# Patient Record
Sex: MALE | Race: BLACK OR AFRICAN AMERICAN | Employment: UNEMPLOYED | URBAN - METROPOLITAN AREA
[De-identification: names, ages, dates, MRNs, and addresses within clinical notes are randomized per-mention and may not be internally consistent; named-entity substitution may affect disease eponyms.]

---

## 2017-01-11 ENCOUNTER — OFFICE VISIT (OUTPATIENT)
Dept: FAMILY MEDICINE CLINIC | Age: 52
End: 2017-01-11

## 2017-01-11 VITALS
BODY MASS INDEX: 25.92 KG/M2 | WEIGHT: 175 LBS | OXYGEN SATURATION: 99 % | SYSTOLIC BLOOD PRESSURE: 132 MMHG | HEIGHT: 69 IN | DIASTOLIC BLOOD PRESSURE: 85 MMHG | TEMPERATURE: 98.3 F | RESPIRATION RATE: 18 BRPM | HEART RATE: 91 BPM

## 2017-01-11 DIAGNOSIS — E11.9 TYPE 2 DIABETES MELLITUS WITHOUT COMPLICATION, WITHOUT LONG-TERM CURRENT USE OF INSULIN (HCC): Primary | ICD-10-CM

## 2017-01-11 DIAGNOSIS — Z86.73 HISTORY OF CVA (CEREBROVASCULAR ACCIDENT): ICD-10-CM

## 2017-01-11 DIAGNOSIS — E78.2 MIXED HYPERLIPIDEMIA: ICD-10-CM

## 2017-01-11 DIAGNOSIS — J40 BRONCHITIS: ICD-10-CM

## 2017-01-11 DIAGNOSIS — E11.9 TYPE 2 DIABETES MELLITUS WITHOUT COMPLICATION, WITHOUT LONG-TERM CURRENT USE OF INSULIN (HCC): ICD-10-CM

## 2017-01-11 RX ORDER — INSULIN PUMP SYRINGE, 3 ML
EACH MISCELLANEOUS
Qty: 1 KIT | Refills: 0 | Status: ON HOLD | OUTPATIENT
Start: 2017-01-11 | End: 2020-11-05

## 2017-01-11 RX ORDER — LISINOPRIL 5 MG/1
5 TABLET ORAL DAILY
Qty: 30 TAB | Refills: 5 | Status: SHIPPED | OUTPATIENT
Start: 2017-01-11 | End: 2018-09-20

## 2017-01-11 RX ORDER — LANCETS 28 GAUGE
EACH MISCELLANEOUS
Qty: 50 LANCET | Refills: 12 | Status: ON HOLD | OUTPATIENT
Start: 2017-01-11 | End: 2020-11-05

## 2017-01-11 RX ORDER — ATORVASTATIN CALCIUM 20 MG/1
20 TABLET, FILM COATED ORAL DAILY
Qty: 30 TAB | Refills: 5 | Status: SHIPPED | OUTPATIENT
Start: 2017-01-11 | End: 2017-06-22 | Stop reason: SDUPTHER

## 2017-01-11 NOTE — PROGRESS NOTES
Dora Garcia 46 y.o. male   Chief Complaint   Patient presents with    Follow-up     1 month f/u    Hypertension    Diabetes     Type 2    Labs     completed on 12-29-16         1. Have you been to the ER, urgent care clinic since your last visit? Hospitalized since your last visit? No    2. Have you seen or consulted any other health care providers outside of the [address] [place] [address] since your last visit? Include any pap smears or colon screening.  No

## 2017-01-11 NOTE — PROGRESS NOTES
HISTORY OF PRESENT ILLNESS  Mendy Al is a 46 y.o. male. Chief Complaint   Patient presents with    Follow-up     1 month f/u    Hypertension    Diabetes     Type 2    Labs     completed on 12-29-16       HPI  Pt is here for follow up of Hypertension, Diabetes Type 2, and review labs. Pt has not been able to check blood sugars as he does not have a machine or testing supplies. Pt has not yet noted any improvement in his vision or his foot pain. Pt had labs on 12-29-16. Labs reviewed in detail with pt. Pt does need medication refills today. Pt requests electronic refills. New concerns today: none      Pt reports that his cough has improved. He is still coughing at times. He is using the albuterol inhaler. Review of Systems   Constitutional: Negative. HENT: Negative. Eyes: Positive for blurred vision. Respiratory: Negative. Cardiovascular: Negative. Neurological: Positive for sensory change. All other systems reviewed and are negative. Physical Exam  Nursing note and vitals reviewed. Constitutional: He is oriented to person, place, and time. He appears well-developed and well-nourished. HENT:   Head: Normocephalic and atraumatic. Right Ear: External ear normal.   Left Ear: External ear normal.   Nose: Nose normal.   Eyes: Conjunctivae and EOM are normal.   Neck: Normal range of motion. Neck supple. No JVD present. Carotid bruit is not present. No thyromegaly present. Cardiovascular: Normal rate, regular rhythm, normal heart sounds and intact distal pulses. Exam reveals no gallop and no friction rub. No murmur heard. Pulmonary/Chest: Effort normal and breath sounds normal. He has no wheezes. He has no rhonchi. He has no rales. Abdominal: Soft. Musculoskeletal: Normal range of motion. Neurological: He is alert and oriented to person, place, and time. Coordination normal.   Skin: Skin is warm and dry. Psychiatric: He has a normal mood and affect. His behavior is normal. Judgment and thought content normal.       ASSESSMENT and PLAN  Kiki Lefort was seen today for follow-up, hypertension, diabetes and labs. Diagnoses and all orders for this visit:    Type 2 diabetes mellitus without complication, without long-term current use of insulin ([facility])  -     Blood-Glucose Meter monitoring kit; TEST GLUCOSE TWICE DAILY. DX:E11.9  -     glucose blood VI test strips (FREESTYLE LITE STRIPS) strip; TEST BLOOD GLUCOSE TWICE DAILY. DX:E11.9  -     lancets (FREESTYLE LANCETS) 28 gauge misc; TEST BLOOD GLUCOSE TWICE DAILY. DX E11.9  -     lisinopril (PRINIVIL, ZESTRIL) 5 mg tablet; Take 1 Tab by mouth daily.  -     METABOLIC PANEL, COMPREHENSIVE; Future  -     LIPID PANEL; Future  -     HEMOGLOBIN A1C WITH EAG; Future    Mixed hyperlipidemia  -     atorvastatin (LIPITOR) 20 mg tablet; Take 1 Tab by mouth daily.  -     METABOLIC PANEL, COMPREHENSIVE; Future  -     LIPID PANEL; Future    History of CVA (cerebrovascular accident)  -     METABOLIC PANEL, COMPREHENSIVE; Future  -     LIPID PANEL; Future    Bronchitis  Improving.       Follow-up Disposition: 1 month for dm; sooner prn

## 2017-01-13 ENCOUNTER — TELEPHONE (OUTPATIENT)
Dept: FAMILY MEDICINE CLINIC | Age: 52
End: 2017-01-13

## 2017-01-13 NOTE — TELEPHONE ENCOUNTER
Contacted patients insurance for approved meter. Insurance instructed me to have the patient call [employer] to set up for his supplies. Pt was notified and given the contact info to [employer].

## 2017-02-13 ENCOUNTER — OFFICE VISIT (OUTPATIENT)
Dept: FAMILY MEDICINE CLINIC | Age: 52
End: 2017-02-13

## 2017-02-13 VITALS
OXYGEN SATURATION: 97 % | HEIGHT: 69 IN | DIASTOLIC BLOOD PRESSURE: 87 MMHG | HEART RATE: 86 BPM | WEIGHT: 179 LBS | BODY MASS INDEX: 26.51 KG/M2 | SYSTOLIC BLOOD PRESSURE: 138 MMHG | TEMPERATURE: 98.4 F

## 2017-02-13 DIAGNOSIS — E11.9 TYPE 2 DIABETES MELLITUS WITHOUT COMPLICATION, WITHOUT LONG-TERM CURRENT USE OF INSULIN (HCC): Primary | ICD-10-CM

## 2017-02-13 RX ORDER — GLIMEPIRIDE 2 MG/1
TABLET ORAL
Qty: 45 TAB | Refills: 0 | Status: SHIPPED | OUTPATIENT
Start: 2017-02-13 | End: 2017-03-02 | Stop reason: SDUPTHER

## 2017-02-13 RX ORDER — GABAPENTIN 100 MG/1
CAPSULE ORAL
Refills: 0 | COMMUNITY
Start: 2017-01-28 | End: 2017-12-01 | Stop reason: SDUPTHER

## 2017-02-13 NOTE — PROGRESS NOTES
1. Have you been to the ER, urgent care clinic since your last visit? Hospitalized since your last visit? Pt states he was seen at [facility] for DM foot pain. Pt was referred to 1 foot 2 foot. Pt was given gabapentin for the DM foot pain. 2. Have you seen or consulted any other health care providers outside of the [address] [place] [address] since your last visit? Include any pap smears or colon screening. Yes 1 foot 2 foot    Is someone accompanying this pt? no    Is the patient using any DME equipment during OV? no      Chief Complaint   Patient presents with    Diabetes     Pt following up today onchronic condition. Pt has recieved DM supplies and has been checking blood sugars daily. Pt states that his blood sugars on average are 280.  Pt states he always checks blood sugars in the AM before breakfast.

## 2017-02-13 NOTE — PROGRESS NOTES
Chief Complaint   Patient presents with    Diabetes     Pt following up today onchronic condition. Pt has recieved DM supplies and has been checking blood sugars daily. Pt states that his blood sugars on average are 280. Pt states he always checks blood sugars in the AM before breakfast.     HISTORY OF PRESENT ILLNESS  Yudi Cabrera is a 46 y.o. male. HPI  Pt is here for follow up of diabetes. He has been checking bs readings and they are usually around 280 prior to breakfast.      Pt does not need medication refills today. New concerns today: pt will f/u with podiatry later this month for testing. He will f/u for the results of those tests. ROS  Review of Systems   Constitutional: Negative. HENT: Negative. Respiratory: Negative. Cardiovascular: Negative. All other systems reviewed and are negative. Physical Exam  Nursing note and vitals reviewed. Constitutional: He is oriented to person, place, and time. He appears well-developed and well-nourished. HENT:   Head: Normocephalic and atraumatic. Right Ear: External ear normal.   Left Ear: External ear normal.   Nose: Nose normal.   Eyes: Conjunctivae and EOM are normal.   Neck: Normal range of motion. Neck supple. No JVD present. Carotid bruit is not present. No thyromegaly present. Cardiovascular: Normal rate, regular rhythm, normal heart sounds and intact distal pulses. Exam reveals no gallop and no friction rub. No murmur heard. Pulmonary/Chest: Effort normal and breath sounds normal. He has no wheezes. He has no rhonchi. He has no rales. Abdominal: Soft. Musculoskeletal: Normal range of motion. Neurological: He is alert and oriented to person, place, and time. Coordination normal.   Skin: Skin is warm and dry. Psychiatric: He has a normal mood and affect. His behavior is normal. Judgment and thought content normal.       ASSESSMENT and PLAN  Koki Fletcher was seen today for diabetes.     Diagnoses and all orders for this visit:    Type 2 diabetes mellitus without complication, without long-term current use of insulin (HCC)  -     glimepiride (AMARYL) 2 mg tablet; Take one pill by mouth each morning x 2 wks, then increase to 2 pills by mouth each morning. Anticipate increasing dosage further if tolerated. Pt agrees with plan.       Follow-up Disposition: 1 month; sooner prn

## 2017-03-02 DIAGNOSIS — E11.9 TYPE 2 DIABETES MELLITUS WITHOUT COMPLICATION, WITHOUT LONG-TERM CURRENT USE OF INSULIN (HCC): ICD-10-CM

## 2017-03-03 RX ORDER — GLIMEPIRIDE 2 MG/1
TABLET ORAL
Qty: 45 TAB | Refills: 0 | Status: SHIPPED | OUTPATIENT
Start: 2017-03-03 | End: 2017-07-25 | Stop reason: SDUPTHER

## 2017-05-31 ENCOUNTER — TELEPHONE (OUTPATIENT)
Dept: FAMILY MEDICINE CLINIC | Age: 52
End: 2017-05-31

## 2017-05-31 NOTE — TELEPHONE ENCOUNTER
Arcelia with [employer] is calling regarding paperwork that was filled out by Dr. Sue Ron. Not all the areas have been filled out. She needs ICD 10 codes pertaining to diabetes so that [employer] will pay.

## 2017-06-02 ENCOUNTER — TELEPHONE (OUTPATIENT)
Dept: FAMILY MEDICINE CLINIC | Age: 52
End: 2017-06-02

## 2017-06-07 ENCOUNTER — OFFICE VISIT (OUTPATIENT)
Dept: FAMILY MEDICINE CLINIC | Age: 52
End: 2017-06-07

## 2017-06-07 ENCOUNTER — PATIENT OUTREACH (OUTPATIENT)
Dept: FAMILY MEDICINE CLINIC | Age: 52
End: 2017-06-07

## 2017-06-07 VITALS
HEIGHT: 69 IN | HEART RATE: 96 BPM | OXYGEN SATURATION: 97 % | BODY MASS INDEX: 26.36 KG/M2 | TEMPERATURE: 98.1 F | DIASTOLIC BLOOD PRESSURE: 89 MMHG | SYSTOLIC BLOOD PRESSURE: 150 MMHG | RESPIRATION RATE: 18 BRPM | WEIGHT: 178 LBS

## 2017-06-07 DIAGNOSIS — Z12.11 COLON CANCER SCREENING: ICD-10-CM

## 2017-06-07 DIAGNOSIS — E11.9 TYPE 2 DIABETES MELLITUS WITHOUT COMPLICATION, WITHOUT LONG-TERM CURRENT USE OF INSULIN (HCC): ICD-10-CM

## 2017-06-07 DIAGNOSIS — Z00.00 WELL ADULT EXAM: Primary | ICD-10-CM

## 2017-06-07 DIAGNOSIS — Z23 ENCOUNTER FOR IMMUNIZATION: ICD-10-CM

## 2017-06-07 DIAGNOSIS — Z11.59 ENCOUNTER FOR HEPATITIS C SCREENING TEST FOR LOW RISK PATIENT: ICD-10-CM

## 2017-06-07 LAB — HBA1C MFR BLD HPLC: 10.4 %

## 2017-06-07 RX ORDER — INSULIN GLARGINE 100 [IU]/ML
10 INJECTION, SOLUTION SUBCUTANEOUS
Qty: 1 PEN | Refills: 5 | Status: SHIPPED | OUTPATIENT
Start: 2017-06-07 | End: 2017-07-06 | Stop reason: SDUPTHER

## 2017-06-07 NOTE — TELEPHONE ENCOUNTER
Returned call to [facility] at [facility], and she was given the patients diagnosis of Diabetes Type 2.

## 2017-06-07 NOTE — MR AVS SNAPSHOT
Visit Information Date & Time Provider Department Dept. Phone Encounter #  
 6/7/2017  9:45 AM Lucretia Adam MD [address] [id number] Follow-up Instructions Return in about 2 weeks (around 6/21/2017) for diabetes. Upcoming Health Maintenance Date Due Hepatitis C Screening 1965 FOOT EXAM Q1 7/26/1975 MICROALBUMIN Q1 7/26/1975 EYE EXAM RETINAL OR DILATED Q1 7/26/1975 Pneumococcal 19-64 Medium Risk (1 of 1 - PPSV23) 7/26/1984 DTaP/Tdap/Td series (1 - Tdap) 7/26/1986 FOBT Q 1 YEAR AGE 50-75 7/26/2015 HEMOGLOBIN A1C Q6M 6/28/2017 INFLUENZA AGE 9 TO ADULT 8/1/2017 LIPID PANEL Q1 12/29/2017 Allergies as of 6/7/2017  Review Complete On: 6/7/2017 By: Lucretia Adam MD  
 No Known Allergies Current Immunizations  Never Reviewed No immunizations on file. Not reviewed this visit You Were Diagnosed With   
  
 Codes Comments Well adult exam    -  Primary ICD-10-CM: Z00.00 ICD-9-CM: V70.0 Encounter for hepatitis C screening test for low risk patient     ICD-10-CM: Z11.59 
ICD-9-CM: V73.89 Encounter for immunization     ICD-10-CM: T85 ICD-9-CM: V03.89 Colon cancer screening     ICD-10-CM: Z12.11 ICD-9-CM: V76.51 Type 2 diabetes mellitus without complication, without long-term current use of insulin (HCC)     ICD-10-CM: E11.9 ICD-9-CM: 250.00 Vitals BP Pulse Temp Resp Height(growth percentile) Weight(growth percentile) 150/89 96 98.1 °F (36.7 °C) (Oral) 18 5' 8.9\" (1.75 m) 178 lb (80.7 kg) SpO2 BMI Smoking Status 97% 26.36 kg/m2 Never Smoker BMI and BSA Data Body Mass Index Body Surface Area  
 26.36 kg/m 2 1.98 m 2 Preferred Pharmacy Pharmacy Name Phone [facility] [address] [place] [address] [phone] Your Updated Medication List  
  
   
 This list is accurate as of: 6/7/17 10:27 AM.  Always use your most recent med list.  
  
  
  
  
 albuterol 90 mcg/actuation inhaler Commonly known as:  PROVENTIL HFA, VENTOLIN HFA, PROAIR HFA Take 2 Puffs by inhalation every four (4) hours as needed for Wheezing. aspirin 81 mg chewable tablet Take 1 Tab by mouth daily. atorvastatin 20 mg tablet Commonly known as:  LIPITOR Take 1 Tab by mouth daily. Blood-Glucose Meter monitoring kit TEST GLUCOSE TWICE DAILY. DX:E11.9 FISH OIL 1,000 mg Cap Generic drug:  omega-3 fatty acids-vitamin e Take 1 Cap by mouth daily. gabapentin 100 mg capsule Commonly known as:  NEURONTIN  
TK ONE C PO  TID  
  
 glimepiride 2 mg tablet Commonly known as:  AMARYL  
TAKE 1 TABLET BY MOUTH EACH MORNING FOR 2 WEEKS, THEN INCREASE TO 2 TABLETS BY MOUTH EACH MORNING  
  
 * glucose blood VI test strips strip Commonly known as:  blood glucose test  
Use with [employer] Prime Meter. Test Blood sugar daily. DX:250.02  
  
 * glucose blood VI test strips strip Commonly known as:  FREESTYLE LITE STRIPS  
TEST BLOOD GLUCOSE TWICE DAILY. DX:E11.9  
  
 insulin glargine 100 unit/mL (3 mL) Inpn Commonly known as:  LANTUS,BASAGLAR  
10 Units by SubCUTAneous route nightly. * Lancets Misc Use with [employer] Prime Meter. Test Blood sugar Daily. DX: 250.02  
  
 * lancets 28 gauge Misc Commonly known as:  FREESTYLE LANCETS  
TEST BLOOD GLUCOSE TWICE DAILY. DX E11.9  
  
 lisinopril 5 mg tablet Commonly known as:  Dulcie Mcburney Take 1 Tab by mouth daily. SITagliptin-metFORMIN 100-1,000 mg Tm24 Commonly known as:  JANUMET XR Take 1 Tab by mouth daily. * Notice: This list has 4 medication(s) that are the same as other medications prescribed for you. Read the directions carefully, and ask your doctor or other care provider to review them with you. Prescriptions Sent to Pharmacy Refills insulin glargine (LANTUS,BASAGLAR) 100 unit/mL (3 mL) inpn 5 Sig: 10 Units by SubCUTAneous route nightly. Class: Normal  
 Pharmacy: [id number], [address] [place] [address] #: [phone] Route: SubCUTAneous We Performed the Following AMB POC HEMOGLOBIN A1C [03299 CPT(R)] REFERRAL TO OPHTHALMOLOGY [REF57 Custom] Follow-up Instructions Return in about 2 weeks (around 6/21/2017) for diabetes. To-Do List   
 06/07/2017 Lab:  HEMOGLOBIN A1C WITH EAG   
  
 06/07/2017 Lab:  HEPATITIS C AB   
  
 06/07/2017 Lab:  LIPID PANEL   
  
 06/07/2017 Lab:  METABOLIC PANEL, COMPREHENSIVE   
  
 06/07/2017 Lab:  MICROALBUMIN, UR, RAND W/ MICROALBUMIN/CREA RATIO   
  
 06/07/2017 Lab:  OCCULT BLOOD, IMMUNOASSAY (FIT) Referral Information Referral ID Referred By [facility]  
  
 [id number] [address] [facility], [place] [address] Phone: [phone] Fax: [phone] Visits Status Start Date End Date 1 New Request 6/7/17 6/7/18 If your referral has a status of pending review or denied, additional information will be sent to support the outcome of this decision. Patient Instructions Please contact our office if you have any questions about your visit today. Introducing [facility]! King Shankar introduces [employer] patient portal. Now you can access parts of your medical record, email your doctor's office, and request medication refills online. 1. In your internet browser, go to [email or web address] 2. Click on the First Time User? Click Here link in the Sign In box. You will see the New Member Sign Up page. 3. Enter your [employer] Access Code exactly as it appears below. You will not need to use this code after youve completed the sign-up process. If you do not sign up before the expiration date, you must request a new code. · [employer]b Access Code: [email or web address] Expires: 9/5/2017  9:29 AM 
 
4. Enter the last four digits of your Social Security Number (xxxx) and Date of Birth (mm/dd/yyyy) as indicated and click Submit. You will be taken to the next sign-up page. 5. Create a Coub ID. This will be your [employer]b login ID and cannot be changed, so think of one that is secure and easy to remember. 6. Create a [employer]b password. You can change your password at any time. 7. Enter your Password Reset Question and Answer. This can be used at a later time if you forget your password. 8. Enter your e-mail address. You will receive e-mail notification when new information is available in [address] 9. Click Sign Up. You can now view and download portions of your medical record. 10. Click the Download Summary menu link to download a portable copy of your medical information. If you have questions, please visit the Frequently Asked Questions section of the [employer] website. Remember, [employer] is NOT to be used for urgent needs. For medical emergencies, dial 911. Now available from your iPhone and Android! Please provide this summary of care documentation to your next provider. Your primary care clinician is listed as [place]. If you have any questions after today's visit, please call [phone].

## 2017-06-07 NOTE — PROGRESS NOTES
Subjective:     Callie Turner is a 46 y.o. male presenting for annual exam and complete physical.    Pt reports that home bs readings are now more consistently around 210. Patient Active Problem List   Diagnosis Code    Unspecified essential hypertension I10    Type 2 diabetes mellitus without complication, without long-term current use of insulin (HCC) E11.9    History of CVA (cerebrovascular accident) Z80.78    History of MI (myocardial infarction) I25.2     Current Outpatient Prescriptions   Medication Sig Dispense Refill    insulin glargine (LANTUS,BASAGLAR) 100 unit/mL (3 mL) inpn 10 Units by SubCUTAneous route nightly. 1 Pen 5    glimepiride (AMARYL) 2 mg tablet TAKE 1 TABLET BY MOUTH EACH MORNING FOR 2 WEEKS, THEN INCREASE TO 2 TABLETS BY MOUTH EACH MORNING 45 Tab 0    gabapentin (NEURONTIN) 100 mg capsule TK ONE C PO  TID  0    Blood-Glucose Meter monitoring kit TEST GLUCOSE TWICE DAILY. DX:E11.9 1 Kit 0    glucose blood VI test strips (FREESTYLE LITE STRIPS) strip TEST BLOOD GLUCOSE TWICE DAILY. DX:E11.9 48 Strip 12    lancets (FREESTYLE LANCETS) 28 gauge misc TEST BLOOD GLUCOSE TWICE DAILY. DX E11.9 50 Lancet 12    atorvastatin (LIPITOR) 20 mg tablet Take 1 Tab by mouth daily. 30 Tab 5    lisinopril (PRINIVIL, ZESTRIL) 5 mg tablet Take 1 Tab by mouth daily. 30 Tab 5    SITagliptin-metFORMIN (JANUMET XR) 100-1,000 mg TM24 Take 1 Tab by mouth daily. 30 Tab 5    aspirin 81 mg chewable tablet Take 1 Tab by mouth daily. 30 Tab 5    albuterol (PROVENTIL HFA, VENTOLIN HFA, PROAIR HFA) 90 mcg/actuation inhaler Take 2 Puffs by inhalation every four (4) hours as needed for Wheezing. 1 Inhaler 0    Lancets Misc Use with Relion Prime Meter. Test Blood sugar Daily. DX: 250.02 50 Each 1    glucose blood VI test strips (BLOOD GLUCOSE TEST) strip Use with [employer]ion Prime Meter. Test Blood sugar daily.  DX:250.02 50 Strip 1    omega-3 fatty acids-vitamin e (FISH OIL) 1,000 mg cap Take 1 Cap by mouth daily. No Known Allergies  Past Medical History:   Diagnosis Date    Arthritis     Chest pain     CVA [facility]cerebral vascular accident) ([facility] 2011    r side stroke    Depression     Diabetes ([facility]     Heart attack ([facility] 2010    chest pains    Hypercholesterolemia     Hypertension     Loss of hearing     right ear, patient states it comes and goes    Polyuria     Shortness of breath 2011    Type 2 diabetes mellitus without complication, without long-term current use of insulin ([facility] 7/23/2013     Past Surgical History:   Procedure Laterality Date    HX HEART CATHETERIZATION       Family History   Problem Relation Age of Onset    Arthritis-osteo Mother     Gout Mother     Diabetes Mother     Hypertension Mother     Diabetes Father     Diabetes Brother     Diabetes Maternal Grandmother      Social History   Substance Use Topics    Smoking status: Never Smoker    Smokeless tobacco: Never Used    Alcohol use Yes      Comment: occassionally        Lab Results   Component Value Date/Time    Glucose 337 12/29/2016 08:13 AM    LDL, calculated 131 12/29/2016 08:13 AM    Creatinine 0.6 12/29/2016 08:13 AM      Lab Results   Component Value Date/Time    Cholesterol, total 193 12/29/2016 08:13 AM    HDL Cholesterol 31 12/29/2016 08:13 AM    LDL, calculated 131 12/29/2016 08:13 AM    Triglyceride 154 12/29/2016 08:13 AM       Lab Results   Component Value Date/Time    Sodium 141 12/29/2016 08:13 AM    Potassium 5.2 12/29/2016 08:13 AM    Chloride 98 12/29/2016 08:13 AM    CO2 24 12/29/2016 08:13 AM    Anion gap 19.0 12/29/2016 08:13 AM    Glucose 337 12/29/2016 08:13 AM    BUN 10 12/29/2016 08:13 AM    Creatinine 0.6 12/29/2016 08:13 AM    Calcium 9.8 12/29/2016 08:13 AM    Bilirubin, total 0.4 12/29/2016 08:13 AM    ALT (SGPT) 11 12/29/2016 08:13 AM    AST (SGOT) 8 12/29/2016 08:13 AM    Alk.  phosphatase 81 12/29/2016 08:13 AM    Protein, total 7.1 12/29/2016 08:13 AM    Albumin 4.0 12/29/2016 08:13 AM Globulin 3.1 12/29/2016 08:13 AM    A-G Ratio 1.3 12/29/2016 08:13 AM      Lab Results   Component Value Date/Time    Hemoglobin A1c (POC) 10.4 06/07/2017 09:59 AM         Review of Systems  A comprehensive review of systems was negative. Objective:     Visit Vitals    /89  Comment: pt did take his BP medication today    Pulse 96    Temp 98.1 °F (36.7 °C) (Oral)    Resp 18    Ht 5' 8.9\" (1.75 m)    Wt 178 lb (80.7 kg)    SpO2 97%    BMI 26.36 kg/m2     Physical exam:   General:  Alert, cooperative, no distress, appears stated age. Head:  Normocephalic, without obvious abnormality, atraumatic. Eyes:  Conjunctivae/corneas clear. PERRL, EOMs intact. Fundi benign. Ears:  Normal TMs and external ear canals both ears. Nose: Nares normal. Septum midline. Mucosa normal. No drainage or sinus tenderness. Throat: Lips, mucosa, and tongue normal. Teeth and gums normal.   Neck: Supple, symmetrical, trachea midline, no adenopathy, thyroid: no enlargement/tenderness/nodules, no carotid bruit and no JVD. Back:   Symmetric, no curvature. ROM normal. No CVA tenderness. Lungs:   Clear to auscultation bilaterally. Chest wall:  No tenderness or deformity. Heart:  Regular rate and rhythm, S1, S2 normal, no murmur, click, rub or gallop. Abdomen:   Soft, non-tender. Bowel sounds normal. No masses,  No organomegaly. Extremities: Extremities normal, atraumatic, no cyanosis or edema. Pulses: 2+ and symmetric all extremities. Skin: Skin color, texture, turgor normal. No rashes or lesions. Lymph nodes: Cervical and supraclavicular nodes normal.   Neurologic: CNII-XII grossly intact. Normal reflexes throughout. Assessment/Plan:     Well adult  routine labs ordered. Katie France was seen today for complete physical.    Diagnoses and all orders for this visit:    Well adult exam    Encounter for hepatitis C screening test for low risk patient  -     HEPATITIS C AB;  Future  -     HEPATITIS C AB    Encounter for immunization  -     Pneumococcal polysaccharide vaccine, 23-valent, adult or immunosuppressed pt dose  -     Tetanus, diphtheria toxoids and acellular pertussis (TDAP) vaccine, in individuals >=7 years, IM    Colon cancer screening  -     OCCULT BLOOD, IMMUNOASSAY (FIT); Future    Type 2 diabetes mellitus without complication, without long-term current use of insulin (HCC)  -     AMB POC HEMOGLOBIN A1C  -     REFERRAL TO OPHTHALMOLOGY  -     LIPID PANEL; Future  -     METABOLIC PANEL, COMPREHENSIVE; Future  -     HEMOGLOBIN A1C WITH EAG; Future  -     MICROALBUMIN, UR, RAND W/ MICROALBUMIN/CREA RATIO; Future  -    Start: insulin glargine (LANTUS,BASAGLAR) 100 unit/mL (3 mL) inpn; 10 Units by SubCUTAneous route nightly. Pt referred to RN Freddy for insulin teaching. F/u in 2 wks, sooner prn.   -     CREATININE, UR, RANDOM  -     LIPID PANEL  -     METABOLIC PANEL, COMPREHENSIVE  -     HEMOGLOBIN A1C WITH EAG  -     MICROALBUMIN, UR, RAND W/ MICROALBUMIN/CREA RATIO    .

## 2017-06-07 NOTE — PROGRESS NOTES
Ania Hammer 46 y.o. male   Chief Complaint   Patient presents with    Complete Physical         1. Have you been to the ER, urgent care clinic since your last visit? Hospitalized since your last visit? No    2. Have you seen or consulted any other health care providers outside of the [address] [place] [address] since your last visit? Include any pap smears or colon screening. No  Ania Hammer 1965 male who presents for routine immunizations. Patient denies any symptoms , reactions or allergies that would exclude them from being immunized today. Risks and adverse reactions were discussed and the VIS was given to them. All questions were addressed. Order placed for TDAP and PPSV23,  per Verbal Order from DR. TAYLOR with read back. Patient was observed for 15 min post injection. There were no reactions observed.     Mariya Reza LPN

## 2017-06-08 DIAGNOSIS — E11.9 TYPE 2 DIABETES MELLITUS WITHOUT COMPLICATION, WITHOUT LONG-TERM CURRENT USE OF INSULIN (HCC): Primary | ICD-10-CM

## 2017-06-08 LAB
A-G RATIO,AGRAT: 1.6 RATIO (ref 1.1–2.6)
ALBUMIN SERPL-MCNC: 4.1 G/DL (ref 3.5–5)
ALP SERPL-CCNC: 82 U/L (ref 25–115)
ALT SERPL-CCNC: 10 U/L (ref 5–40)
ANION GAP SERPL CALC-SCNC: 15 MMOL/L
AST SERPL W P-5'-P-CCNC: 9 U/L (ref 10–37)
AVG GLU, 10930: 256 MG/DL (ref 91–123)
BILIRUB SERPL-MCNC: 0.2 MG/DL (ref 0.2–1.2)
BUN SERPL-MCNC: 10 MG/DL (ref 6–22)
CALCIUM SERPL-MCNC: 10.2 MG/DL (ref 8.4–10.4)
CHLORIDE SERPL-SCNC: 95 MMOL/L (ref 98–110)
CHOLEST SERPL-MCNC: 196 MG/DL (ref 110–200)
CO2 SERPL-SCNC: 27 MMOL/L (ref 20–32)
CREAT SERPL-MCNC: 0.7 MG/DL (ref 0.5–1.2)
CREATININE, URINE: 48 MG/DL
GFRAA, 66117: >60
GFRNA, 66118: >60
GLOBULIN,GLOB: 2.6 G/DL (ref 2–4)
GLUCOSE SERPL-MCNC: 298 MG/DL (ref 65–99)
HBA1C MFR BLD HPLC: 10.5 % (ref 4.8–5.9)
HCV AB SER IA-ACNC: NORMAL
HDLC SERPL-MCNC: 41 MG/DL (ref 40–59)
LDLC SERPL CALC-MCNC: 126 MG/DL (ref 50–99)
MICROALB/CREAT RATIO, 140286: NORMAL MCG/MG OF CREATININE (ref 0–30)
MICROALBUMIN,URINE RANDOM 140054: <12 UG/ML (ref 0.1–17)
POTASSIUM SERPL-SCNC: 5.2 MMOL/L (ref 3.5–5.5)
PROT SERPL-MCNC: 6.7 G/DL (ref 6.4–8.3)
SODIUM SERPL-SCNC: 137 MMOL/L (ref 133–145)
TRIGL SERPL-MCNC: 148 MG/DL (ref 40–149)
VLDLC SERPL CALC-MCNC: 30 MG/DL (ref 8–30)

## 2017-06-08 RX ORDER — PEN NEEDLE, DIABETIC 30 GX3/16"
NEEDLE, DISPOSABLE MISCELLANEOUS
COMMUNITY
End: 2017-06-08 | Stop reason: SDUPTHER

## 2017-06-09 RX ORDER — PEN NEEDLE, DIABETIC 30 GX3/16"
NEEDLE, DISPOSABLE MISCELLANEOUS DAILY
Qty: 1 PACKAGE | Refills: 12 | Status: SHIPPED | OUTPATIENT
Start: 2017-06-09 | End: 2018-09-27 | Stop reason: SDUPTHER

## 2017-06-14 DIAGNOSIS — Z86.73 HISTORY OF CVA (CEREBROVASCULAR ACCIDENT): ICD-10-CM

## 2017-06-14 DIAGNOSIS — E78.2 MIXED HYPERLIPIDEMIA: ICD-10-CM

## 2017-06-14 DIAGNOSIS — I10 UNSPECIFIED ESSENTIAL HYPERTENSION: ICD-10-CM

## 2017-06-14 DIAGNOSIS — I25.2 HISTORY OF MI (MYOCARDIAL INFARCTION): ICD-10-CM

## 2017-06-15 RX ORDER — GUAIFENESIN 100 MG/5ML
81 LIQUID (ML) ORAL DAILY
Qty: 30 TAB | Refills: 5 | Status: SHIPPED | OUTPATIENT
Start: 2017-06-15 | End: 2017-07-25 | Stop reason: SDUPTHER

## 2017-06-22 ENCOUNTER — OFFICE VISIT (OUTPATIENT)
Dept: FAMILY MEDICINE CLINIC | Age: 52
End: 2017-06-22

## 2017-06-22 VITALS
TEMPERATURE: 98.4 F | DIASTOLIC BLOOD PRESSURE: 82 MMHG | HEART RATE: 94 BPM | RESPIRATION RATE: 18 BRPM | SYSTOLIC BLOOD PRESSURE: 140 MMHG | OXYGEN SATURATION: 98 % | WEIGHT: 178 LBS | HEIGHT: 69 IN | BODY MASS INDEX: 26.36 KG/M2

## 2017-06-22 DIAGNOSIS — E78.2 MIXED HYPERLIPIDEMIA: Primary | ICD-10-CM

## 2017-06-22 DIAGNOSIS — Z79.4 TYPE 2 DIABETES MELLITUS WITHOUT COMPLICATION, WITH LONG-TERM CURRENT USE OF INSULIN (HCC): ICD-10-CM

## 2017-06-22 DIAGNOSIS — E11.9 TYPE 2 DIABETES MELLITUS WITHOUT COMPLICATION, WITH LONG-TERM CURRENT USE OF INSULIN (HCC): ICD-10-CM

## 2017-06-22 RX ORDER — ATORVASTATIN CALCIUM 20 MG/1
20 TABLET, FILM COATED ORAL DAILY
Qty: 30 TAB | Refills: 5 | Status: SHIPPED | OUTPATIENT
Start: 2017-06-22 | End: 2018-05-30 | Stop reason: SDUPTHER

## 2017-06-22 NOTE — PROGRESS NOTES
Adele Desir 46 y.o. male   Chief Complaint   Patient presents with    Follow-up     2 week f/u    Diabetes     Type 2, pt reports Glucose readings range from 140-180    Labs     completed on 6-7-17         1. Have you been to the ER, urgent care clinic since your last visit? Hospitalized since your last visit? No    2. Have you seen or consulted any other health care providers outside of the [address] [place] [address] since your last visit? Include any pap smears or colon screening.  No

## 2017-06-22 NOTE — PROGRESS NOTES
HISTORY OF PRESENT ILLNESS  Ania Hammer is a 46 y.o. male. Chief Complaint   Patient presents with    Follow-up     2 week f/u    Diabetes     Type 2, pt reports Glucose readings range from 140-180    Labs     completed on 6-7-17       HPI  Pt is here for a 2 week follow up of Diabetes Type 2. At his last visit, pt reported that his blood sugars were around 210. Pt's girlfriend reports that they were actually above 300 frequently. Patient reports that his glucose readings range from 140 to 180. He has been getting his shot each morning as they were not aware that it should be given in the evening. Pt had labs on 6-7-17. Labs reviewed in detail with pt. Pt does not need medication refills today. New concerns today: none      ROS  Review of Systems   Constitutional: Negative. HENT: Negative. Respiratory: Negative. Cardiovascular: Negative. All other systems reviewed and are negative. Physical Exam  Nursing note and vitals reviewed. Constitutional: He is oriented to person, place, and time. He appears well-developed and well-nourished. HENT:   Head: Normocephalic and atraumatic. Right Ear: External ear normal.   Left Ear: External ear normal.   Nose: Nose normal.   Eyes: Conjunctivae and EOM are normal.   Neck: Normal range of motion. Neck supple. No JVD present. Carotid bruit is not present. No thyromegaly present. Cardiovascular: Normal rate, regular rhythm, normal heart sounds and intact distal pulses. Exam reveals no gallop and no friction rub. No murmur heard. Pulmonary/Chest: Effort normal and breath sounds normal. He has no wheezes. He has no rhonchi. He has no rales. Abdominal: Soft. Musculoskeletal: Normal range of motion. Neurological: He is alert and oriented to person, place, and time. Coordination normal.   Skin: Skin is warm and dry. Psychiatric: He has a normal mood and affect.  His behavior is normal. Judgment and thought content normal. ASSESSMENT and PLAN  Talha Tang was seen today for follow-up, diabetes and labs. Diagnoses and all orders for this visit:    Mixed hyperlipidemia  -     atorvastatin (LIPITOR) 20 mg tablet; Take 1 Tab by mouth daily. Type 2 diabetes mellitus without complication, with long-term current use of insulin ([facility])  Increase lantus to 12Uqhs x 1 wk, then increase to 15U qhs if fasting blood sugar not between .      Follow-up Disposition: 2 wks; sooner prn

## 2017-06-26 ENCOUNTER — PATIENT OUTREACH (OUTPATIENT)
Dept: FAMILY MEDICINE CLINIC | Age: 52
End: 2017-06-26

## 2017-06-26 NOTE — PROGRESS NOTES
Completed chart review, patient kept DM follow-up appointment with PCP on 6/22/17 as previously scheduled. Next DM follow-up appointment is scheduled with PCP for 7/6/17 at 0930.

## 2017-07-06 ENCOUNTER — OFFICE VISIT (OUTPATIENT)
Dept: FAMILY MEDICINE CLINIC | Age: 52
End: 2017-07-06

## 2017-07-06 VITALS
TEMPERATURE: 98.3 F | RESPIRATION RATE: 18 BRPM | HEART RATE: 82 BPM | OXYGEN SATURATION: 97 % | SYSTOLIC BLOOD PRESSURE: 138 MMHG | BODY MASS INDEX: 26.07 KG/M2 | DIASTOLIC BLOOD PRESSURE: 83 MMHG | HEIGHT: 69 IN | WEIGHT: 176 LBS

## 2017-07-06 DIAGNOSIS — E11.9 TYPE 2 DIABETES MELLITUS WITHOUT COMPLICATION, WITH LONG-TERM CURRENT USE OF INSULIN (HCC): Primary | ICD-10-CM

## 2017-07-06 DIAGNOSIS — Z79.4 TYPE 2 DIABETES MELLITUS WITHOUT COMPLICATION, WITH LONG-TERM CURRENT USE OF INSULIN (HCC): Primary | ICD-10-CM

## 2017-07-06 RX ORDER — INSULIN GLARGINE 100 [IU]/ML
12 INJECTION, SOLUTION SUBCUTANEOUS
Qty: 2 ADJUSTABLE DOSE PRE-FILLED PEN SYRINGE | Refills: 5 | Status: SHIPPED | OUTPATIENT
Start: 2017-07-06 | End: 2018-02-12 | Stop reason: SDUPTHER

## 2017-07-06 NOTE — PROGRESS NOTES
Digna Parrish 46 y.o. male   Chief Complaint   Patient presents with    Follow-up     2 wk f/u    Diabetes     Type 2, pt reports that his glucose was 137 this morning         1. Have you been to the ER, urgent care clinic since your last visit? Hospitalized since your last visit? No    2. Have you seen or consulted any other health care providers outside of the [address] [place] [address] since your last visit? Include any pap smears or colon screening.  No

## 2017-07-06 NOTE — PROGRESS NOTES
HISTORY OF PRESENT ILLNESS  Sharmaine Bob is a 46 y.o. male. Chief Complaint   Patient presents with    Follow-up     2 wk f/u    Diabetes     Type 2, pt reports that his glucose was 137 this morning       HPI  Pt here for f/u of dm. Pt is tolerating the change from lantus in the morning to qhs;  He is also seeing better readings with the increase to 12U of lantus. His readings are usually 130-140s. He has decreased sugary beverages, including soda. His highest reading was 210. ROS  Review of Systems   Constitutional: Negative. HENT: Negative. Respiratory: Negative. Cardiovascular: Negative. All other systems reviewed and are negative. Physical Exam  Nursing note and vitals reviewed. Constitutional: He is oriented to person, place, and time. He appears well-developed and well-nourished. HENT:   Head: Normocephalic and atraumatic. Right Ear: External ear normal.   Left Ear: External ear normal.   Nose: Nose normal.   Eyes: Conjunctivae and EOM are normal.   Neck: Normal range of motion. Neck supple. No JVD present. Carotid bruit is not present. No thyromegaly present. Cardiovascular: Normal rate, regular rhythm, normal heart sounds and intact distal pulses. Exam reveals no gallop and no friction rub. No murmur heard. Pulmonary/Chest: Effort normal and breath sounds normal. He has no wheezes. He has no rhonchi. He has no rales. Abdominal: Soft. Musculoskeletal: Normal range of motion. Neurological: He is alert and oriented to person, place, and time. Coordination normal.   Skin: Skin is warm and dry. Psychiatric: He has a normal mood and affect. His behavior is normal. Judgment and thought content normal.       ASSESSMENT and PLAN  Marcia Pompa was seen today for follow-up and diabetes.     Diagnoses and all orders for this visit:    Type 2 diabetes mellitus without complication, with long-term current use of insulin (HCC)  -     insulin glargine (LANTUS,BASAGLAR) 100 unit/mL (3 mL) inpn; 12 Units by SubCUTAneous route nightly. Doing well. Cont pres tx plan.      Follow-up Disposition: 3 months; sooner prn

## 2017-07-24 DIAGNOSIS — I10 UNSPECIFIED ESSENTIAL HYPERTENSION: ICD-10-CM

## 2017-07-24 DIAGNOSIS — Z86.73 HISTORY OF CVA (CEREBROVASCULAR ACCIDENT): ICD-10-CM

## 2017-07-24 DIAGNOSIS — E78.2 MIXED HYPERLIPIDEMIA: ICD-10-CM

## 2017-07-24 DIAGNOSIS — E11.9 TYPE 2 DIABETES MELLITUS WITHOUT COMPLICATION, WITHOUT LONG-TERM CURRENT USE OF INSULIN (HCC): ICD-10-CM

## 2017-07-24 DIAGNOSIS — E11.9 DIABETES MELLITUS WITHOUT COMPLICATION (HCC): ICD-10-CM

## 2017-07-24 DIAGNOSIS — I25.2 HISTORY OF MI (MYOCARDIAL INFARCTION): ICD-10-CM

## 2017-07-26 RX ORDER — GUAIFENESIN 100 MG/5ML
81 LIQUID (ML) ORAL DAILY
Qty: 30 TAB | Refills: 5 | Status: SHIPPED | OUTPATIENT
Start: 2017-07-26 | End: 2018-07-17 | Stop reason: SDUPTHER

## 2017-07-26 RX ORDER — GLIMEPIRIDE 2 MG/1
2 TABLET ORAL DAILY
Qty: 30 TAB | Refills: 5 | Status: SHIPPED | OUTPATIENT
Start: 2017-07-26 | End: 2018-06-20 | Stop reason: SDUPTHER

## 2017-10-06 ENCOUNTER — OFFICE VISIT (OUTPATIENT)
Dept: FAMILY MEDICINE CLINIC | Age: 52
End: 2017-10-06

## 2017-10-06 VITALS
TEMPERATURE: 98.3 F | WEIGHT: 178 LBS | SYSTOLIC BLOOD PRESSURE: 135 MMHG | BODY MASS INDEX: 26.36 KG/M2 | HEART RATE: 88 BPM | DIASTOLIC BLOOD PRESSURE: 80 MMHG | RESPIRATION RATE: 16 BRPM | OXYGEN SATURATION: 98 % | HEIGHT: 69 IN

## 2017-10-06 DIAGNOSIS — E78.5 HYPERLIPIDEMIA, UNSPECIFIED HYPERLIPIDEMIA TYPE: ICD-10-CM

## 2017-10-06 DIAGNOSIS — Z79.4 TYPE 2 DIABETES MELLITUS WITHOUT COMPLICATION, WITH LONG-TERM CURRENT USE OF INSULIN (HCC): Primary | ICD-10-CM

## 2017-10-06 DIAGNOSIS — I10 ESSENTIAL HYPERTENSION: ICD-10-CM

## 2017-10-06 DIAGNOSIS — E11.9 TYPE 2 DIABETES MELLITUS WITHOUT COMPLICATION, WITH LONG-TERM CURRENT USE OF INSULIN (HCC): Primary | ICD-10-CM

## 2017-10-06 DIAGNOSIS — E11.9 TYPE 2 DIABETES MELLITUS WITHOUT COMPLICATION, WITH LONG-TERM CURRENT USE OF INSULIN (HCC): ICD-10-CM

## 2017-10-06 DIAGNOSIS — Z79.4 TYPE 2 DIABETES MELLITUS WITHOUT COMPLICATION, WITH LONG-TERM CURRENT USE OF INSULIN (HCC): ICD-10-CM

## 2017-10-06 DIAGNOSIS — Z28.21 INFLUENZA VACCINATION DECLINED: ICD-10-CM

## 2017-10-06 NOTE — PROGRESS NOTES
Vito Harris 46 y.o. male   Chief Complaint   Patient presents with    Follow Up Chronic Condition     3 month    Cholesterol Problem    Diabetes     Type 2, glucose reading was 168 this morning.  Hypertension         1. Have you been to the ER, urgent care clinic since your last visit? Hospitalized since your last visit? No    2. Have you seen or consulted any other health care providers outside of the [address] [place] [address] since your last visit? Include any pap smears or colon screening.  No

## 2017-10-06 NOTE — PROGRESS NOTES
HISTORY OF PRESENT ILLNESS  Agueda Coley is a 46 y.o. male. Chief Complaint   Patient presents with    Follow Up Chronic Condition     3 month    Cholesterol Problem    Diabetes     Type 2, glucose reading was 168 this morning.  Hypertension       HPI  Pt is here for follow up of Hypertension, Diabetes Type 2, and Cholesterol. Pt states his glucose was 168 this morning. Pt reports that his am blood sugars are ranging from 160-200. Pt does not need medication refills today. New concerns today: none      ROS  Review of Systems   Constitutional: Negative. HENT: Negative. Respiratory: Negative. Cardiovascular: Negative. All other systems reviewed and are negative. Physical Exam  Nursing note and vitals reviewed. Constitutional: He is oriented to person, place, and time. He appears well-developed and well-nourished. HENT:   Head: Normocephalic and atraumatic. Right Ear: External ear normal.   Left Ear: External ear normal.   Nose: Nose normal.   Eyes: Conjunctivae and EOM are normal.   Neck: Normal range of motion. Neck supple. No JVD present. Carotid bruit is not present. No thyromegaly present. Cardiovascular: Normal rate, regular rhythm, normal heart sounds and intact distal pulses. Exam reveals no gallop and no friction rub. No murmur heard. Pulmonary/Chest: Effort normal and breath sounds normal. He has no wheezes. He has no rhonchi. He has no rales. Abdominal: Soft. Musculoskeletal: Normal range of motion. Neurological: He is alert and oriented to person, place, and time. Coordination normal.   Skin: Skin is warm and dry. Psychiatric: He has a normal mood and affect. His behavior is normal. Judgment and thought content normal.       ASSESSMENT and PLAN  Diagnoses and all orders for this visit:    1. Type 2 diabetes mellitus without complication, with long-term current use of insulin (HCC)  -     HEMOGLOBIN A1C WITH EAG;  Future  -     METABOLIC PANEL, COMPREHENSIVE; Future  -     LIPID PANEL; Future  -     HEMOGLOBIN A1C WITH EAG; Future    2. Essential hypertension  -     METABOLIC PANEL, COMPREHENSIVE; Future  -     LIPID PANEL; Future  Stable, cont pres tx plan. 3. Hyperlipidemia, unspecified hyperlipidemia type  -     METABOLIC PANEL, COMPREHENSIVE; Future  -     LIPID PANEL; Future    4.  Influenza vaccination declined      Follow-up Disposition: 3 months; sooner prn

## 2017-10-07 ENCOUNTER — HOSPITAL ENCOUNTER (OUTPATIENT)
Dept: LAB | Age: 52
Discharge: HOME | End: 2017-10-07

## 2017-10-07 DIAGNOSIS — Z79.4 TYPE 2 DIABETES MELLITUS WITHOUT COMPLICATION, WITH LONG-TERM CURRENT USE OF INSULIN (HCC): Primary | ICD-10-CM

## 2017-10-07 DIAGNOSIS — E11.9 TYPE 2 DIABETES MELLITUS WITHOUT COMPLICATION, WITH LONG-TERM CURRENT USE OF INSULIN (HCC): Primary | ICD-10-CM

## 2017-10-07 LAB
A-G RATIO,AGRAT: 1.3 RATIO (ref 1.1–2.6)
ALBUMIN SERPL-MCNC: 3.9 G/DL (ref 3.5–5)
ALP SERPL-CCNC: 66 U/L (ref 25–115)
ALT SERPL-CCNC: 6 U/L (ref 5–40)
ANION GAP SERPL CALC-SCNC: 15 MMOL/L
AST SERPL W P-5'-P-CCNC: 6 U/L (ref 10–37)
AVG GLU, 10930: 247 MG/DL (ref 91–123)
BILIRUB SERPL-MCNC: 0.4 MG/DL (ref 0.2–1.2)
BUN SERPL-MCNC: 11 MG/DL (ref 6–22)
CALCIUM SERPL-MCNC: 9.7 MG/DL (ref 8.4–10.4)
CHLORIDE SERPL-SCNC: 98 MMOL/L (ref 98–110)
CHOLEST SERPL-MCNC: 192 MG/DL (ref 110–200)
CO2 SERPL-SCNC: 25 MMOL/L (ref 20–32)
CREAT SERPL-MCNC: 0.6 MG/DL (ref 0.5–1.2)
GFRAA, 66117: >60
GFRNA, 66118: >60
GLOBULIN,GLOB: 2.9 G/DL (ref 2–4)
GLUCOSE SERPL-MCNC: 266 MG/DL (ref 70–99)
HBA1C MFR BLD HPLC: 10.2 % (ref 4.8–5.9)
HDLC SERPL-MCNC: 36 MG/DL (ref 40–59)
LDLC SERPL CALC-MCNC: 134 MG/DL (ref 50–99)
POTASSIUM SERPL-SCNC: 4.6 MMOL/L (ref 3.5–5.5)
PROT SERPL-MCNC: 6.8 G/DL (ref 6.4–8.3)
SENTARA SPECIMEN COL,SENBCF: NORMAL
SODIUM SERPL-SCNC: 138 MMOL/L (ref 133–145)
TRIGL SERPL-MCNC: 111 MG/DL (ref 40–149)
VLDLC SERPL CALC-MCNC: 22 MG/DL (ref 8–30)

## 2017-10-07 PROCEDURE — 99001 SPECIMEN HANDLING PT-LAB: CPT | Performed by: FAMILY MEDICINE

## 2017-10-16 NOTE — PROGRESS NOTES
Please notify pt:  A1c was elevated. Need to increase insulin. Would like for pt to increase to 20 units nightly. Please order A1c for 3 months from now and ensure pt has f/u appt in 1 month.

## 2017-10-20 DIAGNOSIS — J40 BRONCHITIS: ICD-10-CM

## 2017-10-20 NOTE — TELEPHONE ENCOUNTER
Pt called requesting refill for medication . Requested Prescriptions     Pending Prescriptions Disp Refills    albuterol (PROVENTIL HFA, VENTOLIN HFA, PROAIR HFA) 90 mcg/actuation inhaler 1 Inhaler 0     Sig: Take 2 Puffs by inhalation every four (4) hours as needed for Wheezing.

## 2017-10-23 RX ORDER — ALBUTEROL SULFATE 90 UG/1
2 AEROSOL, METERED RESPIRATORY (INHALATION)
Qty: 1 INHALER | Refills: 5 | Status: SHIPPED | OUTPATIENT
Start: 2017-10-23 | End: 2019-04-23

## 2017-11-03 ENCOUNTER — OFFICE VISIT (OUTPATIENT)
Dept: FAMILY MEDICINE CLINIC | Age: 52
End: 2017-11-03

## 2017-11-03 VITALS
HEIGHT: 69 IN | TEMPERATURE: 98.1 F | BODY MASS INDEX: 25.77 KG/M2 | DIASTOLIC BLOOD PRESSURE: 84 MMHG | HEART RATE: 88 BPM | RESPIRATION RATE: 18 BRPM | WEIGHT: 174 LBS | OXYGEN SATURATION: 98 % | SYSTOLIC BLOOD PRESSURE: 135 MMHG

## 2017-11-03 DIAGNOSIS — E11.9 TYPE 2 DIABETES MELLITUS WITHOUT COMPLICATION, WITH LONG-TERM CURRENT USE OF INSULIN (HCC): ICD-10-CM

## 2017-11-03 DIAGNOSIS — Z79.4 TYPE 2 DIABETES MELLITUS WITHOUT COMPLICATION, WITH LONG-TERM CURRENT USE OF INSULIN (HCC): ICD-10-CM

## 2017-11-03 DIAGNOSIS — J01.40 ACUTE NON-RECURRENT PANSINUSITIS: Primary | ICD-10-CM

## 2017-11-03 DIAGNOSIS — Z28.21 INFLUENZA VACCINATION DECLINED: ICD-10-CM

## 2017-11-03 DIAGNOSIS — Z71.85 VACCINE COUNSELING: ICD-10-CM

## 2017-11-03 RX ORDER — AMOXICILLIN AND CLAVULANATE POTASSIUM 875; 125 MG/1; MG/1
1 TABLET, FILM COATED ORAL 2 TIMES DAILY
Qty: 20 TAB | Refills: 0 | Status: SHIPPED | OUTPATIENT
Start: 2017-11-03 | End: 2017-11-13

## 2017-11-03 NOTE — PROGRESS NOTES
Devin Lake 46 y.o. male   Chief Complaint   Patient presents with    Follow-up    Labs     completed on 10-7-17    Head Pain     and face on the right side x 4 days with blurred vision. Pain level 6/10. Pt denies any blurred vision currently. Pt also denies slurred speech    Fatigue         1. Have you been to the ER, urgent care clinic since your last visit? Hospitalized since your last visit? No    2. Have you seen or consulted any other health care providers outside of the [address] [place] [address] since your last visit? Include any pap smears or colon screening.  No

## 2017-11-03 NOTE — PROGRESS NOTES
HISTORY OF PRESENT ILLNESS  Daniela Fu is a 46 y.o. male. Chief Complaint   Patient presents with   [address]     completed on 10-7-17    Head Pain     and face on the right side x 4 days with blurred vision. Pain level 6/10. Pt denies any blurred vision currently. Pt also denies slurred speech    Fatigue       HPI  Pt is here for follow up of to discuss labs. Home bs readings are still in the 200s. Pt still drinks soda and eats whatever he wants to eat. Pt had labs on 10-7-17. Labs reviewed in detail with pt. Pt does not need medication refills today. New concerns today: Pt reports having right side head and facial pain x 4 days. Pt also states that he had blurred vision, but not currently. Pt denies any slurred speech or CP. He has a cough as well. The cough makes the pain worse. Pt's girlfriend suggested that he go to the ER but pt refused. He has been in the bed. Pt has had decreased appetite. Pt has prior hx of cva and his girlfriend has been concerned this could be another stroke. Discussed flu shot. Pt declines. Review of Systems   Constitutional: Negative. HENT: Negative. Respiratory: Positive for cough and sputum production. Cardiovascular: Negative. Neurological: Positive for headaches. All other systems reviewed and are negative. Physical Exam   Constitutional: He is oriented to person, place, and time. He appears well-developed and well-nourished. No distress. HENT:   Head: Normocephalic and atraumatic. Right Ear: Hearing, tympanic membrane, external ear and ear canal normal.   Left Ear: Hearing, tympanic membrane, external ear and ear canal normal.   Nose: Mucosal edema present. Right sinus exhibits maxillary sinus tenderness and frontal sinus tenderness. Left sinus exhibits maxillary sinus tenderness and frontal sinus tenderness.    Mouth/Throat: Uvula is midline, oropharynx is clear and moist and mucous membranes are normal. Eyes: Conjunctivae and EOM are normal.   Neck: Normal range of motion. Neck supple. No JVD present. No thyromegaly present. Cardiovascular: Normal rate, regular rhythm and normal heart sounds. Exam reveals no gallop and no friction rub. No murmur heard. Pulmonary/Chest: Effort normal and breath sounds normal. He has no wheezes. He has no rhonchi. He has no rales. Musculoskeletal: Normal range of motion. Lymphadenopathy:     He has no cervical adenopathy. Neurological: He is alert and oriented to person, place, and time. Coordination normal.   Skin: Skin is warm and dry. Psychiatric: He has a normal mood and affect. His behavior is normal. Judgment and thought content normal.   Nursing note and vitals reviewed. ASSESSMENT and PLAN  Diagnoses and all orders for this visit:    1. Acute non-recurrent pansinusitis  -     amoxicillin-clavulanate (AUGMENTIN) 875-125 mg per tablet; Take 1 Tab by mouth two (2) times a day for 10 days. 2. Type 2 diabetes mellitus without complication, with long-term current use of insulin ([facility]  Long discussion about improving diet. Pt will work on this. 3. Vaccine counseling  Lengthy discussion about flu vaccination.       4. Influenza vaccination declined      Follow-up Disposition: 1 month; sooner prn

## 2017-12-01 ENCOUNTER — OFFICE VISIT (OUTPATIENT)
Dept: FAMILY MEDICINE CLINIC | Age: 52
End: 2017-12-01

## 2017-12-01 VITALS
SYSTOLIC BLOOD PRESSURE: 134 MMHG | OXYGEN SATURATION: 98 % | HEIGHT: 69 IN | WEIGHT: 178 LBS | HEART RATE: 96 BPM | DIASTOLIC BLOOD PRESSURE: 76 MMHG | TEMPERATURE: 98 F | BODY MASS INDEX: 26.36 KG/M2 | RESPIRATION RATE: 16 BRPM

## 2017-12-01 DIAGNOSIS — G63 POLYNEUROPATHY ASSOCIATED WITH UNDERLYING DISEASE (HCC): ICD-10-CM

## 2017-12-01 DIAGNOSIS — J01.90 ACUTE NON-RECURRENT SINUSITIS, UNSPECIFIED LOCATION: ICD-10-CM

## 2017-12-01 DIAGNOSIS — E11.42 TYPE 2 DIABETES MELLITUS WITH DIABETIC POLYNEUROPATHY, WITH LONG-TERM CURRENT USE OF INSULIN (HCC): Primary | ICD-10-CM

## 2017-12-01 DIAGNOSIS — Z79.4 TYPE 2 DIABETES MELLITUS WITH DIABETIC POLYNEUROPATHY, WITH LONG-TERM CURRENT USE OF INSULIN (HCC): Primary | ICD-10-CM

## 2017-12-01 RX ORDER — GABAPENTIN 300 MG/1
300 CAPSULE ORAL
Qty: 30 CAP | Refills: 5 | Status: SHIPPED | OUTPATIENT
Start: 2017-12-01 | End: 2018-02-13 | Stop reason: SDUPTHER

## 2017-12-01 NOTE — PROGRESS NOTES
HISTORY OF PRESENT ILLNESS  Maira Diaz is a 46 y.o. male. Chief Complaint   Patient presents with    Follow-up     1 month     Diabetes       HPI  Patient is here for a 1 month follow up of DM. Patient states that his glucose ranges from 130-240. He is aware that his diet affects his blood sugars. He is usually 120-130s. Pt reports that the sinus infection completely resolved with the abx. He no longer has the ha or face pain. He is back to his usual activities. Patient does not need medication refills today. New concerns today: Patient reports still having tingling in the feet at night. This tends to happen more at night but can happen in the daytime as well. The discomfort is always a tingling feeling. Review of Systems   Constitutional: Negative. HENT: Negative. Respiratory: Negative. Cardiovascular: Negative. Neurological: Positive for tingling and sensory change. Negative for headaches. All other systems reviewed and are negative. Physical Exam  Nursing note and vitals reviewed. Constitutional: He is oriented to person, place, and time. He appears well-developed and well-nourished. HENT:   Head: Normocephalic and atraumatic. Right Ear: External ear normal.   Left Ear: External ear normal.   Nose: Nose normal.   Eyes: Conjunctivae and EOM are normal.   Neck: Normal range of motion. Neck supple. No JVD present. Carotid bruit is not present. No thyromegaly present. Cardiovascular: Normal rate, regular rhythm, normal heart sounds and intact distal pulses. Exam reveals no gallop and no friction rub. No murmur heard. Pulmonary/Chest: Effort normal and breath sounds normal. He has no wheezes. He has no rhonchi. He has no rales. Abdominal: Soft. Musculoskeletal: Normal range of motion. Neurological: He is alert and oriented to person, place, and time. Coordination normal.   Skin: Skin is warm and dry. Psychiatric: He has a normal mood and affect. His behavior is normal. Judgment and thought content normal.       ASSESSMENT and PLAN  Diagnoses and all orders for this visit:    1. Type 2 diabetes mellitus with diabetic polyneuropathy, with long-term current use of insulin ([facility])  Pt encouraged to work on diet compliance to improve blood sugars. 2. Polyneuropathy associated with underlying disease ([facility]  -   Trial:  gabapentin (NEURONTIN) 300 mg capsule; Take 1 Cap by mouth nightly. Long conversation about med, goals of tx, and timeline for improvement. 3. Acute non-recurrent sinusitis, unspecified location  Resolved.      Follow-up Disposition: 1 month; sooner prn

## 2017-12-01 NOTE — PROGRESS NOTES
Larry Fields 46 y.o. male   Chief Complaint   Patient presents with    Follow-up     1 month     Diabetes         1. Have you been to the ER, urgent care clinic since your last visit? Hospitalized since your last visit? No    2. Have you seen or consulted any other health care providers outside of the [address] [place] [address] since your last visit? Include any pap smears or colon screening.  No

## 2018-02-12 DIAGNOSIS — E11.9 TYPE 2 DIABETES MELLITUS WITHOUT COMPLICATION, WITH LONG-TERM CURRENT USE OF INSULIN (HCC): ICD-10-CM

## 2018-02-12 DIAGNOSIS — Z79.4 TYPE 2 DIABETES MELLITUS WITHOUT COMPLICATION, WITH LONG-TERM CURRENT USE OF INSULIN (HCC): ICD-10-CM

## 2018-02-13 ENCOUNTER — OFFICE VISIT (OUTPATIENT)
Dept: FAMILY MEDICINE CLINIC | Age: 53
End: 2018-02-13

## 2018-02-13 VITALS
SYSTOLIC BLOOD PRESSURE: 150 MMHG | HEIGHT: 69 IN | HEART RATE: 96 BPM | TEMPERATURE: 98.2 F | RESPIRATION RATE: 16 BRPM | BODY MASS INDEX: 26.36 KG/M2 | WEIGHT: 178 LBS | DIASTOLIC BLOOD PRESSURE: 85 MMHG

## 2018-02-13 DIAGNOSIS — G63 POLYNEUROPATHY ASSOCIATED WITH UNDERLYING DISEASE (HCC): ICD-10-CM

## 2018-02-13 DIAGNOSIS — I10 ESSENTIAL HYPERTENSION: ICD-10-CM

## 2018-02-13 DIAGNOSIS — N52.9 ERECTILE DYSFUNCTION, UNSPECIFIED ERECTILE DYSFUNCTION TYPE: ICD-10-CM

## 2018-02-13 DIAGNOSIS — E11.9 TYPE 2 DIABETES MELLITUS WITHOUT COMPLICATION, WITH LONG-TERM CURRENT USE OF INSULIN (HCC): Primary | ICD-10-CM

## 2018-02-13 DIAGNOSIS — H00.019 HORDEOLUM EXTERNUM, UNSPECIFIED LATERALITY: ICD-10-CM

## 2018-02-13 DIAGNOSIS — Z79.4 TYPE 2 DIABETES MELLITUS WITHOUT COMPLICATION, WITH LONG-TERM CURRENT USE OF INSULIN (HCC): Primary | ICD-10-CM

## 2018-02-13 LAB — HBA1C MFR BLD HPLC: 10.1 %

## 2018-02-13 RX ORDER — GABAPENTIN 300 MG/1
600 CAPSULE ORAL
Qty: 60 CAP | Refills: 5 | Status: SHIPPED | OUTPATIENT
Start: 2018-02-13 | End: 2018-07-17 | Stop reason: SDUPTHER

## 2018-02-13 RX ORDER — SILDENAFIL 50 MG/1
50 TABLET, FILM COATED ORAL AS NEEDED
Qty: 6 TAB | Refills: 5 | Status: SHIPPED | OUTPATIENT
Start: 2018-02-13 | End: 2019-04-30

## 2018-02-13 RX ORDER — INSULIN GLARGINE 100 [IU]/ML
12 INJECTION, SOLUTION SUBCUTANEOUS
Qty: 2 ADJUSTABLE DOSE PRE-FILLED PEN SYRINGE | Refills: 5 | Status: SHIPPED | OUTPATIENT
Start: 2018-02-13 | End: 2018-06-20 | Stop reason: SDUPTHER

## 2018-02-13 NOTE — PROGRESS NOTES
HISTORY OF PRESENT ILLNESS  Yanira Hendrix is a 46 y.o. male. Chief Complaint   Patient presents with    Follow-up     1 month f/u     Diabetes    Stye     x 2 weeks in the right eye, now in left eye as well       HPI  Patient is here for a 1 month follow up of DM. Pt reports that he is doing better. He does not see the 200s as often anymore. Patient does not need medication refills today. New concerns today: Patient reports having a stye in the right eye x 2 weeks, and is now in the left eye. He has been using hot compresses without improvement. Pt cont to have intermittent pain in his feet. Sometimes it will wake him up. He thinks he is taking the gabapentin qhs. Pt c/o ED. He would like to try medication for this. Review of Systems   Constitutional: Negative. HENT: Negative. Eyes:        B stye   Respiratory: Negative. Cardiovascular: Negative. Genitourinary:        ED   Musculoskeletal:        B foot pain   All other systems reviewed and are negative. Physical Exam  Nursing note and vitals reviewed. Constitutional: He is oriented to person, place, and time. He appears well-developed and well-nourished. HENT:   Head: Normocephalic and atraumatic. Right Ear: External ear normal.   Left Ear: External ear normal.   Nose: Nose normal.   Eyes: Conjunctivae and EOM are normal.   Neck: Normal range of motion. Neck supple. No JVD present. Carotid bruit is not present. No thyromegaly present. Cardiovascular: Normal rate, regular rhythm, normal heart sounds and intact distal pulses. Exam reveals no gallop and no friction rub. No murmur heard. Pulmonary/Chest: Effort normal and breath sounds normal. He has no wheezes. He has no rhonchi. He has no rales. Abdominal: Soft. Musculoskeletal: Normal range of motion. Neurological: He is alert and oriented to person, place, and time. Coordination normal.   Skin: Skin is warm and dry.    Psychiatric: He has a normal mood and affect. His behavior is normal. Judgment and thought content normal.       ASSESSMENT and PLAN  Diagnoses and all orders for this visit:    1. Type 2 diabetes mellitus without complication, with long-term current use of insulin (HCC)  -     AMB POC HEMOGLOBIN A1C  Still not at goal.  Dietary indiscretion seems to be causing elevated post-prandial readings. Consider ssi or diabetes ed with nutritionist.  Pt to try to work on diet. 2. Polyneuropathy associated with underlying disease ([address]  -     gabapentin (NEURONTIN) 300 mg capsule; Take 2 Caps by mouth nightly. Pt to sched appt with podiatry. 3. Essential hypertension  Slightly elevated today. Recheck at f/u.     4. Erectile dysfunction, unspecified erectile dysfunction type  -     Trial:  sildenafil citrate (VIAGRA) 50 mg tablet; Take 1 Tab by mouth as needed. Pt advised that this may not be covered by his insurance.      5. Hordeolum externum, unspecified laterality  -     REFERRAL TO OPHTHALMOLOGY      Follow-up Disposition: 3 months; sooner prn

## 2018-02-13 NOTE — PROGRESS NOTES
Sarinahair Lynngia 46 y.o. male   Chief Complaint   Patient presents with    Follow-up     1 month f/u     Diabetes    Stye     x 2 weeks in the right eye, now in left eye as well         1. Have you been to the ER, urgent care clinic since your last visit? Hospitalized since your last visit? No    2. Have you seen or consulted any other health care providers outside of the [address] [place] [address] since your last visit? Include any pap smears or colon screening.  No

## 2018-04-11 ENCOUNTER — TELEPHONE (OUTPATIENT)
Dept: FAMILY MEDICINE CLINIC | Age: 53
End: 2018-04-11

## 2018-04-11 NOTE — TELEPHONE ENCOUNTER
Pt called an stated that he was prescribed Viagra an never received medication. Please consult and advise.

## 2018-05-15 ENCOUNTER — OFFICE VISIT (OUTPATIENT)
Dept: FAMILY MEDICINE CLINIC | Age: 53
End: 2018-05-15

## 2018-05-15 VITALS
DIASTOLIC BLOOD PRESSURE: 86 MMHG | WEIGHT: 182 LBS | BODY MASS INDEX: 26.96 KG/M2 | HEART RATE: 93 BPM | HEIGHT: 69 IN | OXYGEN SATURATION: 97 % | RESPIRATION RATE: 14 BRPM | TEMPERATURE: 98.5 F | SYSTOLIC BLOOD PRESSURE: 150 MMHG

## 2018-05-15 DIAGNOSIS — E11.40 TYPE 2 DIABETES MELLITUS WITH DIABETIC NEUROPATHY, WITHOUT LONG-TERM CURRENT USE OF INSULIN (HCC): Primary | ICD-10-CM

## 2018-05-15 DIAGNOSIS — I10 ESSENTIAL HYPERTENSION: ICD-10-CM

## 2018-05-15 DIAGNOSIS — Z86.73 HISTORY OF CVA (CEREBROVASCULAR ACCIDENT): ICD-10-CM

## 2018-05-15 DIAGNOSIS — I25.2 HISTORY OF MI (MYOCARDIAL INFARCTION): ICD-10-CM

## 2018-05-15 NOTE — PROGRESS NOTES
Nelia Renner 46 y.o. male   Chief Complaint   Patient presents with    Follow-up     3 month f/u    Diabetes    Hypertension    Neuropathy     polyneuropathy         1. Have you been to the ER, urgent care clinic since your last visit? Hospitalized since your last visit? No    2. Have you seen or consulted any other health care providers outside of the [address] [place] [address] since your last visit? Include any pap smears or colon screening.  No

## 2018-05-15 NOTE — PROGRESS NOTES
HISTORY OF PRESENT ILLNESS  Dontae Hughes is a 46 y.o. male. Chief Complaint   Patient presents with    Follow-up     3 month f/u    Diabetes    Hypertension    Neuropathy     polyneuropathy       HPI  Patient is here for a 3 month follow up of DM, lipids, and Polyneuropathy. Pt has been feeling well overall. Patient reports that he checks his glucose every morning. Patient states the glucose reading this morning was 175. The usual morning range is 170-190. Patient does not need medication refills today. New concerns today: none      ROS  Review of Systems   Constitutional: Negative. HENT: Negative. Respiratory: Negative. Cardiovascular: Negative. All other systems reviewed and are negative. Physical Exam  Nursing note and vitals reviewed. Constitutional: He is oriented to person, place, and time. He appears well-developed and well-nourished. HENT:   Head: Normocephalic and atraumatic. Right Ear: External ear normal.   Left Ear: External ear normal.   Nose: Nose normal.   Eyes: Conjunctivae and EOM are normal.   Neck: Normal range of motion. Neck supple. No JVD present. Carotid bruit is not present. No thyromegaly present. Cardiovascular: Normal rate, regular rhythm, normal heart sounds and intact distal pulses. Exam reveals no gallop and no friction rub. No murmur heard. Pulmonary/Chest: Effort normal and breath sounds normal. He has no wheezes. He has no rhonchi. He has no rales. Abdominal: Soft. Musculoskeletal: Normal range of motion. Neurological: He is alert and oriented to person, place, and time. Coordination normal.   Skin: Skin is warm and dry. Psychiatric: He has a normal mood and affect. His behavior is normal. Judgment and thought content normal.       ASSESSMENT and PLAN  Diagnoses and all orders for this visit:    1.  Type 2 diabetes mellitus with diabetic neuropathy, without long-term current use of insulin (HCC)  -     AMB POC HEMOGLOBIN A1C  - METABOLIC PANEL, COMPREHENSIVE; Future  -     LIPID PANEL; Future  -     HEMOGLOBIN A1C WITH EAG; Future  -     MICROALBUMIN, UR, RAND W/ MICROALB/CREAT RATIO; Future    2. Essential hypertension  -     METABOLIC PANEL, COMPREHENSIVE; Future  -     LIPID PANEL; Future  Slightly elevated today; recheck at f/u.     3. History of CVA (cerebrovascular accident)  -     METABOLIC PANEL, COMPREHENSIVE; Future  -     LIPID PANEL; Future    4. History of MI (myocardial infarction)  -     METABOLIC PANEL, COMPREHENSIVE; Future  -     LIPID PANEL;  Future      Follow-up Disposition: 1 month for f/u bp and labs; sooner prn

## 2018-05-17 ENCOUNTER — HOSPITAL ENCOUNTER (OUTPATIENT)
Dept: LAB | Age: 53
Discharge: HOME | End: 2018-05-17
Payer: MEDICAID

## 2018-05-17 DIAGNOSIS — I25.2 HISTORY OF MI (MYOCARDIAL INFARCTION): ICD-10-CM

## 2018-05-17 DIAGNOSIS — I10 ESSENTIAL HYPERTENSION: ICD-10-CM

## 2018-05-17 DIAGNOSIS — Z86.73 HISTORY OF CVA (CEREBROVASCULAR ACCIDENT): ICD-10-CM

## 2018-05-17 DIAGNOSIS — E11.40 TYPE 2 DIABETES MELLITUS WITH DIABETIC NEUROPATHY, WITHOUT LONG-TERM CURRENT USE OF INSULIN (HCC): ICD-10-CM

## 2018-05-17 LAB
ALBUMIN SERPL-MCNC: 3.6 G/DL (ref 3.4–5)
ALBUMIN/GLOB SERPL: 1.1 {RATIO} (ref 0.8–1.7)
ALP SERPL-CCNC: 94 U/L (ref 45–117)
ALT SERPL-CCNC: 28 U/L (ref 16–61)
ANION GAP SERPL CALC-SCNC: 7 MMOL/L (ref 3–18)
AST SERPL-CCNC: 17 U/L (ref 15–37)
BILIRUB SERPL-MCNC: 0.5 MG/DL (ref 0.2–1)
BUN SERPL-MCNC: 10 MG/DL (ref 7–18)
BUN/CREAT SERPL: 13 (ref 12–20)
CALCIUM SERPL-MCNC: 9.3 MG/DL (ref 8.5–10.1)
CHLORIDE SERPL-SCNC: 99 MMOL/L (ref 100–108)
CHOLEST SERPL-MCNC: 139 MG/DL
CO2 SERPL-SCNC: 30 MMOL/L (ref 21–32)
CREAT SERPL-MCNC: 0.8 MG/DL (ref 0.6–1.3)
EST. AVERAGE GLUCOSE BLD GHB EST-MCNC: 258 MG/DL
GLOBULIN SER CALC-MCNC: 3.3 G/DL (ref 2–4)
GLUCOSE SERPL-MCNC: 263 MG/DL (ref 74–99)
HBA1C MFR BLD: 10.6 % (ref 4.2–5.6)
HDLC SERPL-MCNC: 38 MG/DL (ref 40–60)
HDLC SERPL: 3.7 {RATIO} (ref 0–5)
LDLC SERPL CALC-MCNC: 79.4 MG/DL (ref 0–100)
LIPID PROFILE,FLP: ABNORMAL
POTASSIUM SERPL-SCNC: 4.7 MMOL/L (ref 3.5–5.5)
PROT SERPL-MCNC: 6.9 G/DL (ref 6.4–8.2)
SODIUM SERPL-SCNC: 136 MMOL/L (ref 136–145)
TRIGL SERPL-MCNC: 108 MG/DL (ref ?–150)
VLDLC SERPL CALC-MCNC: 21.6 MG/DL

## 2018-05-17 PROCEDURE — 83036 HEMOGLOBIN GLYCOSYLATED A1C: CPT | Performed by: FAMILY MEDICINE

## 2018-05-17 PROCEDURE — 80053 COMPREHEN METABOLIC PANEL: CPT | Performed by: FAMILY MEDICINE

## 2018-05-17 PROCEDURE — 36415 COLL VENOUS BLD VENIPUNCTURE: CPT | Performed by: FAMILY MEDICINE

## 2018-05-17 PROCEDURE — 80061 LIPID PANEL: CPT | Performed by: FAMILY MEDICINE

## 2018-05-30 DIAGNOSIS — E78.2 MIXED HYPERLIPIDEMIA: ICD-10-CM

## 2018-06-01 RX ORDER — ATORVASTATIN CALCIUM 20 MG/1
20 TABLET, FILM COATED ORAL DAILY
Qty: 30 TAB | Refills: 5 | Status: SHIPPED | OUTPATIENT
Start: 2018-06-01 | End: 2018-10-19 | Stop reason: SDUPTHER

## 2018-06-20 ENCOUNTER — OFFICE VISIT (OUTPATIENT)
Dept: FAMILY MEDICINE CLINIC | Age: 53
End: 2018-06-20

## 2018-06-20 VITALS
WEIGHT: 181.5 LBS | RESPIRATION RATE: 20 BRPM | BODY MASS INDEX: 26.88 KG/M2 | TEMPERATURE: 97.4 F | DIASTOLIC BLOOD PRESSURE: 79 MMHG | HEART RATE: 84 BPM | HEIGHT: 69 IN | SYSTOLIC BLOOD PRESSURE: 139 MMHG

## 2018-06-20 DIAGNOSIS — E11.9 TYPE 2 DIABETES MELLITUS WITHOUT COMPLICATION, WITHOUT LONG-TERM CURRENT USE OF INSULIN (HCC): ICD-10-CM

## 2018-06-20 DIAGNOSIS — I10 ESSENTIAL HYPERTENSION: ICD-10-CM

## 2018-06-20 DIAGNOSIS — J30.2 SEASONAL ALLERGIC RHINITIS, UNSPECIFIED TRIGGER: ICD-10-CM

## 2018-06-20 DIAGNOSIS — E11.9 DIABETES MELLITUS WITHOUT COMPLICATION (HCC): ICD-10-CM

## 2018-06-20 DIAGNOSIS — Z79.4 TYPE 2 DIABETES MELLITUS WITHOUT COMPLICATION, WITH LONG-TERM CURRENT USE OF INSULIN (HCC): ICD-10-CM

## 2018-06-20 DIAGNOSIS — E11.40 TYPE 2 DIABETES MELLITUS WITH DIABETIC NEUROPATHY, WITHOUT LONG-TERM CURRENT USE OF INSULIN (HCC): Primary | ICD-10-CM

## 2018-06-20 DIAGNOSIS — E11.9 TYPE 2 DIABETES MELLITUS WITHOUT COMPLICATION, WITH LONG-TERM CURRENT USE OF INSULIN (HCC): ICD-10-CM

## 2018-06-20 RX ORDER — GLIMEPIRIDE 2 MG/1
TABLET ORAL
Qty: 30 TAB | Refills: 5 | Status: SHIPPED | OUTPATIENT
Start: 2018-06-20 | End: 2018-11-13 | Stop reason: SDUPTHER

## 2018-06-20 RX ORDER — ALBUTEROL SULFATE 90 UG/1
2 AEROSOL, METERED RESPIRATORY (INHALATION)
Qty: 3 INHALER | Refills: 3 | Status: CANCELLED | COMMUNITY
Start: 2018-06-20

## 2018-06-20 RX ORDER — FLUTICASONE PROPIONATE 50 MCG
2 SPRAY, SUSPENSION (ML) NASAL DAILY
Qty: 1 BOTTLE | Refills: 5 | Status: SHIPPED | OUTPATIENT
Start: 2018-06-20 | End: 2018-10-19 | Stop reason: SDUPTHER

## 2018-06-20 RX ORDER — INSULIN GLARGINE 100 [IU]/ML
20 INJECTION, SOLUTION SUBCUTANEOUS
Qty: 6 ML | Refills: 5 | Status: SHIPPED | OUTPATIENT
Start: 2018-06-20 | End: 2018-10-19 | Stop reason: SDUPTHER

## 2018-06-20 RX ORDER — LORATADINE 10 MG/1
10 TABLET ORAL DAILY
Qty: 30 TAB | Refills: 5 | Status: SHIPPED | OUTPATIENT
Start: 2018-06-20 | End: 2018-10-19 | Stop reason: SDUPTHER

## 2018-06-20 RX ORDER — SITAGLIPTIN AND METFORMIN HYDROCHLORIDE 100; 1000 MG/1; MG/1
TABLET, FILM COATED, EXTENDED RELEASE ORAL
Qty: 30 TAB | Refills: 5 | Status: SHIPPED | OUTPATIENT
Start: 2018-06-20 | End: 2018-11-13 | Stop reason: SDUPTHER

## 2018-06-20 NOTE — PROGRESS NOTES
Chief Complaint   Patient presents with    Follow-up     1 month f/u    Hypertension    Diabetes    Results     discuss lab results     HISTORY OF PRESENT ILLNESS  Digna Parrish is a 46 y.o. male. HPI  Patient is here for a one month follow up of htn and labs. Pt has been using his lantus qam; he is now using 20U. He feels that his blood sugars have improved but is not sure what the numbers have been. Patient had labs on 5/17/18. Labs reviewed in detail with patient     Patient does need medication refills today. New concerns today: pt c/o increased cough after going out to cut grass. He cuts 4 yards in his neighborhood. He will have a dry cough afterwards. He uses the albuterol with \"so-so\" improvement. He will have itchy eyes and some pnd. Review of Systems   Constitutional: Negative. HENT: Positive for ear pain. Negative for sore throat. Respiratory: Positive for sputum production. All other systems reviewed and are negative. Physical Exam   Constitutional: He is oriented to person, place, and time. He appears well-developed and well-nourished. No distress. HENT:   Head: Normocephalic and atraumatic. Eyes: Conjunctivae and EOM are normal.   Neck: Normal range of motion. Neck supple. No JVD present. No thyromegaly present. Cardiovascular: Normal rate, regular rhythm and normal heart sounds. Exam reveals no gallop and no friction rub. No murmur heard. Pulmonary/Chest: Effort normal and breath sounds normal. He has no wheezes. He has no rales. He exhibits no tenderness. Abdominal: He exhibits no distension and no mass. There is no tenderness. There is no rebound and no guarding. Musculoskeletal: Normal range of motion. Lymphadenopathy:     He has no cervical adenopathy. Neurological: He is alert and oriented to person, place, and time. Coordination normal.   Skin: Skin is warm and dry. Psychiatric: He has a normal mood and affect.  His behavior is normal. Judgment and thought content normal.   Nursing note and vitals reviewed. ASSESSMENT and PLAN  Diagnoses and all orders for this visit:    1. Type 2 diabetes mellitus with diabetic neuropathy, without long-term current use of insulin (HCC)   Type 2 diabetes mellitus without complication, with long-term current use of insulin (HCC)  -     insulin glargine (LANTUS,BASAGLAR) 100 unit/mL (3 mL) inpn; 20 Units by SubCUTAneous route nightly. -     loratadine (CLARITIN) 10 mg tablet; Take 1 Tab by mouth daily. -     fluticasone (FLONASE) 50 mcg/actuation nasal spray; 2 Sprays by Both Nostrils route daily. Would benefit from DM teaching/ health coaching with NP Dawood. Pt agreeable to idea of meeting with her. 2. Essential hypertension  Stable, cont pres tx plan. 4. Seasonal allergic rhinitis, unspecified trigger  -     loratadine (CLARITIN) 10 mg tablet; Take 1 Tab by mouth daily. -     fluticasone (FLONASE) 50 mcg/actuation nasal spray; 2 Sprays by Both Nostrils route daily. Pt ed re: dx.      Follow-up Disposition: 3 months; sooner prn

## 2018-08-08 DIAGNOSIS — G63 POLYNEUROPATHY ASSOCIATED WITH UNDERLYING DISEASE (HCC): ICD-10-CM

## 2018-08-08 RX ORDER — GABAPENTIN 300 MG/1
CAPSULE ORAL
Qty: 60 CAP | Refills: 5 | Status: SHIPPED | OUTPATIENT
Start: 2018-08-08 | End: 2018-12-12 | Stop reason: SDUPTHER

## 2018-08-13 ENCOUNTER — TELEPHONE (OUTPATIENT)
Dept: FAMILY MEDICINE CLINIC | Age: 53
End: 2018-08-13

## 2018-08-20 NOTE — TELEPHONE ENCOUNTER
Ms mccollum re calling,concerning the monitor and cuff,has not received any thing,do you have dr Sachin Perdomo or does it need to be re sent?ms mccollum # [phone]

## 2018-08-21 NOTE — TELEPHONE ENCOUNTER
Hans Tolentino MD   You;  Lenin Geiger 17 hours ago (3:33 PM)                 Form signed today.   (Routing comment

## 2018-09-20 ENCOUNTER — OFFICE VISIT (OUTPATIENT)
Dept: FAMILY MEDICINE CLINIC | Age: 53
End: 2018-09-20

## 2018-09-20 VITALS
RESPIRATION RATE: 12 BRPM | HEIGHT: 69 IN | SYSTOLIC BLOOD PRESSURE: 136 MMHG | WEIGHT: 187 LBS | HEART RATE: 100 BPM | TEMPERATURE: 98.6 F | DIASTOLIC BLOOD PRESSURE: 86 MMHG | BODY MASS INDEX: 27.7 KG/M2

## 2018-09-20 DIAGNOSIS — I10 ESSENTIAL HYPERTENSION: ICD-10-CM

## 2018-09-20 DIAGNOSIS — Z23 ENCOUNTER FOR IMMUNIZATION: ICD-10-CM

## 2018-09-20 DIAGNOSIS — E78.2 MIXED HYPERLIPIDEMIA: ICD-10-CM

## 2018-09-20 DIAGNOSIS — I49.9 IRREGULAR HEART BEAT: ICD-10-CM

## 2018-09-20 DIAGNOSIS — E11.9 TYPE 2 DIABETES MELLITUS WITHOUT COMPLICATION, WITH LONG-TERM CURRENT USE OF INSULIN (HCC): Primary | ICD-10-CM

## 2018-09-20 DIAGNOSIS — R00.0 TACHYCARDIA: ICD-10-CM

## 2018-09-20 DIAGNOSIS — E51.9 VITAMIN B1 DEFICIENCY: ICD-10-CM

## 2018-09-20 DIAGNOSIS — Z86.73 HISTORY OF CVA (CEREBROVASCULAR ACCIDENT): ICD-10-CM

## 2018-09-20 DIAGNOSIS — Z79.4 TYPE 2 DIABETES MELLITUS WITHOUT COMPLICATION, WITH LONG-TERM CURRENT USE OF INSULIN (HCC): Primary | ICD-10-CM

## 2018-09-20 RX ORDER — LISINOPRIL 40 MG/1
40 TABLET ORAL DAILY
Qty: 90 TAB | Refills: 3 | Status: SHIPPED | OUTPATIENT
Start: 2018-09-20 | End: 2019-03-06 | Stop reason: SINTOL

## 2018-09-20 RX ORDER — LANOLIN ALCOHOL/MO/W.PET/CERES
100 CREAM (GRAM) TOPICAL
COMMUNITY
Start: 2018-09-08 | End: 2018-09-20 | Stop reason: SDUPTHER

## 2018-09-20 RX ORDER — LISINOPRIL 40 MG/1
40 TABLET ORAL
COMMUNITY
Start: 2018-09-08 | End: 2018-09-20 | Stop reason: SDUPTHER

## 2018-09-20 RX ORDER — BISMUTH SUBSALICYLATE 262 MG
TABLET,CHEWABLE ORAL
COMMUNITY
Start: 2018-09-07 | End: 2022-09-06

## 2018-09-20 RX ORDER — LANOLIN ALCOHOL/MO/W.PET/CERES
100 CREAM (GRAM) TOPICAL DAILY
Qty: 90 TAB | Refills: 3 | Status: SHIPPED | OUTPATIENT
Start: 2018-09-20 | End: 2021-11-15

## 2018-09-20 RX ORDER — ASPIRIN 325 MG
81 TABLET, DELAYED RELEASE (ENTERIC COATED) ORAL
COMMUNITY
Start: 2018-09-07 | End: 2019-03-11 | Stop reason: DRUGHIGH

## 2018-09-20 NOTE — PATIENT INSTRUCTIONS
Vaccine Information Statement    Influenza (Flu) Vaccine (Inactivated or Recombinant): What you need to know    Many Vaccine Information Statements are available in Italian and other languages. See www.immunize.org/vis  Hojas de Información Sobre Vacunas están disponibles en Español y en muchos otros idiomas. Visite www.immunize.org/vis    1. Why get vaccinated? Influenza (flu) is a contagious disease that spreads around the [place] every year, usually between October and May. Flu is caused by influenza viruses, and is spread mainly by coughing, sneezing, and close contact. Anyone can get flu. Flu strikes suddenly and can last several days. Symptoms vary by age, but can include:   fever/chills   sore throat   muscle aches   fatigue   cough   headache    runny or stuffy nose    Flu can also lead to pneumonia and blood infections, and cause diarrhea and seizures in children. If you have a medical condition, such as heart or lung disease, flu can make it worse. Flu is more dangerous for some people. Infants and young children, people 72years of age and older, pregnant women, and people with certain health conditions or a weakened immune system are at greatest risk. Each year thousands of people in the [facility] die from flu, and many more are hospitalized. Flu vaccine can:   keep you from getting flu,   make flu less severe if you do get it, and   keep you from spreading flu to your family and other people. 2. Inactivated and recombinant flu vaccines    A dose of flu vaccine is recommended every flu season. Children 6 months through 6years of age may need two doses during the same flu season. Everyone else needs only one dose each flu season.        Some inactivated flu vaccines contain a very small amount of a mercury-based preservative called thimerosal. Studies have not shown thimerosal in vaccines to be harmful, but flu vaccines that do not contain thimerosal are available. There is no live flu virus in flu shots. They cannot cause the flu. There are many flu viruses, and they are always changing. Each year a new flu vaccine is made to protect against three or four viruses that are likely to cause disease in the upcoming flu season. But even when the vaccine doesnt exactly match these viruses, it may still provide some protection    Flu vaccine cannot prevent:   flu that is caused by a virus not covered by the vaccine, or   illnesses that look like flu but are not. It takes about 2 weeks for protection to develop after vaccination, and protection lasts through the flu season. 3. Some people should not get this vaccine    Tell the person who is giving you the vaccine:     If you have any severe, life-threatening allergies. If you ever had a life-threatening allergic reaction after a dose of flu vaccine, or have a severe allergy to any part of this vaccine, you may be advised not to get vaccinated. Most, but not all, types of flu vaccine contain a small amount of egg protein.  If you ever had Guillain-Barré Syndrome (also called GBS). Some people with a history of GBS should not get this vaccine. This should be discussed with your doctor.  If you are not feeling well. It is usually okay to get flu vaccine when you have a mild illness, but you might be asked to come back when you feel better. 4. Risks of a vaccine reaction    With any medicine, including vaccines, there is a chance of reactions. These are usually mild and go away on their own, but serious reactions are also possible. Most people who get a flu shot do not have any problems with it.      Minor problems following a flu shot include:    soreness, redness, or swelling where the shot was given     hoarseness   sore, red or itchy eyes   cough   fever   aches   headache   itching   fatigue  If these problems occur, they usually begin soon after the shot and last 1 or 2 days. More serious problems following a flu shot can include the following:     There may be a small increased risk of Guillain-Barré Syndrome (GBS) after inactivated flu vaccine. This risk has been estimated at 1 or 2 additional cases per million people vaccinated. This is much lower than the risk of severe complications from flu, which can be prevented by flu vaccine.  Young children who get the flu shot along with pneumococcal vaccine (PCV13) and/or DTaP vaccine at the same time might be slightly more likely to have a seizure caused by fever. Ask your doctor for more information. Tell your doctor if a child who is getting flu vaccine has ever had a seizure. Problems that could happen after any injected vaccine:      People sometimes faint after a medical procedure, including vaccination. Sitting or lying down for about 15 minutes can help prevent fainting, and injuries caused by a fall. Tell your doctor if you feel dizzy, or have vision changes or ringing in the ears.  Some people get severe pain in the shoulder and have difficulty moving the arm where a shot was given. This happens very rarely.  Any medication can cause a severe allergic reaction. Such reactions from a vaccine are very rare, estimated at about 1 in a million doses, and would happen within a few minutes to a few hours after the vaccination. As with any medicine, there is a very remote chance of a vaccine causing a serious injury or death. The safety of vaccines is always being monitored. For more information, visit: www.cdc.gov/vaccinesafety/    5. What if there is a serious reaction? What should I look for?  Look for anything that concerns you, such as signs of a severe allergic reaction, very high fever, or unusual behavior.     Signs of a severe allergic reaction can include hives, swelling of the face and throat, difficulty breathing, a fast heartbeat, dizziness, and weakness - usually within a few minutes to a few hours after the vaccination. What should I do?  If you think it is a severe allergic reaction or other emergency that cant wait, call 9-1-1 and get the person to the nearest hospital. Otherwise, call your doctor.  Reactions should be reported to the Vaccine Adverse Event Reporting System (VAERS). Your doctor should file this report, or you can do it yourself through  the VAERS web site at www.vaers. [facility].gov, or by calling 5-186.841.5538. VAERS does not give medical advice. 6. The National Vaccine Injury Compensation Program    The [facility] Vaccine Injury Compensation Program (VICP) is a federal program that was created to compensate people who may have been injured by certain vaccines. Persons who believe they may have been injured by a vaccine can learn about the program and about filing a claim by calling 4-142.501.7175 or visiting the [facility] website at www.[facility].gov/vaccinecompensation. There is a time limit to file a claim for compensation. 7. How can I learn more?  Ask your healthcare provider. He or she can give you the vaccine package insert or suggest other sources of information.  Call your local or state health department.  Contact the Centers for Disease Control and Prevention (CDC):  - Call 5-723.410.5075 (1-800-CDC-INFO) or  - Visit CDCs website at www.cdc.gov/flu    Vaccine Information Statement   Inactivated Influenza Vaccine   8/7/2015  42 HEATHER Geoffreybraydon Chu [id number]-[id number]    Department of Health and Human Services  Centers for Disease Control and Prevention    Office Use Only

## 2018-09-20 NOTE — PROGRESS NOTES
Fior Santos presents today for   Chief Complaint   Patient presents with   [facility] Follow Up     Patient was admitted to [facility] on 9-5-18 and discharged on 9-7-18 for chest pain/stroke. Patient has upcoming appointments with Neurology on 10-23-18 and Cardiology on 10-10-18    Medication Refill       Fior Santos preferred language for health care discussion is english/other. Is someone accompanying this pt? no    Is the patient using any DME equipment during OV? no    Depression Screening:  PHQ over the last two weeks 2/13/2018   Little interest or pleasure in doing things Not at all   Feeling down, depressed, irritable, or hopeless Not at all   Total Score PHQ 2 0       Learning Assessment:  Learning Assessment 9/20/2018   PRIMARY LEARNER Patient   HIGHEST LEVEL OF EDUCATION - PRIMARY LEARNER  DID NOT GRADUATE [facility] PRIMARY LEARNER NONE   CO-LEARNER CAREGIVER No   PRIMARY LANGUAGE ENGLISH   LEARNER PREFERENCE PRIMARY DEMONSTRATION     -   ANSWERED BY patient   RELATIONSHIP SELF       Abuse Screening:  Abuse Screening Questionnaire 9/20/2018   Do you ever feel afraid of your partner? N   Are you in a relationship with someone who physically or mentally threatens you? N   Is it safe for you to go home? Y           Coordination of Care:  1. Have you been to the ER, urgent care clinic since your last visit? Hospitalized since your last visit? Yes at [facility] on 9-5-18 through 9-7-18    2. Have you seen or consulted any other health care providers outside of the [address] [place] [address] since your last visit? Include any pap smears or colon screening.  yes

## 2018-09-20 NOTE — PROGRESS NOTES
Chief Complaint   Patient presents with   [facility] Follow Up     Patient was admitted to [facility] on 9-5-18 and discharged on 9-7-18 for chest pain/stroke. Patient has upcoming appointments with Neurology on 10-23-18 and Cardiology on 10-10-18    Medication Refill     HISTORY OF PRESENT ILLNESS  Sunday Garvin is a 48 y.o. male. HPI  Patient is here for follow up of hospital admission at [facility] for chest pain and cva. Pt's cardiac testing was neg for mi but he is to f/u with cards as an outpt. Neuro also recommended eval for pfo. Since returning home, pt has been doing well. No further sx. Pt has made diet changes and feels that his strength is returning. Patient does need medication refills today. New concerns today: none      ROS  Review of Systems   Constitutional: Negative. HENT: Negative. Respiratory: Negative. Cardiovascular: Negative. All other systems reviewed and are negative. Physical Exam  Nursing note and vitals reviewed. Constitutional: He is oriented to person, place, and time. He appears well-developed and well-nourished. HENT:   Head: Normocephalic and atraumatic. Right Ear: External ear normal.   Left Ear: External ear normal.   Nose: Nose normal.   Eyes: Conjunctivae and EOM are normal.   Neck: Normal range of motion. Neck supple. No JVD present. Carotid bruit is not present. No thyromegaly present. Cardiovascular: Normal rate, irregular rhythm, normal heart sounds and intact distal pulses. Exam reveals no gallop and no friction rub. No murmur heard. Pulmonary/Chest: Effort normal and breath sounds normal. He has no wheezes. He has no rhonchi. He has no rales. Abdominal: Soft. Musculoskeletal: Normal range of motion. Neurological: He is alert and oriented to person, place, and time. Coordination normal.   Skin: Skin is warm and dry. Psychiatric: He has a normal mood and affect.  His behavior is normal. Judgment and thought content normal. ASSESSMENT and PLAN  Diagnoses and all orders for this visit:    1. Type 2 diabetes mellitus without complication, with long-term current use of insulin (HCC)  -     METABOLIC PANEL, COMPREHENSIVE; Future  -     LIPID PANEL; Future  -     HEMOGLOBIN A1C WITH EAG; Future  -     MICROALBUMIN, UR, RAND W/ MICROALB/CREAT RATIO; Future  -     CBC WITH AUTOMATED DIFF; Future    2. History of CVA (cerebrovascular accident)  Discussed PFO. Importance of f/u with cards and neuro emphasized    3. Essential hypertension  -     lisinopril (PRINIVIL, ZESTRIL) 40 mg tablet; Take 1 Tab by mouth daily.  -     METABOLIC PANEL, COMPREHENSIVE; Future  -     LIPID PANEL; Future    4. Mixed hyperlipidemia  -     METABOLIC PANEL, COMPREHENSIVE; Future  -     LIPID PANEL; Future    5. Encounter for immunization  -     Influenza virus vaccine (QUADRIVALENT PRES FREE SYRINGE) IM (77246)    6. Vitamin B1 deficiency  -     thiamine HCL (B-1) 100 mg tablet; Take 1 Tab by mouth daily. -     VITAMIN B1, WHOLE BLOOD; Future    7. Irregular heart beat  -     AMB POC EKG ROUTINE W/ 12 LEADS, INTER & REP    8.  Tachycardia  -     AMB POC EKG ROUTINE W/ 12 LEADS, INTER & REP      Follow-up Disposition: 3 months; sooner prn

## 2018-09-27 DIAGNOSIS — E11.9 TYPE 2 DIABETES MELLITUS WITHOUT COMPLICATION, WITHOUT LONG-TERM CURRENT USE OF INSULIN (HCC): ICD-10-CM

## 2018-09-27 NOTE — TELEPHONE ENCOUNTER
Patient needs a refill for needles for his insulin. Please advise      Requested Prescriptions     Pending Prescriptions Disp Refills    Insulin Needles, Disposable, (SURE-FINE PEN NEEDLES) 31 gauge x 5/16\" ndle 1 Package 12     Sig: by Does Not Apply route daily. Use as directed with insulin.

## 2018-09-28 RX ORDER — PEN NEEDLE, DIABETIC 30 GX3/16"
NEEDLE, DISPOSABLE MISCELLANEOUS DAILY
Qty: 1 PACKAGE | Refills: 12 | Status: ON HOLD | OUTPATIENT
Start: 2018-09-28 | End: 2020-11-05

## 2018-10-19 DIAGNOSIS — E11.40 TYPE 2 DIABETES MELLITUS WITH DIABETIC NEUROPATHY, WITHOUT LONG-TERM CURRENT USE OF INSULIN (HCC): ICD-10-CM

## 2018-10-19 DIAGNOSIS — J30.2 SEASONAL ALLERGIC RHINITIS, UNSPECIFIED TRIGGER: ICD-10-CM

## 2018-10-19 DIAGNOSIS — E78.2 MIXED HYPERLIPIDEMIA: ICD-10-CM

## 2018-10-21 RX ORDER — FLUTICASONE PROPIONATE 50 MCG
SPRAY, SUSPENSION (ML) NASAL
Qty: 16 G | Refills: 12 | Status: SHIPPED | OUTPATIENT
Start: 2018-10-21 | End: 2019-05-24 | Stop reason: ALTCHOICE

## 2018-10-21 RX ORDER — INSULIN GLARGINE 100 [IU]/ML
INJECTION, SOLUTION SUBCUTANEOUS
Qty: 6 ML | Refills: 5 | Status: SHIPPED | OUTPATIENT
Start: 2018-10-21 | End: 2019-04-01 | Stop reason: SDUPTHER

## 2018-10-21 RX ORDER — ATORVASTATIN CALCIUM 20 MG/1
TABLET, FILM COATED ORAL
Qty: 30 TAB | Refills: 5 | Status: SHIPPED | OUTPATIENT
Start: 2018-10-21 | End: 2019-04-01 | Stop reason: SDUPTHER

## 2018-10-21 RX ORDER — LORATADINE 10 MG/1
TABLET ORAL
Qty: 30 TAB | Refills: 5 | Status: SHIPPED | OUTPATIENT
Start: 2018-10-21 | End: 2019-04-01 | Stop reason: SDUPTHER

## 2018-11-13 DIAGNOSIS — E11.9 DIABETES MELLITUS WITHOUT COMPLICATION (HCC): ICD-10-CM

## 2018-11-13 DIAGNOSIS — E11.9 TYPE 2 DIABETES MELLITUS WITHOUT COMPLICATION, WITHOUT LONG-TERM CURRENT USE OF INSULIN (HCC): ICD-10-CM

## 2018-11-13 RX ORDER — GLIMEPIRIDE 2 MG/1
TABLET ORAL
Qty: 30 TAB | Refills: 5 | Status: SHIPPED | OUTPATIENT
Start: 2018-11-13 | End: 2018-11-18 | Stop reason: SDUPTHER

## 2018-11-13 NOTE — TELEPHONE ENCOUNTER
PCP: Oliver Tripathi MD    Last appt: 9/20/2018  Future Appointments   Date Time Provider Amalia Silver   11/27/2018  9:30 AM [facility]       Requested Prescriptions     Pending Prescriptions Disp Refills    SITagliptin-metFORMIN (JANUMET XR) 100-1,000 mg TM24 30 Tab 5     Sig: TAKE 1 TABLET BY MOUTH DAILY    glimepiride (AMARYL) 2 mg tablet 30 Tab 5     Sig: TAKE 1 TABLET BY MOUTH DAILY

## 2018-11-15 DIAGNOSIS — E11.9 DIABETES MELLITUS WITHOUT COMPLICATION (HCC): ICD-10-CM

## 2018-11-15 DIAGNOSIS — E11.9 TYPE 2 DIABETES MELLITUS WITHOUT COMPLICATION, WITHOUT LONG-TERM CURRENT USE OF INSULIN (HCC): ICD-10-CM

## 2018-11-18 RX ORDER — SITAGLIPTIN AND METFORMIN HYDROCHLORIDE 100; 1000 MG/1; MG/1
TABLET, FILM COATED, EXTENDED RELEASE ORAL
Qty: 30 TAB | Refills: 5 | Status: SHIPPED | OUTPATIENT
Start: 2018-11-18 | End: 2019-05-16 | Stop reason: SDUPTHER

## 2018-11-18 RX ORDER — GLIMEPIRIDE 2 MG/1
TABLET ORAL
Qty: 30 TAB | Refills: 5 | Status: SHIPPED | OUTPATIENT
Start: 2018-11-18 | End: 2019-05-16 | Stop reason: SDUPTHER

## 2018-12-10 DIAGNOSIS — G63 POLYNEUROPATHY ASSOCIATED WITH UNDERLYING DISEASE (HCC): ICD-10-CM

## 2018-12-12 RX ORDER — GABAPENTIN 300 MG/1
CAPSULE ORAL
Qty: 60 CAP | Refills: 5 | Status: SHIPPED | OUTPATIENT
Start: 2018-12-12 | End: 2019-06-12 | Stop reason: SDUPTHER

## 2019-02-22 ENCOUNTER — OFFICE VISIT (OUTPATIENT)
Dept: FAMILY MEDICINE CLINIC | Age: 54
End: 2019-02-22

## 2019-02-22 VITALS
HEART RATE: 101 BPM | RESPIRATION RATE: 18 BRPM | TEMPERATURE: 98.7 F | HEIGHT: 69 IN | BODY MASS INDEX: 27.11 KG/M2 | WEIGHT: 183 LBS | DIASTOLIC BLOOD PRESSURE: 90 MMHG | SYSTOLIC BLOOD PRESSURE: 140 MMHG

## 2019-02-22 DIAGNOSIS — I10 ESSENTIAL HYPERTENSION: Primary | ICD-10-CM

## 2019-02-22 DIAGNOSIS — Z79.4 TYPE 2 DIABETES MELLITUS WITHOUT COMPLICATION, WITH LONG-TERM CURRENT USE OF INSULIN (HCC): ICD-10-CM

## 2019-02-22 DIAGNOSIS — Z86.73 HISTORY OF CVA (CEREBROVASCULAR ACCIDENT): ICD-10-CM

## 2019-02-22 DIAGNOSIS — I25.2 HISTORY OF MI (MYOCARDIAL INFARCTION): ICD-10-CM

## 2019-02-22 DIAGNOSIS — Z12.11 COLON CANCER SCREENING: ICD-10-CM

## 2019-02-22 DIAGNOSIS — E11.9 TYPE 2 DIABETES MELLITUS WITHOUT COMPLICATION, WITH LONG-TERM CURRENT USE OF INSULIN (HCC): ICD-10-CM

## 2019-02-22 NOTE — PROGRESS NOTES
Chief Complaint   Patient presents with    Hypertension     follow up    Diabetes    Cholesterol Problem    Vitamin B12 Deficiency         DAIANA Barr is a 48 y.o. male presenting today for  3 months  follow up of htn, dm, hld, vit b12. Pt had a cva last fall; he has not followed up with cards or neuro. Pt notes that his wife has been ill; she is currently in the hospital.      No recent labs. ROS  Review of Systems   Constitutional: Negative. HENT: Negative. Respiratory: Negative. Cardiovascular: Negative. All other systems reviewed and are negative. Physical Exam  Nursing note and vitals reviewed. Constitutional: He is oriented to person, place, and time. He appears well-developed and well-nourished. HENT:   Head: Normocephalic and atraumatic. Right Ear: External ear normal.   Left Ear: External ear normal.   Nose: Nose normal.   Eyes: Conjunctivae and EOM are normal.   Neck: Normal range of motion. Neck supple. No JVD present. Carotid bruit is not present. No thyromegaly present. Cardiovascular: Normal rate, regular rhythm, normal heart sounds and intact distal pulses. Exam reveals no gallop and no friction rub. No murmur heard. Pulmonary/Chest: Effort normal and breath sounds normal. He has no wheezes. He has no rhonchi. He has no rales. Abdominal: Soft. Musculoskeletal: Normal range of motion. Neurological: He is alert and oriented to person, place, and time. Coordination normal.   Skin: Skin is warm and dry. Psychiatric: He has a normal mood and affect. His behavior is normal. Judgment and thought content normal.       Diagnoses and all orders for this visit:    1. Essential hypertension  -     REFERRAL TO CARDIOLOGY  -     REFERRAL TO NEUROLOGY  Stable, cont pres tx plan. Labs pending    2.  History of CVA (cerebrovascular accident)  -     REFERRAL TO CARDIOLOGY  -     REFERRAL TO NEUROLOGY    3. History of MI (myocardial infarction)  -     REFERRAL TO CARDIOLOGY  -     REFERRAL TO NEUROLOGY    4. Type 2 diabetes mellitus without complication, with long-term current use of insulin ([facility] [address]  Labs pending    5. Colon cancer screening  -     REFERRAL TO COLON AND RECTAL SURGERY      Follow-up Disposition:  Return in about 3 months (around 5/22/2019) for diabetes, high blood pressure, high cholesterol, cad.

## 2019-02-22 NOTE — PATIENT INSTRUCTIONS
High Blood Pressure: Care Instructions  Overview    It's normal for blood pressure to go up and down throughout the day. But if it stays up, you have high blood pressure. Another name for high blood pressure is hypertension. Despite what a lot of people think, high blood pressure usually doesn't cause headaches or make you feel dizzy or lightheaded. It usually has no symptoms. But it does increase your risk of stroke, heart attack, and other problems. You and your doctor will talk about your risks of these problems based on your blood pressure. Your doctor will give you a goal for your blood pressure. Your goal will be based on your health and your age. Lifestyle changes, such as eating healthy and being active, are always important to help lower blood pressure. You might also take medicine to reach your blood pressure goal.  Follow-up care is a key part of your treatment and safety. Be sure to make and go to all appointments, and call your doctor if you are having problems. It's also a good idea to know your test results and keep a list of the medicines you take. How can you care for yourself at home? Medical treatment  · If you stop taking your medicine, your blood pressure will go back up. You may take one or more types of medicine to lower your blood pressure. Be safe with medicines. Take your medicine exactly as prescribed. Call your doctor if you think you are having a problem with your medicine. · Talk to your doctor before you start taking aspirin every day. Aspirin can help certain people lower their risk of a heart attack or stroke. But taking aspirin isn't right for everyone, because it can cause serious bleeding. · See your doctor regularly. You may need to see the doctor more often at first or until your blood pressure comes down. · If you are taking blood pressure medicine, talk to your doctor before you take decongestants or anti-inflammatory medicine, such as ibuprofen.  Some of these medicines can raise blood pressure. · Learn how to check your blood pressure at home. Lifestyle changes  · Stay at a healthy weight. This is especially important if you put on weight around the waist. Losing even 10 pounds can help you lower your blood pressure. · If your doctor recommends it, get more exercise. Walking is a good choice. Bit by bit, increase the amount you walk every day. Try for at least 30 minutes on most days of the week. You also may want to swim, bike, or do other activities. · Avoid or limit alcohol. Talk to your doctor about whether you can drink any alcohol. · Try to limit how much sodium you eat to less than 2,300 milligrams (mg) a day. Your doctor may ask you to try to eat less than 1,500 mg a day. · Eat plenty of fruits (such as bananas and oranges), vegetables, legumes, whole grains, and low-fat dairy products. · Lower the amount of saturated fat in your diet. Saturated fat is found in animal products such as milk, cheese, and meat. Limiting these foods may help you lose weight and also lower your risk for heart disease. · Do not smoke. Smoking increases your risk for heart attack and stroke. If you need help quitting, talk to your doctor about stop-smoking programs and medicines. These can increase your chances of quitting for good. When should you call for help? Call 911 anytime you think you may need emergency care. This may mean having symptoms that suggest that your blood pressure is causing a serious heart or blood vessel problem. Your blood pressure may be over 180/120.   For example, call 911 if:    · You have symptoms of a heart attack. These may include:  ? Chest pain or pressure, or a strange feeling in the chest.  ? Sweating. ? Shortness of breath. ? Nausea or vomiting. ? Pain, pressure, or a strange feeling in the back, neck, jaw, or upper belly or in one or both shoulders or arms. ? Lightheadedness or sudden weakness.   ? A fast or irregular heartbeat.     · You have symptoms of a stroke. These may include:  ? Sudden numbness, tingling, weakness, or loss of movement in your face, arm, or leg, especially on only one side of your body. ? Sudden vision changes. ? Sudden trouble speaking. ? Sudden confusion or trouble understanding simple statements. ? Sudden problems with walking or balance. ? A sudden, severe headache that is different from past headaches.     · You have severe back or belly pain.    Do not wait until your blood pressure comes down on its own. Get help right away.   Call your doctor now or seek immediate care if:    · Your blood pressure is much higher than normal (such as 180/120 or higher), but you don't have symptoms.     · You think high blood pressure is causing symptoms, such as:  ? Severe headache.  ? Blurry vision.    Watch closely for changes in your health, and be sure to contact your doctor if:    · Your blood pressure measures higher than your doctor recommends at least 2 times. That means the top number is higher or the bottom number is higher, or both.     · You think you may be having side effects from your blood pressure medicine. Where can you learn more? Go to http://luma-che.info/. Enter Z742 in the search box to learn more about \"High Blood Pressure: Care Instructions. \"  Current as of: July 22, 2018  Content Version: 11.9  © 1601-5908 Healthwise, Incorporated. Care instructions adapted under license by [employer] (which disclaims liability or warranty for this information). If you have questions about a medical condition or this instruction, always ask your healthcare professional. [place] [address] any warranty or liability for your use of this information.

## 2019-02-22 NOTE — PROGRESS NOTES
Joshua Minaya presents today for   Chief Complaint   Patient presents with    Hypertension     follow up    Diabetes    Cholesterol Problem    Vitamin B12 Deficiency       Joshua Minaya preferred language for health care discussion is english/other. Is someone accompanying this pt? no    Is the patient using any DME equipment during [address]? no    Depression Screening:  3 most recent PHQ Screens 2/22/2019   Little interest or pleasure in doing things Not at all   Feeling down, depressed, irritable, or hopeless Not at all   Total Score PHQ 2 0       Learning Assessment:  Learning Assessment 2/22/2019   PRIMARY LEARNER Patient   HIGHEST LEVEL OF EDUCATION - PRIMARY LEARNER  DID NOT GRADUATE [facility] PRIMARY LEARNER NONE   CO-LEARNER CAREGIVER No   PRIMARY LANGUAGE ENGLISH   LEARNER PREFERENCE PRIMARY LISTENING     -   ANSWERED BY self   RELATIONSHIP SELF       Abuse Screening:  Abuse Screening Questionnaire 2/22/2019   Do you ever feel afraid of your partner? N   Are you in a relationship with someone who physically or mentally threatens you? N   Is it safe for you to go home? Y           Coordination of Care:  1. Have you been to the ER, urgent care clinic since your last visit? Hospitalized since your last visit? no    2. Have you seen or consulted any other health care providers outside of the [address] [place] [address] since your last visit? Include any pap smears or colon screening. no    Per-Dr. Clayton Izquierdo ok medication not taking to be deleted. Advance Directive:  1. Do you have an advance directive in place? Patient Reply:no    2. If not, would you like material regarding how to put one in place?  Patient Reply: no

## 2019-03-06 ENCOUNTER — OFFICE VISIT (OUTPATIENT)
Dept: CARDIOLOGY CLINIC | Age: 54
End: 2019-03-06

## 2019-03-06 VITALS
SYSTOLIC BLOOD PRESSURE: 152 MMHG | HEART RATE: 91 BPM | DIASTOLIC BLOOD PRESSURE: 92 MMHG | WEIGHT: 183 LBS | HEIGHT: 68 IN | BODY MASS INDEX: 27.74 KG/M2

## 2019-03-06 DIAGNOSIS — R94.39 ABNORMAL STRESS TEST: ICD-10-CM

## 2019-03-06 DIAGNOSIS — I10 ESSENTIAL HYPERTENSION: Primary | ICD-10-CM

## 2019-03-06 DIAGNOSIS — Z86.73 HISTORY OF CVA (CEREBROVASCULAR ACCIDENT): ICD-10-CM

## 2019-03-06 DIAGNOSIS — R07.9 CHEST PAIN, UNSPECIFIED TYPE: ICD-10-CM

## 2019-03-06 DIAGNOSIS — Z79.4 TYPE 2 DIABETES MELLITUS WITHOUT COMPLICATION, WITH LONG-TERM CURRENT USE OF INSULIN (HCC): ICD-10-CM

## 2019-03-06 DIAGNOSIS — E11.9 TYPE 2 DIABETES MELLITUS WITHOUT COMPLICATION, WITH LONG-TERM CURRENT USE OF INSULIN (HCC): ICD-10-CM

## 2019-03-06 RX ORDER — LOSARTAN POTASSIUM AND HYDROCHLOROTHIAZIDE 12.5; 5 MG/1; MG/1
1 TABLET ORAL DAILY
Qty: 30 TAB | Refills: 6 | Status: SHIPPED | OUTPATIENT
Start: 2019-03-06 | End: 2019-07-31 | Stop reason: SDUPTHER

## 2019-03-06 NOTE — PATIENT INSTRUCTIONS
High Blood Pressure: Care Instructions  Overview    It's normal for blood pressure to go up and down throughout the day. But if it stays up, you have high blood pressure. Another name for high blood pressure is hypertension. Despite what a lot of people think, high blood pressure usually doesn't cause headaches or make you feel dizzy or lightheaded. It usually has no symptoms. But it does increase your risk of stroke, heart attack, and other problems. You and your doctor will talk about your risks of these problems based on your blood pressure. Your doctor will give you a goal for your blood pressure. Your goal will be based on your health and your age. Lifestyle changes, such as eating healthy and being active, are always important to help lower blood pressure. You might also take medicine to reach your blood pressure goal.  Follow-up care is a key part of your treatment and safety. Be sure to make and go to all appointments, and call your doctor if you are having problems. It's also a good idea to know your test results and keep a list of the medicines you take. How can you care for yourself at home? Medical treatment  · If you stop taking your medicine, your blood pressure will go back up. You may take one or more types of medicine to lower your blood pressure. Be safe with medicines. Take your medicine exactly as prescribed. Call your doctor if you think you are having a problem with your medicine. · Talk to your doctor before you start taking aspirin every day. Aspirin can help certain people lower their risk of a heart attack or stroke. But taking aspirin isn't right for everyone, because it can cause serious bleeding. · See your doctor regularly. You may need to see the doctor more often at first or until your blood pressure comes down. · If you are taking blood pressure medicine, talk to your doctor before you take decongestants or anti-inflammatory medicine, such as ibuprofen.  Some of these medicines can raise blood pressure. · Learn how to check your blood pressure at home. Lifestyle changes  · Stay at a healthy weight. This is especially important if you put on weight around the waist. Losing even 10 pounds can help you lower your blood pressure. · If your doctor recommends it, get more exercise. Walking is a good choice. Bit by bit, increase the amount you walk every day. Try for at least 30 minutes on most days of the week. You also may want to swim, bike, or do other activities. · Avoid or limit alcohol. Talk to your doctor about whether you can drink any alcohol. · Try to limit how much sodium you eat to less than 2,300 milligrams (mg) a day. Your doctor may ask you to try to eat less than 1,500 mg a day. · Eat plenty of fruits (such as bananas and oranges), vegetables, legumes, whole grains, and low-fat dairy products. · Lower the amount of saturated fat in your diet. Saturated fat is found in animal products such as milk, cheese, and meat. Limiting these foods may help you lose weight and also lower your risk for heart disease. · Do not smoke. Smoking increases your risk for heart attack and stroke. If you need help quitting, talk to your doctor about stop-smoking programs and medicines. These can increase your chances of quitting for good. When should you call for help? Call 911 anytime you think you may need emergency care. This may mean having symptoms that suggest that your blood pressure is causing a serious heart or blood vessel problem. Your blood pressure may be over 180/120.   For example, call 911 if:    · You have symptoms of a heart attack. These may include:  ? Chest pain or pressure, or a strange feeling in the chest.  ? Sweating. ? Shortness of breath. ? Nausea or vomiting. ? Pain, pressure, or a strange feeling in the back, neck, jaw, or upper belly or in one or both shoulders or arms. ? Lightheadedness or sudden weakness.   ? A fast or irregular heartbeat.     · You have symptoms of a stroke. These may include:  ? Sudden numbness, tingling, weakness, or loss of movement in your face, arm, or leg, especially on only one side of your body. ? Sudden vision changes. ? Sudden trouble speaking. ? Sudden confusion or trouble understanding simple statements. ? Sudden problems with walking or balance. ? A sudden, severe headache that is different from past headaches.     · You have severe back or belly pain.    Do not wait until your blood pressure comes down on its own. Get help right away.   Call your doctor now or seek immediate care if:    · Your blood pressure is much higher than normal (such as 180/120 or higher), but you don't have symptoms.     · You think high blood pressure is causing symptoms, such as:  ? Severe headache.  ? Blurry vision.    Watch closely for changes in your health, and be sure to contact your doctor if:    · Your blood pressure measures higher than your doctor recommends at least 2 times. That means the top number is higher or the bottom number is higher, or both.     · You think you may be having side effects from your blood pressure medicine. Where can you learn more? Go to http://luma-che.info/. Enter G051 in the search box to learn more about \"High Blood Pressure: Care Instructions. \"  Current as of: July 22, 2018  Content Version: 11.9  © 8637-7600 [employer]. Care instructions adapted under license by [employer] (which disclaims liability or warranty for this information). If you have questions about a medical condition or this instruction, always ask your healthcare professional. [address] any warranty or liability for your use of this information. Pixelated Activation    Thank you for requesting access to [employer]ated. Please follow the instructions below to securely access and download your online medical record.  Pixelated allows you to send messages to your doctor, view your test results, renew your prescriptions, schedule appointments, and more. How Do I Sign Up? 1. In your internet browser, go to [email or web address]. 2. Click on the First Time User? Click Here link in the Sign In box. You will see the New Member Sign Up page. 3. Enter your [employer] Access Code exactly as it appears below. You will not need to use this code after youve completed the sign-up process. If you do not sign up before the expiration date, you must request a new code. [employer] Access Code: [email or web address]  Expires: 2019 10:28 AM (This is the date your [employer] access code will )    4. Enter the last four digits of your Social Security Number (xxxx) and Date of Birth (mm/dd/yyyy) as indicated and click Submit. You will be taken to the next sign-up page. 5. Create a [employer] ID. This will be your [employer] login ID and cannot be changed, so think of one that is secure and easy to remember. 6. Create a [employer] password. You can change your password at any time. 7. Enter your Password Reset Question and Answer. This can be used at a later time if you forget your password. 8. Enter your e-mail address. You will receive e-mail notification when new information is available in [address] 9. Click Sign Up. You can now view and download portions of your medical record. 10. Click the Download Summary menu link to download a portable copy of your medical information. Additional Information    If you have questions, please visit the Frequently Asked Questions section of the [employer] website at [email or web address]/. Remember, [employer] is NOT to be used for urgent needs. For medical emergencies, dial 911.

## 2019-03-06 NOTE — PROGRESS NOTES
HISTORY OF PRESENT ILLNESS  Roney Reagan is a 48 y.o. male. New Patient   The history is provided by the patient. This is a chronic problem. The problem occurs every several days. The problem has not changed since onset. Pertinent negatives include no chest pain, no abdominal pain, no headaches and no shortness of breath. Hypertension   The history is provided by the patient. This is a chronic problem. The problem occurs every several days. The problem has not changed since onset. Pertinent negatives include no chest pain, no abdominal pain, no headaches and no shortness of breath. Review of Systems   Constitutional: Negative for chills, diaphoresis, fever and weight loss. HENT: Negative for ear pain and hearing loss. Eyes: Negative for blurred vision. Respiratory: Negative for cough, hemoptysis, sputum production, shortness of breath, wheezing and stridor. Cardiovascular: Negative for chest pain, palpitations, orthopnea, claudication, leg swelling and PND. Gastrointestinal: Negative for abdominal pain, heartburn, nausea and vomiting. Musculoskeletal: Negative for myalgias and neck pain. Skin: Negative for rash. Neurological: Negative for dizziness, tingling, tremors, focal weakness, loss of consciousness, weakness and headaches. Psychiatric/Behavioral: Negative for depression and suicidal ideas.      Family History   Problem Relation Age of Onset    Arthritis-osteo Mother     Gout Mother     Diabetes Mother     Hypertension Mother     Diabetes Father     Diabetes Brother     Diabetes Maternal Grandmother        Past Medical History:   Diagnosis Date    Arthritis     Chest pain     [facility]cerebral vascular [facility] 2011    r side stroke    CVA (cerebral vascular accident) ([facility] 09/05/2018    admitted at [address] [place] [address] [facility]) 2010    chest pains    Hypercholesterolemia     Hypertension     Loss of hearing     right ear, patient states it comes and goes    Polyuria     Shortness of breath 2011    Type 2 diabetes mellitus without complication[facility] 07[facility]       Past Surgical History:   Procedure Laterality Date    HX HEART CATHETERIZATION         Social History     Tobacco Use    Smoking status: Never Smoker    Smokeless tobacco: Never Used   Substance Use Topics    Alcohol use: Yes     Comment: occassionally       No Known Allergies    Outpatient Medications Marked as Taking for the 3/6/19 encounter (Office Visit) with Candance Fender, MD   Medication Sig Dispense Refill    losartan-hydroCHLOROthiazide (HYZAAR) 50-12.5 mg per tablet Take 1 Tab by mouth daily. 30 Tab 6    gabapentin (NEURONTIN) 300 mg capsule TAKE 2 CAPSULES BY MOUTH EVERY EVENING 60 Cap 5    JANUMET -1,000 mg TM24 TAKE 1 TABLET BY MOUTH EVERY DAY 30 Tab 5    glimepiride (AMARYL) 2 mg tablet TAKE 1 TABLET BY MOUTH EVERY DAY 30 Tab 5    atorvastatin (LIPITOR) 20 mg tablet TAKE 1 TABLET BY MOUTH EVERY DAY 30 Tab 5    LANTUS SOLOSTAR U-100 INSULIN 100 unit/mL (3 mL) inpn INJECT 20 UNITS subcutaneously EVERY EVENING 6 mL 5    fluticasone (FLONASE) 50 mcg/actuation nasal spray INHALE 2 SPRAYS into both nostrils EVERY DAY 16 g 12    loratadine (CLARITIN) 10 mg tablet TAKE 1 TABLET BY MOUTH EVERY DAY 30 Tab 5    Insulin Needles, Disposable, (SURE-FINE PEN NEEDLES) 31 gauge x 5/16\" ndle by Does Not Apply route daily. Use as directed with insulin. 1 Package 12    aspirin delayed-release 325 mg tablet 81 mg.      multivitamin (ONE A DAY) tablet Take 1 Tab by Mouth Once a Day. Similar alternatives are ok.  thiamine HCL (B-1) 100 mg tablet Take 1 Tab by mouth daily. 90 Tab 3    sildenafil citrate (VIAGRA) 50 mg tablet Take 1 Tab by mouth as needed. 6 Tab 5    albuterol (PROVENTIL HFA, VENTOLIN HFA, PROAIR HFA) 90 mcg/actuation inhaler Take 2 Puffs by inhalation every four (4) hours as needed for Wheezing.  1 Inhaler 5  Blood-Glucose Meter monitoring kit TEST GLUCOSE TWICE DAILY. DX:E11.9 1 Kit 0    glucose blood VI test strips (FREESTYLE LITE STRIPS) strip TEST BLOOD GLUCOSE TWICE DAILY. DX:E11.9 48 Strip 12    lancets (FREESTYLE LANCETS) 28 gauge misc TEST BLOOD GLUCOSE TWICE DAILY. DX E11.9 50 Lancet 12    Lancets Misc Use with Relion Prime Meter. Test Blood sugar Daily. DX: 250.02 50 Each 1    glucose blood VI test strips (BLOOD GLUCOSE TEST) strip Use with [employer] Prime Meter. Test Blood sugar daily. DX:250.02 50 Strip 1    omega-3 fatty acids-vitamin e (FISH OIL) 1,000 mg cap Take 1 Cap by mouth daily. Visit Vitals  BP (!) 152/92   Pulse 91   Ht 5' 8\" (1.727 m)   Wt 83 kg (183 lb)   BMI 27.83 kg/m²       Physical Exam   Constitutional: He is oriented to person, place, and time. He appears well-developed and well-nourished. No distress. HENT:   Head: Atraumatic. Mouth/Throat: No oropharyngeal exudate. Eyes: Conjunctivae are normal. No scleral icterus. Neck: Normal range of motion. Neck supple. No JVD present. No tracheal deviation present. No thyromegaly present. Cardiovascular: Normal rate and regular rhythm. Exam reveals no gallop. No murmur heard. Pulmonary/Chest: Effort normal and breath sounds normal. No stridor. He has no wheezes. He has no rales. Abdominal: Soft. There is no tenderness. There is no rebound and no guarding. Musculoskeletal: Normal range of motion. He exhibits no edema or tenderness. Neurological: He is alert and oriented to person, place, and time. He exhibits normal muscle tone. Skin: Skin is warm. He is not diaphoretic. Psychiatric: He has a normal mood and affect. His behavior is normal.     Echo 09/2018:  NORMAL LEFT VENTRICULAR CAVITY SIZE AND SYSTOLIC FUNCTION WITH AN EJECTION FRACTION   VISUALLY ESTIMATED AT 60%. NORMAL DIASTOLIC FUNCTION. MILD LEFT VENTRICULAR HYPERTROPHY PRESENT. TRACE MITRAL REGURGITATION.    MILDLY SCLEROTIC TRILEAFLET AORTIC VALVE.   INSUFFICIENT TRICUSPID REGURGITANT JET TO ESTIMATE PULMONARY ARTERY PRESSURE. NEGATIVE BUBBLE STUDY ON PRIOR ECHO REPORT 4/09/2012. NO SIGNIFICANT CHANGES FROM PRIOR ECHO REPORT ON 4/09/2012. Lexiscan 2018-  THIS MYOCARDIAL PERFUSION STUDY IS ABNORMAL   THIS IS A MEDIUM RISK STUDY   ABNORMAL NUCLEAR SCAN WITH  A SMALL AREA OF NON PERFUSION ON BOTH THE STRESS AND REST IMAGES   IN THE INFERIOR APICAL WALL CONSISTENT WITH EITHER PRIOR INFERIOR APICAL INFARCTION OR HIGH GRADE   OCCLUSION TO THE DISTAL RCA        INFERIOR APICAL AKINESIS WITH AN EJECTION FRACTION  ESTIMATED AT   50%  ASSESSMENT and PLAN    ICD-10-CM ICD-9-CM    1. Essential hypertension I10 401.9 MAGNESIUM      METABOLIC PANEL, BASIC   2. Type 2 diabetes mellitus without complication, with long-term current use of insulin ([facility]) E11.9 250.00     Z79.4 V58.67    3. History of CVA (cerebrovascular accident) Z86.73 V12.54    4. Abnormal stress test R94.39 794.39    5. Chest pain, unspecified type R07.9 786.50      Orders Placed This Encounter    MAGNESIUM     Standing Status:   Future     Standing Expiration Date:   6/7/2934    METABOLIC PANEL, BASIC     Standing Status:   Future     Standing Expiration Date:   3/6/2020    losartan-hydroCHLOROthiazide (HYZAAR) 50-12.5 mg per tablet     Sig: Take 1 Tab by mouth daily. Dispense:  30 Tab     Refill:  6     Follow-up Disposition:  Return in about 1 month (around 4/6/2019). current treatment plan is effective, no change in therapy  reviewed diet, exercise and weight control    cardiovascular risk and specific lipid/LDL goals reviewed  use of aspirin to prevent MI and TIA's discussed. Patient with recent admission to [facility] for CVA. Echo and stress test report reviewed with patient. C/o excessive cough- likely from liisnopril- will switich to losartan/hctz.   Will f/u in 1 month

## 2019-03-06 NOTE — LETTER
Mignon Ibanez 1965 
 
3/6/2019 Dear Dhruv Esparza MD 
 
I had the pleasure of evaluating  Mr. Alistair Wilson in office today. Below are the relevant portions of my assessment and plan of care. ICD-10-CM ICD-9-CM 1. Essential hypertension I10 401.9 MAGNESIUM  
   METABOLIC PANEL, BASIC 2. Type 2 diabetes mellitus without complication, with long-term current use of insulin (HCC) E11.9 250.00   
 Z79.4 V58.67   
3. History of CVA (cerebrovascular accident) Z86.73 V12.54   
4. Abnormal stress test R94.39 794.39   
5. Chest pain, unspecified type R07.9 786.50 Current Outpatient Medications Medication Sig Dispense Refill  losartan-hydroCHLOROthiazide (HYZAAR) 50-12.5 mg per tablet Take 1 Tab by mouth daily. 30 Tab 6  
 gabapentin (NEURONTIN) 300 mg capsule TAKE 2 CAPSULES BY MOUTH EVERY EVENING 60 Cap 5  JANUMET -1,000 mg TM24 TAKE 1 TABLET BY MOUTH EVERY DAY 30 Tab 5  
 glimepiride (AMARYL) 2 mg tablet TAKE 1 TABLET BY MOUTH EVERY DAY 30 Tab 5  
 atorvastatin (LIPITOR) 20 mg tablet TAKE 1 TABLET BY MOUTH EVERY DAY 30 Tab 5  
 LANTUS SOLOSTAR U-100 INSULIN 100 unit/mL (3 mL) inpn INJECT 20 UNITS subcutaneously EVERY EVENING 6 mL 5  
 fluticasone (FLONASE) 50 mcg/actuation nasal spray INHALE 2 SPRAYS into both nostrils EVERY DAY 16 g 12  loratadine (CLARITIN) 10 mg tablet TAKE 1 TABLET BY MOUTH EVERY DAY 30 Tab 5  
 Insulin Needles, Disposable, (SURE-FINE PEN NEEDLES) 31 gauge x 5/16\" ndle by Does Not Apply route daily. Use as directed with insulin. 1 Package 12  
 aspirin delayed-release 325 mg tablet 81 mg.    
 multivitamin (ONE A DAY) tablet Take 1 Tab by Mouth Once a Day. Similar alternatives are ok.  thiamine HCL (B-1) 100 mg tablet Take 1 Tab by mouth daily. 90 Tab 3  
 sildenafil citrate (VIAGRA) 50 mg tablet Take 1 Tab by mouth as needed.  6 Tab 5  
 albuterol (PROVENTIL HFA, VENTOLIN HFA, PROAIR HFA) 90 mcg/actuation inhaler Take 2 Puffs by inhalation every four (4) hours as needed for Wheezing. 1 Inhaler 5  Blood-Glucose Meter monitoring kit TEST GLUCOSE TWICE DAILY. DX:E11.9 1 Kit 0  
 glucose blood VI test strips (FREESTYLE LITE STRIPS) strip TEST BLOOD GLUCOSE TWICE DAILY. DX:E11.9 48 Strip 12  
 lancets (FREESTYLE LANCETS) 28 gauge misc TEST BLOOD GLUCOSE TWICE DAILY. DX E11.9 50 Lancet 12  Lancets Misc Use with Relion Prime Meter. Test Blood sugar Daily. DX: 250.02 50 Each 1  
 glucose blood VI test strips (BLOOD GLUCOSE TEST) strip Use with Relion Prime Meter. Test Blood sugar daily. DX:250.02 50 Strip 1  
 omega-3 fatty acids-vitamin e (FISH OIL) 1,000 mg cap Take 1 Cap by mouth daily. Orders Placed This Encounter  MAGNESIUM Standing Status:   Future Standing Expiration Date:   3/6/2020  METABOLIC PANEL, BASIC Standing Status:   Future Standing Expiration Date:   3/6/2020  losartan-hydroCHLOROthiazide (HYZAAR) 50-12.5 mg per tablet Sig: Take 1 Tab by mouth daily. Dispense:  30 Tab Refill:  6 If you have questions, please do not hesitate to call me. I look forward to following Mr. Nara Wild along with you.  
 
Sincerely, 
Reinier Kirkland MD

## 2019-03-18 ENCOUNTER — HOSPITAL ENCOUNTER (OUTPATIENT)
Dept: LAB | Age: 54
Discharge: HOME | End: 2019-03-18
Payer: MEDICAID

## 2019-03-18 DIAGNOSIS — I10 ESSENTIAL HYPERTENSION: ICD-10-CM

## 2019-03-18 LAB
ANION GAP SERPL CALC-SCNC: 7 MMOL/L (ref 3–18)
BUN SERPL-MCNC: 14 MG/DL (ref 7–18)
BUN/CREAT SERPL: 14 (ref 12–20)
CALCIUM SERPL-MCNC: 9.5 MG/DL (ref 8.5–10.1)
CHLORIDE SERPL-SCNC: 98 MMOL/L (ref 100–108)
CO2 SERPL-SCNC: 31 MMOL/L (ref 21–32)
CREAT SERPL-MCNC: 1.03 MG/DL (ref 0.6–1.3)
GLUCOSE SERPL-MCNC: 161 MG/DL (ref 74–99)
MAGNESIUM SERPL-MCNC: 2 MG/DL (ref 1.6–2.6)
POTASSIUM SERPL-SCNC: 3.6 MMOL/L (ref 3.5–5.5)
SODIUM SERPL-SCNC: 136 MMOL/L (ref 136–145)

## 2019-03-18 PROCEDURE — 80048 BASIC METABOLIC PNL TOTAL CA: CPT

## 2019-03-18 PROCEDURE — 36415 COLL VENOUS BLD VENIPUNCTURE: CPT

## 2019-03-18 PROCEDURE — 83735 ASSAY OF MAGNESIUM: CPT

## 2019-04-01 DIAGNOSIS — J30.2 SEASONAL ALLERGIC RHINITIS, UNSPECIFIED TRIGGER: ICD-10-CM

## 2019-04-01 DIAGNOSIS — E78.2 MIXED HYPERLIPIDEMIA: ICD-10-CM

## 2019-04-01 DIAGNOSIS — E11.40 TYPE 2 DIABETES MELLITUS WITH DIABETIC NEUROPATHY, WITHOUT LONG-TERM CURRENT USE OF INSULIN (HCC): ICD-10-CM

## 2019-04-01 RX ORDER — LORATADINE 10 MG/1
TABLET ORAL
Qty: 30 TAB | Refills: 5 | Status: SHIPPED | OUTPATIENT
Start: 2019-04-01 | End: 2019-08-26 | Stop reason: SDUPTHER

## 2019-04-01 RX ORDER — ATORVASTATIN CALCIUM 20 MG/1
TABLET, FILM COATED ORAL
Qty: 30 TAB | Refills: 5 | Status: SHIPPED | OUTPATIENT
Start: 2019-04-01 | End: 2019-05-24

## 2019-04-01 RX ORDER — INSULIN GLARGINE 100 [IU]/ML
INJECTION, SOLUTION SUBCUTANEOUS
Qty: 6 ML | Refills: 5 | Status: SHIPPED | OUTPATIENT
Start: 2019-04-01 | End: 2019-08-26 | Stop reason: SDUPTHER

## 2019-04-23 ENCOUNTER — OFFICE VISIT (OUTPATIENT)
Dept: NEUROLOGY | Age: 54
End: 2019-04-23

## 2019-04-23 VITALS
OXYGEN SATURATION: 98 % | TEMPERATURE: 99 F | WEIGHT: 177 LBS | SYSTOLIC BLOOD PRESSURE: 120 MMHG | RESPIRATION RATE: 18 BRPM | HEIGHT: 68 IN | DIASTOLIC BLOOD PRESSURE: 64 MMHG | HEART RATE: 103 BPM | BODY MASS INDEX: 26.83 KG/M2

## 2019-04-23 DIAGNOSIS — E78.5 HYPERLIPIDEMIA LDL GOAL <70: ICD-10-CM

## 2019-04-23 DIAGNOSIS — I63.9 CEREBROVASCULAR ACCIDENT (CVA), UNSPECIFIED MECHANISM (HCC): Primary | ICD-10-CM

## 2019-04-23 DIAGNOSIS — Z79.02 ANTIPLATELET OR ANTITHROMBOTIC LONG-TERM USE: ICD-10-CM

## 2019-04-23 DIAGNOSIS — E11.9 TYPE 2 DIABETES MELLITUS WITH HEMOGLOBIN A1C GOAL OF LESS THAN 7.0% (HCC): ICD-10-CM

## 2019-04-23 RX ORDER — ASPIRIN 325 MG
325 TABLET ORAL DAILY
Status: ON HOLD | COMMUNITY
End: 2020-11-05 | Stop reason: DRUGHIGH

## 2019-04-23 NOTE — PATIENT INSTRUCTIONS
Please follow up with cardiologist Dr. Jamila Julien and your primary care provider. Remain on Aspirin 325 mg. Thank you for choosing [facility] and [facility] for your     care. You may receive a survey about your visit. We appreciate you taking time     to complete this survey as we use your feedback to improve our services. We     realize we are not perfect, but we strive to provide excellent care.

## 2019-04-23 NOTE — PROGRESS NOTES
[facility]  [address], [address], [facility], [address]  [address] [facility], [address]  Office:  [phone]  Fax: [phone]    Referring: Luz Veliz MD    Chief Complaint   Patient presents with    Neurologic Problem     S/P strokes        HPI: This is a 45-year-old male who presents for new patient evaluation with chief complaint of history of stroke. He presented to [facility] on 9-5-18 for chest pain and right arm and leg tingling. Documentation reveals punctate hypodensity in the right basal ganglia, new from prior Age indeterminate lacunar infarct. Evolved left frontal infarct with subjacent volume loss. Past medical history of stroke with residual R arm weakness, DM II, HTN, CVA, MI, and marijuana usage. Patient is somewhat of a poor historian although he tries. He does deny any subsequent strokelike symptoms in the interim. He is says that he has wore a Holter monitor at one point. He said it was \"taped on him. \"  I see that he was seen by cardiology on 3/6. I do not see report of Holter monitor. Patient also failed to show up for his one month follow up after seeing cardiology in March. He said he will call to schedule this. He is maintained on ASA and statin. His PCP ordered upcoming labs including lipid panel. He is on insulin therapy for diabetes. He denies tobacco use. He is a frequent soda drinker. Says he will buy 3 12 -packs and go through them in one month. He said he is trying to cut this back. He said he promised his girlfriend he would not drink soda for two weeks. Denies chest pain, SOB, numbness, tingling, or new onset weakness. Denies headache, lightheadedness, or falls. No additional concerns at this time.      Social History     Socioeconomic History    Marital status: LEGALLY      Spouse name: Not on file    Number of children: Not on file    Years of education: Not on file    Highest education level: Not on file Occupational History    Occupation: disabled     Comment: prior ; disabled after CVA   Social Needs    Financial resource strain: Not on file    Food insecurity:     Worry: Not on file     Inability: Not on file   Thoughtly needs:     Medical: Not on file     Non-medical: Not on file   Tobacco Use    Smoking status: Never Smoker    Smokeless tobacco: Never Used   Substance and Sexual Activity    Alcohol use: Yes     Comment: occassionally    Drug use: Yes     Types: Prescription, OTC, Marijuana    Sexual activity: Yes   Lifestyle    Physical activity:     Days per week: Not on file     Minutes per session: Not on file    Stress: Not on file   Relationships    Social connections:     Talks on phone: Not on file     Gets together: Not on file     Attends Congregational service: Not on file     Active member of club or organization: Not on file     Attends meetings of clubs or organizations: Not on file     Relationship status: Not on file    Intimate partner violence:     Fear of current or ex partner: Not on file     Emotionally abused: Not on file     Physically abused: Not on file     Forced sexual activity: Not on file   Other Topics Concern    Not on file   Social History Narrative    Not on file       Family History   Problem Relation Age of Onset    Arthritis-osteo Mother     Gout Mother     Diabetes Mother     Hypertension Mother     Diabetes Father     Diabetes Brother     Diabetes Maternal Grandmother        Current Outpatient Medications   Medication Sig Dispense Refill    aspirin (ASPIRIN) 325 mg tablet Take 325 mg by mouth daily.       atorvastatin (LIPITOR) 20 mg tablet TAKE 1 TABLET BY MOUTH EVERY DAY 30 Tab 5    loratadine (CLARITIN) 10 mg tablet TAKE 1 TABLET BY MOUTH EVERY DAY 30 Tab 5    LANTUS SOLOSTAR U-100 INSULIN 100 unit/mL (3 mL) inpn INJECT 20 UNITS subcutaneously EVERY EVENING 6 mL 5    losartan-hydroCHLOROthiazide (HYZAAR) 50-12.5 mg per tablet Take 1 Tab by mouth daily. 30 Tab 6    gabapentin (NEURONTIN) 300 mg capsule TAKE 2 CAPSULES BY MOUTH EVERY EVENING 60 Cap 5    JANUMET -1,000 mg TM24 TAKE 1 TABLET BY MOUTH EVERY DAY 30 Tab 5    glimepiride (AMARYL) 2 mg tablet TAKE 1 TABLET BY MOUTH EVERY DAY 30 Tab 5    fluticasone (FLONASE) 50 mcg/actuation nasal spray INHALE 2 SPRAYS into both nostrils EVERY DAY 16 g 12    Insulin Needles, Disposable, (SURE-FINE PEN NEEDLES) 31 gauge x 5/16\" ndle by Does Not Apply route daily. Use as directed with insulin. 1 Package 12    multivitamin (ONE A DAY) tablet Take 1 Tab by Mouth Once a Day. Similar alternatives are ok.  thiamine HCL (B-1) 100 mg tablet Take 1 Tab by mouth daily. 90 Tab 3    sildenafil citrate (VIAGRA) 50 mg tablet Take 1 Tab by mouth as needed. 6 Tab 5    Blood-Glucose Meter monitoring kit TEST GLUCOSE TWICE DAILY. DX:E11.9 1 Kit 0    glucose blood VI test strips (FREESTYLE LITE STRIPS) strip TEST BLOOD GLUCOSE TWICE DAILY. DX:E11.9 48 Strip 12    lancets (FREESTYLE LANCETS) 28 gauge misc TEST BLOOD GLUCOSE TWICE DAILY. DX E11.9 50 Lancet 12    Lancets Misc Use with Relion Prime Meter. Test Blood sugar Daily. DX: 250.02 50 Each 1    glucose blood VI test strips (BLOOD GLUCOSE TEST) strip Use with Relion Prime Meter. Test Blood sugar daily.  DX:250.02 48 Strip 1       Past Medical History:   Diagnosis Date    Arthritis     Chest pain     CVA (cerebral vascular accident) ([facility] [address] 2011    r side stroke    CVA (cerebral vascular accident) ([address] 09/05/2018    admitted at [facility] Said [facility]) 2010    chest pains    Hypercholesterolemia     Hypertension     Loss of hearing     right ear, patient states it comes and goes    Polyuria     Shortness of breath 2011    Type 2 diabetes mellitus without complication, with long-term current use of insulin ([facility] [address] 07/23/2013       Past Surgical History:   Procedure Laterality Date    HX HEART CATHETERIZATION         No Known Allergies    Patient Active Problem List   Diagnosis Code    Essential hypertension I10    History of CVA (cerebrovascular accident) Z80.78    History of MI (myocardial infarction) I25.2    Type 2 diabetes mellitus without complication, with long-term current use of insulin ([facility]) E11.9, Z79.4    Type 2 diabetes mellitus with diabetic neuropathy [facility] E11.40         Review of Systems:   Constitutional: no fever or chills  Skin denies rash or itching  HEENT:  Denies tinnitus, hearing loss, or visual changes  Respiratory: denies shortness of breath  Cardiovascular: denies chest pain, dyspnea on exertion  Gastrointestinal: does not report nausea or vomiting  Genitourinary: does not report dysuria or incontinence  Musculoskeletal: does not report joint pain or swelling  Endocrine: denies weight change  Hematology: denies easy bruising or bleeding   Neurological: as above in HPI      PHYSICAL EXAMINATION:      VITAL SIGNS:    Visit Vitals  /64 (BP 1 Location: Left arm, BP Patient Position: Sitting)   Pulse (!) 103   Temp 99 °F (37.2 °C)   Resp 18   Ht 5' 8\" (1.727 m)   Wt 80.3 kg (177 lb)   SpO2 98%   BMI 26.91 kg/m²       GENERAL: Well developed, well nourished, in no apparent distress. HEART: RR, no murmurs heard, no carotid bruits  LUNGS:                      CTAB  EXTREMITIES: No clubbing, cyanosis, or edema is identified. Pulses 2+ and symmetrical.  HEAD:   Normocephalic, atraumatic. NEUROLOGIC EXAMINATION    MENTAL STATUS: Awake, alert, and oriented. Poor historian. Trouble when discussing medications and medical history. There is no aphasia. Fund of knowledge is adequate. Mood and affect are appropriate  CRANIAL NERVES: Visual fields are full to confrontation. Disc margins not easily observed. Pupils are reactive to light and accommodation. Extraocular movements are intact and there is no nystagmus.    Facial sensation is normal  Face is symmetrical.   Hearing is grossly intact. SCM/TPZ 5/5  Palate rises symmetrically. Tongue is in the midline. MOTOR:   Normal tone, bulk, and resists fully in the upper and lower extremities. No cogwheel rigidity or clonus present. CEREBELLAR: Finger to nose was normal.   No tremors or dysmetria    SENSORY:  Sensory examination is intact to primary modalities and there is no lateralization of sensation. Romberg negative    DTR's:   +2 throughout, toes downgoing     GAIT:   Normal gait    Impression/Plan  Yolanda Morton is a 48 y.o. male whose history and physical are consistent with history of CVA in patient with risk factors including MI, previous stroke, HTN, HLD, and DM II. Patient here with history of stroke. Documentation reveals patient with small embolic shower in right posterior MCA distribution highly suggestive of an embolic event. Patient is a poor historian. Hospital documentation reveals consideration for outpatient Holter monitor. I do see a 14 day Holter monitor workup negative in Care Everywhere tab. He has been seen by cardiology in the interim. I will defer additional evaluation for embolic etiology to his cardiologist.  Consider referral to electrophysiologist for extended heart monitor. I do see that he had an echo complete with negative bubble study. During hospital admission he did have MRA brain and neck complete. No evidence of LVO. Recommendation for goal LDL less than 70 in somebody with history of stroke. Patient maintained on statin. I have no recent labs to go by. I see that his primary care provider does have upcoming surveillance for lipid panel and I will defer management to his primary care provider. He is diabetic and goal A1c and 70 has had a stroke is less than 7%. No recent A1c at this time. Again defer to his primary care provider. He remains on ASA. His blood pressure is controlled in office today. We discussed stroke signs and symptoms and when to call 911.   We discussed the importance of healthy diet and exercise. Patient endorses frequent soda and certainly this does not help with history of diabetes. At this point follow-up on an as-needed basis. All questions addressed and patient is agreeable with plan of care. Diagnoses and all orders for this visit:    1. Cerebrovascular accident (CVA), unspecified mechanism ([facility]    2. Hyperlipidemia LDL goal <70    3. Type 2 diabetes mellitus with hemoglobin A1c goal of less than 7.0% ([facility])    4. Antiplatelet or antithrombotic long-term use        I spent 45 minutes with the patient in face-to-face consultation, with 37 minutes spent in counseling and coordination of care as described above. Signed By: Javier Graves NP    This note will not be viewable in [address] PLEASE NOTE:   Portions of this document may have been produced using voice recognition software. Unrecognized errors in transcription may be present.

## 2019-04-25 PROBLEM — E11.40 TYPE 2 DIABETES MELLITUS WITH DIABETIC NEUROPATHY (HCC): Status: ACTIVE | Noted: 2019-04-25

## 2019-04-30 ENCOUNTER — OFFICE VISIT (OUTPATIENT)
Dept: CARDIOLOGY CLINIC | Age: 54
End: 2019-04-30

## 2019-04-30 VITALS
HEIGHT: 68 IN | DIASTOLIC BLOOD PRESSURE: 79 MMHG | SYSTOLIC BLOOD PRESSURE: 128 MMHG | BODY MASS INDEX: 30.16 KG/M2 | HEART RATE: 95 BPM | WEIGHT: 199 LBS

## 2019-04-30 DIAGNOSIS — R94.39 ABNORMAL STRESS TEST: ICD-10-CM

## 2019-04-30 DIAGNOSIS — I10 ESSENTIAL HYPERTENSION: Primary | ICD-10-CM

## 2019-04-30 DIAGNOSIS — E78.3 HYPERCHYLOMICRONEMIA: ICD-10-CM

## 2019-04-30 DIAGNOSIS — Z79.4 TYPE 2 DIABETES MELLITUS WITHOUT COMPLICATION, WITH LONG-TERM CURRENT USE OF INSULIN (HCC): ICD-10-CM

## 2019-04-30 DIAGNOSIS — E11.9 TYPE 2 DIABETES MELLITUS WITHOUT COMPLICATION, WITH LONG-TERM CURRENT USE OF INSULIN (HCC): ICD-10-CM

## 2019-04-30 DIAGNOSIS — Z86.73 HISTORY OF CVA (CEREBROVASCULAR ACCIDENT): ICD-10-CM

## 2019-04-30 NOTE — PROGRESS NOTES
1. Have you been to the ER, urgent care clinic since your last visit? Hospitalized since your last visit? No    2. Have you seen or consulted any other health care providers outside of the [address] [place] [address] since your last visit? Include any pap smears or colon screening.  No

## 2019-04-30 NOTE — PROGRESS NOTES
HISTORY OF PRESENT ILLNESS  Fior Santos is a 48 y.o. male. Hypertension   The history is provided by the patient. This is a chronic problem. The problem occurs every several days. The problem has not changed since onset. Pertinent negatives include no chest pain and no shortness of breath. Review of Systems   Constitutional: Negative for chills, diaphoresis, fever and weight loss. HENT: Negative for ear pain and hearing loss. Eyes: Negative for blurred vision. Respiratory: Negative for cough, hemoptysis, sputum production, shortness of breath, wheezing and stridor. Cardiovascular: Negative for chest pain, palpitations, orthopnea, claudication, leg swelling and PND. Gastrointestinal: Negative for heartburn, nausea and vomiting. Musculoskeletal: Negative for myalgias and neck pain. Skin: Negative for rash. Neurological: Negative for dizziness, tingling, tremors, focal weakness, loss of consciousness and weakness. Psychiatric/Behavioral: Negative for depression and suicidal ideas.      Family History   Problem Relation Age of Onset    Arthritis-osteo Mother     Gout Mother     Diabetes Mother     Hypertension Mother     Diabetes Father     Diabetes Brother     Diabetes Maternal Grandmother        Past Medical History:   Diagnosis Date    Arthritis     Chest pain     CVA (cerebral vascular accident) ([address] 2011    r side stroke    CVA (cerebral vascular accident) ([facility] 09/05/2018    admitted at [address] [place] [address] attack [facility]) 2010    chest pains    Hypercholesterolemia     Hypertension     Loss of hearing     right ear, patient states it comes and goes    Polyuria     Shortness of breath 2011    Type 2 diabetes mellitus without complication, with long-term current use of insulin ([facility] 07/23/2013       Past Surgical History:   Procedure Laterality Date    HX HEART CATHETERIZATION         Social History     Tobacco Use    Smoking status: Never Smoker    Smokeless tobacco: Never Used   Substance Use Topics    Alcohol use: Yes     Frequency: Monthly or less     Drinks per session: 1 or 2     Binge frequency: Never     Comment: occassionally       Allergies   Allergen Reactions    Lisinopril Cough       Outpatient Medications Marked as Taking for the 4/30/19 encounter (Office Visit) with Clint Clark MD   Medication Sig Dispense Refill    aspirin (ASPIRIN) 325 mg tablet Take 325 mg by mouth daily.  atorvastatin (LIPITOR) 20 mg tablet TAKE 1 TABLET BY MOUTH EVERY DAY 30 Tab 5    loratadine (CLARITIN) 10 mg tablet TAKE 1 TABLET BY MOUTH EVERY DAY 30 Tab 5    LANTUS SOLOSTAR U-100 INSULIN 100 unit/mL (3 mL) inpn INJECT 20 UNITS subcutaneously EVERY EVENING 6 mL 5    losartan-hydroCHLOROthiazide (HYZAAR) 50-12.5 mg per tablet Take 1 Tab by mouth daily. 30 Tab 6    gabapentin (NEURONTIN) 300 mg capsule TAKE 2 CAPSULES BY MOUTH EVERY EVENING 60 Cap 5    JANUMET -1,000 mg TM24 TAKE 1 TABLET BY MOUTH EVERY DAY 30 Tab 5    glimepiride (AMARYL) 2 mg tablet TAKE 1 TABLET BY MOUTH EVERY DAY 30 Tab 5    fluticasone (FLONASE) 50 mcg/actuation nasal spray INHALE 2 SPRAYS into both nostrils EVERY DAY 16 g 12    Insulin Needles, Disposable, (SURE-FINE PEN NEEDLES) 31 gauge x 5/16\" ndle by Does Not Apply route daily. Use as directed with insulin. 1 Package 12    multivitamin (ONE A DAY) tablet Take 1 Tab by Mouth Once a Day. Similar alternatives are ok.  thiamine HCL (B-1) 100 mg tablet Take 1 Tab by mouth daily. 90 Tab 3    Blood-Glucose Meter monitoring kit TEST GLUCOSE TWICE DAILY. DX:E11.9 1 Kit 0    glucose blood VI test strips (FREESTYLE LITE STRIPS) strip TEST BLOOD GLUCOSE TWICE DAILY. DX:E11.9 48 Strip 12    lancets (FREESTYLE LANCETS) 28 gauge misc TEST BLOOD GLUCOSE TWICE DAILY. DX E11.9 50 Lancet 12    Lancets Misc Use with Relion Prime Meter. Test Blood sugar Daily.  DX: 250.02 50 Each 1    glucose blood VI test strips (BLOOD GLUCOSE TEST) strip Use with Relion Prime Meter. Test Blood sugar daily. DX:250.02 50 Strip 1        Visit Vitals  /79   Pulse 95   Ht 5' 8\" (1.727 m)   Wt 90.3 kg (199 lb)   BMI 30.26 kg/m²       Physical Exam   Constitutional: He is oriented to person, place, and time. He appears well-developed and well-nourished. No distress. HENT:   Head: Atraumatic. Mouth/Throat: No oropharyngeal exudate. Eyes: Conjunctivae are normal. No scleral icterus. Neck: Normal range of motion. Neck supple. No JVD present. No tracheal deviation present. No thyromegaly present. Cardiovascular: Normal rate and regular rhythm. Exam reveals no gallop. No murmur heard. Pulmonary/Chest: Effort normal and breath sounds normal. No stridor. He has no wheezes. He has no rales. Abdominal: Soft. There is no tenderness. There is no rebound and no guarding. Musculoskeletal: Normal range of motion. He exhibits no edema or tenderness. Neurological: He is alert and oriented to person, place, and time. He exhibits normal muscle tone. Skin: Skin is warm. He is not diaphoretic. Psychiatric: He has a normal mood and affect. His behavior is normal.     9/2018 - 14 day event - NSR  Echo 09/2018:  NORMAL LEFT VENTRICULAR CAVITY SIZE AND SYSTOLIC FUNCTION WITH AN EJECTION FRACTION   VISUALLY ESTIMATED AT 60%. NORMAL DIASTOLIC FUNCTION. MILD LEFT VENTRICULAR HYPERTROPHY PRESENT. TRACE MITRAL REGURGITATION. MILDLY SCLEROTIC TRILEAFLET AORTIC VALVE. INSUFFICIENT TRICUSPID REGURGITANT JET TO ESTIMATE PULMONARY ARTERY PRESSURE. NEGATIVE BUBBLE STUDY ON PRIOR ECHO REPORT 4/09/2012. NO SIGNIFICANT CHANGES FROM PRIOR ECHO REPORT ON 4/09/2012.   Lexiscan 2018-  THIS MYOCARDIAL PERFUSION STUDY IS ABNORMAL   THIS IS A MEDIUM RISK STUDY   ABNORMAL NUCLEAR SCAN WITH  A SMALL AREA OF NON PERFUSION ON BOTH THE STRESS AND REST IMAGES   IN THE INFERIOR APICAL WALL CONSISTENT WITH EITHER PRIOR INFERIOR APICAL INFARCTION OR HIGH GRADE OCCLUSION TO THE DISTAL RCA        INFERIOR APICAL AKINESIS WITH AN EJECTION FRACTION  ESTIMATED AT   50%  ASSESSMENT and PLAN    ICD-10-CM ICD-9-CM    1. Essential hypertension I10 401.9    2. Type 2 diabetes mellitus without complication, with long-term current use of insulin (HCC) E11.9 250.00     Z79.4 V58.67    3. Abnormal stress test R94.39 794.39    4. History of CVA (cerebrovascular accident) Z86.73 V12.54      No orders of the defined types were placed in this encounter. current treatment plan is effective, no change in therapy  reviewed diet, exercise and weight control    cardiovascular risk and specific lipid/LDL goals reviewed  use of aspirin to prevent MI and TIA's discussed. Patient with recent admission to [facility] for CVA. Echo and stress test report reviewed with patient. C/o excessive cough- likely from liisnopril- will switich to losartan/hctz.    4/2019 - H/O Abnormal stress test and CVA. Cardiac status stable. Cough significantly improved after stopping lisinopril  Will continue current treatment regimen   Check Lipids and LFT  F/U In 6 months or sooner if needed. I have independently evaluated and examined the patient. All relevant labs and testing data's are reviewed. Care plan discussed and updated after review.     Rosa Maria Tolentino MD

## 2019-04-30 NOTE — PATIENT INSTRUCTIONS

## 2019-05-02 ENCOUNTER — HOSPITAL ENCOUNTER (OUTPATIENT)
Dept: LAB | Age: 54
Discharge: HOME | End: 2019-05-02
Payer: MEDICAID

## 2019-05-02 DIAGNOSIS — E78.3 HYPERCHYLOMICRONEMIA: ICD-10-CM

## 2019-05-02 LAB
ALBUMIN SERPL-MCNC: 3.3 G/DL (ref 3.4–5)
ALBUMIN/GLOB SERPL: 1 {RATIO} (ref 0.8–1.7)
ALP SERPL-CCNC: 69 U/L (ref 45–117)
ALT SERPL-CCNC: 32 U/L (ref 16–61)
AST SERPL-CCNC: 16 U/L (ref 15–37)
BILIRUB DIRECT SERPL-MCNC: 0.2 MG/DL (ref 0–0.2)
BILIRUB SERPL-MCNC: 0.5 MG/DL (ref 0.2–1)
CHOLEST SERPL-MCNC: 175 MG/DL
GLOBULIN SER CALC-MCNC: 3.3 G/DL (ref 2–4)
HDLC SERPL-MCNC: 44 MG/DL (ref 40–60)
HDLC SERPL: 4 {RATIO} (ref 0–5)
LDLC SERPL CALC-MCNC: 106.2 MG/DL (ref 0–100)
LIPID PROFILE,FLP: ABNORMAL
PROT SERPL-MCNC: 6.6 G/DL (ref 6.4–8.2)
TRIGL SERPL-MCNC: 124 MG/DL (ref ?–150)
VLDLC SERPL CALC-MCNC: 24.8 MG/DL

## 2019-05-02 PROCEDURE — 80076 HEPATIC FUNCTION PANEL: CPT

## 2019-05-02 PROCEDURE — 36415 COLL VENOUS BLD VENIPUNCTURE: CPT

## 2019-05-02 PROCEDURE — 80061 LIPID PANEL: CPT

## 2019-05-07 ENCOUNTER — TELEPHONE (OUTPATIENT)
Dept: CARDIOLOGY CLINIC | Age: 54
End: 2019-05-07

## 2019-05-07 DIAGNOSIS — Z86.73 HISTORY OF CVA (CEREBROVASCULAR ACCIDENT): Primary | ICD-10-CM

## 2019-05-07 NOTE — TELEPHONE ENCOUNTER
Patient called in concern to lab results received. Patient made aware of high LDL on recent lipid panel; patient verified that he is currently taking Lipitor 20 mg  nightly. Per JUJU Dunbar's order patient is to increase Lipitor to 40 mg nightly and repeat lipid and hepatic panel in six weeks. Patient made aware of medication change and upcoming labs; verbalized understanding of all above information. Patient reminded if any questions or concerns to call the office.

## 2019-05-16 DIAGNOSIS — E11.9 TYPE 2 DIABETES MELLITUS WITHOUT COMPLICATION, WITHOUT LONG-TERM CURRENT USE OF INSULIN (HCC): ICD-10-CM

## 2019-05-16 DIAGNOSIS — E11.9 DIABETES MELLITUS WITHOUT COMPLICATION (HCC): ICD-10-CM

## 2019-05-16 RX ORDER — SITAGLIPTIN AND METFORMIN HYDROCHLORIDE 100; 1000 MG/1; MG/1
TABLET, FILM COATED, EXTENDED RELEASE ORAL
Qty: 30 TAB | Refills: 5 | Status: SHIPPED | OUTPATIENT
Start: 2019-05-16 | End: 2019-06-12 | Stop reason: DRUGHIGH

## 2019-05-16 RX ORDER — GLIMEPIRIDE 2 MG/1
TABLET ORAL
Qty: 30 TAB | Refills: 5 | Status: SHIPPED | OUTPATIENT
Start: 2019-05-16 | End: 2019-09-18 | Stop reason: SDUPTHER

## 2019-05-22 ENCOUNTER — HOSPITAL ENCOUNTER (OUTPATIENT)
Dept: LAB | Age: 54
Discharge: HOME | End: 2019-05-22
Payer: COMMERCIAL

## 2019-05-22 DIAGNOSIS — Z86.73 HISTORY OF CVA (CEREBROVASCULAR ACCIDENT): ICD-10-CM

## 2019-05-22 LAB
ALBUMIN SERPL-MCNC: 3.6 G/DL (ref 3.4–5)
ALBUMIN/GLOB SERPL: 1.1 {RATIO} (ref 0.8–1.7)
ALP SERPL-CCNC: 75 U/L (ref 45–117)
ALT SERPL-CCNC: 24 U/L (ref 16–61)
AST SERPL-CCNC: 12 U/L (ref 15–37)
BILIRUB DIRECT SERPL-MCNC: <0.1 MG/DL (ref 0–0.2)
BILIRUB SERPL-MCNC: 0.4 MG/DL (ref 0.2–1)
CHOLEST SERPL-MCNC: 172 MG/DL
GLOBULIN SER CALC-MCNC: 3.4 G/DL (ref 2–4)
HDLC SERPL-MCNC: 38 MG/DL (ref 40–60)
HDLC SERPL: 4.5 {RATIO} (ref 0–5)
LDLC SERPL CALC-MCNC: 110 MG/DL (ref 0–100)
LIPID PROFILE,FLP: ABNORMAL
PROT SERPL-MCNC: 7 G/DL (ref 6.4–8.2)
TRIGL SERPL-MCNC: 120 MG/DL (ref ?–150)
VLDLC SERPL CALC-MCNC: 24 MG/DL

## 2019-05-22 PROCEDURE — 80076 HEPATIC FUNCTION PANEL: CPT

## 2019-05-22 PROCEDURE — 36415 COLL VENOUS BLD VENIPUNCTURE: CPT

## 2019-05-22 PROCEDURE — 80061 LIPID PANEL: CPT

## 2019-05-24 ENCOUNTER — OFFICE VISIT (OUTPATIENT)
Dept: FAMILY MEDICINE CLINIC | Age: 54
End: 2019-05-24

## 2019-05-24 VITALS
HEART RATE: 98 BPM | SYSTOLIC BLOOD PRESSURE: 131 MMHG | BODY MASS INDEX: 28.95 KG/M2 | RESPIRATION RATE: 18 BRPM | DIASTOLIC BLOOD PRESSURE: 81 MMHG | TEMPERATURE: 98.2 F | HEIGHT: 68 IN | WEIGHT: 191 LBS

## 2019-05-24 DIAGNOSIS — I25.2 HISTORY OF MI (MYOCARDIAL INFARCTION): ICD-10-CM

## 2019-05-24 DIAGNOSIS — I10 ESSENTIAL HYPERTENSION: ICD-10-CM

## 2019-05-24 DIAGNOSIS — Z86.73 HISTORY OF CVA (CEREBROVASCULAR ACCIDENT): ICD-10-CM

## 2019-05-24 DIAGNOSIS — Z79.4 TYPE 2 DIABETES MELLITUS WITH COMPLICATION, WITH LONG-TERM CURRENT USE OF INSULIN (HCC): Primary | ICD-10-CM

## 2019-05-24 DIAGNOSIS — E11.8 TYPE 2 DIABETES MELLITUS WITH COMPLICATION, WITH LONG-TERM CURRENT USE OF INSULIN (HCC): Primary | ICD-10-CM

## 2019-05-24 DIAGNOSIS — E78.2 MIXED HYPERLIPIDEMIA: ICD-10-CM

## 2019-05-24 DIAGNOSIS — R07.9 CHEST PAIN, UNSPECIFIED TYPE: ICD-10-CM

## 2019-05-24 LAB — HBA1C MFR BLD HPLC: NORMAL %

## 2019-05-24 RX ORDER — ATORVASTATIN CALCIUM 40 MG/1
40 TABLET, FILM COATED ORAL DAILY
Qty: 30 TAB | Refills: 5 | Status: SHIPPED | OUTPATIENT
Start: 2019-05-24 | End: 2019-10-11 | Stop reason: SDUPTHER

## 2019-05-24 NOTE — PROGRESS NOTES
[place] presents today for   Chief Complaint   Patient presents with    Diabetes     follow up    Hypertension    Cholesterol Problem    Coronary Artery Disease    O2/Oxygen     Patient stated that he sometimes need oxygen. [place] preferred language for health care discussion is english/other. Is someone accompanying this pt? Yes Mrs. Stover    Is the patient using any DME equipment during [facility]? no    Depression Screening:  3 most recent PHQ Screens 5/24/2019   Little interest or pleasure in doing things Not at all   Feeling down, depressed, irritable, or hopeless Not at all   Total Score PHQ 2 0       Learning Assessment:  Learning Assessment 5/24/2019   PRIMARY LEARNER Patient   HIGHEST LEVEL OF EDUCATION - PRIMARY LEARNER  DID NOT GRADUATE [facility] PRIMARY LEARNER NONE   CO-LEARNER CAREGIVER No   PRIMARY LANGUAGE ENGLISH   LEARNER PREFERENCE PRIMARY LISTENING     -   ANSWERED BY self   RELATIONSHIP SELF       Abuse Screening:  Abuse Screening Questionnaire 5/24/2019   Do you ever feel afraid of your partner? N   Are you in a relationship with someone who physically or mentally threatens you? N   Is it safe for you to go home? Y       Health Maintenance Due   Topic Date Due    Shingrix Vaccine Age 50> (1 of 2) 07/26/2015    FOBT Q 1 YEAR AGE 50-75  07/26/2015    FOOT EXAM Q1  02/13/2018    MICROALBUMIN Q1  06/07/2018    HEMOGLOBIN A1C Q6M  03/06/2019   . Health Maintenance reviewed and discussed and ordered per Provider. [place] is updated on all     Coordination of Care:  1. Have you been to the ER, urgent care clinic since your last visit? Hospitalized since your last visit? no    2. Have you seen or consulted any other health care providers outside of the [address] [place] [address] since your last visit? Include any pap smears or colon screening. no      Advance Directive:  1. Do you have an advance directive in place? Patient Reply:no    2.  If not, would you like material regarding how to put one in place?  Patient Reply: no

## 2019-05-24 NOTE — PROGRESS NOTES
Chief Complaint   Patient presents with    Diabetes     follow up    Hypertension    Cholesterol Problem    Coronary Artery Disease    O2/Oxygen     Patient stated that he sometimes need oxygen. HPI    Shital Heredia is a 48 y.o. male presenting today for 3 months  follow up of dm, htn, hld, hx of cva. Pt has been seen by neuro and cards. Pt has been using his lantus 20U qam.  He has still been drinking sugary beverages. Patient had labs on 5/22/19. Labs reviewed in detail with patient. Pt was not aware that he was to increase lipitor to 40mg and recheck labs in 6 wks; he had a call from cardiology about this but misunderstood the instructions. Patient does not need medication refills today. New concerns today: pt recently had an episode of wheezing and shortness of breath. He used his girlfriend's O2 and felt better. He has shortness of breath occasionally with exertion; there is chest pain associated with it. He describes it as a sharp pain in the center of his chest.       Review of Systems   Constitutional: Negative. HENT: Negative. Respiratory: Negative. Cardiovascular: Positive for chest pain. All other systems reviewed and are negative. Physical Exam  Nursing note and vitals reviewed. Constitutional: He is oriented to person, place, and time. He appears well-developed and well-nourished. HENT:   Head: Normocephalic and atraumatic. Right Ear: External ear normal.   Left Ear: External ear normal.   Nose: Nose normal.   Eyes: Conjunctivae and EOM are normal.   Neck: Normal range of motion. Neck supple. No JVD present. Carotid bruit is not present. No thyromegaly present. Cardiovascular: Normal rate, regular rhythm, normal heart sounds and intact distal pulses. Exam reveals no gallop and no friction rub. No murmur heard. Pulmonary/Chest: Effort normal and breath sounds normal. He has no wheezes. He has no rhonchi. He has no rales. Abdominal: Soft. Musculoskeletal: Normal range of motion. Neurological: He is alert and oriented to person, place, and time. Coordination normal.   Skin: Skin is warm and dry. Psychiatric: He has a normal mood and affect. His behavior is normal. Judgment and thought content normal.       Diagnoses and all orders for this visit:    1. Type 2 diabetes mellitus with complication, with long-term current use of insulin (HCC)  -     AMB POC HEMOGLOBIN A1C  -     REFERRAL TO PHARMACIST    2. Mixed hyperlipidemia  -     atorvastatin (LIPITOR) 40 mg tablet; Take 1 Tab by mouth daily.  -     REFERRAL TO PHARMACIST    3. Essential hypertension  -     REFERRAL TO PHARMACIST    4. History of MI (myocardial infarction)  -     REFERRAL TO PHARMACIST    5. History of CVA (cerebrovascular accident)  -     REFERRAL TO PHARMACIST    6. Chest pain, unspecified type  Pt to contact cards for appt for eval.      Follow-up and Dispositions    · Return in about 3 months (around 8/24/2019) for diabetes, high blood pressure, high cholesterol.

## 2019-05-30 ENCOUNTER — TELEPHONE (OUTPATIENT)
Dept: CARDIOLOGY CLINIC | Age: 54
End: 2019-05-30

## 2019-05-30 NOTE — TELEPHONE ENCOUNTER
----- Message from Gabriel Harrell NP sent at 5/30/2019  9:41 AM EDT -----  Please let her know LDL remains elevated  To continue low fat diet and current medication regimen  ----- Message -----  From: Jose Daniel Cooper [facility]  Sent: 5/22/2019  12:44 PM  To: Gabriel Harrell NP

## 2019-05-30 NOTE — TELEPHONE ENCOUNTER
Patient called in concern to lab results received. Per the NP patient is to continue current medication regimen and low fat diet; patient verbalized understanding of above information. Patient reminded if any questions or concerns to call the office.

## 2019-05-31 ENCOUNTER — TELEPHONE (OUTPATIENT)
Dept: FAMILY MEDICINE CLINIC | Age: 54
End: 2019-05-31

## 2019-05-31 NOTE — TELEPHONE ENCOUNTER
Called patient and chart review was done. Patient was advised per- Dr. Samuel Maynard to call an schedule an appointment at his cardiology for chest pain. Patient stated that he will call and schedule a closer appointment.

## 2019-06-12 ENCOUNTER — OFFICE VISIT (OUTPATIENT)
Dept: INTERNAL MEDICINE CLINIC | Age: 54
End: 2019-06-12

## 2019-06-12 DIAGNOSIS — Z79.4 TYPE 2 DIABETES MELLITUS WITHOUT COMPLICATION, WITH LONG-TERM CURRENT USE OF INSULIN (HCC): Primary | ICD-10-CM

## 2019-06-12 DIAGNOSIS — G63 POLYNEUROPATHY ASSOCIATED WITH UNDERLYING DISEASE (HCC): ICD-10-CM

## 2019-06-12 DIAGNOSIS — E11.9 TYPE 2 DIABETES MELLITUS WITHOUT COMPLICATION, WITH LONG-TERM CURRENT USE OF INSULIN (HCC): Primary | ICD-10-CM

## 2019-06-12 NOTE — PROGRESS NOTES
Pharmacy Note - Diabetes   06/12/19       Patient name: iFor Santos (74 y.o., male)  YOB: 1965    Referred by: Fabiola Morelos MD for diabetes education / management   Past Medical History:   Diagnosis Date    Arthritis     Chest pain     CVA (cerebral vascular accident) ([facility] 2011    r side stroke    CVA (cerebral vascular accident) ([facility] 09/05/2018    admitted at [address] [place] [address] [facility]) 2010    chest pains    Hypercholesterolemia     Hypertension     Loss of hearing     right ear, patient states it comes and goes    Polyuria     Shortness of breath 2011    Type 2 diabetes mellitus without complication, with long-term current use of insulin ([facility] 07/23/2013     Allergies: Allergies   Allergen Reactions    Lisinopril Cough     Subjective / Objective      Medications:   Current medications for diabetes include:      Key Antihyperglycemic Medications             glimepiride (AMARYL) 2 mg tablet TAKE 1 TABLET BY MOUTH EVERY DAY    JANUMET -1,000 mg TM24 TAKE 1 TABLET BY MOUTH EVERY DAY    LANTUS SOLOSTAR U-100 INSULIN 100 unit/mL (3 mL) inpn INJECT 20 UNITS subcutaneously EVERY EVENING        Previous medications used for diabetes management include:   [] metformin [] SGLT-2 Inhibitors   [] sulfonylureas [] TZDs   [] DPP-IV inhibitors [] insulin   [] GLP-1 agonists  [x] none     Patient reports adherence with his medications. Sometimes +5 Lantus     Blood Glucose Findings:   He checks his blood glucose readings occasionally daily. Patient did not bring his blood sugar log to today's visit. - fasting range: 289, 300,     - postprandial range: 400    Hypoglycemic episodes: Yes - 3 time per month.  Usually in the morning    His last A1c value(s) noted to be:      Lab Results   Component Value Date/Time    Hemoglobin A1c 10.6 (H) 05/17/2018 09:03 AM    Hemoglobin A1c 10.2 (H) 10/07/2017 12:00 PM    Hemoglobin A1c 10.5 (H) 06/07/2017 10:39 AM Hemoglobin A1c (POC) 9.9% 05/24/2019 11:25 AM    Hemoglobin A1c (POC) 10.1 02/13/2018 01:29 PM    Hemoglobin A1c (POC) 10.4 06/07/2017 09:59 AM       Dietary Considerations:   A typical days food intake is as follows:   - breakfast: eggs, sausage, 1 slice toast, sweet tea/soda   - lunch: varies, noodles, ham sandwich, fast food (sliders [employer]), soda/sweet tea   - dinner: baked meats, broccoli, string beans, lima beans, fresh foods only, rice, potatoes, sweet tea/soda   - beverages: above    - snacks: chips, cheese puffs, almond cookies, honey buns, ice cream from DQ, slushies     Physical Activity:     Current activity level: walks around neighborhood, has backed off some since last stroke 3x per week    Screenings/Prevention Parameters:  -Diabetic Eye and Foot Exams:      Diabetic Foot and Eye Exam HM Status   Topic Date Due    Diabetic Foot Care  02/13/2018    Eye Exam  09/22/2019     -Microalbumin / Creatinine ratio:       Lab Results   Component Value Date/Time    Microalb/Creat ratio (ug/mg creat.)  06/07/2017 10:39 AM      Comment:      ** Unable to calculate Microablumin/Creatinine Ratio due to low Microalbumin     -Immunizations:      Immunization History   Administered Date(s) Administered    Influenza Vaccine (Quad) PF 09/20/2018    Pneumococcal Polysaccharide (PPSV-23) 06/07/2017    Tdap 06/07/2017       Additional Laboratory Parameters of Interest:     Estimation of renal function:  Lab Results   Component Value Date/Time    Creatinine 1.03 03/18/2019 02:46 PM    Creatinine 0.80 05/17/2018 09:03 AM    Creatinine 0.6 10/07/2017 12:00 PM    GFR est AA >60 03/18/2019 02:46 PM    GFR est AA >60 05/17/2018 09:03 AM    GFR est non-AA >60 03/18/2019 02:46 PM    GFR est non-AA >60 05/17/2018 09:03 AM     Wt Readings from Last 3 Encounters:   05/24/19 191 lb (86.6 kg)   04/30/19 199 lb (90.3 kg)   04/23/19 177 lb (80.3 kg)     Ht Readings from Last 1 Encounters:   05/24/19 5' 8\" (1.727 m)       Assessment / Plan      1. Diabetes:  Goal A1C: < 7%. Patient's most recent A1c is not at their current goal. During the visit today reviewed with patient: self-monitoring blood glucose fasting/post-prandial goals, importance of blood glucose control in avoidance of diabetic complications or further progression if already present, signs/symptoms/management of hypoglycemia, impact of exercise on glucose control, and diet and health maintenance items explained below. Patient presented with fiance today who noted patient sporadically checked his blood sugar. Asked patient to check blood sugar twice daily varying between post prandial and fasting. Patient preferred to work on diet, specifically working soda/tea out of his diet so no major medication changes made at this time. Did want to increase metformin component of Janumet to max dose of metformin and patient was agreeable. Verbal order obtained from patient's PCP for Janumet and testing strips so patient receives more than 50 test strips at one time. Follow up appointment made in 3 weeks. Orders Placed This Encounter    SITagliptin-metFORMIN (JANUMET XR) 50-1,000 mg TM24     Sig: Take 2 Tabs by mouth daily. Dispense:  180 Tab     Refill:  3    glucose blood VI test strips ([employer] BLOOD GLUCOSE SYSTEM) strip     Sig: Test blood sugar twice daily as directed     Dispense:  100 Strip     Refill:  11     2. Diet/lifestyle:  Reviewed with patient utilization of the plate method as a way to encourage healthy eating. Reviewed carbohydrate and non-carbohydrate rich foods, carbohydrate content of various foods, appropriate serving sizes for carbohydrates (15 grams = 1 carb serving), reading the nutrition label focusing on total carbohydrates while being mindful of serving size, recommended serving sizes for carbohydrates for meals and snacks (45-60g with meals and less than or equal to 15g for snacks ), and discussion regarding fruits as a carbohydrate.   Patient education materials were provided and reviewed with the patient for their future reference and use. Patient encouraged to limit soda/tea in his diet as the rest of his diet isn't that bad. He does need to watch portion sizes and patient was educated about this as well. Patient and fiance may be interested in meeting with nutritionist for further education about specific macronutrient requirements. Patient verbalized understanding of the information presented and all of the patients questions were answered. AVS was handed to the patient and information reviewed. Patient advised to call the office with any additional questions or concerns. Notification of recommendations will be sent to Lili Chris MD for review.     Future Appointments   Date Time Provider Amalia Silver   7/2/2019 10:00 AM [facility]   8/23/2019 10:30 AM Lili Chris MD [facility] None   10/22/2019 11:45 AM Jaimee Naranjo MD [facility]     Thank you for the consult,  Ciara Lux, PharmD, BCPS, BCACP, BC-ADM

## 2019-06-17 RX ORDER — GABAPENTIN 300 MG/1
CAPSULE ORAL
Qty: 60 CAP | Refills: 5 | Status: SHIPPED | OUTPATIENT
Start: 2019-06-17 | End: 2019-11-06 | Stop reason: SDUPTHER

## 2019-06-26 ENCOUNTER — OFFICE VISIT (OUTPATIENT)
Dept: FAMILY MEDICINE CLINIC | Age: 54
End: 2019-06-26

## 2019-06-26 VITALS
OXYGEN SATURATION: 97 % | SYSTOLIC BLOOD PRESSURE: 111 MMHG | WEIGHT: 190.2 LBS | TEMPERATURE: 98.2 F | BODY MASS INDEX: 28.82 KG/M2 | RESPIRATION RATE: 18 BRPM | HEIGHT: 68 IN | HEART RATE: 100 BPM | DIASTOLIC BLOOD PRESSURE: 74 MMHG

## 2019-06-26 DIAGNOSIS — I10 ESSENTIAL HYPERTENSION: ICD-10-CM

## 2019-06-26 DIAGNOSIS — Z79.4 TYPE 2 DIABETES MELLITUS WITHOUT COMPLICATION, WITH LONG-TERM CURRENT USE OF INSULIN (HCC): ICD-10-CM

## 2019-06-26 DIAGNOSIS — E11.9 TYPE 2 DIABETES MELLITUS WITHOUT COMPLICATION, WITH LONG-TERM CURRENT USE OF INSULIN (HCC): ICD-10-CM

## 2019-06-26 DIAGNOSIS — I50.31 ACUTE DIASTOLIC CONGESTIVE HEART FAILURE (HCC): Primary | ICD-10-CM

## 2019-06-26 PROBLEM — I50.9 CHF (CONGESTIVE HEART FAILURE) (HCC): Status: ACTIVE | Noted: 2019-06-18

## 2019-06-26 RX ORDER — FLUTICASONE PROPIONATE 50 MCG
SPRAY, SUSPENSION (ML) NASAL
Refills: 12 | COMMUNITY
Start: 2019-06-13 | End: 2019-09-19 | Stop reason: SDUPTHER

## 2019-06-26 RX ORDER — SITAGLIPTIN AND METFORMIN HYDROCHLORIDE 100; 1000 MG/1; MG/1
100-1000 TABLET, FILM COATED, EXTENDED RELEASE ORAL DAILY
Refills: 5 | COMMUNITY
Start: 2019-05-20 | End: 2020-01-21 | Stop reason: ALTCHOICE

## 2019-06-26 RX ORDER — FUROSEMIDE 40 MG/1
40 TABLET ORAL
COMMUNITY
Start: 2019-06-19 | End: 2019-07-31 | Stop reason: SDUPTHER

## 2019-06-26 NOTE — PROGRESS NOTES
Marylen Lu presents today for   Chief Complaint   Patient presents with   [address]     Patient was seen at [facility] for CHF on 6/18/19       Marylen Lu preferred language for health care discussion is english/other. Is someone accompanying this pt? no    Is the patient using any DME equipment during [address]? no    Depression Screening:  3 most recent PHQ Screens 5/24/2019   Little interest or pleasure in doing things Not at all   Feeling down, depressed, irritable, or hopeless Not at all   Total Score PHQ 2 0       Learning Assessment:  Learning Assessment 5/24/2019   PRIMARY LEARNER Patient   HIGHEST LEVEL OF EDUCATION - PRIMARY LEARNER  DID NOT GRADUATE [facility] PRIMARY LEARNER NONE   CO-LEARNER CAREGIVER No   PRIMARY LANGUAGE ENGLISH   LEARNER PREFERENCE PRIMARY LISTENING     -   ANSWERED BY self   RELATIONSHIP SELF       Abuse Screening:  Abuse Screening Questionnaire 5/24/2019   Do you ever feel afraid of your partner? N   Are you in a relationship with someone who physically or mentally threatens you? N   Is it safe for you to go home? Y       Health Maintenance Due   Topic Date Due    Shingrix Vaccine Age 50> (1 of 2) 07/26/2015    FOBT Q 1 YEAR AGE 50-75  07/26/2015    FOOT EXAM Q1  02/13/2018    MICROALBUMIN Q1  06/07/2018   . Health Maintenance reviewed and discussed and ordered per Provider. Marylen Sarina is updated on all     Coordination of Care:  1. Have you been to the ER, urgent care clinic since your last visit? Hospitalized since your last visit? no    2. Have you seen or consulted any other health care providers outside of the [address] [place] [address] since your last visit? Include any pap smears or colon screening. no      Advance Directive:  1. Do you have an advance directive in place? Patient Reply:no    2. If not, would you like material regarding how to put one in place?  Patient Reply: no

## 2019-06-26 NOTE — PROGRESS NOTES
HPI  Pt presents with a follow up from hospitalization at [facility] 06/18/19- 06/22/19. Presented to ER with pain in his chest and SOB. Reports that he had been experiencing orthopnea and lower extremity edema at home. Was diagnosed with acute diastolic CHF and started on Lasix. Lantus was increased from 20 units to 30 units/day. Echo shows EF of 00% with diastolic dysfunction    Pt reports that he is feeling \"better\". Denies chest pain, SOB, edema. Pt reports that blood sugar last night before Lantus was 215. Home care nurse came to house yesterday. Was told that he has to check BP and weigh himself daily. Meeting with CHF program tomorrow    Pt has all needed medication. Is to get CMP drawn, girlfriend asking where to get this done    Past Medical History  Past Medical History:   Diagnosis Date    Arthritis     Chest pain     CVA (cerebral vascular accident) ([address] 2011    r side stroke    CVA (cerebral vascular accident) ([facility] 09/05/2018    admitted at [address] [place] [address] Heart attack [facility]) 2010    chest pains    Hypercholesterolemia     Hypertension     Loss of hearing     right ear, patient states it comes and goes    Polyuria     Shortness of breath 2011    Type 2 diabetes mellitus without complication, with long-term current use of insulin ([facility] 07/23/2013       Surgical History  Past Surgical History:   Procedure Laterality Date    HX HEART CATHETERIZATION          Medications  Current Outpatient Medications   Medication Sig Dispense Refill    furosemide (LASIX) 40 mg tablet Take 40 mg by mouth.  JANUMET -1,000 mg TM24 Take 100-1,000 Tabs by mouth daily. 5    gabapentin (NEURONTIN) 300 mg capsule TAKE 2 CAPSULES BY MOUTH EVERY EVENING 60 Cap 5    glucose blood VI test strips ([employer] BLOOD GLUCOSE SYSTEM) strip Test blood sugar twice daily as directed 100 Strip 11    atorvastatin (LIPITOR) 40 mg tablet Take 1 Tab by mouth daily.  30 Tab 5    glimepiride (AMARYL) 2 mg tablet TAKE 1 TABLET BY MOUTH EVERY DAY 30 Tab 5    aspirin (ASPIRIN) 325 mg tablet Take 325 mg by mouth daily.  loratadine (CLARITIN) 10 mg tablet TAKE 1 TABLET BY MOUTH EVERY DAY 30 Tab 5    LANTUS SOLOSTAR U-100 INSULIN 100 unit/mL (3 mL) inpn INJECT 20 UNITS subcutaneously EVERY EVENING 6 mL 5    losartan-hydroCHLOROthiazide (HYZAAR) 50-12.5 mg per tablet Take 1 Tab by mouth daily. 30 Tab 6    Insulin Needles, Disposable, (SURE-FINE PEN NEEDLES) 31 gauge x 5/16\" ndle by Does Not Apply route daily. Use as directed with insulin. 1 Package 12    multivitamin (ONE A DAY) tablet Take 1 Tab by Mouth Once a Day. Similar alternatives are ok.  thiamine HCL (B-1) 100 mg tablet Take 1 Tab by mouth daily. 90 Tab 3    Blood-Glucose Meter monitoring kit TEST GLUCOSE TWICE DAILY. DX:E11.9 1 Kit 0    lancets (FREESTYLE LANCETS) 28 gauge misc TEST BLOOD GLUCOSE TWICE DAILY. DX E11.9 50 Lancet 12    Lancets Misc Use with Relion Prime Meter. Test Blood sugar Daily. DX: 250.02 50 Each 1    fluticasone propionate (FLONASE) 50 mcg/actuation nasal spray INHALE 2 SPRAYS into both nostrils EVERY DAY  12    SITagliptin-metFORMIN (JANUMET XR) 50-1,000 mg TM24 Take 2 Tabs by mouth daily.  180 Tab 3       Allergies  Allergies   Allergen Reactions    Lisinopril Cough       Family History  Family History   Problem Relation Age of Onset    Arthritis-osteo Mother     Gout Mother     Diabetes Mother     Hypertension Mother     Diabetes Father     Diabetes Brother     Diabetes Maternal Grandmother        Social History  Social History     Socioeconomic History    Marital status: LEGALLY      Spouse name: Not on file    Number of children: Not on file    Years of education: Not on file    Highest education level: Not on file   Occupational History    Occupation: disabled     Comment: prior ; disabled after CVA   Social Needs    Financial resource strain: Not on file    Food insecurity:     Worry: Not on file     Inability: Not on file    Transportation needs:     Medical: Not on file     Non-medical: Not on file   Tobacco Use    Smoking status: Never Smoker    Smokeless tobacco: Never Used   Substance and Sexual Activity    Alcohol use: Yes     Frequency: Monthly or less     Drinks per session: 1 or 2     Binge frequency: Never     Comment: occassionally    Drug use: Not Currently     Types: Prescription, OTC, Marijuana    Sexual activity: Yes   Lifestyle    Physical activity:     Days per week: Not on file     Minutes per session: Not on file    Stress: Not on file   Relationships    Social connections:     Talks on phone: Not on file     Gets together: Not on file     Attends Spiritism service: Not on file     Active member of club or organization: Not on file     Attends meetings of clubs or organizations: Not on file     Relationship status: Not on file    Intimate partner violence:     Fear of current or ex partner: Not on file     Emotionally abused: Not on file     Physically abused: Not on file     Forced sexual activity: Not on file   Other Topics Concern    Not on file   Social History Narrative    Not on file       Problem List  Patient Active Problem List   Diagnosis Code    Essential hypertension I10    History of CVA (cerebrovascular accident) Z80.78    History of MI (myocardial infarction) I25.2    Type 2 diabetes mellitus without complication, with long-term current use of insulin ([facility] E11.9, Z79.4    Type 2 diabetes mellitus with diabetic neuropathy ([facility] E11.40    CHF (congestive heart failure) ([facility] I50.9    Atherosclerosis of coronary artery I25.10       Review of Systems  Review of Systems   Constitutional: Negative. Respiratory: Negative. Cardiovascular: Negative. Neurological: Negative.         Vital Signs  Vitals:    06/26/19 1015   BP: 111/74   Pulse: 100   Resp: 18   Temp: 98.2 °F (36.8 °C)   TempSrc: Oral   SpO2: 97% Weight: 190 lb 3.2 oz (86.3 kg)   Height: 5' 8\" (1.727 m)   PainSc:   0 - No pain       Physical Exam  Physical Exam   Constitutional: He is oriented to person, place, and time. He appears well-developed and well-nourished. No distress. Cardiovascular: Normal rate, regular rhythm and normal heart sounds. Pulmonary/Chest: Effort normal and breath sounds normal. No respiratory distress. Abdominal: Soft. Bowel sounds are normal. There is no tenderness. Musculoskeletal: Normal range of motion. He exhibits no edema. Neurological: He is alert and oriented to person, place, and time. Skin: Skin is warm and dry. Psychiatric: He has a normal mood and affect. Nursing note and vitals reviewed. Diagnostics  Orders Placed This Encounter    METABOLIC PANEL, COMPREHENSIVE     Standing Status:   Future     Standing Expiration Date:   6/26/2020    furosemide (LASIX) 40 mg tablet     Sig: Take 40 mg by mouth.  JANUMET -1,000 mg TM24     Sig: Take 100-1,000 Tabs by mouth daily. Refill:  5    fluticasone propionate (FLONASE) 50 mcg/actuation nasal spray     Sig: INHALE 2 SPRAYS into both nostrils EVERY DAY     Refill:  12       Results  Results for orders placed or performed in visit on 05/24/19   AMB POC HEMOGLOBIN A1C   Result Value Ref Range    Hemoglobin A1c (POC) 9.9% %       Assessment and Plan  Diagnoses and all orders for this visit:    1. Acute diastolic congestive heart failure (HCC)  -     METABOLIC PANEL, COMPREHENSIVE; Future  Reinforced daily weights, continue with present plan of care. Reviewed return/ ER precautions    2. Essential hypertension  Continue with present plan of care. Reinforced checking blood pressure at home. Low sodium diet    3. Type 2 diabetes mellitus without complication, with long-term current use of insulin ([facility]  Continue with present plan of care. Encouraged to monitor blood sugar at home. After care summary printed and reviewed with patient.  Plan reviewed with patient. Questions answered. Patient verbalized understanding of plan and is in agreement with plan. Patient to follow up with PCP in one month or earlier if symptoms worsen. Encouraged the use of my chart.     OZ MuroC

## 2019-07-03 ENCOUNTER — TELEPHONE (OUTPATIENT)
Dept: FAMILY MEDICINE CLINIC | Age: 54
End: 2019-07-03

## 2019-07-23 NOTE — TELEPHONE ENCOUNTER
Home health has faxed over paper work that needs to be signed so that Mr. Leobardo Osman can get his BP Cuff and also his blood sugar monitor. Please call [facility] Stover con cering this issue she is listed on the patients chart. Need for prophylactic measure DM (diabetes mellitus)

## 2019-07-31 ENCOUNTER — HOSPITAL ENCOUNTER (OUTPATIENT)
Dept: LAB | Age: 54
Discharge: HOME | End: 2019-07-31
Payer: MEDICAID

## 2019-07-31 ENCOUNTER — OFFICE VISIT (OUTPATIENT)
Dept: FAMILY MEDICINE CLINIC | Age: 54
End: 2019-07-31

## 2019-07-31 VITALS
WEIGHT: 198.4 LBS | SYSTOLIC BLOOD PRESSURE: 133 MMHG | TEMPERATURE: 97.8 F | RESPIRATION RATE: 18 BRPM | HEIGHT: 68 IN | BODY MASS INDEX: 30.07 KG/M2 | DIASTOLIC BLOOD PRESSURE: 80 MMHG | HEART RATE: 52 BPM

## 2019-07-31 DIAGNOSIS — E11.9 TYPE 2 DIABETES MELLITUS WITHOUT COMPLICATION, WITH LONG-TERM CURRENT USE OF INSULIN (HCC): ICD-10-CM

## 2019-07-31 DIAGNOSIS — Z79.4 TYPE 2 DIABETES MELLITUS WITHOUT COMPLICATION, WITH LONG-TERM CURRENT USE OF INSULIN (HCC): ICD-10-CM

## 2019-07-31 DIAGNOSIS — Z79.4 TYPE 2 DIABETES MELLITUS WITHOUT COMPLICATION, WITH LONG-TERM CURRENT USE OF INSULIN (HCC): Primary | ICD-10-CM

## 2019-07-31 DIAGNOSIS — I10 ESSENTIAL HYPERTENSION: ICD-10-CM

## 2019-07-31 DIAGNOSIS — E78.2 MIXED HYPERLIPIDEMIA: ICD-10-CM

## 2019-07-31 DIAGNOSIS — I50.31 ACUTE DIASTOLIC CONGESTIVE HEART FAILURE (HCC): ICD-10-CM

## 2019-07-31 DIAGNOSIS — Z12.11 SCREEN FOR COLON CANCER: ICD-10-CM

## 2019-07-31 DIAGNOSIS — E11.9 TYPE 2 DIABETES MELLITUS WITHOUT COMPLICATION, WITH LONG-TERM CURRENT USE OF INSULIN (HCC): Primary | ICD-10-CM

## 2019-07-31 LAB
ALBUMIN SERPL-MCNC: 3.5 G/DL (ref 3.4–5)
ALBUMIN/GLOB SERPL: 1 {RATIO} (ref 0.8–1.7)
ALP SERPL-CCNC: 87 U/L (ref 45–117)
ALT SERPL-CCNC: 21 U/L (ref 16–61)
ANION GAP SERPL CALC-SCNC: 6 MMOL/L (ref 3–18)
AST SERPL-CCNC: 12 U/L (ref 10–38)
BILIRUB SERPL-MCNC: 0.4 MG/DL (ref 0.2–1)
BNP SERPL-MCNC: 611 PG/ML (ref 0–900)
BUN SERPL-MCNC: 20 MG/DL (ref 7–18)
BUN/CREAT SERPL: 14 (ref 12–20)
CALCIUM SERPL-MCNC: 8.7 MG/DL (ref 8.5–10.1)
CHLORIDE SERPL-SCNC: 100 MMOL/L (ref 100–111)
CO2 SERPL-SCNC: 32 MMOL/L (ref 21–32)
CREAT SERPL-MCNC: 1.41 MG/DL (ref 0.6–1.3)
EST. AVERAGE GLUCOSE BLD GHB EST-MCNC: 200 MG/DL
GLOBULIN SER CALC-MCNC: 3.4 G/DL (ref 2–4)
GLUCOSE SERPL-MCNC: 238 MG/DL (ref 74–99)
HBA1C MFR BLD: 8.6 % (ref 4.2–5.6)
POTASSIUM SERPL-SCNC: 3.4 MMOL/L (ref 3.5–5.5)
PROT SERPL-MCNC: 6.9 G/DL (ref 6.4–8.2)
SODIUM SERPL-SCNC: 138 MMOL/L (ref 136–145)

## 2019-07-31 PROCEDURE — 80053 COMPREHEN METABOLIC PANEL: CPT

## 2019-07-31 PROCEDURE — 36415 COLL VENOUS BLD VENIPUNCTURE: CPT

## 2019-07-31 PROCEDURE — 83036 HEMOGLOBIN GLYCOSYLATED A1C: CPT

## 2019-07-31 PROCEDURE — 83880 ASSAY OF NATRIURETIC PEPTIDE: CPT

## 2019-07-31 RX ORDER — FUROSEMIDE 40 MG/1
40 TABLET ORAL DAILY
Qty: 90 TAB | Refills: 4 | Status: SHIPPED | OUTPATIENT
Start: 2019-07-31 | End: 2020-09-21 | Stop reason: SDUPTHER

## 2019-07-31 RX ORDER — LOSARTAN POTASSIUM AND HYDROCHLOROTHIAZIDE 12.5; 1 MG/1; MG/1
1 TABLET ORAL
COMMUNITY
End: 2019-09-12 | Stop reason: ALTCHOICE

## 2019-07-31 RX ORDER — LOSARTAN POTASSIUM AND HYDROCHLOROTHIAZIDE 12.5; 5 MG/1; MG/1
1 TABLET ORAL DAILY
Qty: 30 TAB | Refills: 3 | Status: SHIPPED | OUTPATIENT
Start: 2019-07-31 | End: 2019-08-28 | Stop reason: SDUPTHER

## 2019-07-31 NOTE — PROGRESS NOTES
Chief Complaint   Patient presents with    CHF     follow up    Hypertension    Diabetes    Medication Refill         HPI    Olu Lawrence is a 47 y.o. male presenting today for 2 months  follow up of htn, dm. Since his last visit with me, pt was admitted to [facility] for acute chf.  He has been started on lasix. Pt has not had any further issues with swelling or orthopnea. Pt's lantus was to be increased to 30U qhs. Home bs readings are around 180. He has been eating birthday cake recently and has seen sugars as high as 300. Pt has stopped drinking sodas. His girlfriend is working on helping him to eat a better diet. Pt's gf is worried and feels that his stomach is harder lately as if he is retaining fluid. She does not feel that he urinates as much with the lasix as he did initially. Patient does need medication refills today. ROS  Review of Systems   Constitutional: Negative. HENT: Negative. Respiratory: Negative. Cardiovascular: Negative. All other systems reviewed and are negative. Physical Exam  Nursing note and vitals reviewed. Constitutional: He is oriented to person, place, and time. He appears well-developed and well-nourished. HENT:   Head: Normocephalic and atraumatic. Right Ear: External ear normal.   Left Ear: External ear normal.   Nose: Nose normal.   Eyes: Conjunctivae and EOM are normal.   Neck: Normal range of motion. Neck supple. No JVD present. Carotid bruit is not present. No thyromegaly present. Cardiovascular: Normal rate, regular rhythm, normal heart sounds and intact distal pulses. Exam reveals no gallop and no friction rub. No murmur heard. Pulmonary/Chest: Effort normal and breath sounds normal. He has no wheezes. He has no rhonchi. He has no rales. Abdominal: Soft. Musculoskeletal: Normal range of motion. Neurological: He is alert and oriented to person, place, and time. Coordination normal.   Skin: Skin is warm and dry. Psychiatric: He has a normal mood and affect. His behavior is normal. Judgment and thought content normal.       Diagnoses and all orders for this visit:    1. Type 2 diabetes mellitus without complication, with long-term current use of insulin (HCC)  -     METABOLIC PANEL, COMPREHENSIVE; Future  -     HEMOGLOBIN A1C WITH EAG; Future  -     BSHSI MYCHART GLUCOSE FLOWSHEET  Increase lantus to 35U nightly. 2. Essential hypertension  -     METABOLIC PANEL, COMPREHENSIVE; Future  Stable, cont pres tx plan. 3. Mixed hyperlipidemia  -     METABOLIC PANEL, COMPREHENSIVE; Future    4. Acute diastolic congestive heart failure (HCC)  -     furosemide (LASIX) 40 mg tablet; Take 1 Tab by mouth daily.  -     METABOLIC PANEL, COMPREHENSIVE; Future  -     NT-PRO BNP; Future    5.  Screen for colon cancer  -     OCCULT BLOOD IMMUNOASSAY,DIAGNOSTIC; Future      Follow-up and Dispositions    · Return in about 2 months (around 9/30/2019) for diabetes, high blood pressure, high cholesterol, chf.

## 2019-07-31 NOTE — PROGRESS NOTES
Silverjose luis Guerrero presents today for   Chief Complaint   Patient presents with    CHF     follow up    Hypertension    Diabetes       Silver Guerrero preferred language for health care discussion is english/other. Is someone accompanying this pt? no    Is the patient using any DME equipment during [address]? no    Depression Screening:  3 most recent PHQ Screens 5/24/2019   Little interest or pleasure in doing things Not at all   Feeling down, depressed, irritable, or hopeless Not at all   Total Score PHQ 2 0       Learning Assessment:  Learning Assessment 5/24/2019   PRIMARY LEARNER Patient   HIGHEST LEVEL OF EDUCATION - PRIMARY LEARNER  DID NOT GRADUATE [facility] PRIMARY LEARNER NONE   CO-LEARNER CAREGIVER No   PRIMARY LANGUAGE ENGLISH   LEARNER PREFERENCE PRIMARY LISTENING     -   ANSWERED BY self   RELATIONSHIP SELF       Abuse Screening:  Abuse Screening Questionnaire 5/24/2019   Do you ever feel afraid of your partner? N   Are you in a relationship with someone who physically or mentally threatens you? N   Is it safe for you to go home? Y       Health Maintenance Due   Topic Date Due    Shingrix Vaccine Age 50> (1 of 2) 07/26/2015    FOBT Q 1 YEAR AGE 50-75  07/26/2015    FOOT EXAM Q1  02/13/2018    MICROALBUMIN Q1  06/07/2018   . Health Maintenance reviewed and discussed and ordered per Provider. Silver Cesar is updated on all     Coordination of Care:  1. Have you been to the ER, urgent care clinic since your last visit? Hospitalized since your last visit? no    2. Have you seen or consulted any other health care providers outside of the [address] [place] [address] since your last visit? Include any pap smears or colon screening. no      Advance Directive:  1. Do you have an advance directive in place? Patient Reply:no    2. If not, would you like material regarding how to put one in place?  Patient Reply: no

## 2019-07-31 NOTE — PATIENT INSTRUCTIONS
High Blood Pressure: Care Instructions  Overview    It's normal for blood pressure to go up and down throughout the day. But if it stays up, you have high blood pressure. Another name for high blood pressure is hypertension. Despite what a lot of people think, high blood pressure usually doesn't cause headaches or make you feel dizzy or lightheaded. It usually has no symptoms. But it does increase your risk of stroke, heart attack, and other problems. You and your doctor will talk about your risks of these problems based on your blood pressure. Your doctor will give you a goal for your blood pressure. Your goal will be based on your health and your age. Lifestyle changes, such as eating healthy and being active, are always important to help lower blood pressure. You might also take medicine to reach your blood pressure goal.  Follow-up care is a key part of your treatment and safety. Be sure to make and go to all appointments, and call your doctor if you are having problems. It's also a good idea to know your test results and keep a list of the medicines you take. How can you care for yourself at home? Medical treatment  · If you stop taking your medicine, your blood pressure will go back up. You may take one or more types of medicine to lower your blood pressure. Be safe with medicines. Take your medicine exactly as prescribed. Call your doctor if you think you are having a problem with your medicine. · Talk to your doctor before you start taking aspirin every day. Aspirin can help certain people lower their risk of a heart attack or stroke. But taking aspirin isn't right for everyone, because it can cause serious bleeding. · See your doctor regularly. You may need to see the doctor more often at first or until your blood pressure comes down. · If you are taking blood pressure medicine, talk to your doctor before you take decongestants or anti-inflammatory medicine, such as ibuprofen.  Some of these medicines can raise blood pressure. · Learn how to check your blood pressure at home. Lifestyle changes  · Stay at a healthy weight. This is especially important if you put on weight around the waist. Losing even 10 pounds can help you lower your blood pressure. · If your doctor recommends it, get more exercise. Walking is a good choice. Bit by bit, increase the amount you walk every day. Try for at least 30 minutes on most days of the week. You also may want to swim, bike, or do other activities. · Avoid or limit alcohol. Talk to your doctor about whether you can drink any alcohol. · Try to limit how much sodium you eat to less than 2,300 milligrams (mg) a day. Your doctor may ask you to try to eat less than 1,500 mg a day. · Eat plenty of fruits (such as bananas and oranges), vegetables, legumes, whole grains, and low-fat dairy products. · Lower the amount of saturated fat in your diet. Saturated fat is found in animal products such as milk, cheese, and meat. Limiting these foods may help you lose weight and also lower your risk for heart disease. · Do not smoke. Smoking increases your risk for heart attack and stroke. If you need help quitting, talk to your doctor about stop-smoking programs and medicines. These can increase your chances of quitting for good. When should you call for help? Call 911 anytime you think you may need emergency care. This may mean having symptoms that suggest that your blood pressure is causing a serious heart or blood vessel problem. Your blood pressure may be over 180/120.   For example, call 911 if:    · You have symptoms of a heart attack. These may include:  ? Chest pain or pressure, or a strange feeling in the chest.  ? Sweating. ? Shortness of breath. ? Nausea or vomiting. ? Pain, pressure, or a strange feeling in the back, neck, jaw, or upper belly or in one or both shoulders or arms. ? Lightheadedness or sudden weakness.   ? A fast or irregular heartbeat.     · You have symptoms of a stroke. These may include:  ? Sudden numbness, tingling, weakness, or loss of movement in your face, arm, or leg, especially on only one side of your body. ? Sudden vision changes. ? Sudden trouble speaking. ? Sudden confusion or trouble understanding simple statements. ? Sudden problems with walking or balance. ? A sudden, severe headache that is different from past headaches.     · You have severe back or belly pain.    Do not wait until your blood pressure comes down on its own. Get help right away.   Call your doctor now or seek immediate care if:    · Your blood pressure is much higher than normal (such as 180/120 or higher), but you don't have symptoms.     · You think high blood pressure is causing symptoms, such as:  ? Severe headache.  ? Blurry vision.    Watch closely for changes in your health, and be sure to contact your doctor if:    · Your blood pressure measures higher than your doctor recommends at least 2 times. That means the top number is higher or the bottom number is higher, or both.     · You think you may be having side effects from your blood pressure medicine. Where can you learn more? Go to http://luma-che.info/. Enter W383 in the search box to learn more about \"High Blood Pressure: Care Instructions. \"  Current as of: July 22, 2018  Content Version: 12.1  © 6193-6094 [employer]. Care instructions adapted under license by [employer] (which disclaims liability or warranty for this information). If you have questions about a medical condition or this instruction, always ask your healthcare professional. [place] [address] any warranty or liability for your use of this information. Increase Lantus to 35U each night. Cont to check blood sugars regularly. Please log them through the [employer] Glucose flowsheet. I will let you know if we need to make any further changes.

## 2019-08-23 ENCOUNTER — OFFICE VISIT (OUTPATIENT)
Dept: FAMILY MEDICINE CLINIC | Age: 54
End: 2019-08-23

## 2019-08-23 ENCOUNTER — HOSPITAL ENCOUNTER (OUTPATIENT)
Dept: LAB | Age: 54
Discharge: HOME | End: 2019-08-23
Payer: MEDICAID

## 2019-08-23 DIAGNOSIS — Z12.11 SCREEN FOR COLON CANCER: ICD-10-CM

## 2019-08-23 PROCEDURE — 82274 ASSAY TEST FOR BLOOD FECAL: CPT

## 2019-08-27 LAB — HEMOCCULT STL QL IA: NEGATIVE

## 2019-08-28 RX ORDER — LOSARTAN POTASSIUM AND HYDROCHLOROTHIAZIDE 12.5; 5 MG/1; MG/1
1 TABLET ORAL DAILY
Qty: 30 TAB | Refills: 3 | Status: SHIPPED | OUTPATIENT
Start: 2019-08-28 | End: 2019-09-12 | Stop reason: SDUPTHER

## 2019-08-28 NOTE — TELEPHONE ENCOUNTER
Not sure which dose patient should be taking on July 3,2019 we refilled for 50-12.5. But through out the chart from 7/1/2019 to 8/1/2019 it states that patient is taking 100-12.5. Can not seem to figure who increased it.

## 2019-09-11 ENCOUNTER — TELEPHONE (OUTPATIENT)
Dept: FAMILY MEDICINE CLINIC | Age: 54
End: 2019-09-11

## 2019-09-12 RX ORDER — LOSARTAN POTASSIUM AND HYDROCHLOROTHIAZIDE 12.5; 5 MG/1; MG/1
1 TABLET ORAL DAILY
Qty: 30 TAB | Refills: 6 | Status: SHIPPED | OUTPATIENT
Start: 2019-09-12 | End: 2019-11-06 | Stop reason: SDUPTHER

## 2019-09-18 DIAGNOSIS — E11.9 TYPE 2 DIABETES MELLITUS WITHOUT COMPLICATION, WITHOUT LONG-TERM CURRENT USE OF INSULIN (HCC): ICD-10-CM

## 2019-09-18 DIAGNOSIS — E11.40 TYPE 2 DIABETES MELLITUS WITH DIABETIC NEUROPATHY, WITHOUT LONG-TERM CURRENT USE OF INSULIN (HCC): ICD-10-CM

## 2019-09-18 DIAGNOSIS — J30.2 SEASONAL ALLERGIC RHINITIS, UNSPECIFIED TRIGGER: ICD-10-CM

## 2019-09-19 RX ORDER — FLUTICASONE PROPIONATE 50 MCG
SPRAY, SUSPENSION (ML) NASAL
Qty: 16 G | Refills: 12 | Status: SHIPPED | OUTPATIENT
Start: 2019-09-19 | End: 2021-11-15

## 2019-09-19 RX ORDER — GLIMEPIRIDE 2 MG/1
TABLET ORAL
Qty: 30 TAB | Refills: 5 | Status: SHIPPED | OUTPATIENT
Start: 2019-09-19 | End: 2020-02-21

## 2019-09-19 NOTE — TELEPHONE ENCOUNTER
I have attempted without success to contact this patient by phone to return their call, will try again later and I left a message on answering machine.

## 2019-09-20 NOTE — TELEPHONE ENCOUNTER
Patient called and chart review was done. Patient wife called and stated that patient wanted to know if there is a form or order the doctor could write for the patient to go to the [facility] and have water aerobics. Provider was made aware and stated that there is no form to fill out for water aerobics. Patient was advised and stated that he will go an just fill out an application to join the [facility].

## 2019-10-18 ENCOUNTER — OFFICE VISIT (OUTPATIENT)
Dept: FAMILY MEDICINE CLINIC | Age: 54
End: 2019-10-18

## 2019-10-18 VITALS
SYSTOLIC BLOOD PRESSURE: 139 MMHG | DIASTOLIC BLOOD PRESSURE: 72 MMHG | HEIGHT: 68 IN | TEMPERATURE: 97.9 F | RESPIRATION RATE: 18 BRPM | BODY MASS INDEX: 29.64 KG/M2 | HEART RATE: 52 BPM | WEIGHT: 195.6 LBS

## 2019-10-18 DIAGNOSIS — Z12.5 SCREENING FOR MALIGNANT NEOPLASM OF PROSTATE: ICD-10-CM

## 2019-10-18 DIAGNOSIS — Z23 ENCOUNTER FOR IMMUNIZATION: ICD-10-CM

## 2019-10-18 DIAGNOSIS — E78.2 MIXED HYPERLIPIDEMIA: ICD-10-CM

## 2019-10-18 DIAGNOSIS — I10 ESSENTIAL HYPERTENSION: ICD-10-CM

## 2019-10-18 DIAGNOSIS — E11.40 TYPE 2 DIABETES MELLITUS WITH DIABETIC NEUROPATHY, WITHOUT LONG-TERM CURRENT USE OF INSULIN (HCC): Primary | ICD-10-CM

## 2019-10-18 NOTE — PATIENT INSTRUCTIONS
Vaccine Information Statement    Influenza (Flu) Vaccine (Inactivated or Recombinant): What You Need to Know    Many Vaccine Information Statements are available in Irish and other languages. See www.immunize.org/vis  Hojas de información sobre vacunas están disponibles en español y en muchos otros idiomas. Visite www.immunize.org/vis    1. Why get vaccinated? Influenza vaccine can prevent influenza (flu). Flu is a contagious disease that spreads around the [facility] every year, usually between October and May. Anyone can get the flu, but it is more dangerous for some people. Infants and young children, people 72years of age and older, pregnant women, and people with certain health conditions or a weakened immune system are at greatest risk of flu complications. Pneumonia, bronchitis, sinus infections and ear infections are examples of flu-related complications. If you have a medical condition, such as heart disease, cancer or diabetes, flu can make it worse. Flu can cause fever and chills, sore throat, muscle aches, fatigue, cough, headache, and runny or stuffy nose. Some people may have vomiting and diarrhea, though this is more common in children than adults. Each year thousands of people in the [facility] die from flu, and many more are hospitalized. Flu vaccine prevents millions of illnesses and flu-related visits to the doctor each year. 2. Influenza vaccines     CDC recommends everyone 10months of age and older get vaccinated every flu season. Children 6 months through 6years of age may need 2 doses during a single flu season. Everyone else needs only 1 dose each flu season. It takes about 2 weeks for protection to develop after vaccination. There are many flu viruses, and they are always changing. Each year a new flu vaccine is made to protect against three or four viruses that are likely to cause disease in the upcoming flu season.  Even when the vaccine doesnt exactly match these viruses, it may still provide some protection. Influenza vaccine does not cause flu. Influenza vaccine may be given at the same time as other vaccines. 3. Talk with your health care provider    Tell your vaccine provider if the person getting the vaccine:   Has had an allergic reaction after a previous dose of influenza vaccine, or has any severe, life-threatening allergies.  Has ever had Guillain-Barré Syndrome (also called GBS). In some cases, your health care provider may decide to postpone influenza vaccination to a future visit. People with minor illnesses, such as a cold, may be vaccinated. People who are moderately or severely ill should usually wait until they recover before getting influenza vaccine. Your health care provider can give you more information. 4. Risks of a reaction     Soreness, redness, and swelling where shot is given, fever, muscle aches, and headache can happen after influenza vaccine.  There may be a very small increased risk of Guillain-Barré Syndrome (GBS) after inactivated influenza vaccine (the flu shot). Choco Gutierrez children who get the flu shot along with pneumococcal vaccine (PCV13), and/or DTaP vaccine at the same time might be slightly more likely to have a seizure caused by fever. Tell your health care provider if a child who is getting flu vaccine has ever had a seizure. People sometimes faint after medical procedures, including vaccination. Tell your provider if you feel dizzy or have vision changes or ringing in the ears. As with any medicine, there is a very remote chance of a vaccine causing a severe allergic reaction, other serious injury, or death. 5. What if there is a serious problem? An allergic reaction could occur after the vaccinated person leaves the clinic.  If you see signs of a severe allergic reaction (hives, swelling of the face and throat, difficulty breathing, a fast heartbeat, dizziness, or weakness), call 9-1-1 and get the person to the nearest hospital.    For other signs that concern you, call your health care provider. Adverse reactions should be reported to the Vaccine Adverse Event Reporting System (VAERS). Your health care provider will usually file this report, or you can do it yourself. Visit the VAERS website at www.vaers. [facility].gov or call 7-575.741.7192. VAERS is only for reporting reactions, and VAERS staff do not give medical advice. 6. The National Vaccine Injury Compensation Program    The [facility], located [facility] Vaccine Injury Compensation Program (VICP) is a federal program that was created to compensate people who may have been injured by certain vaccines. Visit the VICP website at www.[facility].gov/vaccinecompensation or call 1-602.563.7373 to learn about the program and about filing a claim. There is a time limit to file a claim for compensation. 7. How can I learn more?  Ask your health care provider.  Call your local or state health department.  Contact the Centers for Disease Control and Prevention (CDC):  - Call 0-275-831-434-350-9274 (4-430-WEE-INFO) or  - Visit CDCs influenza website at www.cdc.gov/flu    Vaccine Information Statement (Interim)  Inactivated Influenza Vaccine   8/15/2019  [address] HEATHER Kinney Mel [id number]   Department of Health and Human Services  Centers for Disease Control and Prevention    Office Use Only

## 2019-10-18 NOTE — PROGRESS NOTES
Temple Angelucci presents today for   Chief Complaint   Patient presents with    Diabetes     follow up    Hypertension    Cholesterol Problem    CHF    Labs     Completed 7/31/19 8/23/19       Temple Angelucci preferred language for health care discussion is english/other. Is someone accompanying this pt? no    Is the patient using any DME equipment during [address]? no    Depression Screening:  3 most recent PHQ Screens 5/24/2019   Little interest or pleasure in doing things Not at all   Feeling down, depressed, irritable, or hopeless Not at all   Total Score PHQ 2 0       Learning Assessment:  Learning Assessment 5/24/2019   PRIMARY LEARNER Patient   HIGHEST LEVEL OF EDUCATION - PRIMARY LEARNER  DID NOT GRADUATE [facility] PRIMARY LEARNER NONE   CO-LEARNER CAREGIVER No   PRIMARY LANGUAGE ENGLISH   LEARNER PREFERENCE PRIMARY LISTENING     -   ANSWERED BY self   RELATIONSHIP SELF       Abuse Screening:  Abuse Screening Questionnaire 5/24/2019   Do you ever feel afraid of your partner? N   Are you in a relationship with someone who physically or mentally threatens you? N   Is it safe for you to go home? Y       Generalized Anxiety  No flowsheet data found. Health Maintenance Due   Topic Date Due    Shingrix Vaccine Age 50> (1 of 2) 07/26/2015    FOOT EXAM Q1  02/13/2018    MICROALBUMIN Q1  06/07/2018    Influenza Age 9 to Adult  08/01/2019    EYE EXAM RETINAL OR DILATED  09/22/2019   . Health Maintenance reviewed and discussed and ordered per Provider. Choco Johnsonshavon is updated on all     Coordination of Care:  1. Have you been to the ER, urgent care clinic since your last visit? Hospitalized since your last visit? no    2. Have you seen or consulted any other health care providers outside of the [address] [place] [address] since your last visit? Include any pap smears or colon screening. no      Advance Directive:  1. Do you have an advance directive in place? Patient Reply:no    2.  If not, would you like material regarding how to put one in place? Patient Reply: tye Swain 1965 male who presents for routine immunizations. Patient denies any symptoms , reactions or allergies that would exclude them from being immunized today. Risks and adverse reactions were discussed and the VIS was given to them. All questions were addressed. Order placed for Flu,  per Verbal Order from Dr. Oz Duran with read back. Patient was observed for 15 min post injection. There were no reactions observed.     Dionicio Hampton

## 2019-10-18 NOTE — PROGRESS NOTES
Chief Complaint   Patient presents with    Diabetes     follow up    Hypertension    Cholesterol Problem    CHF    Labs     Completed 7/31/19 8/23/19         HPI    Christina Deluna is a 47 y.o. male presenting today for 3 months  follow up of dm, htn, hld, chf.      Pt has completed an advanced care directive and brought a copy today for our records. However, would like to make changes to it. Patient had labs on 7/31/19. Labs reviewed in detail with patient     Patient does not need medication refills today. Pt would like to have a flu shot today. ROS  Review of Systems   Constitutional: Negative. HENT: Negative. Respiratory: Negative. Cardiovascular: Negative. All other systems reviewed and are negative. Physical Exam  Nursing note and vitals reviewed. Constitutional: He is oriented to person, place, and time. He appears well-developed and well-nourished. HENT:   Head: Normocephalic and atraumatic. Right Ear: External ear normal.   Left Ear: External ear normal.   Nose: Nose normal.   Eyes: Conjunctivae and EOM are normal.   Neck: Normal range of motion. Neck supple. No JVD present. Carotid bruit is not present. No thyromegaly present. Cardiovascular: Normal rate, regular rhythm, normal heart sounds and intact distal pulses. Exam reveals no gallop and no friction rub. No murmur heard. Pulmonary/Chest: Effort normal and breath sounds normal. He has no wheezes. He has no rhonchi. He has no rales. Abdominal: Soft. Musculoskeletal: Normal range of motion. Neurological: He is alert and oriented to person, place, and time. Coordination normal.   Skin: Skin is warm and dry. Psychiatric: He has a normal mood and affect. His behavior is normal. Judgment and thought content normal.       Diagnoses and all orders for this visit:    1.  Type 2 diabetes mellitus with diabetic neuropathy, without long-term current use of insulin ([facility])  -     METABOLIC PANEL, COMPREHENSIVE; Future  -     LIPID PANEL; Future  -     HEMOGLOBIN A1C WITH EAG; Future  -     MICROALBUMIN, UR, RAND W/ MICROALB/CREAT RATIO; Future    2. Essential hypertension  -     METABOLIC PANEL, COMPREHENSIVE; Future  -     LIPID PANEL; Future  Stable, cont pres tx plan. 3. Mixed hyperlipidemia  -     METABOLIC PANEL, COMPREHENSIVE; Future  -     LIPID PANEL; Future    4. Screening for malignant neoplasm of prostate  -     PSA SCREENING (SCREENING); Future    5. Flu shot today    Follow-up and Dispositions    · Return in about 3 months (around 1/18/2020) for diabetes, high blood pressure, high cholesterol, lab review.

## 2019-10-18 NOTE — PROGRESS NOTES
Advance Care Planning (ACP) Provider Note - Comprehensive     Date of ACP Conversation: 10/18/19  Persons included in Conversation:  patient  Length of ACP Conversation in minutes:  16 minutes    Authorized Decision Maker (if patient is incapable of making informed decisions): This person is: Other Legally Authorized Decision Maker (e.g. Next of Kin)  [facility] ACP for ALL Patients with Decision Making Capacity:   Importance of advance care planning, including choosing a healthcare agent to communicate patient's healthcare decisions if patient lost the ability to make decisions, such as after a sudden illness or accident  Understanding of the healthcare agent role was assessed and information provided  Exploration of values, goals, and preferences if recovery is not expected, even with continued medical treatment in the event of: Imminent death  Severe, permanent brain injury  \"In these circumstances, what matters most to you? \"  Care focused more on comfort or quality of life.     Review of Existing Advance Directive:  pt does not have an AD    For Serious or Chronic Illness:  Understanding of medical condition      Interventions Provided:  Recommended completion of Advance Directive form after review of ACP materials and conversation with prospective healthcare agent

## 2019-10-22 ENCOUNTER — OFFICE VISIT (OUTPATIENT)
Dept: CARDIOLOGY CLINIC | Age: 54
End: 2019-10-22

## 2019-10-22 VITALS
SYSTOLIC BLOOD PRESSURE: 143 MMHG | WEIGHT: 197.4 LBS | BODY MASS INDEX: 29.92 KG/M2 | HEIGHT: 68 IN | HEART RATE: 53 BPM | DIASTOLIC BLOOD PRESSURE: 70 MMHG

## 2019-10-22 DIAGNOSIS — E11.9 TYPE 2 DIABETES MELLITUS WITHOUT COMPLICATION, WITH LONG-TERM CURRENT USE OF INSULIN (HCC): ICD-10-CM

## 2019-10-22 DIAGNOSIS — E78.5 DYSLIPIDEMIA: ICD-10-CM

## 2019-10-22 DIAGNOSIS — R94.39 ABNORMAL STRESS TEST: ICD-10-CM

## 2019-10-22 DIAGNOSIS — Z86.73 HISTORY OF CVA (CEREBROVASCULAR ACCIDENT): Primary | ICD-10-CM

## 2019-10-22 DIAGNOSIS — Z79.4 TYPE 2 DIABETES MELLITUS WITHOUT COMPLICATION, WITH LONG-TERM CURRENT USE OF INSULIN (HCC): ICD-10-CM

## 2019-10-22 DIAGNOSIS — I10 ESSENTIAL HYPERTENSION: ICD-10-CM

## 2019-10-22 NOTE — PROGRESS NOTES
1. Have you been to the ER, urgent care clinic since your last visit? Hospitalized since your last visit? No    2. Have you seen or consulted any other health care providers outside of the [address] [place] [address] since your last visit? Include any pap smears or colon screening.  No

## 2019-10-22 NOTE — PROGRESS NOTES
HISTORY OF PRESENT ILLNESS  Brenda Montana is a 47 y.o. male. Hypertension   The history is provided by the patient. This is a chronic problem. The problem occurs every several days. The problem has not changed since onset. Review of Systems   Constitutional: Negative for chills, diaphoresis, fever and weight loss. HENT: Negative for ear pain and hearing loss. Eyes: Negative for blurred vision. Respiratory: Negative for cough, hemoptysis, sputum production, wheezing and stridor. Cardiovascular: Negative for palpitations, orthopnea, claudication, leg swelling and PND. Gastrointestinal: Negative for heartburn, nausea and vomiting. Musculoskeletal: Negative for myalgias and neck pain. Skin: Negative for rash. Neurological: Negative for dizziness, tingling, tremors, focal weakness, loss of consciousness and weakness. Psychiatric/Behavioral: Negative for depression and suicidal ideas.      Family History   Problem Relation Age of Onset    Arthritis-osteo Mother     Gout Mother     Diabetes Mother     Hypertension Mother     Diabetes Father     Diabetes Brother     Diabetes Maternal Grandmother        Past Medical History:   Diagnosis Date    Arthritis     Chest pain     CHF (congestive heart failure) ([address]     CVA (cerebral vascular accident) ([address] 2011    r side stroke    CVA (cerebral vascular accident) ([address] 09/05/2018    admitted at [address] [place] [address]) 2010    chest pains    Hypercholesterolemia     Hypertension     Loss of hearing     right ear, patient states it comes and goes    Polyuria     Shortness of breath 2011    Type 2 diabetes mellitus without complication, with long-term current use of insulin ([address] 07/23/2013       Past Surgical History:   Procedure Laterality Date    HX HEART CATHETERIZATION         Social History     Tobacco Use    Smoking status: Never Smoker    Smokeless tobacco: Never Used   Substance Use Topics    Alcohol use: Yes     Frequency: Monthly or less     Drinks per session: 1 or 2     Binge frequency: Never     Comment: occassionally       Allergies   Allergen Reactions    Lisinopril Cough       Outpatient Medications Marked as Taking for the 10/22/19 encounter (Office Visit) with Yolis Baca MD   Medication Sig Dispense Refill    atorvastatin (LIPITOR) 40 mg tablet TAKE ONE TABLET BY MOUTH EVERY DAY 30 Tab 12    glimepiride (AMARYL) 2 mg tablet TAKE 1 TABLET BY MOUTH EVERY DAY 30 Tab 5    fluticasone propionate (FLONASE) 50 mcg/actuation nasal spray INHALE 2 SPRAYS into both nostrils EVERY DAY 16 g 12    losartan-hydroCHLOROthiazide (HYZAAR) 50-12.5 mg per tablet Take 1 Tab by mouth daily. 30 Tab 6    LANTUS SOLOSTAR U-100 INSULIN 100 unit/mL (3 mL) inpn INJECT 20 UNITS subcutaneously EVERY EVENING 6 mL 0    loratadine (CLARITIN) 10 mg tablet TAKE 1 TABLET BY MOUTH EVERY DAY 30 Tab 0    aspirin 81 mg chewable tablet CHEW AND SWALLOW TWO TABLETS BY MOUTH EVERY DAY (Patient taking differently: Take 81 mg by mouth two (2) times a day.) 60 Tab 0    furosemide (LASIX) 40 mg tablet Take 1 Tab by mouth daily. 90 Tab 4    gabapentin (NEURONTIN) 300 mg capsule TAKE 2 CAPSULES BY MOUTH EVERY EVENING 60 Cap 5    SITagliptin-metFORMIN (JANUMET XR) 50-1,000 mg TM24 Take 2 Tabs by mouth daily. 180 Tab 3    glucose blood VI test strips (KlickEx BLOOD GLUCOSE SYSTEM) strip Test blood sugar twice daily as directed 100 Strip 11    Insulin Needles, Disposable, (SURE-FINE PEN NEEDLES) 31 gauge x 5/16\" ndle by Does Not Apply route daily. Use as directed with insulin. 1 Package 12    multivitamin (ONE A DAY) tablet Take 1 Tab by Mouth Once a Day. Similar alternatives are ok.  thiamine HCL (B-1) 100 mg tablet Take 1 Tab by mouth daily. 90 Tab 3    Blood-Glucose Meter monitoring kit TEST GLUCOSE TWICE DAILY. DX:E11.9 1 Kit 0    lancets (FREESTYLE LANCETS) 28 gauge misc TEST BLOOD GLUCOSE TWICE DAILY.  DX E11.9 50 Lancet 12    Lancets Misc Use with Relion Prime Meter. Test Blood sugar Daily. DX: 250.02 50 Each 1        Visit Vitals  /70   Pulse (!) 53   Ht 5' 8\" (1.727 m)   Wt 89.5 kg (197 lb 6.4 oz)   BMI 30.01 kg/m²       Physical Exam   Constitutional: He is oriented to person, place, and time. He appears well-developed and well-nourished. No distress. HENT:   Head: Atraumatic. Mouth/Throat: No oropharyngeal exudate. Eyes: Conjunctivae are normal. No scleral icterus. Neck: Normal range of motion. Neck supple. No JVD present. No tracheal deviation present. No thyromegaly present. Cardiovascular: Normal rate and regular rhythm. Exam reveals no gallop. No murmur heard. Pulmonary/Chest: Effort normal and breath sounds normal. No stridor. He has no wheezes. He has no rales. Abdominal: Soft. There is no tenderness. There is no rebound and no guarding. Musculoskeletal: Normal range of motion. He exhibits no edema or tenderness. Neurological: He is alert and oriented to person, place, and time. He exhibits normal muscle tone. Skin: Skin is warm. He is not diaphoretic. Psychiatric: He has a normal mood and affect. His behavior is normal.     9/2018 - 14 day event - NSR  Echo 09/2018:  NORMAL LEFT VENTRICULAR CAVITY SIZE AND SYSTOLIC FUNCTION WITH AN EJECTION FRACTION   VISUALLY ESTIMATED AT 60%. NORMAL DIASTOLIC FUNCTION. MILD LEFT VENTRICULAR HYPERTROPHY PRESENT. TRACE MITRAL REGURGITATION. MILDLY SCLEROTIC TRILEAFLET AORTIC VALVE. INSUFFICIENT TRICUSPID REGURGITANT JET TO ESTIMATE PULMONARY ARTERY PRESSURE. NEGATIVE BUBBLE STUDY ON PRIOR ECHO REPORT 4/09/2012. NO SIGNIFICANT CHANGES FROM PRIOR ECHO REPORT ON 4/09/2012.   Lexiscan 2018-  THIS MYOCARDIAL PERFUSION STUDY IS ABNORMAL   THIS IS A MEDIUM RISK STUDY   ABNORMAL NUCLEAR SCAN WITH  A SMALL AREA OF NON PERFUSION ON BOTH THE STRESS AND REST IMAGES   IN THE INFERIOR APICAL WALL CONSISTENT WITH EITHER PRIOR INFERIOR APICAL INFARCTION OR HIGH GRADE   OCCLUSION TO THE DISTAL RCA        INFERIOR APICAL AKINESIS WITH AN EJECTION FRACTION  ESTIMATED AT   50%  ASSESSMENT and PLAN    ICD-10-CM ICD-9-CM    1. History of CVA (cerebrovascular accident) Z86.73 V12.54    2. Essential hypertension I10 401.9    3. Type 2 diabetes mellitus without complication, with long-term current use of insulin (HCC) E11.9 250.00     Z79.4 V58.67    4. Abnormal stress test R94.39 794.39    5. Dyslipidemia E78.5 272.4      No orders of the defined types were placed in this encounter. Follow-up and Dispositions    · Return in about 6 months (around 4/22/2020). current treatment plan is effective, no change in therapy  reviewed diet, exercise and weight control    cardiovascular risk and specific lipid/LDL goals reviewed  use of aspirin to prevent MI and TIA's discussed. Patient seen for follow-up. Has prior abnormal nuclear stress test.  However he denies chest pain or shortness of breath. Effort tolerance is good. Currently on optimal medical therapy. We will follow-up in 6 months to reevaluate symptoms.

## 2019-10-30 ENCOUNTER — HOSPITAL ENCOUNTER (OUTPATIENT)
Dept: LAB | Age: 54
Discharge: HOME | End: 2019-10-30
Payer: MEDICAID

## 2019-10-30 DIAGNOSIS — I10 ESSENTIAL HYPERTENSION: ICD-10-CM

## 2019-10-30 DIAGNOSIS — E78.2 MIXED HYPERLIPIDEMIA: ICD-10-CM

## 2019-10-30 DIAGNOSIS — Z12.5 SCREENING FOR MALIGNANT NEOPLASM OF PROSTATE: ICD-10-CM

## 2019-10-30 DIAGNOSIS — E11.40 TYPE 2 DIABETES MELLITUS WITH DIABETIC NEUROPATHY, WITHOUT LONG-TERM CURRENT USE OF INSULIN (HCC): ICD-10-CM

## 2019-10-30 LAB
ALBUMIN SERPL-MCNC: 3.4 G/DL (ref 3.4–5)
ALBUMIN/GLOB SERPL: 1 {RATIO} (ref 0.8–1.7)
ALP SERPL-CCNC: 67 U/L (ref 45–117)
ALT SERPL-CCNC: 25 U/L (ref 16–61)
ANION GAP SERPL CALC-SCNC: 6 MMOL/L (ref 3–18)
AST SERPL-CCNC: 13 U/L (ref 10–38)
BILIRUB SERPL-MCNC: 0.3 MG/DL (ref 0.2–1)
BUN SERPL-MCNC: 14 MG/DL (ref 7–18)
BUN/CREAT SERPL: 14 (ref 12–20)
CALCIUM SERPL-MCNC: 9.1 MG/DL (ref 8.5–10.1)
CHLORIDE SERPL-SCNC: 101 MMOL/L (ref 100–111)
CHOLEST SERPL-MCNC: 156 MG/DL
CO2 SERPL-SCNC: 32 MMOL/L (ref 21–32)
CREAT SERPL-MCNC: 1.03 MG/DL (ref 0.6–1.3)
EST. AVERAGE GLUCOSE BLD GHB EST-MCNC: 237 MG/DL
GLOBULIN SER CALC-MCNC: 3.4 G/DL (ref 2–4)
GLUCOSE SERPL-MCNC: 311 MG/DL (ref 74–99)
HBA1C MFR BLD: 9.9 % (ref 4.2–5.6)
HDLC SERPL-MCNC: 32 MG/DL (ref 40–60)
HDLC SERPL: 4.9 {RATIO} (ref 0–5)
LDLC SERPL CALC-MCNC: 83.8 MG/DL (ref 0–100)
LIPID PROFILE,FLP: ABNORMAL
POTASSIUM SERPL-SCNC: 3.7 MMOL/L (ref 3.5–5.5)
PROT SERPL-MCNC: 6.8 G/DL (ref 6.4–8.2)
PSA SERPL-MCNC: 0.3 NG/ML (ref 0–4)
SODIUM SERPL-SCNC: 139 MMOL/L (ref 136–145)
TRIGL SERPL-MCNC: 201 MG/DL (ref ?–150)
VLDLC SERPL CALC-MCNC: 40.2 MG/DL

## 2019-10-30 PROCEDURE — 80061 LIPID PANEL: CPT

## 2019-10-30 PROCEDURE — 36415 COLL VENOUS BLD VENIPUNCTURE: CPT

## 2019-10-30 PROCEDURE — 83036 HEMOGLOBIN GLYCOSYLATED A1C: CPT

## 2019-10-30 PROCEDURE — 82043 UR ALBUMIN QUANTITATIVE: CPT

## 2019-10-30 PROCEDURE — 84153 ASSAY OF PSA TOTAL: CPT

## 2019-10-30 PROCEDURE — 80053 COMPREHEN METABOLIC PANEL: CPT

## 2020-01-15 ENCOUNTER — HOSPITAL ENCOUNTER (OUTPATIENT)
Dept: LAB | Age: 55
Discharge: HOME | End: 2020-01-15
Payer: MEDICAID

## 2020-01-15 ENCOUNTER — OFFICE VISIT (OUTPATIENT)
Dept: FAMILY MEDICINE CLINIC | Age: 55
End: 2020-01-15

## 2020-01-15 VITALS
SYSTOLIC BLOOD PRESSURE: 153 MMHG | BODY MASS INDEX: 30.4 KG/M2 | HEIGHT: 68 IN | DIASTOLIC BLOOD PRESSURE: 97 MMHG | HEART RATE: 83 BPM | WEIGHT: 200.6 LBS | TEMPERATURE: 98.5 F | RESPIRATION RATE: 18 BRPM

## 2020-01-15 DIAGNOSIS — E11.40 TYPE 2 DIABETES MELLITUS WITH DIABETIC NEUROPATHY, WITHOUT LONG-TERM CURRENT USE OF INSULIN (HCC): Primary | ICD-10-CM

## 2020-01-15 DIAGNOSIS — M54.50 ACUTE RIGHT-SIDED LOW BACK PAIN WITHOUT SCIATICA: Primary | ICD-10-CM

## 2020-01-15 DIAGNOSIS — R10.9 RIGHT SIDED ABDOMINAL PAIN: ICD-10-CM

## 2020-01-15 DIAGNOSIS — E11.40 TYPE 2 DIABETES MELLITUS WITH DIABETIC NEUROPATHY, WITHOUT LONG-TERM CURRENT USE OF INSULIN (HCC): ICD-10-CM

## 2020-01-15 LAB
CREAT UR-MCNC: 18 MG/DL (ref 30–125)
MICROALBUMIN UR-MCNC: 0.76 MG/DL (ref 0–3)
MICROALBUMIN/CREAT UR-RTO: 42 MG/G (ref 0–30)

## 2020-01-15 PROCEDURE — 82043 UR ALBUMIN QUANTITATIVE: CPT

## 2020-01-15 NOTE — PROGRESS NOTES
HPI  Pt of Dr Molly Soto. Reports 4 day history of burning pain on right side above hip. Can't sleep on right side. No visible rash. No injuries. Nothing makes it worse or better. Doesn't radiate. Concerned that he has shingles. Has never had this before. No burning with urination. No blood in urine. No history of kidney stones.   No constipation    Also says that he has been having \"runny\" stools for past three days    BP elevated this AM.  Layo Jumper that he hasn't taken his medication yet today    Past Medical History  Past Medical History:   Diagnosis Date    Arthritis     Chest pain     [facility]     CVA (cerebral vascular accident) ([facility] 2011    r side stroke    CVA (cerebral vascular accident) ([facility] 09/05/2018    admitted at [address] [place] [address]) 2010    chest pains    Hypercholesterolemia     Hypertension     Loss of hearing     right ear, patient states it comes and goes    Polyuria     Shortness of breath 2011    Type 2 diabetes mellitus without complication, with long-term current use of insulin ([facility] 07/23/2013       Surgical History  Past Surgical History:   Procedure Laterality Date    HX HEART CATHETERIZATION          Medications  Current Outpatient Medications   Medication Sig Dispense Refill    loratadine (CLARITIN) 10 mg tablet TAKE 1 TABLET BY MOUTH EVERY DAY 30 Tab 5    insulin glargine (LANTUS SOLOSTAR U-100 INSULIN) 100 unit/mL (3 mL) inpn INJECT 20 UNITS subcutaneously EVERY EVENING 6 mL 5    gabapentin (NEURONTIN) 300 mg capsule TAKE TWO CAPSULES BY MOUTH EVERY EVENING 60 Cap 5    losartan-hydroCHLOROthiazide (HYZAAR) 50-12.5 mg per tablet TAKE ONE TABLET BY MOUTH DAILY 30 Tab 3    atorvastatin (LIPITOR) 40 mg tablet TAKE ONE TABLET BY MOUTH EVERY DAY 30 Tab 12    glimepiride (AMARYL) 2 mg tablet TAKE 1 TABLET BY MOUTH EVERY DAY 30 Tab 5    fluticasone propionate (FLONASE) 50 mcg/actuation nasal spray INHALE 2 SPRAYS into both nostrils EVERY DAY 16 g 12    furosemide (LASIX) 40 mg tablet Take 1 Tab by mouth daily. 90 Tab 4    SITagliptin-metFORMIN (JANUMET XR) 50-1,000 mg TM24 Take 2 Tabs by mouth daily. 180 Tab 3    glucose blood VI test strips (EMBRACE BLOOD GLUCOSE SYSTEM) strip Test blood sugar twice daily as directed 100 Strip 11    aspirin (ASPIRIN) 325 mg tablet Take 325 mg by mouth daily.  Insulin Needles, Disposable, (SURE-FINE PEN NEEDLES) 31 gauge x 5/16\" ndle by Does Not Apply route daily. Use as directed with insulin. 1 Package 12    multivitamin (ONE A DAY) tablet Take 1 Tab by Mouth Once a Day. Similar alternatives are ok.  thiamine HCL (B-1) 100 mg tablet Take 1 Tab by mouth daily. 90 Tab 3    Blood-Glucose Meter monitoring kit TEST GLUCOSE TWICE DAILY. DX:E11.9 1 Kit 0    lancets (FREESTYLE LANCETS) 28 gauge misc TEST BLOOD GLUCOSE TWICE DAILY. DX E11.9 50 Lancet 12    Lancets Misc Use with Relion Prime Meter. Test Blood sugar Daily. DX: 250.02 50 Each 1    aspirin 81 mg chewable tablet CHEW AND SWALLOW TWO TABLETS BY MOUTH EVERY DAY (Patient taking differently: Take 81 mg by mouth two (2) times a day.) 60 Tab 0    JANUMET -1,000 mg TM24 Take 100-1,000 Tabs by mouth daily.   5       Allergies  Allergies   Allergen Reactions    Lisinopril Cough       Family History  Family History   Problem Relation Age of Onset    Arthritis-osteo Mother     Gout Mother     Diabetes Mother     Hypertension Mother     Diabetes Father     Diabetes Brother     Diabetes Maternal Grandmother        Social History  Social History     Socioeconomic History    Marital status: LEGALLY      Spouse name: Not on file    Number of children: Not on file    Years of education: Not on file    Highest education level: Not on file   Occupational History    Occupation: disabled     Comment: prior ; disabled after CVA   Social Needs    Financial resource strain: Not on file   [place] insecurity:     Worry: Not on file     Inability: Not on file    Transportation needs:     Medical: Not on file     Non-medical: Not on file   Tobacco Use    Smoking status: Never Smoker    Smokeless tobacco: Never Used   Substance and Sexual Activity    Alcohol use: Yes     Frequency: Monthly or less     Drinks per session: 1 or 2     Binge frequency: Never     Comment: occassionally    Drug use: Not Currently     Types: Prescription, OTC, Marijuana    Sexual activity: Yes   Lifestyle    Physical activity:     Days per week: Not on file     Minutes per session: Not on file    Stress: Not on file   Relationships    Social connections:     Talks on phone: Not on file     Gets together: Not on file     Attends L[employer] service: Not on file     Active member of club or organization: Not on file     Attends meetings of clubs or organizations: Not on file     Relationship status: Not on file    Intimate partner violence:     Fear of current or ex partner: Not on file     Emotionally abused: Not on file     Physically abused: Not on file     Forced sexual activity: Not on file   Other Topics Concern    Not on file   Social History Narrative    Not on file       Problem List  Patient Active Problem List   Diagnosis Code    Essential hypertension I10    History of CVA (cerebrovascular accident) Z80.78    History of MI (myocardial infarction) I25.2    Type 2 diabetes mellitus without complication, with long-term current use of insulin ([facility] E11.9, Z79.4    Type 2 diabetes mellitus with diabetic neuropathy ([facility] E11.40    CHF (congestive heart failure) ([facility] I50.9    Atherosclerosis of coronary artery I25.10    Acute right-sided low back pain without sciatica M54.5    Right sided abdominal pain R10.9       Review of Systems  Review of Systems   Constitutional: Negative. Respiratory: Negative. Cardiovascular: Negative. Gastrointestinal:        Runny stools   Genitourinary: Negative. Musculoskeletal:        Pain in right side   Neurological: Negative. Vital Signs  Vitals:    01/15/20 0901 01/15/20 0904   BP: (!) 162/104 (!) 153/97   Pulse: 86 83   Resp: 18    Temp: 98.5 °F (36.9 °C)    TempSrc: Oral    Weight: 200 lb 9.6 oz (91 kg)    Height: 5' 8\" (1.727 m)    PainSc:   7    PainLoc: Back        Physical Exam  Physical Exam  Vitals signs and nursing note reviewed. Constitutional:       Appearance: Normal appearance. Cardiovascular:      Rate and Rhythm: Normal rate and regular rhythm. Heart sounds: Normal heart sounds. Pulmonary:      Effort: Pulmonary effort is normal.      Breath sounds: Normal breath sounds. Abdominal:      General: Bowel sounds are normal. There is no distension. Palpations: Abdomen is soft. Tenderness: There is no guarding or rebound. Comments: Slight tenderness upon palpation of right sided abdomen   Musculoskeletal:      Comments: Slight tenderness upon palpation of lower right back/ right flank area/ right sided abdomen   Skin:     General: Skin is warm and dry. Findings: No rash. Neurological:      Mental Status: He is alert and oriented to person, place, and time. Psychiatric:         Mood and Affect: Mood normal.         Behavior: Behavior normal.         Diagnostics  No orders of the defined types were placed in this encounter.       Results  Results for orders placed or performed during the hospital encounter of 59/75/05   METABOLIC PANEL, COMPREHENSIVE   Result Value Ref Range    Sodium 139 136 - 145 mmol/L    Potassium 3.7 3.5 - 5.5 mmol/L    Chloride 101 100 - 111 mmol/L    CO2 32 21 - 32 mmol/L    Anion gap 6 3.0 - 18 mmol/L    Glucose 311 (H) 74 - 99 mg/dL    BUN 14 7.0 - 18 MG/DL    Creatinine 1.03 0.6 - 1.3 MG/DL    BUN/Creatinine ratio 14 12 - 20      GFR est AA >60 >60 ml/min/1.73m2    GFR est non-AA >60 >60 ml/min/1.73m2    Calcium 9.1 8.5 - 10.1 MG/DL    Bilirubin, total 0.3 0.2 - 1.0 MG/DL    ALT (SGPT) 25 16 - 61 U/L    AST (SGOT) 13 10 - 38 U/L    Alk. phosphatase 67 45 - 117 U/L    Protein, total 6.8 6.4 - 8.2 g/dL    Albumin 3.4 3.4 - 5.0 g/dL    Globulin 3.4 2.0 - 4.0 g/dL    A-G Ratio 1.0 0.8 - 1.7     LIPID PANEL   Result Value Ref Range    LIPID PROFILE          Cholesterol, total 156 <200 MG/DL    Triglyceride 201 (H) <150 MG/DL    HDL Cholesterol 32 (L) 40 - 60 MG/DL    LDL, calculated 83.8 0 - 100 MG/DL    VLDL, calculated 40.2 MG/DL    CHOL/HDL Ratio 4.9 0 - 5.0     HEMOGLOBIN A1C WITH EAG   Result Value Ref Range    Hemoglobin A1c 9.9 (H) 4.2 - 5.6 %    Est. average glucose 237 mg/dL   PSA SCREENING (SCREENING)   Result Value Ref Range    Prostate Specific Ag 0.3 0.0 - 4.0 ng/mL     Assessment and Plan  Diagnoses and all orders for this visit:    1. Acute right-sided low back pain without sciatica    2. Right sided abdominal pain    Differential diagnoses include: prodromal shingles, kidney stone, constipation, pulled muscle. Pt declined giving urine for clean catch. Conservative measures at this point. Pt is to monitor symptoms. Reviewed signs/symptoms of shingles rash. Reviewed return/ emergency precautions. Pt agreeable with this plan of care as he has follow up appt with Dr Nellie Shepard next week    Also reminded to take medications as prescried    After care summary printed and reviewed with patient. Plan reviewed with patient. Questions answered. Patient verbalized understanding of plan and is in agreement with plan. Patient to follow up with PCP as planned or sooner if needed. Encouraged the use of my chart.     RADHA Kessler

## 2020-01-15 NOTE — PROGRESS NOTES
Lokesh Lind presents today for   Chief Complaint   Patient presents with    Rash     Patient stated that he been having a rash on right side back x 4 days. Patient stated that he has a burning pain 7/10. Lokesh Lind preferred language for health care discussion is english/other. Is someone accompanying this pt? no    Is the patient using any DME equipment during [address]? no    Depression Screening:  3 most recent PHQ Screens 1/15/2020   Little interest or pleasure in doing things Not at all   Feeling down, depressed, irritable, or hopeless Not at all   Total Score PHQ 2 0       Learning Assessment:  Learning Assessment 1/15/2020   PRIMARY LEARNER Patient   HIGHEST LEVEL OF EDUCATION - PRIMARY LEARNER  DID NOT GRADUATE [facility] PRIMARY LEARNER NONE   CO-LEARNER CAREGIVER No   PRIMARY LANGUAGE ENGLISH   LEARNER PREFERENCE PRIMARY LISTENING     -   ANSWERED BY self   RELATIONSHIP SELF       Abuse Screening:  Abuse Screening Questionnaire 1/15/2020   Do you ever feel afraid of your partner? N   Are you in a relationship with someone who physically or mentally threatens you? N   Is it safe for you to go home? Y       Generalized Anxiety  No flowsheet data found. Health Maintenance Due   Topic Date Due    Shingrix Vaccine Age 50> (1 of 2) 07/26/2015    FOOT EXAM Q1  02/13/2018    MICROALBUMIN Q1  06/07/2018    EYE EXAM RETINAL OR DILATED  09/22/2019   . Health Maintenance reviewed and discussed and ordered per Provider. Lokesh Lind is updated on all     Coordination of Care:  1. Have you been to the ER, urgent care clinic since your last visit? Hospitalized since your last visit? no    2. Have you seen or consulted any other health care providers outside of the [address] [place] [address] since your last visit? Include any pap smears or colon screening. no      Advance Directive:  1. Do you have an advance directive in place? Patient Reply:no    2.  If not, would you like material regarding how to put one in place?  Patient Reply: no

## 2020-01-21 ENCOUNTER — HOSPITAL ENCOUNTER (OUTPATIENT)
Dept: LAB | Age: 55
Discharge: HOME | End: 2020-01-21
Payer: MEDICAID

## 2020-01-21 ENCOUNTER — OFFICE VISIT (OUTPATIENT)
Dept: FAMILY MEDICINE CLINIC | Age: 55
End: 2020-01-21

## 2020-01-21 VITALS
HEART RATE: 56 BPM | WEIGHT: 195.2 LBS | TEMPERATURE: 98.3 F | DIASTOLIC BLOOD PRESSURE: 70 MMHG | SYSTOLIC BLOOD PRESSURE: 120 MMHG | BODY MASS INDEX: 29.58 KG/M2 | HEIGHT: 68 IN | RESPIRATION RATE: 18 BRPM

## 2020-01-21 DIAGNOSIS — E78.2 MIXED HYPERLIPIDEMIA: ICD-10-CM

## 2020-01-21 DIAGNOSIS — E11.40 TYPE 2 DIABETES MELLITUS WITH DIABETIC NEUROPATHY, WITHOUT LONG-TERM CURRENT USE OF INSULIN (HCC): Primary | ICD-10-CM

## 2020-01-21 DIAGNOSIS — E11.40 TYPE 2 DIABETES MELLITUS WITH DIABETIC NEUROPATHY, WITHOUT LONG-TERM CURRENT USE OF INSULIN (HCC): ICD-10-CM

## 2020-01-21 DIAGNOSIS — I10 ESSENTIAL HYPERTENSION: ICD-10-CM

## 2020-01-21 LAB
ALBUMIN SERPL-MCNC: 3.7 G/DL (ref 3.4–5)
ALBUMIN/GLOB SERPL: 1 {RATIO} (ref 0.8–1.7)
ALP SERPL-CCNC: 85 U/L (ref 45–117)
ALT SERPL-CCNC: 23 U/L (ref 16–61)
ANION GAP SERPL CALC-SCNC: 8 MMOL/L (ref 3–18)
AST SERPL-CCNC: 22 U/L (ref 10–38)
BILIRUB SERPL-MCNC: 0.4 MG/DL (ref 0.2–1)
BUN SERPL-MCNC: 29 MG/DL (ref 7–18)
BUN/CREAT SERPL: 22 (ref 12–20)
CALCIUM SERPL-MCNC: 10.7 MG/DL (ref 8.5–10.1)
CHLORIDE SERPL-SCNC: 95 MMOL/L (ref 100–111)
CHOLEST SERPL-MCNC: 185 MG/DL
CO2 SERPL-SCNC: 31 MMOL/L (ref 21–32)
CREAT SERPL-MCNC: 1.34 MG/DL (ref 0.6–1.3)
EST. AVERAGE GLUCOSE BLD GHB EST-MCNC: 243 MG/DL
GLOBULIN SER CALC-MCNC: 3.8 G/DL (ref 2–4)
GLUCOSE SERPL-MCNC: 333 MG/DL (ref 74–99)
HBA1C MFR BLD: 10.1 % (ref 4.2–5.6)
HDLC SERPL-MCNC: 32 MG/DL (ref 40–60)
HDLC SERPL: 5.8 {RATIO} (ref 0–5)
LDLC SERPL CALC-MCNC: 97.4 MG/DL (ref 0–100)
LIPID PROFILE,FLP: ABNORMAL
POTASSIUM SERPL-SCNC: 3.7 MMOL/L (ref 3.5–5.5)
PROT SERPL-MCNC: 7.5 G/DL (ref 6.4–8.2)
SODIUM SERPL-SCNC: 134 MMOL/L (ref 136–145)
TRIGL SERPL-MCNC: 278 MG/DL (ref ?–150)
VLDLC SERPL CALC-MCNC: 55.6 MG/DL

## 2020-01-21 PROCEDURE — 80061 LIPID PANEL: CPT

## 2020-01-21 PROCEDURE — 83036 HEMOGLOBIN GLYCOSYLATED A1C: CPT

## 2020-01-21 PROCEDURE — 36415 COLL VENOUS BLD VENIPUNCTURE: CPT

## 2020-01-21 PROCEDURE — 80053 COMPREHEN METABOLIC PANEL: CPT

## 2020-01-21 RX ORDER — ERYTHROMYCIN 5 MG/G
OINTMENT OPHTHALMIC
COMMUNITY
Start: 2020-01-15 | End: 2020-04-21 | Stop reason: ALTCHOICE

## 2020-01-21 RX ORDER — AMOXICILLIN AND CLAVULANATE POTASSIUM 875; 125 MG/1; MG/1
TABLET, FILM COATED ORAL
COMMUNITY
Start: 2020-01-15 | End: 2020-04-21 | Stop reason: ALTCHOICE

## 2020-01-21 NOTE — PROGRESS NOTES
Chief Complaint   Patient presents with    Diabetes     follow up    Hypertension    Cholesterol Problem    Labs     completed 10/30/19         HPI    Dary Camacho is a 47 y.o. male presenting today for 3 months  follow up of dm, htn, hld. Home bs readings are usually ranging 170-190s in the morning. Pt notes that the burning pain in his side has not gone away but is not any worse. Patient had labs on 10/30/19. Labs reviewed in detail with patient     Pt has a stye on his eye. He saw an eye doctor. Patient does not need medication refills today. New concerns today: none      ROS  Review of Systems   Constitutional: Negative. HENT: Negative. Respiratory: Negative. Cardiovascular: Negative. All other systems reviewed and are negative. Physical Exam  Nursing note and vitals reviewed. Constitutional: He is oriented to person, place, and time. He appears well-developed and well-nourished. HENT:   Head: Normocephalic and atraumatic. Right Ear: External ear normal.   Left Ear: External ear normal.   Nose: Nose normal.   Eyes: Conjunctivae and EOM are normal.   Neck: Normal range of motion. Neck supple. No JVD present. Carotid bruit is not present. No thyromegaly present. Cardiovascular: Normal rate, regular rhythm, normal heart sounds and intact distal pulses. Exam reveals no gallop and no friction rub. No murmur heard. Pulmonary/Chest: Effort normal and breath sounds normal. He has no wheezes. He has no rhonchi. He has no rales. Abdominal: Soft. Musculoskeletal: Normal range of motion. Neurological: He is alert and oriented to person, place, and time. Coordination normal.   Skin: Skin is warm and dry. no rashes. Psychiatric: He has a normal mood and affect. His behavior is normal. Judgment and thought content normal.       Diagnoses and all orders for this visit:    1.  Type 2 diabetes mellitus with diabetic neuropathy, without long-term current use of insulin ([facility]  -     METABOLIC PANEL, COMPREHENSIVE; Future  -     LIPID PANEL; Future  -     HEMOGLOBIN A1C WITH EAG; Future  Labs today. 2. Essential hypertension  -     METABOLIC PANEL, COMPREHENSIVE; Future  -     LIPID PANEL; Future  Stable, cont pres tx plan. 3. Mixed hyperlipidemia  -     METABOLIC PANEL, COMPREHENSIVE; Future  -     LIPID PANEL; Future  Labs today. Follow-up and Dispositions    · Return in about 3 months (around 4/21/2020) for diabetes, high blood pressure, high cholesterol.

## 2020-01-21 NOTE — PROGRESS NOTES
Sunday Garvin presents today for   Chief Complaint   Patient presents with    Diabetes     follow up    Hypertension    Cholesterol Problem    Labs     completed 10/30/19       Sunday Garvin preferred language for health care discussion is english/other. Is someone accompanying this pt? no    Is the patient using any DME equipment during [address]? no    Depression Screening:  3 most recent PHQ Screens 1/15/2020   Little interest or pleasure in doing things Not at all   Feeling down, depressed, irritable, or hopeless Not at all   Total Score PHQ 2 0       Learning Assessment:  Learning Assessment 1/15/2020   PRIMARY LEARNER Patient   HIGHEST LEVEL OF EDUCATION - PRIMARY LEARNER  DID NOT GRADUATE [facility] PRIMARY LEARNER NONE   CO-LEARNER CAREGIVER No   PRIMARY LANGUAGE ENGLISH   LEARNER PREFERENCE PRIMARY LISTENING     -   ANSWERED BY self   RELATIONSHIP SELF       Abuse Screening:  Abuse Screening Questionnaire 1/15/2020   Do you ever feel afraid of your partner? N   Are you in a relationship with someone who physically or mentally threatens you? N   Is it safe for you to go home? Y       Generalized Anxiety  No flowsheet data found. Health Maintenance Due   Topic Date Due    Shingrix Vaccine Age 50> (1 of 2) 07/26/2015    FOOT EXAM Q1  02/13/2018    EYE EXAM RETINAL OR DILATED  09/22/2019   . Health Maintenance reviewed and discussed and ordered per Provider. Sunday Garvin is updated on all     Coordination of Care:  1. Have you been to the ER, urgent care clinic since your last visit? Hospitalized since your last visit? no    2. Have you seen or consulted any other health care providers outside of the [address] [place] [address] since your last visit? Include any pap smears or colon screening. no      Advance Directive:  1. Do you have an advance directive in place? Patient Reply:no    2. If not, would you like material regarding how to put one in place?  Patient Reply: no

## 2020-02-20 DIAGNOSIS — E11.9 TYPE 2 DIABETES MELLITUS WITHOUT COMPLICATION, WITHOUT LONG-TERM CURRENT USE OF INSULIN (HCC): ICD-10-CM

## 2020-02-21 RX ORDER — GLIMEPIRIDE 2 MG/1
TABLET ORAL
Qty: 30 TAB | Refills: 5 | Status: SHIPPED | OUTPATIENT
Start: 2020-02-21 | End: 2020-04-18

## 2020-03-12 RX ORDER — HYDROCHLOROTHIAZIDE 12.5 MG/1
TABLET ORAL
Qty: 30 TAB | Refills: 2 | Status: SHIPPED | OUTPATIENT
Start: 2020-03-12 | End: 2020-04-13

## 2020-03-12 RX ORDER — LOSARTAN POTASSIUM 50 MG/1
TABLET ORAL
Qty: 30 TAB | Refills: 2 | Status: SHIPPED | OUTPATIENT
Start: 2020-03-12 | End: 2020-04-13

## 2020-04-13 DIAGNOSIS — E11.9 TYPE 2 DIABETES MELLITUS WITHOUT COMPLICATION, WITHOUT LONG-TERM CURRENT USE OF INSULIN (HCC): ICD-10-CM

## 2020-04-13 DIAGNOSIS — E11.40 TYPE 2 DIABETES MELLITUS WITH DIABETIC NEUROPATHY, WITHOUT LONG-TERM CURRENT USE OF INSULIN (HCC): ICD-10-CM

## 2020-04-13 DIAGNOSIS — J30.2 SEASONAL ALLERGIC RHINITIS, UNSPECIFIED TRIGGER: ICD-10-CM

## 2020-04-13 DIAGNOSIS — G63 POLYNEUROPATHY ASSOCIATED WITH UNDERLYING DISEASE (HCC): ICD-10-CM

## 2020-04-13 RX ORDER — HYDROCHLOROTHIAZIDE 12.5 MG/1
TABLET ORAL
Qty: 30 TAB | Refills: 5 | Status: SHIPPED | OUTPATIENT
Start: 2020-04-13 | End: 2020-07-10

## 2020-04-13 RX ORDER — LOSARTAN POTASSIUM 50 MG/1
TABLET ORAL
Qty: 30 TAB | Refills: 5 | Status: SHIPPED | OUTPATIENT
Start: 2020-04-13 | End: 2020-07-10

## 2020-04-18 RX ORDER — INSULIN GLARGINE 100 [IU]/ML
INJECTION, SOLUTION SUBCUTANEOUS
Qty: 6 ML | Refills: 5 | Status: SHIPPED | OUTPATIENT
Start: 2020-04-18 | End: 2020-06-02

## 2020-04-18 RX ORDER — GLIMEPIRIDE 2 MG/1
TABLET ORAL
Qty: 30 TAB | Refills: 5 | Status: SHIPPED | OUTPATIENT
Start: 2020-04-18 | End: 2020-09-22

## 2020-04-18 RX ORDER — LORATADINE 10 MG/1
TABLET ORAL
Qty: 30 TAB | Refills: 5 | Status: SHIPPED | OUTPATIENT
Start: 2020-04-18 | End: 2021-11-15

## 2020-04-18 RX ORDER — SITAGLIPTIN AND METFORMIN HYDROCHLORIDE 50; 1000 MG/1; MG/1
TABLET, FILM COATED, EXTENDED RELEASE ORAL
Qty: 180 TAB | Refills: 5 | Status: SHIPPED | OUTPATIENT
Start: 2020-04-18 | End: 2020-06-02

## 2020-04-18 RX ORDER — GABAPENTIN 300 MG/1
CAPSULE ORAL
Qty: 60 CAP | Refills: 5 | Status: SHIPPED | OUTPATIENT
Start: 2020-04-18 | End: 2020-10-28

## 2020-04-21 ENCOUNTER — VIRTUAL VISIT (OUTPATIENT)
Dept: CARDIOLOGY CLINIC | Age: 55
End: 2020-04-21

## 2020-04-21 ENCOUNTER — VIRTUAL VISIT (OUTPATIENT)
Dept: FAMILY MEDICINE CLINIC | Age: 55
End: 2020-04-21

## 2020-04-21 VITALS — HEIGHT: 68 IN | BODY MASS INDEX: 29.68 KG/M2

## 2020-04-21 DIAGNOSIS — E78.2 MIXED HYPERLIPIDEMIA: ICD-10-CM

## 2020-04-21 DIAGNOSIS — E11.9 TYPE 2 DIABETES MELLITUS WITHOUT COMPLICATION, WITH LONG-TERM CURRENT USE OF INSULIN (HCC): ICD-10-CM

## 2020-04-21 DIAGNOSIS — I10 ESSENTIAL HYPERTENSION: ICD-10-CM

## 2020-04-21 DIAGNOSIS — E11.8 TYPE 2 DIABETES MELLITUS WITH COMPLICATION, WITH LONG-TERM CURRENT USE OF INSULIN (HCC): Primary | ICD-10-CM

## 2020-04-21 DIAGNOSIS — E78.5 DYSLIPIDEMIA: ICD-10-CM

## 2020-04-21 DIAGNOSIS — Z79.4 TYPE 2 DIABETES MELLITUS WITHOUT COMPLICATION, WITH LONG-TERM CURRENT USE OF INSULIN (HCC): ICD-10-CM

## 2020-04-21 DIAGNOSIS — Z79.4 TYPE 2 DIABETES MELLITUS WITH COMPLICATION, WITH LONG-TERM CURRENT USE OF INSULIN (HCC): Primary | ICD-10-CM

## 2020-04-21 DIAGNOSIS — Z86.73 HISTORY OF CVA (CEREBROVASCULAR ACCIDENT): Primary | ICD-10-CM

## 2020-04-21 DIAGNOSIS — R94.39 ABNORMAL STRESS TEST: ICD-10-CM

## 2020-04-21 NOTE — PROGRESS NOTES
Consent: Du Thompson, who was seen by synchronous (real-time) audio-video technology, and/or his healthcare decision maker, is aware that this patient-initiated, Telehealth encounter on 4/21/2020 is a billable service, with coverage as determined by his insurance carrier. He is aware that he may receive a bill and has provided verbal consent to proceed: Yes. Assessment & Plan:   Diagnoses and all orders for this visit:    1. Type 2 diabetes mellitus with complication, with long-term current use of insulin (HCC)  Decrease sugary beverages. Increase lantus to 25 U qhs x 1 wk, then if not under 130 qam, increase to 30 U qhs.       2. Mixed hyperlipidemia  Work on diet. Follow-up and Dispositions    · Return in about 2 weeks (around 5/5/2020) for diabetes - med change. 712  Subjective:   Du Thompson is a 47 y.o. male who was seen for Diabetes; Hypertension; and Cholesterol Problem (high chol)    Pt reports that his blood sugars have been up and down. They are usually 180-190 when fasting in the am.  Pt admits to dietary indiscretion, especially with sugary beverages. Jan 2020 labs reviewed with pt. No new concerns. Prior to Admission medications    Medication Sig Start Date End Date Taking?  Authorizing Provider   insulin glargine (Lantus Solostar U-100 Insulin) 100 unit/mL (3 mL) inpn INJECT 20 UNITS SUBCUTANEOUSLY IN THE EVENING 4/18/20  Yes Carlita Marion MD   glimepiride (AMARYL) 2 mg tablet TAKE 1 TABLET BY MOUTH EVERY DAY 4/18/20  Yes Yee Marion MD   gabapentin (NEURONTIN) 300 mg capsule TAKE TWO CAPSULES BY MOUTH IN THE EVENING 4/18/20  Yes Yee Marion MD   loratadine (CLARITIN) 10 mg tablet TAKE ONE TABLET BY MOUTH EVERY DAY 4/18/20  Yes Yee Marion MD   Janumet XR 50-1,000 mg TM24 TAKE TWO TABLETS BY MOUTH EVERY DAY 4/18/20  Yes Yee Marion MD   losartan (COZAAR) 50 mg tablet TAKE ONE TABLET BY MOUTH DAILY 4/13/20  Yes Maribel Mendosa NP hydroCHLOROthiazide (HYDRODIURIL) 12.5 mg tablet TAKE ONE TABLET BY MOUTH DAILY 4/13/20  Yes Rossi Nath NP   losartan-hydroCHLOROthiazide (HYZAAR) 50-12.5 mg per tablet TAKE ONE TABLET BY MOUTH DAILY 11/6/19  Yes Jennifer Dunbar NP   atorvastatin (LIPITOR) 40 mg tablet TAKE ONE TABLET BY MOUTH EVERY DAY 10/13/19  Yes Tanna Marion MD   furosemide (LASIX) 40 mg tablet Take 1 Tab by mouth daily. 7/31/19  Yes Ramo Molina MD   glucose blood VI test strips [facility] BLOOD GLUCOSE SYSTEM) strip Test blood sugar twice daily as directed 6/12/19  Yes Ramo Molina MD   aspirin (ASPIRIN) 325 mg tablet Take 325 mg by mouth daily. Yes Provider, Historical   Insulin Needles, Disposable, (SURE-FINE PEN NEEDLES) 31 gauge x 5/16\" ndle by Does Not Apply route daily. Use as directed with insulin. 9/28/18  Yes Ramo Molina MD   multivitamin (ONE A DAY) tablet Take 1 Tab by Mouth Once a Day. Similar alternatives are ok. 9/7/18  Yes Provider, Historical   thiamine HCL (B-1) 100 mg tablet Take 1 Tab by mouth daily. 9/20/18  Yes Tanna Marion MD   Blood-Glucose Meter monitoring kit TEST GLUCOSE TWICE DAILY. DX:E11.9 1/11/17  Yes Tanna Marion MD   lancets (FREESTYLE LANCETS) 28 gauge misc TEST BLOOD GLUCOSE TWICE DAILY. DX E11.9 1/11/17  Yes Ramo Molina MD   Lancets Misc Use with Relion Prime Meter. Test Blood sugar Daily.  DX: 250.02 9/16/13  Yes Ramo Molina MD   fluticasone propionate (FLONASE) 50 mcg/actuation nasal spray INHALE 2 SPRAYS into both nostrils EVERY DAY 9/19/19   Ramo Molina MD     Allergies   Allergen Reactions    Lisinopril Cough       Patient Active Problem List   Diagnosis Code    Essential hypertension I10    History of CVA (cerebrovascular accident) Z80.78    History of MI (myocardial infarction) I25.2    Type 2 diabetes mellitus, with long-term current use of insulin ([facility] E11.9, Z79.4    Type 2 diabetes mellitus with diabetic neuropathy ([facility] E11.40    CHF (congestive heart failure) ([facility]) I50.9    Atherosclerosis of coronary artery I25.10    Acute right-sided low back pain without sciatica M54.5    Right sided abdominal pain R10.9     Current Outpatient Medications   Medication Sig Dispense Refill    insulin glargine (Lantus Solostar U-100 Insulin) 100 unit/mL (3 mL) inpn INJECT 20 UNITS SUBCUTANEOUSLY IN THE EVENING 6 mL 5    glimepiride (AMARYL) 2 mg tablet TAKE 1 TABLET BY MOUTH EVERY DAY 30 Tab 5    gabapentin (NEURONTIN) 300 mg capsule TAKE TWO CAPSULES BY MOUTH IN THE EVENING 60 Cap 5    loratadine (CLARITIN) 10 mg tablet TAKE ONE TABLET BY MOUTH EVERY DAY 30 Tab 5    Janumet XR 50-1,000 mg TM24 TAKE TWO TABLETS BY MOUTH EVERY  Tab 5    losartan (COZAAR) 50 mg tablet TAKE ONE TABLET BY MOUTH DAILY 30 Tab 5    hydroCHLOROthiazide (HYDRODIURIL) 12.5 mg tablet TAKE ONE TABLET BY MOUTH DAILY 30 Tab 5    losartan-hydroCHLOROthiazide (HYZAAR) 50-12.5 mg per tablet TAKE ONE TABLET BY MOUTH DAILY 30 Tab 3    atorvastatin (LIPITOR) 40 mg tablet TAKE ONE TABLET BY MOUTH EVERY DAY 30 Tab 12    furosemide (LASIX) 40 mg tablet Take 1 Tab by mouth daily. 90 Tab 4    glucose blood VI test strips ([employer] [facility] BLOOD GLUCOSE SYSTEM) strip Test blood sugar twice daily as directed 100 Strip 11    aspirin (ASPIRIN) 325 mg tablet Take 325 mg by mouth daily.  Insulin Needles, Disposable, (SURE-FINE PEN NEEDLES) 31 gauge x 5/16\" ndle by Does Not Apply route daily. Use as directed with insulin. 1 Package 12    multivitamin (ONE A DAY) tablet Take 1 Tab by Mouth Once a Day. Similar alternatives are ok.  thiamine HCL (B-1) 100 mg tablet Take 1 Tab by mouth daily. 90 Tab 3    Blood-Glucose Meter monitoring kit TEST GLUCOSE TWICE DAILY. DX:E11.9 1 Kit 0    lancets (FREESTYLE LANCETS) 28 gauge misc TEST BLOOD GLUCOSE TWICE DAILY. DX E11.9 50 Lancet 12    Lancets Misc Use with Relion Prime Meter. Test Blood sugar Daily.  DX: 250.02 50 Each 1    fluticasone propionate (FLONASE) 50 mcg/actuation nasal spray INHALE 2 SPRAYS into both nostrils EVERY DAY 16 g 12     Allergies   Allergen Reactions    Lisinopril Cough     Past Medical History:   Diagnosis Date    Arthritis     Chest pain     [facility]est[facility] failure) [facility]     [facility] 2011    r side stroke    CVA (cerebral vascular accident) ([facility] 09/05/2018    admitted at [address] [place] [address] Said Heart attack [facility]) 2010    chest pains    Hypercholesterolemia     Hypertension     Loss of hearing     right ear, patient states it comes and goes    Polyuria     Shortness of breath 2011    Type 2 diabetes mellitus without complication, with long-term current use of insulin ([facility] 07/23/2013    Type 2 diabetes mellitus, with long-term current use of insulin ([facility] 7/23/2013     Past Surgical History:   Procedure Laterality Date    HX HEART CATHETERIZATION       Family History   Problem Relation Age of Onset    Arthritis-osteo Mother     Gout Mother     Diabetes Mother     Hypertension Mother     Diabetes Father     Diabetes Brother     Diabetes Maternal Grandmother      Social History     Tobacco Use    Smoking status: Never Smoker    Smokeless tobacco: Never Used   Substance Use Topics    Alcohol use: Yes     Frequency: Monthly or less     Drinks per session: 1 or 2     Binge frequency: Never     Comment: occassionally       Review of Systems   Constitutional: Negative. HENT: Negative. Respiratory: Negative. Cardiovascular: Negative. All other systems reviewed and are negative. Objective: There were no vitals taken for this visit.    General: alert, cooperative, no distress   Mental  status: normal mood, behavior, speech, dress, motor activity, and thought processes, able to follow commands   HENT: NCAT   Neck: no visualized mass   Resp: no respiratory distress   Neuro: no gross deficits   Skin: no discoloration or lesions of concern on visible areas   Psychiatric: normal affect, consistent with stated mood, no evidence of hallucinations     Additional exam findings:  none      We discussed the expected course, resolution and complications of the diagnosis(es) in detail. Medication risks, benefits, costs, interactions, and alternatives were discussed as indicated. I advised him to contact the office if his condition worsens, changes or fails to improve as anticipated. He expressed understanding with the diagnosis(es) and plan. Doreen Abdi is a 47 y.o. male being evaluated by a video visit encounter for concerns as above. A caregiver was present when appropriate. Due to this being a TeleHealth encounter ([facility] public health emergency), evaluation of the following organ systems was limited: Vitals/Constitutional/EENT/Resp/CV/GI//MS/Neuro/Skin/Heme-Lymph-Imm. Pursuant to the emergency declaration under the [facility], [facility] waiver authority and the [employer] Act, this Virtual  Visit was conducted, with patient's (and/or legal guardian's) consent, to reduce the patient's risk of exposure to COVID-19 and provide necessary medical care. Services were provided through a video synchronous discussion virtually to substitute for in-person clinic visit. Patient and provider were located at their individual homes.         Trip Scales MD

## 2020-04-21 NOTE — PROGRESS NOTES
Jany Howell presents today for   Chief Complaint   Patient presents with    Diabetes    Hypertension    Cholesterol Problem     high chol       Jany Howell preferred language for health care discussion is english/other. Is someone accompanying this pt? no    Is the patient using any DME equipment during [address]? no    Depression Screening:  3 most recent PHQ Screens 1/15/2020   Little interest or pleasure in doing things Not at all   Feeling down, depressed, irritable, or hopeless Not at all   Total Score PHQ 2 0       Learning Assessment:  Learning Assessment 1/15/2020   PRIMARY LEARNER Patient   HIGHEST LEVEL OF EDUCATION - PRIMARY LEARNER  DID NOT GRADUATE [facility] PRIMARY LEARNER NONE   CO-LEARNER CAREGIVER No   PRIMARY LANGUAGE ENGLISH   LEARNER PREFERENCE PRIMARY LISTENING     -   ANSWERED BY self   RELATIONSHIP SELF       Abuse Screening:  Abuse Screening Questionnaire 1/15/2020   Do you ever feel afraid of your partner? N   Are you in a relationship with someone who physically or mentally threatens you? N   Is it safe for you to go home? Y       Generalized Anxiety  No flowsheet data found. Health Maintenance Due   Topic Date Due    Shingrix Vaccine Age 49> (1 of 2) 07/26/2015    Foot Exam Q1  02/13/2018    Eye Exam Retinal or Dilated  09/22/2019    A1C test (Diabetic or Prediabetic)  04/21/2020   . Health Maintenance reviewed and discussed and ordered per Provider. Coordination of Care:  1. Have you been to the ER, urgent care clinic since your last visit? Hospitalized since your last visit? no    2. Have you seen or consulted any other health care providers outside of the [address] [place] [address] since your last visit? Include any pap smears or colon screening.  no      Advance Directive:  Discussed 1/21/20

## 2020-04-21 NOTE — PROGRESS NOTES
1. Have you been to the ER, urgent care clinic since your last visit? Hospitalized since your last visit?     no    2. Have you seen or consulted any other health care providers outside of the [address] [place] [address] since your last visit? Include any pap smears or colon screening.       No

## 2020-04-21 NOTE — PROGRESS NOTES
Kermit Oconnor is a 47 y.o. male who was seen by synchronous (real-time) audio-video technology on 4/21/2020. Consent:  He and/or his healthcare decision maker is aware that this patient-initiated Telehealth encounter is a billable service, with coverage as determined by his insurance carrier. He is aware that he may receive a bill and has provided verbal consent to proceed: Yes    712  Subjective:   Kermit Oconnor was seen for Hypertension (6 m f/u ) and CHF      Patient seen for f/u- denies chest pain or sob. Et stable. No orthopnea/ pnd or pedal edema. No palpitations        Coding Help - Use CPT Codes 68579-83614, 05209-69572 for Established and New Patients respectively, either employing EM elements or Time rules. Other codes (example consult codes) may also apply.     Family History   Problem Relation Age of Onset    Arthritis-osteo Mother     Gout Mother     Diabetes Mother     Hypertension Mother     Diabetes Father     Diabetes Brother     Diabetes Maternal Grandmother      Past Surgical History:   Procedure Laterality Date    HX HEART CATHETERIZATION       Allergies   Allergen Reactions    Lisinopril Cough       Current Outpatient Medications:     insulin glargine (Lantus Solostar U-100 Insulin) 100 unit/mL (3 mL) inpn, INJECT 20 UNITS SUBCUTANEOUSLY IN THE EVENING, Disp: 6 mL, Rfl: 5    glimepiride (AMARYL) 2 mg tablet, TAKE 1 TABLET BY MOUTH EVERY DAY, Disp: 30 Tab, Rfl: 5    gabapentin (NEURONTIN) 300 mg capsule, TAKE TWO CAPSULES BY MOUTH IN THE EVENING, Disp: 60 Cap, Rfl: 5    Janumet XR 50-1,000 mg TM24, TAKE TWO TABLETS BY MOUTH EVERY DAY, Disp: 180 Tab, Rfl: 5    losartan (COZAAR) 50 mg tablet, TAKE ONE TABLET BY MOUTH DAILY, Disp: 30 Tab, Rfl: 5    hydroCHLOROthiazide (HYDRODIURIL) 12.5 mg tablet, TAKE ONE TABLET BY MOUTH DAILY, Disp: 30 Tab, Rfl: 5    atorvastatin (LIPITOR) 40 mg tablet, TAKE ONE TABLET BY MOUTH EVERY DAY, Disp: 30 Tab, Rfl: 12    furosemide (LASIX) 40 mg tablet, Take 1 Tab by mouth daily. , Disp: 90 Tab, Rfl: 4    glucose blood VI test strips ([facility] BLOOD GLUCOSE SYSTEM) strip, Test blood sugar twice daily as directed, Disp: 100 Strip, Rfl: 11    aspirin (ASPIRIN) 325 mg tablet, Take 325 mg by mouth daily. , Disp: , Rfl:     Insulin Needles, Disposable, (SURE-FINE PEN NEEDLES) 31 gauge x 5/16\" ndle, by Does Not Apply route daily. Use as directed with insulin., Disp: 1 Package, Rfl: 12    multivitamin (ONE A DAY) tablet, Take 1 Tab by Mouth Once a Day. Similar alternatives are ok., Disp: , Rfl:     thiamine HCL (B-1) 100 mg tablet, Take 1 Tab by mouth daily. , Disp: 90 Tab, Rfl: 3    Blood-Glucose Meter monitoring kit, TEST GLUCOSE TWICE DAILY. DX:E11.9, Disp: 1 Kit, Rfl: 0    lancets (FREESTYLE LANCETS) 28 gauge misc, TEST BLOOD GLUCOSE TWICE DAILY. DX E11.9, Disp: 50 Lancet, Rfl: 12    Lancets Misc, Use with Relion Prime Meter. Test Blood sugar Daily. DX: 250.02, Disp: 50 Each, Rfl: 1    loratadine (CLARITIN) 10 mg tablet, TAKE ONE TABLET BY MOUTH EVERY DAY, Disp: 30 Tab, Rfl: 5    losartan-hydroCHLOROthiazide (HYZAAR) 50-12.5 mg per tablet, TAKE ONE TABLET BY MOUTH DAILY, Disp: 30 Tab, Rfl: 3    fluticasone propionate (FLONASE) 50 mcg/actuation nasal spray, INHALE 2 SPRAYS into both nostrils EVERY DAY, Disp: 16 g, Rfl: 12  Allergies   Allergen Reactions    Lisinopril Cough         Review of Systems   Review of Systems   Constitutional: Negative for chills and fever. HENT: Negative for nosebleeds. Eyes: Negative for blurred vision and double vision. Respiratory: Negative for cough, hemoptysis, sputum production, shortness of breath and wheezing. Cardiovascular: Negative for chest pain, palpitations, orthopnea, claudication, leg swelling and PND. Gastrointestinal: Negative for abdominal pain, heartburn, nausea and vomiting. Musculoskeletal: Negative for myalgias. Skin: Negative for rash.    Neurological: Negative for dizziness, weakness and headaches. Endo/Heme/Allergies: Does not bruise/bleed easily. PHYSICAL EXAMINATION:  [ INSTRUCTIONS:  \"[x]\" Indicates a positive item  \"[]\" Indicates a negative item  -- DELETE ALL ITEMS NOT EXAMINED]  Vital Signs: (As obtained by patient/caregiver at home)  Visit Vitals  Ht 5' 8\" (1.727 m)   BMI 29.68 kg/m²        Constitutional: [x] Appears well-developed and well-nourished [x] No apparent distress      [] Abnormal -     Mental status: [x] Alert and awake  [x] Oriented to person/place/time [x] Able to follow commands    [] Abnormal -     Eyes:   EOM    [x]  Normal    [] Abnormal -   Sclera  [x]  Normal    [] Abnormal -          Discharge [x]  None visible   [] Abnormal -     HENT: [x] Normocephalic, atraumatic  [] Abnormal -   [x] Mouth/Throat: Mucous membranes are moist    External Ears [x] Normal  [] Abnormal -    Neck: [x] No visualized mass,NO JVD [] Abnormal -     Pulmonary/Chest: [x] Respiratory effort normal   [x] No visualized signs of difficulty breathing or respiratory distress        [] Abnormal -      Musculoskeletal:   [x] Normal gait with no signs of ataxia         [x] Normal range of motion of neck        [] Abnormal -     Neurological:        [x] No Facial Asymmetry (Cranial nerve 7 motor function) (limited exam due to video visit)          [x] No gaze palsy        [] Abnormal -         Skin:        [x] No significant exanthematous lesions ,no bruising       [] Abnormal -            Psychiatric:       [x] Normal Affect [] Abnormal -        [x] No Hallucinations  Extremities:           No Edema    Other pertinent observable physical exam findings:-    Pertinent LAB and reports  I have reviewed available  pertinent notes, labs and reports and included in current evaluation of this patient. Assessment & Plan:   Diagnoses and all orders for this visit:    1. History of CVA (cerebrovascular accident)    2. Essential hypertension    3.  Type 2 diabetes mellitus without complication, with long-term current use of insulin ([facility]    4. Abnormal stress test    5. Dyslipidemia      No new cardiac symptoms. Currently on aspirin 325 mg. Advised salt restriction and weight reduction. F/u in 6 months    Follow-up and Dispositions    · Return in about 6 months (around 10/21/2020). No orders of the defined types were placed in this encounter. Follow-up and Dispositions    · Return in about 6 months (around 10/21/2020). We discussed the expected course, resolution and complications of the diagnosis(es) in detail. Medication risks, benefits, costs, interactions, and alternatives were discussed as indicated. I advised him to contact the office if his condition worsens, changes or fails to improve as anticipated. He expressed understanding with the diagnosis(es) and plan. I was at home while conducting this encounter. Pursuant to the emergency declaration under the [facility], [facility] waiver authority and the [employer] Act, this Virtual  Visit was conducted, with patient's consent, to reduce the patient's risk of exposure to COVID-19 and provide continuity of care for an established patient. Services were provided through a video synchronous discussion virtually to substitute for in-person clinic visit.     Opal Rose MD

## 2020-05-05 ENCOUNTER — VIRTUAL VISIT (OUTPATIENT)
Dept: FAMILY MEDICINE CLINIC | Age: 55
End: 2020-05-05

## 2020-05-05 DIAGNOSIS — Z79.4 TYPE 2 DIABETES MELLITUS WITH COMPLICATION, WITH LONG-TERM CURRENT USE OF INSULIN (HCC): Primary | ICD-10-CM

## 2020-05-05 DIAGNOSIS — E11.8 TYPE 2 DIABETES MELLITUS WITH COMPLICATION, WITH LONG-TERM CURRENT USE OF INSULIN (HCC): Primary | ICD-10-CM

## 2020-05-05 NOTE — PROGRESS NOTES
Kermit Oconnor presents today for   Chief Complaint   Patient presents with    Follow-up     2 week    Diabetes       Kermit Oconnor preferred language for health care discussion is english/other. Is someone accompanying this pt? no    Is the patient using any DME equipment during [address]? no    Depression Screening:  3 most recent PHQ Screens 1/15/2020   Little interest or pleasure in doing things Not at all   Feeling down, depressed, irritable, or hopeless Not at all   Total Score PHQ 2 0       Learning Assessment:  Learning Assessment 1/15/2020   PRIMARY LEARNER Patient   HIGHEST LEVEL OF EDUCATION - PRIMARY LEARNER  DID NOT GRADUATE [facility] PRIMARY LEARNER NONE   CO-LEARNER CAREGIVER No   PRIMARY LANGUAGE ENGLISH   LEARNER PREFERENCE PRIMARY LISTENING     -   ANSWERED BY self   RELATIONSHIP SELF       Abuse Screening:  Abuse Screening Questionnaire 1/15/2020   Do you ever feel afraid of your partner? N   Are you in a relationship with someone who physically or mentally threatens you? N   Is it safe for you to go home? Y       Generalized Anxiety  No flowsheet data found. Health Maintenance Due   Topic Date Due    Shingrix Vaccine Age 49> (1 of 2) 07/26/2015    Foot Exam Q1  02/13/2018    Eye Exam Retinal or Dilated  09/22/2019    A1C test (Diabetic or Prediabetic)  04/21/2020   . Health Maintenance reviewed and discussed and ordered per Provider. Coordination of Care:  1. Have you been to the ER, urgent care clinic since your last visit? Hospitalized since your last visit? no    2. Have you seen or consulted any other health care providers outside of the [address] [place] [address] since your last visit? Include any pap smears or colon screening.  no      Advance Directive:  Discussed 1/15/20

## 2020-05-05 NOTE — PROGRESS NOTES
Anabell Seay is a 47 y.o. male who was seen by synchronous (real-time) audio-video technology on 5/5/2020. Consent: Anabell Seay, who was seen by synchronous (real-time) audio-video technology, and/or his healthcare decision maker, is aware that this patient-initiated, Telehealth encounter on 5/5/2020 is a billable service, with coverage as determined by his insurance carrier. He is aware that he may receive a bill and has provided verbal consent to proceed: NA - Consent obtained within past 12 months. Assessment & Plan:   Diagnoses and all orders for this visit:    1. Type 2 diabetes mellitus with complication, with long-term current use of insulin (HCC)  Increase lantus to 35U qhs. Cont routine monitoring. Call if any problems. Follow-up and Dispositions    · Return in about 4 weeks (around 6/2/2020) for diabetes. 712  Subjective:   Anabell Seay is a 47 y.o. male who was seen for Follow-up (2 week) and Diabetes    Patient contacted via [employer] me. Home bs readings were ranging 180-190s in the morning at pt's last visit. His pm dose of lantus was increased. When he increased to 25U qhs, he had lower readings that ranged 160-180s. They did not increase to 30 U after the first week as we had discussed. He did tolerate this change without any problems. Prior to Admission medications    Medication Sig Start Date End Date Taking?  Authorizing Provider   insulin glargine (Lantus Solostar U-100 Insulin) 100 unit/mL (3 mL) inpn INJECT 20 UNITS SUBCUTANEOUSLY IN THE EVENING 4/18/20  Yes Carlita Marion MD   glimepiride (AMARYL) 2 mg tablet TAKE 1 TABLET BY MOUTH EVERY DAY 4/18/20  Yes Elzbieta Marion MD   gabapentin (NEURONTIN) 300 mg capsule TAKE TWO CAPSULES BY MOUTH IN THE EVENING 4/18/20  Yes Elzbieta Marion MD   loratadine (CLARITIN) 10 mg tablet TAKE ONE TABLET BY MOUTH EVERY DAY 4/18/20  Yes Elzbieta Marion MD   Janumet XR 50-1,000 mg TM24 TAKE TWO TABLETS BY MOUTH EVERY DAY 4/18/20  Yes Lalo Guevara MD   losartan (COZAAR) 50 mg tablet TAKE ONE TABLET BY MOUTH DAILY 4/13/20  Yes Rossi Nath NP   hydroCHLOROthiazide (HYDRODIURIL) 12.5 mg tablet TAKE ONE TABLET BY MOUTH DAILY 4/13/20  Yes Rossi Nath NP   losartan-hydroCHLOROthiazide (HYZAAR) 50-12.5 mg per tablet TAKE ONE TABLET BY MOUTH DAILY 11/6/19  Yes Jennifer Dunbar NP   atorvastatin (LIPITOR) 40 mg tablet TAKE ONE TABLET BY MOUTH EVERY DAY 10/13/19  Yes Carlita Marion MD   fluticasone propionate (FLONASE) 50 mcg/actuation nasal spray INHALE 2 SPRAYS into both nostrils EVERY DAY 9/19/19  Yes Nya Marion MD   furosemide (LASIX) 40 mg tablet Take 1 Tab by mouth daily. 7/31/19  Yes Lalo Guevara MD   glucose blood VI test strips [facility] BLOOD GLUCOSE SYSTEM) strip Test blood sugar twice daily as directed 6/12/19  Yes Lalo Guevara MD   aspirin (ASPIRIN) 325 mg tablet Take 325 mg by mouth daily. Yes Provider, Historical   Insulin Needles, Disposable, (SURE-FINE PEN NEEDLES) 31 gauge x 5/16\" ndle by Does Not Apply route daily. Use as directed with insulin. 9/28/18  Yes Lalo Guevara MD   multivitamin (ONE A DAY) tablet Take 1 Tab by Mouth Once a Day. Similar alternatives are ok. 9/7/18  Yes Provider, Historical   thiamine HCL (B-1) 100 mg tablet Take 1 Tab by mouth daily. 9/20/18  Yes Nya Marion MD   Blood-Glucose Meter monitoring kit TEST GLUCOSE TWICE DAILY. DX:E11.9 1/11/17  Yes Nya Marion MD   lancets (FREESTYLE LANCETS) 28 gauge misc TEST BLOOD GLUCOSE TWICE DAILY. DX E11.9 1/11/17  Yes Lalo Guevara MD   Lancets Misc Use with [employer] Prime Meter. Test Blood sugar Daily.  DX: 250.02 9/16/13  Yes Lalo Guevara MD     Allergies   Allergen Reactions    Lisinopril Cough       Patient Active Problem List   Diagnosis Code    Essential hypertension I10    History of CVA (cerebrovascular accident) Z80.78    History of MI (myocardial infarction) I25.2    Type 2 diabetes mellitus, with long-term current use of insulin ([facility]) E11.9, Z79.4    Type 2 diabetes mellitus with diabetic neuropathy ([facility]) E11.40    CHF (congestive heart failure) ([facility]) I50.9    Atherosclerosis of coronary artery I25.10    Acute right-sided low back pain without sciatica M54.5    Right sided abdominal pain R10.9     Current Outpatient Medications   Medication Sig Dispense Refill    insulin glargine (Lantus Solostar U-100 Insulin) 100 unit/mL (3 mL) inpn INJECT 20 UNITS SUBCUTANEOUSLY IN THE EVENING 6 mL 5    glimepiride (AMARYL) 2 mg tablet TAKE 1 TABLET BY MOUTH EVERY DAY 30 Tab 5    gabapentin (NEURONTIN) 300 mg capsule TAKE TWO CAPSULES BY MOUTH IN THE EVENING 60 Cap 5    loratadine (CLARITIN) 10 mg tablet TAKE ONE TABLET BY MOUTH EVERY DAY 30 Tab 5    Janumet XR 50-1,000 mg TM24 TAKE TWO TABLETS BY MOUTH EVERY  Tab 5    losartan (COZAAR) 50 mg tablet TAKE ONE TABLET BY MOUTH DAILY 30 Tab 5    hydroCHLOROthiazide (HYDRODIURIL) 12.5 mg tablet TAKE ONE TABLET BY MOUTH DAILY 30 Tab 5    losartan-hydroCHLOROthiazide (HYZAAR) 50-12.5 mg per tablet TAKE ONE TABLET BY MOUTH DAILY 30 Tab 3    atorvastatin (LIPITOR) 40 mg tablet TAKE ONE TABLET BY MOUTH EVERY DAY 30 Tab 12    fluticasone propionate (FLONASE) 50 mcg/actuation nasal spray INHALE 2 SPRAYS into both nostrils EVERY DAY 16 g 12    furosemide (LASIX) 40 mg tablet Take 1 Tab by mouth daily. 90 Tab 4    glucose blood VI test strips ([employer] BLOOD GLUCOSE SYSTEM) strip Test blood sugar twice daily as directed 100 Strip 11    aspirin (ASPIRIN) 325 mg tablet Take 325 mg by mouth daily.  Insulin Needles, Disposable, (SURE-FINE PEN NEEDLES) 31 gauge x 5/16\" ndle by Does Not Apply route daily. Use as directed with insulin. 1 Package 12    multivitamin (ONE A DAY) tablet Take 1 Tab by Mouth Once a Day. Similar alternatives are ok.  thiamine HCL (B-1) 100 mg tablet Take 1 Tab by mouth daily.  90 Tab 3    Blood-Glucose Meter monitoring kit TEST GLUCOSE TWICE DAILY. DX:E11.9 1 Kit 0    lancets (FREESTYLE LANCETS) 28 gauge misc TEST BLOOD GLUCOSE TWICE DAILY. DX E11.9 50 Lancet 12    Lancets Misc Use with Relion Prime Meter. Test Blood sugar Daily. DX: 250.02 50 Each 1     Allergies   Allergen Reactions    Lisinopril Cough     Past Medical History:   Diagnosis Date    Arthritis     Chest pain     [facility]est[facility])     CVA (cerebral vascular accident) ([address] 2011    r side stroke    CVA (cerebral vascular accident) ([address] 09/05/2018    admitted at [address] [place] [address] Heart attack [facility]) 2010    chest pains    Hypercholesterolemia     Hypertension     Loss of hearing     right ear, patient states it comes and goes    Polyuria     Shortness of breath 2011    Type 2 diabetes mellitus without complication, with long-term current use of insulin ([facility] 07/23/2013    Type 2 diabetes mellitus, with long-term current use of insulin ([facility] 7/23/2013     Past Surgical History:   Procedure Laterality Date    HX HEART CATHETERIZATION       Family History   Problem Relation Age of Onset    Arthritis-osteo Mother     Gout Mother     Diabetes Mother     Hypertension Mother     Diabetes Father     Diabetes Brother     Diabetes Maternal Grandmother      Social History     Tobacco Use    Smoking status: Never Smoker    Smokeless tobacco: Never Used   Substance Use Topics    Alcohol use: Yes     Frequency: Monthly or less     Drinks per session: 1 or 2     Binge frequency: Never     Comment: occassionally       Review of Systems   Constitutional: Negative. HENT: Negative. Respiratory: Negative. Cardiovascular: Negative. All other systems reviewed and are negative. Objective: There were no vitals taken for this visit.    General: alert, cooperative, no distress   Mental  status: normal mood, behavior, speech, dress, motor activity, and thought processes, able to follow commands   HENT: NCAT   Neck: no visualized mass   Resp: no respiratory distress   Neuro: no gross deficits   Skin: no discoloration or lesions of concern on visible areas   Psychiatric: normal affect, consistent with stated mood, no evidence of hallucinations     Additional exam findings: none      We discussed the expected course, resolution and complications of the diagnosis(es) in detail. Medication risks, benefits, costs, interactions, and alternatives were discussed as indicated. I advised him to contact the office if his condition worsens, changes or fails to improve as anticipated. He expressed understanding with the diagnosis(es) and plan. Peter Arredondo is a 47 y.o. male who was evaluated by a video visit encounter for concerns as above. Patient identification was verified prior to start of the visit. A caregiver was present when appropriate. Due to this being a TeleHealth encounter (During [place] [address] public health emergency), evaluation of the following organ systems was limited: Vitals/Constitutional/EENT/Resp/CV/GI//MS/Neuro/Skin/Heme-Lymph-Imm. Pursuant to the emergency declaration under the [facility], [address] waiver authority and the [employer] and [employer] General Act, this Virtual  Visit was conducted, with patient's (and/or legal guardian's) consent, to reduce the patient's risk of exposure to COVID-19 and provide necessary medical care. Services were provided through a video synchronous discussion virtually to substitute for in-person clinic visit. Patient and provider were located at their individual homes.       Jarret Ko MD

## 2020-05-08 ENCOUNTER — TELEPHONE (OUTPATIENT)
Dept: FAMILY MEDICINE CLINIC | Age: 55
End: 2020-05-08

## 2020-05-11 NOTE — TELEPHONE ENCOUNTER
I don't see any mention of arthritis or joint pain on his May 5th visit. Schedule another virtual visit to discuss?

## 2020-05-12 ENCOUNTER — TELEPHONE (OUTPATIENT)
Dept: FAMILY MEDICINE CLINIC | Age: 55
End: 2020-05-12

## 2020-05-12 NOTE — TELEPHONE ENCOUNTER
Patient is requesting that a medication be called [facility] for arthritis

## 2020-05-13 NOTE — TELEPHONE ENCOUNTER
We have not discussed arthritis recently. I would rather have a chance to discuss this to better understand his issue. Alternatively, he can take tylenol as needed. Due to his past history, he should avoid some of the other meds used for arthritis.

## 2020-06-01 NOTE — PROGRESS NOTES
Du Thompson presents today for   Chief Complaint   Patient presents with    Diabetes     follow up       Is someone accompanying this pt? No    Is the patient using any DME equipment during OV? No    Depression Screening:  3 most recent PHQ Screens 1/15/2020   Little interest or pleasure in doing things Not at all   Feeling down, depressed, irritable, or hopeless Not at all   Total Score PHQ 2 0       Learning Assessment:  Learning Assessment 1/15/2020   PRIMARY LEARNER Patient   HIGHEST LEVEL OF EDUCATION - PRIMARY LEARNER  DID NOT GRADUATE [facility] PRIMARY LEARNER NONE   CO-LEARNER CAREGIVER No   PRIMARY LANGUAGE ENGLISH   LEARNER PREFERENCE PRIMARY LISTENING     -   ANSWERED BY self   RELATIONSHIP SELF       Abuse Screening:  Abuse Screening Questionnaire 6/2/2020   Do you ever feel afraid of your partner? N   Are you in a relationship with someone who physically or mentally threatens you? N   Is it safe for you to go home? Y         Health Maintenance Due   Topic Date Due    Shingrix Vaccine Age 49> (1 of 2) 07/26/2015    Foot Exam Q1  02/13/2018    Eye Exam Retinal or Dilated  09/22/2019    A1C test (Diabetic or Prediabetic)  04/21/2020   . Health Maintenance reviewed and discussed and ordered per Provider. Coordination of Care  1. Have you been to the ER, urgent care clinic since your last visit? Hospitalized since your last visit? No    2. Have you seen or consulted any other health care providers outside of the [address] [place] [address] since your last visit? Include any pap smears or colon screening. No      Advance Directive:  1. Do you have an advance directive in place? Patient Reply:No    2. If not, would you like material regarding how to put one in place?  Patient Reply: No

## 2020-06-02 ENCOUNTER — VIRTUAL VISIT (OUTPATIENT)
Dept: FAMILY MEDICINE CLINIC | Age: 55
End: 2020-06-02

## 2020-06-02 DIAGNOSIS — Z79.899 ENCOUNTER FOR MEDICATION REVIEW: ICD-10-CM

## 2020-06-02 DIAGNOSIS — M25.551 BILATERAL HIP PAIN: Primary | ICD-10-CM

## 2020-06-02 DIAGNOSIS — M25.552 BILATERAL HIP PAIN: Primary | ICD-10-CM

## 2020-06-02 DIAGNOSIS — E11.40 TYPE 2 DIABETES MELLITUS WITH DIABETIC NEUROPATHY, WITHOUT LONG-TERM CURRENT USE OF INSULIN (HCC): ICD-10-CM

## 2020-06-02 DIAGNOSIS — R05.9 COUGH: ICD-10-CM

## 2020-06-02 RX ORDER — INSULIN GLARGINE 100 [IU]/ML
30 INJECTION, SOLUTION SUBCUTANEOUS
Qty: 9 ML | Refills: 5 | Status: SHIPPED | OUTPATIENT
Start: 2020-06-02 | End: 2020-09-21 | Stop reason: SDUPTHER

## 2020-06-02 RX ORDER — SITAGLIPTIN AND METFORMIN HYDROCHLORIDE 50; 1000 MG/1; MG/1
1 TABLET, FILM COATED, EXTENDED RELEASE ORAL DAILY
Qty: 90 TAB | Refills: 5 | COMMUNITY
Start: 2020-06-02 | End: 2021-06-08

## 2020-06-02 NOTE — PROGRESS NOTES
Jef Hurt is a 47 y.o. male who was seen by synchronous (real-time) audio-video technology on 6/2/2020. Consent: Jef Hurt, who was seen by synchronous (real-time) audio-video technology, and/or his healthcare decision maker, is aware that this patient-initiated, Telehealth encounter on 6/2/2020 is a billable service, with coverage as determined by his insurance carrier. He is aware that he may receive a bill and has provided verbal consent to proceed: NA - Consent obtained within past 12 months. Assessment & Plan:   Diagnoses and all orders for this visit:    1. Bilateral hip pain  -     XR HIPS BI W OR WO AP PELV; Future    2. Cough  -     XR CHEST PA LAT; Future    3. Encounter for medication review  Meds reviewed with pt's partner in detail. 4. Type 2 diabetes mellitus with diabetic neuropathy, without long-term current use of insulin (HCC)  -     insulin glargine (Lantus Solostar U-100 Insulin) 100 unit/mL (3 mL) inpn; 30 Units by SubCUTAneous route nightly. The complexity of medical decision making for this visit is moderate      Follow-up and Dispositions    · Return in about 6 weeks (around 7/14/2020) for hip pain, diabetes, cough. 712  Subjective:   Jef Hurt is a 47 y.o. male who was seen for Diabetes (follow up)    Patient contacted via [employer] me.      Pt's partner is concerned that he has had a severe cough for about 1 month. They forgot to mention it at his last visit. He does have some chest tightness associated with this. The tightness in his chest comes and goes. It does get worse with activity. Pt has never been a smoker. No hx of asthma. She also mentions that pt has been having some stumbling that seems to make it harder to maintain his balance. She reports that he has joint pain in both hips. He has not tried any otc meds, heat, or ice. The pain is anterior in location. Pt states that the pain is sharp in nature.   He awakens with pain in the morning. Pt has not been taking janumet bid. He is taking janumet once daily and only using lantus 30Units once daily. AM bs readings have been ranging . Prior to Admission medications    Medication Sig Start Date End Date Taking? Authorizing Provider   SITagliptin-metFORMIN (Janumet XR) 50-1,000 mg TM24 Take 1 Tab by mouth daily. 6/2/20  Yes Dee Chairez MD   insulin glargine (Lantus Solostar U-100 Insulin) 100 unit/mL (3 mL) inpn 30 Units by SubCUTAneous route nightly. 6/2/20  Yes Gary Marion MD   glimepiride (AMARYL) 2 mg tablet TAKE 1 TABLET BY MOUTH EVERY DAY 4/18/20  Yes Gary Marion MD   gabapentin (NEURONTIN) 300 mg capsule TAKE TWO CAPSULES BY MOUTH IN THE EVENING 4/18/20  Yes Gary Marion MD   loratadine (CLARITIN) 10 mg tablet TAKE ONE TABLET BY MOUTH EVERY DAY 4/18/20  Yes Gary Marion MD   atorvastatin (LIPITOR) 40 mg tablet TAKE ONE TABLET BY MOUTH EVERY DAY 10/13/19  Yes Gary Marion MD   fluticasone propionate (FLONASE) 50 mcg/actuation nasal spray INHALE 2 SPRAYS into both nostrils EVERY DAY 9/19/19  Yes Gary Marion MD   furosemide (LASIX) 40 mg tablet Take 1 Tab by mouth daily. 7/31/19  Yes eDe Chairez MD   glucose blood VI test strips [facility] BLOOD GLUCOSE SYSTEM) strip Test blood sugar twice daily as directed 6/12/19  Yes Dee Chairez MD   aspirin (ASPIRIN) 325 mg tablet Take 325 mg by mouth daily. Yes Provider, Historical   Insulin Needles, Disposable, (SURE-FINE PEN NEEDLES) 31 gauge x 5/16\" ndle by Does Not Apply route daily. Use as directed with insulin. 9/28/18  Yes Dee Chairez MD   multivitamin (ONE A DAY) tablet Take 1 Tab by Mouth Once a Day. Similar alternatives are ok. 9/7/18  Yes Provider, Historical   thiamine HCL (B-1) 100 mg tablet Take 1 Tab by mouth daily. 9/20/18  Yes Gary Marion MD   Blood-Glucose Meter monitoring kit TEST GLUCOSE TWICE DAILY.  DX:E11.9 1/11/17  Yes Dee Chairez MD   lancets (FREESTYLE LANCETS) 28 gauge misc TEST BLOOD GLUCOSE TWICE DAILY. DX E11.9 1/11/17  Yes Robert Gordon MD   Lancets Misc Use with [employer] Meter. Test Blood sugar Daily. DX: 250.02 9/16/13  Yes Robert Gordon MD   losartan (COZAAR) 50 mg tablet TAKE ONE TABLET BY MOUTH DAILY 4/13/20   Rossi Nath, JUJU   hydroCHLOROthiazide (HYDRODIURIL) 12.5 mg tablet TAKE ONE TABLET BY MOUTH DAILY 4/13/20   Rossi Nath, NP     Allergies   Allergen Reactions    Lisinopril Cough       Patient Active Problem List   Diagnosis Code    Essential hypertension I10    History of CVA (cerebrovascular accident) Z80.78    History of MI (myocardial infarction) I25.2    Type 2 diabetes mellitus, with long-term current use of insulin ([facility] E11.9, Z79.4    Type 2 diabetes mellitus with diabetic neuropathy ([facility] E11.40    CHF (congestive heart failure) ([facility] I50.9    Atherosclerosis of coronary artery I25.10    Acute right-sided low back pain without sciatica M54.5    Right sided abdominal pain R10.9     Current Outpatient Medications   Medication Sig Dispense Refill    SITagliptin-metFORMIN (Janumet XR) 50-1,000 mg TM24 Take 1 Tab by mouth daily. 90 Tab 5    insulin glargine (Lantus Solostar U-100 Insulin) 100 unit/mL (3 mL) inpn 30 Units by SubCUTAneous route nightly. 9 mL 5    glimepiride (AMARYL) 2 mg tablet TAKE 1 TABLET BY MOUTH EVERY DAY 30 Tab 5    gabapentin (NEURONTIN) 300 mg capsule TAKE TWO CAPSULES BY MOUTH IN THE EVENING 60 Cap 5    loratadine (CLARITIN) 10 mg tablet TAKE ONE TABLET BY MOUTH EVERY DAY 30 Tab 5    atorvastatin (LIPITOR) 40 mg tablet TAKE ONE TABLET BY MOUTH EVERY DAY 30 Tab 12    fluticasone propionate (FLONASE) 50 mcg/actuation nasal spray INHALE 2 SPRAYS into both nostrils EVERY DAY 16 g 12    furosemide (LASIX) 40 mg tablet Take 1 Tab by mouth daily.  90 Tab 4    glucose blood VI test strips ([employer] BLOOD GLUCOSE SYSTEM) strip Test blood sugar twice daily as directed 100 Strip 11    aspirin (ASPIRIN) 325 mg tablet Take 325 mg by mouth daily.  Insulin Needles, Disposable, (SURE-FINE PEN NEEDLES) 31 gauge x 5/16\" ndle by Does Not Apply route daily. Use as directed with insulin. 1 Package 12    multivitamin (ONE A DAY) tablet Take 1 Tab by Mouth Once a Day. Similar alternatives are ok.  thiamine HCL (B-1) 100 mg tablet Take 1 Tab by mouth daily. 90 Tab 3    Blood-Glucose Meter monitoring kit TEST GLUCOSE TWICE DAILY. DX:E11.9 1 Kit 0    lancets (FREESTYLE LANCETS) 28 gauge misc TEST BLOOD GLUCOSE TWICE DAILY. DX E11.9 50 Lancet 12    Lancets Misc Use with [employer] Prime Meter. Test Blood sugar Daily.  DX: 250.02 50 Each 1    losartan (COZAAR) 50 mg tablet TAKE ONE TABLET BY MOUTH DAILY 30 Tab 5    hydroCHLOROthiazide (HYDRODIURIL) 12.5 mg tablet TAKE ONE TABLET BY MOUTH DAILY 30 Tab 5     Allergies   Allergen Reactions    Lisinopril Cough     Past Medical History:   Diagnosis Date    Arthritis     Chest pain     [facility]) [facility])     [facility]    r side stroke    CVA (cerebral vascular accident) ([facility] 09/05/2018    [facility] [address] [facility]) 2010    chest pains    Hypercholesterolemia     Hypertension     Loss of hearing     right ear, patient states it comes and goes    Polyuria     Shortness of breath 2011    Type 2 diabetes mellitus without complication, with long-term current use of insulin ([address] 07/23/2013    Type 2 diabetes mellitus, with long-term current use of insulin ([address] 7/23/2013     Past Surgical History:   Procedure Laterality Date    HX HEART CATHETERIZATION       Family History   Problem Relation Age of Onset    Arthritis-osteo Mother     Gout Mother     Diabetes Mother     Hypertension Mother     Diabetes Father     Diabetes Brother     Diabetes Maternal Grandmother      Social History     Tobacco Use    Smoking status: Never Smoker    Smokeless tobacco: Never Used   Substance Use Topics    Alcohol use: Yes     Frequency: Monthly or less     Drinks per session: 1 or 2     Binge frequency: Never     Comment: occassionally       Review of Systems   Constitutional: Negative. HENT: Negative. Respiratory: Positive for cough and shortness of breath. Cardiovascular: Negative. Musculoskeletal: Positive for joint pain. All other systems reviewed and are negative. Objective: There were no vitals taken for this visit. General: alert, cooperative, no distress   Mental  status: normal mood, behavior, speech, dress, motor activity, and thought processes, able to follow commands   HENT: NCAT   Neck: no visualized mass   Resp: no respiratory distress   Neuro: no gross deficits   Skin: no discoloration or lesions of concern on visible areas   Psychiatric: normal affect, consistent with stated mood, no evidence of hallucinations     Additional exam findings: none      We discussed the expected course, resolution and complications of the diagnosis(es) in detail. Medication risks, benefits, costs, interactions, and alternatives were discussed as indicated. I advised him to contact the office if his condition worsens, changes or fails to improve as anticipated. He expressed understanding with the diagnosis(es) and plan. Doreen Abdi is a 47 y.o. male who was evaluated by a video visit encounter for concerns as above. Patient identification was verified prior to start of the visit. A caregiver was present when appropriate. Due to this being a TeleHealth encounter (During [facility] public health emergency), evaluation of the following organ systems was limited: Vitals/Constitutional/EENT/Resp/CV/GI//MS/Neuro/Skin/Heme-Lymph-Imm.   Pursuant to the emergency declaration under the [address] [place] [address], [address] [place] [address] and the [employer] and [employer] General Act, this Virtual  Visit was conducted, with patient's (and/or legal guardian's) consent, to reduce the patient's risk of exposure to COVID-19 and provide necessary medical care. Services were provided through a video synchronous discussion virtually to substitute for in-person clinic visit. Patient and provider were located at their individual homes.       Trip Scales MD

## 2020-06-04 ENCOUNTER — HOSPITAL ENCOUNTER (OUTPATIENT)
Dept: GENERAL RADIOLOGY | Age: 55
Discharge: HOME | End: 2020-06-04
Payer: MEDICAID

## 2020-06-04 DIAGNOSIS — M25.551 BILATERAL HIP PAIN: ICD-10-CM

## 2020-06-04 DIAGNOSIS — R05.9 COUGH: ICD-10-CM

## 2020-06-04 DIAGNOSIS — M25.552 BILATERAL HIP PAIN: ICD-10-CM

## 2020-06-04 PROCEDURE — 73521 X-RAY EXAM HIPS BI 2 VIEWS: CPT

## 2020-06-04 PROCEDURE — 71046 X-RAY EXAM CHEST 2 VIEWS: CPT

## 2020-07-10 RX ORDER — LOSARTAN POTASSIUM 50 MG/1
TABLET ORAL
Qty: 30 TAB | Refills: 5 | Status: SHIPPED | OUTPATIENT
Start: 2020-07-10 | End: 2020-12-16

## 2020-07-10 RX ORDER — HYDROCHLOROTHIAZIDE 12.5 MG/1
TABLET ORAL
Qty: 30 TAB | Refills: 5 | Status: SHIPPED | OUTPATIENT
Start: 2020-07-10 | End: 2020-12-16

## 2020-07-20 ENCOUNTER — VIRTUAL VISIT (OUTPATIENT)
Dept: FAMILY MEDICINE CLINIC | Age: 55
End: 2020-07-20

## 2020-07-20 DIAGNOSIS — E11.40 TYPE 2 DIABETES MELLITUS WITH DIABETIC NEUROPATHY, WITHOUT LONG-TERM CURRENT USE OF INSULIN (HCC): ICD-10-CM

## 2020-07-20 DIAGNOSIS — R93.89 ABNORMAL CXR: Primary | ICD-10-CM

## 2020-07-20 DIAGNOSIS — E78.2 MIXED HYPERLIPIDEMIA: ICD-10-CM

## 2020-07-20 DIAGNOSIS — M25.551 BILATERAL HIP PAIN: ICD-10-CM

## 2020-07-20 DIAGNOSIS — M25.552 BILATERAL HIP PAIN: ICD-10-CM

## 2020-07-20 NOTE — PROGRESS NOTES
Blanca Valencia presents today for   Chief Complaint   Patient presents with    Hip Pain     6 week follow up     Diabetes     6 week follow up    Cough     Dry cough continues       Is someone accompanying this pt? Ms Jeremias Galeano Wenceslao)  Is the patient using any DME equipment during OV? No    Depression Screening:  3 most recent PHQ Screens 1/15/2020   Little interest or pleasure in doing things Not at all   Feeling down, depressed, irritable, or hopeless Not at all   Total Score PHQ 2 0       Learning Assessment:  Learning Assessment 1/15/2020   PRIMARY LEARNER Patient   HIGHEST LEVEL OF EDUCATION - PRIMARY LEARNER  DID NOT GRADUATE [facility] PRIMARY LEARNER NONE   CO-LEARNER CAREGIVER No   PRIMARY LANGUAGE ENGLISH   LEARNER PREFERENCE PRIMARY LISTENING     -   ANSWERED BY self   RELATIONSHIP SELF       Abuse Screening:  Abuse Screening Questionnaire 7/20/2020   Do you ever feel afraid of your partner? N   Are you in a relationship with someone who physically or mentally threatens you? N   Is it safe for you to go home? Y         Health Maintenance Due   Topic Date Due    Shingrix Vaccine Age 49> (1 of 2) 07/26/2015    Foot Exam Q1  02/13/2018    Eye Exam Retinal or Dilated  09/22/2019    A1C test (Diabetic or Prediabetic)  04/21/2020         Coordination of Care  1. Have you been to the ER, urgent care clinic since your last visit? Hospitalized since your last visit? No    2. Have you seen or consulted any other health care providers outside of the [address] [place] [address] since your last visit? Include any pap smears or colon screening. No         Advance Directive:  1. Do you have an advance directive in place? Patient Reply:No    2. If not, would you like material regarding how to put one in place?  Patient Reply: No

## 2020-07-20 NOTE — PROGRESS NOTES
Sonja Cash is a 47 y.o. male who was seen by synchronous (real-time) audio-video technology on 7/20/2020 for Hip Pain (6 week follow up ); Diabetes (6 week follow up); and Cough (Dry cough continues)        Assessment & Plan:   Diagnoses and all orders for this visit:    1. Abnormal CXR  -     XR CHEST PA LAT; Future    2. Type 2 diabetes mellitus with diabetic neuropathy, without long-term current use of insulin (HCC)  -     METABOLIC PANEL, COMPREHENSIVE; Future  -     LIPID PANEL; Future  -     HEMOGLOBIN A1C WITH EAG; Future  -     MICROALBUMIN, UR, RAND W/ MICROALB/CREAT RATIO; Future    3. Mixed hyperlipidemia  -     METABOLIC PANEL, COMPREHENSIVE; Future  -     LIPID PANEL; Future    4. Bilateral hip pain  -     REFERRAL TO ORTHOPEDICS    long discussion about need for compliance with dm meds to reduce risk of another cva. The complexity of medical decision making for this visit is moderate     Follow-up and Dispositions    · Return in about 2 months (around 9/20/2020) for diabetes, high blood pressure, high cholesterol, lab review. 712  Subjective:   Patient contacted via doxy. me. Pt cont to have cough. No fevers. The cough is not productive. Pt does not have any ill contacts. Pt cont to have b hip pain. Recent imaging reviewed with pt and partner. Pt's janumet was increased to bid. He is taking lantus qhs. Am blood sugars are elevated. Pt's partner is concerned that his sugars may be elevated at times but he won't let her check blood sugars as she feels she needs to. Prior to Admission medications    Medication Sig Start Date End Date Taking? Authorizing Provider   hydroCHLOROthiazide (HYDRODIURIL) 12.5 mg tablet TAKE ONE TABLET BY MOUTH EVERY DAY 7/10/20  Yes Rossi Nath NP   losartan (COZAAR) 50 mg tablet TAKE ONE TABLET BY MOUTH DAILY 7/10/20  Yes Rossi Nath NP   SITagliptin-metFORMIN (Janumet XR) 50-1,000 mg TM24 Take 1 Tab by mouth daily. 6/2/20  Yes Mariam Casanova MD   insulin glargine (Lantus Solostar U-100 Insulin) 100 unit/mL (3 mL) inpn 30 Units by SubCUTAneous route nightly. 6/2/20  Yes Christine Marion MD   glimepiride (AMARYL) 2 mg tablet TAKE 1 TABLET BY MOUTH EVERY DAY 4/18/20  Yes Christine Marion MD   loratadine (CLARITIN) 10 mg tablet TAKE ONE TABLET BY MOUTH EVERY DAY 4/18/20  Yes Christine Marion MD   atorvastatin (LIPITOR) 40 mg tablet TAKE ONE TABLET BY MOUTH EVERY DAY 10/13/19  Yes Christine Marion MD   furosemide (LASIX) 40 mg tablet Take 1 Tab by mouth daily. 7/31/19  Yes Mariam Casanova MD   glucose blood VI test strips [facility] BLOOD GLUCOSE SYSTEM) strip Test blood sugar twice daily as directed 6/12/19  Yes Mariam Casanova MD   aspirin (ASPIRIN) 325 mg tablet Take 325 mg by mouth daily. Yes Provider, Historical   Insulin Needles, Disposable, (SURE-FINE PEN NEEDLES) 31 gauge x 5/16\" ndle by Does Not Apply route daily. Use as directed with insulin. 9/28/18  Yes Mariam Casanova MD   multivitamin (ONE A DAY) tablet Take 1 Tab by Mouth Once a Day. Similar alternatives are ok. 9/7/18  Yes Provider, Historical   Blood-Glucose Meter monitoring kit TEST GLUCOSE TWICE DAILY. DX:E11.9 1/11/17  Yes Christine Marion MD   lancets (FREESTYLE LANCETS) 28 gauge misc TEST BLOOD GLUCOSE TWICE DAILY. DX E11.9 1/11/17  Yes Mariam Casanova MD   Lancets Misc Use with Relion Prime Meter. Test Blood sugar Daily. DX: 250.02 9/16/13  Yes Mariam Casanova MD   gabapentin (NEURONTIN) 300 mg capsule TAKE TWO CAPSULES BY MOUTH IN THE EVENING 4/18/20   Mariam Casanova MD   fluticasone propionate (FLONASE) 50 mcg/actuation nasal spray INHALE 2 SPRAYS into both nostrils EVERY DAY 9/19/19   Mariam Casanova MD   thiamine HCL (B-1) 100 mg tablet Take 1 Tab by mouth daily.  9/20/18   Mariam Casanova MD     Patient Active Problem List   Diagnosis Code    Essential hypertension I10    History of CVA (cerebrovascular accident) Z80.78    History of MI (myocardial infarction) I25.2    Type 2 diabetes mellitus, with long-term current use of insulin ([facility]) E11.9, Z79.4    Type 2 diabetes mellitus with diabetic neuropathy ([facility]) E11.40    CHF (congestive heart failure) ([facility]) I50.9    Atherosclerosis of coronary artery I25.10    Acute right-sided low back pain without sciatica M54.5    Right sided abdominal pain R10.9     Current Outpatient Medications   Medication Sig Dispense Refill    hydroCHLOROthiazide (HYDRODIURIL) 12.5 mg tablet TAKE ONE TABLET BY MOUTH EVERY DAY 30 Tab 5    losartan (COZAAR) 50 mg tablet TAKE ONE TABLET BY MOUTH DAILY 30 Tab 5    SITagliptin-metFORMIN (Janumet XR) 50-1,000 mg TM24 Take 1 Tab by mouth daily. 90 Tab 5    insulin glargine (Lantus Solostar U-100 Insulin) 100 unit/mL (3 mL) inpn 30 Units by SubCUTAneous route nightly. 9 mL 5    glimepiride (AMARYL) 2 mg tablet TAKE 1 TABLET BY MOUTH EVERY DAY 30 Tab 5    loratadine (CLARITIN) 10 mg tablet TAKE ONE TABLET BY MOUTH EVERY DAY 30 Tab 5    atorvastatin (LIPITOR) 40 mg tablet TAKE ONE TABLET BY MOUTH EVERY DAY 30 Tab 12    furosemide (LASIX) 40 mg tablet Take 1 Tab by mouth daily. 90 Tab 4    glucose blood VI test strips ([employer] BLOOD GLUCOSE SYSTEM) strip Test blood sugar twice daily as directed 100 Strip 11    aspirin (ASPIRIN) 325 mg tablet Take 325 mg by mouth daily.  Insulin Needles, Disposable, (SURE-FINE PEN NEEDLES) 31 gauge x 5/16\" ndle by Does Not Apply route daily. Use as directed with insulin. 1 Package 12    multivitamin (ONE A DAY) tablet Take 1 Tab by Mouth Once a Day. Similar alternatives are ok.  Blood-Glucose Meter monitoring kit TEST GLUCOSE TWICE DAILY. DX:E11.9 1 Kit 0    lancets (FREESTYLE LANCETS) 28 gauge misc TEST BLOOD GLUCOSE TWICE DAILY. DX E11.9 50 Lancet 12    Lancets Misc Use with Relion Prime Meter. Test Blood sugar Daily.  DX: 250.02 50 Each 1    gabapentin (NEURONTIN) 300 mg capsule TAKE TWO CAPSULES BY MOUTH IN THE EVENING 60 Cap 5    fluticasone propionate (FLONASE) 50 mcg/actuation nasal spray INHALE 2 SPRAYS into both nostrils EVERY DAY 16 g 12    thiamine HCL (B-1) 100 mg tablet Take 1 Tab by mouth daily. 90 Tab 3     Allergies   Allergen Reactions    Lisinopril Cough     Past Medical History:   Diagnosis Date    Arthritis     Chest pain     CHF [facility]congestive heart failure) [facility]     CVA (cerebral vascular accident) ([facility] 2011    r side stroke    [facility]    admitted at [address] [place] [address] Heart attack [facility]) 2010    chest pains    Hypercholesterolemia     Hypertension     Loss of hearing     right ear, patient states it comes and goes    Polyuria     Shortness of breath 2011    Type 2 diabetes mellitus without complication, with long-term current use of insulin ([facility] 07/23/2013    Type 2 diabetes mellitus, with long-term current use of insulin [facility] 7/23/2013     Past Surgical History:   Procedure Laterality Date    HX HEART CATHETERIZATION       Family History   Problem Relation Age of Onset    Arthritis-osteo Mother     Gout Mother     Diabetes Mother     Hypertension Mother     Diabetes Father     Diabetes Brother     Diabetes Maternal Grandmother      Social History     Tobacco Use    Smoking status: Never Smoker    Smokeless tobacco: Never Used   Substance Use Topics    Alcohol use: Yes     Frequency: Monthly or less     Drinks per session: 1 or 2     Binge frequency: Never     Comment: occassionally       Review of Systems   Constitutional: Negative. HENT: Negative. Respiratory: Negative. Negative for sputum production. Cardiovascular: Negative. Musculoskeletal: Positive for joint pain. All other systems reviewed and are negative. Objective:   No flowsheet data found.    General: alert, cooperative, no distress   Mental  status: normal mood, behavior, speech, dress, motor activity, and thought processes, able to follow commands HENT: NCAT   Neck: no visualized mass   Resp: no respiratory distress   Neuro: no gross deficits   Skin: no discoloration or lesions of concern on visible areas   Psychiatric: normal affect, consistent with stated mood, no evidence of hallucinations     Additional exam findings: none      We discussed the expected course, resolution and complications of the diagnosis(es) in detail. Medication risks, benefits, costs, interactions, and alternatives were discussed as indicated. I advised him to contact the office if his condition worsens, changes or fails to improve as anticipated. He expressed understanding with the diagnosis(es) and plan. Joaquin Luciano, who was evaluated through a patient-initiated, synchronous (real-time) audio-video encounter, and/or his healthcare decision maker, is aware that it is a billable service, with coverage as determined by his insurance carrier. He provided verbal consent to proceed: n/a- consent obtained within past 12 months, and patient identification was verified. It was conducted pursuant to the emergency declaration under the [address] [place] [address] authority and the [employer] system and [employer] Act. A caregiver was present when appropriate. Ability to conduct physical exam was limited. I was in the office. The patient was at home.       Alirio Oliva MD

## 2020-07-21 ENCOUNTER — HOSPITAL ENCOUNTER (OUTPATIENT)
Dept: LAB | Age: 55
Discharge: HOME | End: 2020-07-21
Payer: MEDICAID

## 2020-07-21 ENCOUNTER — HOSPITAL ENCOUNTER (OUTPATIENT)
Dept: GENERAL RADIOLOGY | Age: 55
Discharge: HOME | End: 2020-07-21
Payer: MEDICAID

## 2020-07-21 DIAGNOSIS — R93.89 ABNORMAL CXR: ICD-10-CM

## 2020-07-21 DIAGNOSIS — E11.40 TYPE 2 DIABETES MELLITUS WITH DIABETIC NEUROPATHY, WITHOUT LONG-TERM CURRENT USE OF INSULIN (HCC): ICD-10-CM

## 2020-07-21 DIAGNOSIS — E78.2 MIXED HYPERLIPIDEMIA: ICD-10-CM

## 2020-07-21 LAB
ALBUMIN SERPL-MCNC: 3.3 G/DL (ref 3.4–5)
ALBUMIN/GLOB SERPL: 0.9 {RATIO} (ref 0.8–1.7)
ALP SERPL-CCNC: 70 U/L (ref 45–117)
ALT SERPL-CCNC: 20 U/L (ref 16–61)
ANION GAP SERPL CALC-SCNC: 5 MMOL/L (ref 3–18)
AST SERPL-CCNC: 10 U/L (ref 10–38)
BILIRUB SERPL-MCNC: 0.7 MG/DL (ref 0.2–1)
BUN SERPL-MCNC: 13 MG/DL (ref 7–18)
BUN/CREAT SERPL: 12 (ref 12–20)
CALCIUM SERPL-MCNC: 9.2 MG/DL (ref 8.5–10.1)
CHLORIDE SERPL-SCNC: 101 MMOL/L (ref 100–111)
CHOLEST SERPL-MCNC: 119 MG/DL
CO2 SERPL-SCNC: 31 MMOL/L (ref 21–32)
CREAT SERPL-MCNC: 1.05 MG/DL (ref 0.6–1.3)
EST. AVERAGE GLUCOSE BLD GHB EST-MCNC: 258 MG/DL
GLOBULIN SER CALC-MCNC: 3.6 G/DL (ref 2–4)
GLUCOSE SERPL-MCNC: 210 MG/DL (ref 74–99)
HBA1C MFR BLD: 10.6 % (ref 4.2–5.6)
HDLC SERPL-MCNC: 32 MG/DL (ref 40–60)
HDLC SERPL: 3.7 {RATIO} (ref 0–5)
LDLC SERPL CALC-MCNC: 63.4 MG/DL (ref 0–100)
LIPID PROFILE,FLP: ABNORMAL
POTASSIUM SERPL-SCNC: 3.7 MMOL/L (ref 3.5–5.5)
PROT SERPL-MCNC: 6.9 G/DL (ref 6.4–8.2)
SODIUM SERPL-SCNC: 137 MMOL/L (ref 136–145)
TRIGL SERPL-MCNC: 118 MG/DL (ref ?–150)
VLDLC SERPL CALC-MCNC: 23.6 MG/DL

## 2020-07-21 PROCEDURE — 80053 COMPREHEN METABOLIC PANEL: CPT

## 2020-07-21 PROCEDURE — 83036 HEMOGLOBIN GLYCOSYLATED A1C: CPT

## 2020-07-21 PROCEDURE — 80061 LIPID PANEL: CPT

## 2020-07-21 PROCEDURE — 36415 COLL VENOUS BLD VENIPUNCTURE: CPT

## 2020-07-21 PROCEDURE — 71046 X-RAY EXAM CHEST 2 VIEWS: CPT

## 2020-08-28 ENCOUNTER — TELEPHONE (OUTPATIENT)
Dept: FAMILY MEDICINE CLINIC | Age: 55
End: 2020-08-28

## 2020-09-21 ENCOUNTER — VIRTUAL VISIT (OUTPATIENT)
Dept: FAMILY MEDICINE CLINIC | Age: 55
End: 2020-09-21

## 2020-09-21 DIAGNOSIS — M25.552 BILATERAL HIP PAIN: ICD-10-CM

## 2020-09-21 DIAGNOSIS — I50.32 CHRONIC DIASTOLIC CONGESTIVE HEART FAILURE (HCC): ICD-10-CM

## 2020-09-21 DIAGNOSIS — M25.551 BILATERAL HIP PAIN: ICD-10-CM

## 2020-09-21 DIAGNOSIS — E11.40 TYPE 2 DIABETES MELLITUS WITH DIABETIC NEUROPATHY, WITHOUT LONG-TERM CURRENT USE OF INSULIN (HCC): Primary | ICD-10-CM

## 2020-09-21 DIAGNOSIS — E78.2 MIXED HYPERLIPIDEMIA: ICD-10-CM

## 2020-09-21 DIAGNOSIS — I10 ESSENTIAL HYPERTENSION: ICD-10-CM

## 2020-09-21 RX ORDER — INSULIN GLARGINE 100 [IU]/ML
40 INJECTION, SOLUTION SUBCUTANEOUS
Qty: 12 PEN | Refills: 4 | Status: SHIPPED | OUTPATIENT
Start: 2020-09-21 | End: 2021-09-29

## 2020-09-21 RX ORDER — FUROSEMIDE 40 MG/1
40 TABLET ORAL DAILY
Qty: 30 TAB | Refills: 4 | Status: SHIPPED | OUTPATIENT
Start: 2020-09-21 | End: 2021-09-29

## 2020-09-21 RX ORDER — FUROSEMIDE 40 MG/1
40 TABLET ORAL DAILY
Qty: 90 TAB | Refills: 4 | Status: SHIPPED | OUTPATIENT
Start: 2020-09-21 | End: 2020-09-21 | Stop reason: SDUPTHER

## 2020-09-21 NOTE — PROGRESS NOTES
Daniel Villela is a 54 y.o. male who was seen by synchronous (real-time) audio-video technology on 9/21/2020 for Diabetes (2 month follow up ); Hypertension (2 month follow up ); Cholesterol Problem (2 month follow up ); and Labs (7/21/20)        Assessment & Plan:   Diagnoses and all orders for this visit:    1. Type 2 diabetes mellitus with diabetic neuropathy, without long-term current use of insulin (HCC)  -     Increase insulin glargine (Lantus Solostar U-100 Insulin) 100 unit/mL (3 mL) inpn; to 40 Units by SubCUTAneous route nightly. 2. Chronic diastolic congestive heart failure (HCC)  -     furosemide (LASIX) 40 mg tablet; Take 1 Tab by mouth daily. 3. Mixed hyperlipidemia  Stable, cont pres tx plan. 4. Essential hypertension  Stable, cont pres tx plan. 5. Bilateral hip pain  Ortho eval pending    The complexity of medical decision making for this visit is moderate   Follow-up and Dispositions    · Return in about 6 weeks (around 11/2/2020) for diabetes, medication change(s). 712  Subjective:   Patient contacted via doxy. me. Recent labs reviewed in detail. Pt has persisted with dietary indiscretion. His fiancee is very concerned. Recent labs discussed. Prior to Admission medications    Medication Sig Start Date End Date Taking? Authorizing Provider   insulin glargine (Lantus Solostar U-100 Insulin) 100 unit/mL (3 mL) inpn 40 Units by SubCUTAneous route nightly. 9/21/20  Yes Jessica Prather MD   furosemide (LASIX) 40 mg tablet Take 1 Tab by mouth daily. 9/21/20  Yes Inga Marion MD   hydroCHLOROthiazide (HYDRODIURIL) 12.5 mg tablet TAKE ONE TABLET BY MOUTH EVERY DAY 7/10/20  Yes Rossi Nath NP   losartan (COZAAR) 50 mg tablet TAKE ONE TABLET BY MOUTH DAILY 7/10/20  Yes Rossi Nath NP   SITagliptin-metFORMIN (Janumet XR) 50-1,000 mg TM24 Take 1 Tab by mouth daily.  6/2/20  Yes Inga Marion MD   glimepiride (AMARYL) 2 mg tablet TAKE 1 TABLET BY MOUTH EVERY DAY 4/18/20  Yes Amanda Michaud MD   gabapentin (NEURONTIN) 300 mg capsule TAKE TWO CAPSULES BY MOUTH IN THE EVENING 4/18/20  Yes Gurpreet Marion MD   loratadine (CLARITIN) 10 mg tablet TAKE ONE TABLET BY MOUTH EVERY DAY 4/18/20  Yes Gurpreet Marion MD   atorvastatin (LIPITOR) 40 mg tablet TAKE ONE TABLET BY MOUTH EVERY DAY 10/13/19  Yes Amanda Michaud MD   glucose blood VI test strips [facility] BLOOD GLUCOSE SYSTEM) strip Test blood sugar twice daily as directed 6/12/19  Yes Amanda Michaud MD   aspirin (ASPIRIN) 325 mg tablet Take 325 mg by mouth daily. Yes Provider, Historical   Insulin Needles, Disposable, (SURE-FINE PEN NEEDLES) 31 gauge x 5/16\" ndle by Does Not Apply route daily. Use as directed with insulin. 9/28/18  Yes Amanda Michaud MD   multivitamin (ONE A DAY) tablet Take 1 Tab by Mouth Once a Day. Similar alternatives are ok. 9/7/18  Yes Provider, Historical   Blood-Glucose Meter monitoring kit TEST GLUCOSE TWICE DAILY. DX:E11.9 1/11/17  Yes Gurpreet Marion MD   lancets (FREESTYLE LANCETS) 28 gauge misc TEST BLOOD GLUCOSE TWICE DAILY. DX E11.9 1/11/17  Yes Amanda Michaud MD   Lancets Misc Use with Relion Prime Meter. Test Blood sugar Daily. DX: 250.02 9/16/13  Yes Amanda Michaud MD   fluticasone propionate (FLONASE) 50 mcg/actuation nasal spray INHALE 2 SPRAYS into both nostrils EVERY DAY 9/19/19   Amanda Michaud MD   thiamine HCL (B-1) 100 mg tablet Take 1 Tab by mouth daily.  9/20/18   Amanda Michaud MD     Patient Active Problem List   Diagnosis Code    Essential hypertension I10    History of CVA (cerebrovascular accident) Z80.78    History of MI (myocardial infarction) I25.2    Type 2 diabetes mellitus, with long-term current use of insulin ([facility] E11.9, Z79.4    Type 2 diabetes mellitus with diabetic neuropathy ([facility] E11.40    CHF (congestive heart failure) ([address] I50.9    Atherosclerosis of coronary artery I25.10    Acute right-sided low back pain without sciatica M54.5    Right sided abdominal pain R10.9     Current Outpatient Medications   Medication Sig Dispense Refill    insulin glargine (Lantus Solostar U-100 Insulin) 100 unit/mL (3 mL) inpn 40 Units by SubCUTAneous route nightly. 12 Pen 4    furosemide (LASIX) 40 mg tablet Take 1 Tab by mouth daily. 30 Tab 4    hydroCHLOROthiazide (HYDRODIURIL) 12.5 mg tablet TAKE ONE TABLET BY MOUTH EVERY DAY 30 Tab 5    losartan (COZAAR) 50 mg tablet TAKE ONE TABLET BY MOUTH DAILY 30 Tab 5    SITagliptin-metFORMIN (Janumet XR) 50-1,000 mg TM24 Take 1 Tab by mouth daily. 90 Tab 5    glimepiride (AMARYL) 2 mg tablet TAKE 1 TABLET BY MOUTH EVERY DAY 30 Tab 5    gabapentin (NEURONTIN) 300 mg capsule TAKE TWO CAPSULES BY MOUTH IN THE EVENING 60 Cap 5    loratadine (CLARITIN) 10 mg tablet TAKE ONE TABLET BY MOUTH EVERY DAY 30 Tab 5    atorvastatin (LIPITOR) 40 mg tablet TAKE ONE TABLET BY MOUTH EVERY DAY 30 Tab 12    glucose blood VI test strips ([employer] BLOOD GLUCOSE SYSTEM) strip Test blood sugar twice daily as directed 100 Strip 11    aspirin (ASPIRIN) 325 mg tablet Take 325 mg by mouth daily.  Insulin Needles, Disposable, (SURE-FINE PEN NEEDLES) 31 gauge x 5/16\" ndle by Does Not Apply route daily. Use as directed with insulin. 1 Package 12    multivitamin (ONE A DAY) tablet Take 1 Tab by Mouth Once a Day. Similar alternatives are ok.  Blood-Glucose Meter monitoring kit TEST GLUCOSE TWICE DAILY. DX:E11.9 1 Kit 0    lancets (FREESTYLE LANCETS) 28 gauge misc TEST BLOOD GLUCOSE TWICE DAILY. DX E11.9 50 Lancet 12    Lancets Misc Use with Relion Prime Meter. Test Blood sugar Daily. DX: 250.02 50 Each 1    fluticasone propionate (FLONASE) 50 mcg/actuation nasal spray INHALE 2 SPRAYS into both nostrils EVERY DAY 16 g 12    thiamine HCL (B-1) 100 mg tablet Take 1 Tab by mouth daily.  90 Tab 3     Allergies   Allergen Reactions    Lisinopril Cough     Past Medical History:   Diagnosis Date    Arthritis     Chest pain  CHF (congestive heart failure) (HCC)     [facility]cere[facility]) [facility] 2011    r side stroke    CVA (cerebral vascular accident) ([facility] 09/05/2018    ad[facility] at [address] [facility]) 2010    chest pains    Hypercholesterolemia     Hypertension     Loss of hearing     right ear, patient states it comes and goes    Polyuria     Shortness of breath 2011    Type 2 diabetes mellitus without complication, with long-term current use of [facility] 07/23/2013    Type 2 diabetes mellitus, with long-term current use of insulin [facility] 7/23/2013     Past Surgical History:   Procedure Laterality Date    HX HEART CATHETERIZATION       Family History   Problem Relation Age of Onset    Arthritis-osteo Mother     Gout Mother     Diabetes Mother     Hypertension Mother     Diabetes Father     Diabetes Brother     Diabetes Maternal Grandmother      Social History     Tobacco Use    Smoking status: Never Smoker    Smokeless tobacco: Never Used   Substance Use Topics    Alcohol use: Yes     Frequency: Monthly or less     Drinks per session: 1 or 2     Binge frequency: Never     Comment: occassionally       Review of Systems   Constitutional: Negative. HENT: Negative. Respiratory: Negative. Cardiovascular: Negative. All other systems reviewed and are negative. Objective:   No flowsheet data found. General: alert, cooperative, no distress   Mental  status: normal mood, behavior, speech, dress, motor activity, and thought processes, able to follow commands   HENT: NCAT   Neck: no visualized mass   Resp: no respiratory distress   Neuro: no gross deficits   Skin: no discoloration or lesions of concern on visible areas   Psychiatric: normal affect, consistent with stated mood, no evidence of hallucinations     Additional exam findings: none      We discussed the expected course, resolution and complications of the diagnosis(es) in detail.   Medication risks, benefits, costs, interactions, and alternatives were discussed as indicated. I advised him to contact the office if his condition worsens, changes or fails to improve as anticipated. He expressed understanding with the diagnosis(es) and plan. Roney Oconnorantonioradha, who was evaluated through a patient-initiated, synchronous (real-time) audio-video encounter, and/or his healthcare decision maker, is aware that it is a billable service, with coverage as determined by his insurance carrier. He provided verbal consent to proceed: n/a- consent obtained within past 12 months, and patient identification was verified. It was conducted pursuant to the emergency declaration under the [facility], [address] [place] [address] authority and the [employer] General Act. A caregiver was present when appropriate. Ability to conduct physical exam was limited. I was in the office. The patient was at home.       Manpreet Ca MD

## 2020-09-21 NOTE — PROGRESS NOTES
Damon Everett presents today for   Chief Complaint   Patient presents with    Diabetes    Hypertension    Cholesterol Problem    Labs     7/21/20       Is someone accompanying this pt? Yes, mau' Swati. Is the patient using any DME equipment during OV? No    Depression Screening:  3 most recent PHQ Screens 1/15/2020   Little interest or pleasure in doing things Not at all   Feeling down, depressed, irritable, or hopeless Not at all   Total Score PHQ 2 0       Learning Assessment:  Learning Assessment 1/15/2020   PRIMARY LEARNER Patient   HIGHEST LEVEL OF EDUCATION - PRIMARY LEARNER  DID NOT GRADUATE [facility] PRIMARY LEARNER NONE   CO-LEARNER CAREGIVER No   PRIMARY LANGUAGE ENGLISH   LEARNER PREFERENCE PRIMARY LISTENING     -   ANSWERED BY self   RELATIONSHIP SELF       Abuse Screening:  Abuse Screening Questionnaire 9/21/2020   Do you ever feel afraid of your partner? N   Are you in a relationship with someone who physically or mentally threatens you? N   Is it safe for you to go home? Y         Health Maintenance Due   Topic Date Due    Shingrix Vaccine Age 49> (1 of 2) 07/26/2015    Foot Exam Q1  02/13/2018    Eye Exam Retinal or Dilated  09/22/2019    Flu Vaccine (1) 09/01/2020    FOBT Q1Y Age 50-75  08/23/2020   . Health Maintenance reviewed and discussed and ordered per Provider. Coordination of Care  1. Have you been to the ER, urgent care clinic since your last visit? Hospitalized since your last visit? No    2. Have you seen or consulted any other health care providers outside of the [address] [place] [address] since your last visit? Include any pap smears or colon screening. No      Advance Directive:  1. Do you have an advance directive in place? Patient Reply: No    2. If not, would you like material regarding how to put one in place?  Patient Reply: No

## 2020-09-24 ENCOUNTER — OFFICE VISIT (OUTPATIENT)
Dept: ORTHOPEDIC SURGERY | Age: 55
End: 2020-09-24
Payer: COMMERCIAL

## 2020-09-24 VITALS
DIASTOLIC BLOOD PRESSURE: 84 MMHG | OXYGEN SATURATION: 97 % | HEIGHT: 68 IN | WEIGHT: 191.4 LBS | BODY MASS INDEX: 29.01 KG/M2 | SYSTOLIC BLOOD PRESSURE: 139 MMHG | HEART RATE: 79 BPM | TEMPERATURE: 96.4 F

## 2020-09-24 DIAGNOSIS — M25.551 RIGHT HIP PAIN: ICD-10-CM

## 2020-09-24 DIAGNOSIS — M25.551 BILATERAL HIP PAIN: Primary | ICD-10-CM

## 2020-09-24 DIAGNOSIS — M25.552 BILATERAL HIP PAIN: Primary | ICD-10-CM

## 2020-09-24 DIAGNOSIS — M25.552 LEFT HIP PAIN: ICD-10-CM

## 2020-09-24 PROCEDURE — 73523 X-RAY EXAM HIPS BI 5/> VIEWS: CPT | Performed by: ORTHOPAEDIC SURGERY

## 2020-09-24 PROCEDURE — 99204 OFFICE O/P NEW MOD 45 MIN: CPT | Performed by: ORTHOPAEDIC SURGERY

## 2020-09-24 NOTE — PROGRESS NOTES
Patient: Melida Davis                MRN: [id number]       SSN: [id number]  YOB: 1965        AGE: 54 y.o. SEX: male  Body mass index is 29.1 kg/m². PCP: Corby Sorenson MD  09/24/20    HISTORY:   Thank you very much for having me see Cristian Duncan today for opinion and advice in regards to bilateral groin pain. He has had a previous heart failure, small stroke, heart attack in the past and denies drinking. Denies any steroid use or _______________ climbing, deep sea diving. Bilateral groin pain, comes and goes. Often worse when he is walking, difficulty putting shoes and socks on. Occasionally pain at night, and denies having much trauma. Systems reviewed and negative for fevers, chills, night sweats, or weight loss. Denies migratory arthralgias as well. PHYSICAL EXAMINATION:  Today:  His right knee is in varus. Mildly antalgic gait to the right side. The hips have a little bit of restricted range of motion. He has about 10-12 degrees of internal rotation after which it reproduces some mild groin pain, but otherwise within the range of motion is relatively comfortable. He does have some mild tenderness over the right greater trochanter. Calf is nontender. Homans sign is negative. No evidence for infection or DVT, and the low back is minimally tender. Sensation is grossly intact, and IT band and EHL 5/5. RADIOGRAPHS:  X-rays today on 09/24/2020, AP pelvis, AP and lateral of each hip show moderate degenerative changes, but not severely so. There is some shadowing and cystic activity in both femoral heads. I would like to rule out avascular necrosis associated with this. 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 PLAN:  We are going to see him back after the MRI of his hips and when he returns, we are going to look at the MRI of his hips and also get weightbearing x-rays especially of the right knee. CC: Carlita Marion, MD        REVIEW OF SYSTEMS:      CON: negative  EYE: negative   ENT: negative  RESP: negative  GI:    negative   :  negative  MSK: Positive  A twelve point review of systems was completed, positives noted and all other systems were reviewed and are negative          Past Medical History:   Diagnosis Date    Arthritis     Chest pain     [facility]     CVA [facility]cerebral vascular accident) ([facility] 2011    r side stroke    CVA (cerebral vascular accident) ([facility] 09/05/2018    admitted at [address] [place], [place] [address]y attack [facility]) 2010    chest pains    Hypercholesterolemia     Hypertension     Loss of hearing     right ear, patient states it comes and goes    Polyuria     Shortness of breath 2011    Type 2 diabetes mellitus without complication, with long-term current use of insulin ([facility] 07/23/2013    Type 2 diabetes mellitus, with long-term current use of insulin ([facility] 7/23/2013       Family History   Problem Relation Age of Onset    Arthritis-osteo Mother     Gout Mother     Diabetes Mother     Hypertension Mother     Diabetes Father     Diabetes Brother     Diabetes Maternal Grandmother        Current Outpatient Medications   Medication Sig Dispense Refill    glimepiride (AMARYL) 2 mg tablet TAKE 1 TABLET BY MOUTH EVERY DAY 90 Tab 4    insulin glargine (Lantus Solostar U-100 Insulin) 100 unit/mL (3 mL) inpn 40 Units by SubCUTAneous route nightly. 12 Pen 4    furosemide (LASIX) 40 mg tablet Take 1 Tab by mouth daily. 30 Tab 4    hydroCHLOROthiazide (HYDRODIURIL) 12.5 mg tablet TAKE ONE TABLET BY MOUTH EVERY DAY 30 Tab 5    losartan (COZAAR) 50 mg tablet TAKE ONE TABLET BY MOUTH DAILY 30 Tab 5    SITagliptin-metFORMIN (Janumet XR) 50-1,000 mg TM24 Take 1 Tab by mouth daily.  90 Tab 5    gabapentin (NEURONTIN) 300 mg capsule TAKE TWO CAPSULES BY MOUTH IN THE EVENING 60 Cap 5    loratadine (CLARITIN) 10 mg tablet TAKE ONE TABLET BY MOUTH EVERY DAY 30 Tab 5    atorvastatin (LIPITOR) 40 mg tablet TAKE ONE TABLET BY MOUTH EVERY DAY 30 Tab 12    fluticasone propionate (FLONASE) 50 mcg/actuation nasal spray INHALE 2 SPRAYS into both nostrils EVERY DAY 16 g 12    glucose blood VI test strips ([employer] BLOOD GLUCOSE SYSTEM) strip Test blood sugar twice daily as directed 100 Strip 11    aspirin (ASPIRIN) 325 mg tablet Take 325 mg by mouth daily.  Insulin Needles, Disposable, (SURE-FINE PEN NEEDLES) 31 gauge x 5/16\" ndle by Does Not Apply route daily. Use as directed with insulin. 1 Package 12    multivitamin (ONE A DAY) tablet Take 1 Tab by Mouth Once a Day. Similar alternatives are ok.  thiamine HCL (B-1) 100 mg tablet Take 1 Tab by mouth daily. 90 Tab 3    Blood-Glucose Meter monitoring kit TEST GLUCOSE TWICE DAILY. DX:E11.9 1 Kit 0    lancets (FREESTYLE LANCETS) 28 gauge misc TEST BLOOD GLUCOSE TWICE DAILY. DX E11.9 50 Lancet 12    Lancets Misc Use with Relion Prime Meter. Test Blood sugar Daily.  DX: 250.02 50 Each 1       Allergies   Allergen Reactions    Lisinopril Cough       Past Surgical History:   Procedure Laterality Date    HX HEART CATHETERIZATION         Social History     Socioeconomic History    Marital status: LEGALLY      Spouse name: Not on file    Number of children: Not on file    Years of education: Not on file    Highest education level: Not on file   Occupational History    Occupation: disabled     Comment: prior ; disabled after CVA   Social Needs    Financial resource strain: Not on file    Food insecurity     Worry: Not on file     Inability: Not on file   [employer] needs     Medical: Not on file     Non-medical: Not on file   Tobacco Use    Smoking status: Never Smoker    Smokeless tobacco: Never Used   Substance and Sexual Activity    Alcohol use: Yes     Frequency: Monthly or less     Drinks per session: 1 or 2     Binge frequency: Never     Comment: occassionally    Drug use: Not Currently     Types: Prescription, OTC, Marijuana    Sexual activity: Yes   Lifestyle    Physical activity     Days per week: Not on file     Minutes per session: Not on file    Stress: Not on file   Relationships    Social connections     Talks on phone: Not on file     Gets together: Not on file     Attends Religion service: Not on file     Active member of club or organization: Not on file     Attends meetings of clubs or organizations: Not on file     Relationship status: Not on file    Intimate partner violence     Fear of current or ex partner: Not on file     Emotionally abused: Not on file     Physically abused: Not on file     Forced sexual activity: Not on file   Other Topics Concern    Not on file   Social History Narrative    Not on file       Visit Vitals  /84 (BP 1 Location: Right arm, BP Patient Position: Sitting)   Pulse 79   Temp (!) 96.4 °F (35.8 °C) (Temporal)   Ht 5' 8\" (1.727 m)   Wt 191 lb 6.4 oz (86.8 kg)   SpO2 97%   BMI 29.10 kg/m²         PHYSICAL EXAMINATION:  GENERAL: Alert and oriented x3, in no acute distress, well-developed, well-nourished, afebrile. HEART: No JVD. EYES: No scleral icterus   NECK: No significant lymphadenopathy   LUNGS: No respiratory compromise or indrawing  ABDOMEN: Soft, non-tender, non-distended. Electronically signed by:  Massiel Chicas MD

## 2020-10-01 DIAGNOSIS — M25.552 LEFT HIP PAIN: ICD-10-CM

## 2020-10-01 DIAGNOSIS — M25.551 RIGHT HIP PAIN: ICD-10-CM

## 2020-10-20 ENCOUNTER — OFFICE VISIT (OUTPATIENT)
Dept: CARDIOLOGY CLINIC | Age: 55
End: 2020-10-20
Payer: COMMERCIAL

## 2020-10-20 VITALS
OXYGEN SATURATION: 95 % | HEART RATE: 86 BPM | SYSTOLIC BLOOD PRESSURE: 153 MMHG | WEIGHT: 192.6 LBS | TEMPERATURE: 97 F | DIASTOLIC BLOOD PRESSURE: 81 MMHG | BODY MASS INDEX: 29.19 KG/M2 | HEIGHT: 68 IN

## 2020-10-20 DIAGNOSIS — I10 ESSENTIAL HYPERTENSION: Primary | ICD-10-CM

## 2020-10-20 DIAGNOSIS — E78.5 DYSLIPIDEMIA: ICD-10-CM

## 2020-10-20 DIAGNOSIS — Z79.4 TYPE 2 DIABETES MELLITUS WITHOUT COMPLICATION, WITH LONG-TERM CURRENT USE OF INSULIN (HCC): ICD-10-CM

## 2020-10-20 DIAGNOSIS — R94.39 ABNORMAL STRESS TEST: ICD-10-CM

## 2020-10-20 DIAGNOSIS — Z86.73 HISTORY OF CVA (CEREBROVASCULAR ACCIDENT): ICD-10-CM

## 2020-10-20 DIAGNOSIS — E11.9 TYPE 2 DIABETES MELLITUS WITHOUT COMPLICATION, WITH LONG-TERM CURRENT USE OF INSULIN (HCC): ICD-10-CM

## 2020-10-20 DIAGNOSIS — R06.02 SOB (SHORTNESS OF BREATH): ICD-10-CM

## 2020-10-20 PROCEDURE — 93000 ELECTROCARDIOGRAM COMPLETE: CPT | Performed by: INTERNAL MEDICINE

## 2020-10-20 PROCEDURE — 99214 OFFICE O/P EST MOD 30 MIN: CPT | Performed by: INTERNAL MEDICINE

## 2020-10-20 NOTE — PROGRESS NOTES
HISTORY OF PRESENT ILLNESS  Armida Littlejohn is a 54 y.o. male. Hypertension   The history is provided by the patient. This is a chronic problem. The problem occurs every several days. The problem has not changed since onset. Associated symptoms include shortness of breath. Shortness of Breath   The history is provided by the patient. This is a chronic problem. The problem occurs frequently. The problem has been gradually worsening. Pertinent negatives include no fever, no ear pain, no neck pain, no cough, no sputum production, no hemoptysis, no wheezing, no PND, no orthopnea, no vomiting, no rash, no leg swelling and no claudication. Review of Systems   Constitutional: Negative for chills, diaphoresis, fever and weight loss. HENT: Negative for ear pain and hearing loss. Eyes: Negative for blurred vision. Respiratory: Positive for shortness of breath. Negative for cough, hemoptysis, sputum production, wheezing and stridor. Cardiovascular: Negative for palpitations, orthopnea, claudication, leg swelling and PND. Gastrointestinal: Negative for heartburn, nausea and vomiting. Musculoskeletal: Negative for myalgias and neck pain. Skin: Negative for rash. Neurological: Negative for dizziness, tingling, tremors, focal weakness, loss of consciousness and weakness. Psychiatric/Behavioral: Negative for depression and suicidal ideas.      Family History   Problem Relation Age of Onset    Arthritis-osteo Mother     Gout Mother     Diabetes Mother     Hypertension Mother     Diabetes Father     Diabetes Brother     Diabetes Maternal Grandmother        Past Medical History:   Diagnosis Date    Arthritis     Chest pain     [facility]     CVA (cerebral vascular accident) ([facility] 2011    r side stroke    CVA (cerebral vascular accident) ([facility] 09/05/2018    admitted at [address] [place] [address] [facility]) 2010    chest pains    Hypercholesterolemia     Hypertension     Loss of hearing     right ear, patient states it comes and goes    Polyuria     Shortness of breath 2011    Type 2 diabetes mellitus without complication, with long-term current use of insulin ([facility] 07/23/2013    Type 2 diabetes mellitus, with long-term current use of insulin ([facility] 7/23/2013       Past Surgical History:   Procedure Laterality Date    HX HEART CATHETERIZATION         Social History     Tobacco Use    Smoking status: Never Smoker    Smokeless tobacco: Never Used   Substance Use Topics    Alcohol use: Yes     Frequency: Monthly or less     Drinks per session: 1 or 2     Binge frequency: Never     Comment: occassionally       Allergies   Allergen Reactions    Lisinopril Cough       Outpatient Medications Marked as Taking for the 10/20/20 encounter (Office Visit) with Tenzin Rowland MD   Medication Sig Dispense Refill    glimepiride (AMARYL) 2 mg tablet TAKE 1 TABLET BY MOUTH EVERY DAY 90 Tab 4    insulin glargine (Lantus Solostar U-100 Insulin) 100 unit/mL (3 mL) inpn 40 Units by SubCUTAneous route nightly. 12 Pen 4    furosemide (LASIX) 40 mg tablet Take 1 Tab by mouth daily. 30 Tab 4    hydroCHLOROthiazide (HYDRODIURIL) 12.5 mg tablet TAKE ONE TABLET BY MOUTH EVERY DAY 30 Tab 5    losartan (COZAAR) 50 mg tablet TAKE ONE TABLET BY MOUTH DAILY 30 Tab 5    SITagliptin-metFORMIN (Janumet XR) 50-1,000 mg TM24 Take 1 Tab by mouth daily. 90 Tab 5    gabapentin (NEURONTIN) 300 mg capsule TAKE TWO CAPSULES BY MOUTH IN THE EVENING 60 Cap 5    loratadine (CLARITIN) 10 mg tablet TAKE ONE TABLET BY MOUTH EVERY DAY 30 Tab 5    atorvastatin (LIPITOR) 40 mg tablet TAKE ONE TABLET BY MOUTH EVERY DAY 30 Tab 12    glucose blood VI test strips (EMBRACE BLOOD GLUCOSE SYSTEM) strip Test blood sugar twice daily as directed 100 Strip 11    aspirin (ASPIRIN) 325 mg tablet Take 325 mg by mouth daily.       Insulin Needles, Disposable, (SURE-FINE PEN NEEDLES) 31 gauge x 5/16\" ndle by Does Not Apply route daily. Use as directed with insulin. 1 Package 12    multivitamin (ONE A DAY) tablet Take 1 Tab by Mouth Once a Day. Similar alternatives are ok.  thiamine HCL (B-1) 100 mg tablet Take 1 Tab by mouth daily. 90 Tab 3    Blood-Glucose Meter monitoring kit TEST GLUCOSE TWICE DAILY. DX:E11.9 1 Kit 0    lancets (FREESTYLE LANCETS) 28 gauge misc TEST BLOOD GLUCOSE TWICE DAILY. DX E11.9 50 Lancet 12    Lancets Misc Use with Relion Prime Meter. Test Blood sugar Daily. DX: 250.02 50 Each 1        Visit Vitals  BP (!) 153/81 (BP 1 Location: Left arm, BP Patient Position: Sitting)   Pulse 86   Temp 97 °F (36.1 °C) (Temporal)   Ht 5' 8\" (1.727 m)   Wt 87.4 kg (192 lb 9.6 oz)   SpO2 95%   BMI 29.28 kg/m²       Physical Exam   Constitutional: He is oriented to person, place, and time. He appears well-developed and well-nourished. No distress. HENT:   Head: Atraumatic. Mouth/Throat: No oropharyngeal exudate. Eyes: Conjunctivae are normal. No scleral icterus. Neck: Normal range of motion. Neck supple. No JVD present. No tracheal deviation present. No thyromegaly present. Cardiovascular: Normal rate and regular rhythm. Exam reveals no gallop. No murmur heard. Pulmonary/Chest: Effort normal and breath sounds normal. No stridor. He has no wheezes. He has no rales. Abdominal: Soft. There is no abdominal tenderness. There is no rebound and no guarding. Musculoskeletal: Normal range of motion. General: No tenderness or edema. Neurological: He is alert and oriented to person, place, and time. He exhibits normal muscle tone. Skin: Skin is warm. He is not diaphoretic. Psychiatric: He has a normal mood and affect. His behavior is normal.     9/2018 - 14 day event - NSR  Echo 09/2018:  NORMAL LEFT VENTRICULAR CAVITY SIZE AND SYSTOLIC FUNCTION WITH AN EJECTION FRACTION   VISUALLY ESTIMATED AT 60%. NORMAL DIASTOLIC FUNCTION. MILD LEFT VENTRICULAR HYPERTROPHY PRESENT.    TRACE MITRAL REGURGITATION. MILDLY SCLEROTIC TRILEAFLET AORTIC VALVE. INSUFFICIENT TRICUSPID REGURGITANT JET TO ESTIMATE PULMONARY ARTERY PRESSURE. NEGATIVE BUBBLE STUDY ON PRIOR ECHO REPORT 4/09/2012. NO SIGNIFICANT CHANGES FROM PRIOR ECHO REPORT ON 4/09/2012. Lexiscan 2018-  THIS MYOCARDIAL PERFUSION STUDY IS ABNORMAL   THIS IS A MEDIUM RISK STUDY   ABNORMAL NUCLEAR SCAN WITH  A SMALL AREA OF NON PERFUSION ON BOTH THE STRESS AND REST IMAGES   IN THE INFERIOR APICAL WALL CONSISTENT WITH EITHER PRIOR INFERIOR APICAL INFARCTION OR HIGH GRADE   OCCLUSION TO THE DISTAL RCA        INFERIOR APICAL AKINESIS WITH AN EJECTION FRACTION  ESTIMATED AT   50%  ASSESSMENT and PLAN    ICD-10-CM ICD-9-CM    1. Essential hypertension  I10 401.9 AMB POC EKG ROUTINE W/ 12 LEADS, INTER & REP   2. History of CVA (cerebrovascular accident)  Z86.73 V12.54    3. Type 2 diabetes mellitus without complication, with long-term current use of insulin (HCC)  E11.9 250.00     Z79.4 V58.67    4. Abnormal stress test  R94.39 794.39    5. Dyslipidemia  E78.5 272.4    6. SOB (shortness of breath)  R06.02 786.05 NUCLEAR CARDIAC STRESS TEST     Orders Placed This Encounter    AMB POC EKG ROUTINE W/ 12 LEADS, INTER & REP     Order Specific Question:   Reason for Exam:     Answer:   HTN     Follow-up and Dispositions    · Return in about 1 week (around 10/27/2020). current treatment plan is effective, no change in therapy  reviewed diet, exercise and weight control    cardiovascular risk and specific lipid/LDL goals reviewed  use of aspirin to prevent MI and TIA's discussed. Patient seen for follow-up. Has prior abnormal nuclear stress test.    Complains of worsening exertional dyspnea while cutting grass. Has occasional chest tightness. Given history of diabetes and prior abnormal stress test we will proceed with repeat imaging to assess CAD. Meanwhile patient advised to continue salt restriction weight reduction.   Will readjust blood pressure medication as needed in the next appointment.

## 2020-10-20 NOTE — PROGRESS NOTES
1. Have you been to the ER, urgent care clinic since your last visit? Hospitalized since your last visit? No    2. Have you seen or consulted any other health care providers outside of the [address] [place] [address] since your last visit? Include any pap smears or colon screening.  No

## 2020-10-23 ENCOUNTER — OFFICE VISIT (OUTPATIENT)
Dept: ORTHOPEDIC SURGERY | Age: 55
End: 2020-10-23
Payer: COMMERCIAL

## 2020-10-23 VITALS
BODY MASS INDEX: 31.02 KG/M2 | HEART RATE: 92 BPM | TEMPERATURE: 96.4 F | HEIGHT: 66 IN | OXYGEN SATURATION: 97 % | SYSTOLIC BLOOD PRESSURE: 151 MMHG | DIASTOLIC BLOOD PRESSURE: 96 MMHG | RESPIRATION RATE: 17 BRPM | WEIGHT: 193 LBS

## 2020-10-23 DIAGNOSIS — M25.552 LEFT HIP PAIN: ICD-10-CM

## 2020-10-23 DIAGNOSIS — M25.551 RIGHT HIP PAIN: ICD-10-CM

## 2020-10-23 DIAGNOSIS — M25.561 RIGHT KNEE PAIN, UNSPECIFIED CHRONICITY: Primary | ICD-10-CM

## 2020-10-23 PROCEDURE — 73564 X-RAY EXAM KNEE 4 OR MORE: CPT | Performed by: PHYSICIAN ASSISTANT

## 2020-10-23 PROCEDURE — 99214 OFFICE O/P EST MOD 30 MIN: CPT | Performed by: PHYSICIAN ASSISTANT

## 2020-10-23 NOTE — PROGRESS NOTES
[address] [place] [address]  [phone]           Patient: William Davis                MRN: [id number]       SSN: [id number]  YOB: 1965        AGE: 54 y.o. SEX: male  Body mass index is 31.15 kg/m². PCP: Paco Ortega MD  10/23/20      This office note has been dictated. REVIEW OF SYSTEMS:  Constitutional: Negative for fever, chills, weight loss and malaise/fatigue. HENT: Negative. Eyes: Negative. Respiratory: Negative. Cardiovascular: Negative. Gastrointestinal: No bowel incontinence or constipation. Genitourinary: No bladder incontinence or saddle anesthesia. Skin: Negative. Neurological: Negative. Endo/Heme/Allergies: Negative. Psychiatric/Behavioral: Negative. Musculoskeletal: As per HPI above. Past Medical History:   Diagnosis Date    Arthritis     Chest pain     [facility]     [facility]cere[facility] 2011    r side stroke    CVA (cerebral vascular accident) ([facility] 09/05/2018    admitted at [address] [place] [address] Heart attack [facility]) 2010    chest pains    Hypercholesterolemia     Hypertension     Loss of hearing     right ear, patient states it comes and goes    Polyuria     Shortness of breath 2011    Type 2 diabetes mellitus without complication, with long-term current use of insulin ([facility] 07/23/2013    Type 2 diabetes mellitus, with long-term current use of insulin ([facility]) 7/23/2013         Current Outpatient Medications:     glimepiride (AMARYL) 2 mg tablet, TAKE 1 TABLET BY MOUTH EVERY DAY, Disp: 90 Tab, Rfl: 4    insulin glargine (Lantus Solostar U-100 Insulin) 100 unit/mL (3 mL) inpn, 40 Units by SubCUTAneous route nightly., Disp: 12 Pen, Rfl: 4    furosemide (LASIX) 40 mg tablet, Take 1 Tab by mouth daily. , Disp: 30 Tab, Rfl: 4    hydroCHLOROthiazide (HYDRODIURIL) 12.5 mg tablet, TAKE ONE TABLET BY MOUTH EVERY DAY, Disp: 30 Tab, Rfl: 5    losartan (COZAAR) 50 mg tablet, TAKE ONE TABLET BY MOUTH DAILY, Disp: 30 Tab, Rfl: 5    SITagliptin-metFORMIN (Janumet XR) 50-1,000 mg TM24, Take 1 Tab by mouth daily. , Disp: 90 Tab, Rfl: 5    gabapentin (NEURONTIN) 300 mg capsule, TAKE TWO CAPSULES BY MOUTH IN THE EVENING, Disp: 60 Cap, Rfl: 5    loratadine (CLARITIN) 10 mg tablet, TAKE ONE TABLET BY MOUTH EVERY DAY, Disp: 30 Tab, Rfl: 5    atorvastatin (LIPITOR) 40 mg tablet, TAKE ONE TABLET BY MOUTH EVERY DAY, Disp: 30 Tab, Rfl: 12    aspirin (ASPIRIN) 325 mg tablet, Take 325 mg by mouth daily. , Disp: , Rfl:     multivitamin (ONE A DAY) tablet, Take 1 Tab by Mouth Once a Day. Similar alternatives are ok., Disp: , Rfl:     fluticasone propionate (FLONASE) 50 mcg/actuation nasal spray, INHALE 2 SPRAYS into both nostrils EVERY DAY, Disp: 16 g, Rfl: 12    glucose blood VI test strips ([employer]utus BLOOD GLUCOSE SYSTEM) strip, Test blood sugar twice daily as directed, Disp: 100 Strip, Rfl: 11    Insulin Needles, Disposable, (SURE-FINE PEN NEEDLES) 31 gauge x 5/16\" ndle, by Does Not Apply route daily. Use as directed with insulin., Disp: 1 Package, Rfl: 12    thiamine HCL (B-1) 100 mg tablet, Take 1 Tab by mouth daily. , Disp: 90 Tab, Rfl: 3    Blood-Glucose Meter monitoring kit, TEST GLUCOSE TWICE DAILY. DX:E11.9, Disp: 1 Kit, Rfl: 0    lancets (FREESTYLE LANCETS) 28 gauge misc, TEST BLOOD GLUCOSE TWICE DAILY. DX E11.9, Disp: 50 Lancet, Rfl: 12    Lancets Misc, Use with Relion Prime Meter. Test Blood sugar Daily.  DX: 250.02, Disp: 50 Each, Rfl: 1    Allergies   Allergen Reactions    Lisinopril Cough       Social History     Socioeconomic History    Marital status: LEGALLY      Spouse name: Not on file    Number of children: Not on file    Years of education: Not on file    Highest education level: Not on file   Occupational History    Occupation: disabled     Comment: prior ; disabled after CVA   Social Needs    Financial resource strain: Not on file    Food insecurity     Worry: Not on file     Inability: Not on file    Transportation needs     Medical: Not on file     Non-medical: Not on file   Tobacco Use    Smoking status: Never Smoker    Smokeless tobacco: Never Used   Substance and Sexual Activity    Alcohol use: Yes     Frequency: Monthly or less     Drinks per session: 1 or 2     Binge frequency: Never     Comment: occassionally    Drug use: Not Currently     Types: Prescription, OTC, Marijuana    Sexual activity: Yes   Lifestyle    Physical activity     Days per week: Not on file     Minutes per session: Not on file    Stress: Not on file   Relationships    Social connections     Talks on phone: Not on file     Gets together: Not on file     Attends Holiness service: Not on file     Active member of club or organization: Not on file     Attends meetings of clubs or organizations: Not on file     Relationship status: Not on file    Intimate partner violence     Fear of current or ex partner: Not on file     Emotionally abused: Not on file     Physically abused: Not on file     Forced sexual activity: Not on file   Other Topics Concern    Not on file   Social History Narrative    Not on file       Past Surgical History:   Procedure Laterality Date    HX HEART CATHETERIZATION           Patient seen evaluate today for bilateral hip pain. He was seen recently by Dr. El Lucas and MRI was discussed however did not have it done. He reports continuation of groin pain bilaterally. It is intermittent. It occurs with ambulation as well as at rest.  He denies any back pain. Denies any radiating pain down the lower extremities. There have been no changes in bowel or bladder habits. Patient denies recent fevers, chills, chest pain, SOB, or injuries. No recent systemic changes noted. A 12-point review of systems is performed today. Pertinent positives are noted.   All other systems reviewed and otherwise are negative. Physical exam: General: Alert and oriented x3, nad.  well-developed, well nourished. normal affect, AF. NC/AT, EOMI, neck supple, trachea midline, no JVD present. Breathing is non-labored. Examination of lower extremities reveals pretty well-maintained range of motion of the hips. Does have some slight discomfort with forced internal rotation. Negative straight leg raise. Negative calf tenderness. Negative Homans. No signs of DVT present. Range of motion intact. There is no erythema, ecchymosis, warmth. There are no signs of infection or cellulitis present. There is no real tenderness palpation about the knee. Review of previous radiographs of the hips show some shadowing the femoral head bilaterally. No acute abnormalities noted. Radiographs obtained in the office today 10/23/2020 at the [facility] location including AP, tunnel, lateral, skyline of the right knee shows no acute bony abnormalities with only mild arthritis noted. Assessment: Bilateral hip pain, rule out AVN    Plan: At this point, we will move forward with obtain an MRI of each of the hips. We will see him back afterwards for review.               JR Wilfrido HASKINS, [place], ATC

## 2020-10-27 DIAGNOSIS — R06.02 SOB (SHORTNESS OF BREATH): ICD-10-CM

## 2020-10-28 DIAGNOSIS — G63 POLYNEUROPATHY ASSOCIATED WITH UNDERLYING DISEASE (HCC): ICD-10-CM

## 2020-10-28 RX ORDER — GABAPENTIN 300 MG/1
CAPSULE ORAL
Qty: 60 CAP | Refills: 5 | Status: SHIPPED | OUTPATIENT
Start: 2020-10-28 | End: 2020-12-17

## 2020-10-30 ENCOUNTER — OFFICE VISIT (OUTPATIENT)
Dept: CARDIOLOGY CLINIC | Age: 55
End: 2020-10-30

## 2020-10-30 VITALS
SYSTOLIC BLOOD PRESSURE: 163 MMHG | HEIGHT: 68 IN | DIASTOLIC BLOOD PRESSURE: 91 MMHG | WEIGHT: 192 LBS | BODY MASS INDEX: 29.1 KG/M2 | HEART RATE: 96 BPM | TEMPERATURE: 97.9 F

## 2020-10-30 DIAGNOSIS — Z86.73 HISTORY OF CVA (CEREBROVASCULAR ACCIDENT): ICD-10-CM

## 2020-10-30 DIAGNOSIS — R94.39 ABNORMAL STRESS TEST: ICD-10-CM

## 2020-10-30 DIAGNOSIS — I20.8 STABLE ANGINA PECTORIS (HCC): ICD-10-CM

## 2020-10-30 DIAGNOSIS — E78.5 DYSLIPIDEMIA: ICD-10-CM

## 2020-10-30 DIAGNOSIS — I10 ESSENTIAL HYPERTENSION: Primary | ICD-10-CM

## 2020-10-30 DIAGNOSIS — Z79.4 TYPE 2 DIABETES MELLITUS WITHOUT COMPLICATION, WITH LONG-TERM CURRENT USE OF INSULIN (HCC): ICD-10-CM

## 2020-10-30 DIAGNOSIS — M25.552 LEFT HIP PAIN: ICD-10-CM

## 2020-10-30 DIAGNOSIS — E11.9 TYPE 2 DIABETES MELLITUS WITHOUT COMPLICATION, WITH LONG-TERM CURRENT USE OF INSULIN (HCC): ICD-10-CM

## 2020-10-30 DIAGNOSIS — M25.551 RIGHT HIP PAIN: ICD-10-CM

## 2020-10-30 LAB
NUC STRESS DIASTOLIC VOLUME INDEX: 110 ML/M2
NUC STRESS SYSTOLIC VOLUME INDEX: 61 ML/M2
STRESS BASELINE DIAS BP: 86 MMHG
STRESS BASELINE HR: 78 BPM
STRESS BASELINE SYS BP: 130 MMHG
STRESS PEAK DIAS BP: 76 MMHG
STRESS PEAK SYS BP: 143 MMHG
STRESS PERCENT HR ACHIEVED: 75 %
STRESS POST PEAK HR: 123 BPM
STRESS RATE PRESSURE PRODUCT: NORMAL BPM*MMHG
STRESS ST DEPRESSION: 0 MM
STRESS ST ELEVATION: 0 MM
STRESS STAGE 1 BP: NORMAL MMHG
STRESS STAGE 1 HR: 107 BPM
STRESS STAGE RECOVERY 1 BP: NORMAL MMHG
STRESS STAGE RECOVERY 1 HR: 116 BPM
STRESS STAGE RECOVERY 2 BP: NORMAL MMHG
STRESS STAGE RECOVERY 2 HR: 104 BPM
STRESS TARGET HR: 165 BPM

## 2020-10-30 PROCEDURE — 99214 OFFICE O/P EST MOD 30 MIN: CPT | Performed by: INTERNAL MEDICINE

## 2020-10-30 RX ORDER — METOPROLOL TARTRATE 25 MG/1
25 TABLET, FILM COATED ORAL 2 TIMES DAILY
Qty: 60 TAB | Refills: 2 | Status: SHIPPED | OUTPATIENT
Start: 2020-10-30 | End: 2022-02-11

## 2020-10-30 NOTE — PROGRESS NOTES
HISTORY OF PRESENT ILLNESS  Billy Caceres is a 54 y.o. male. Hypertension   The history is provided by the patient. This is a chronic problem. The problem occurs every several days. The problem has not changed since onset. Associated symptoms include shortness of breath. Shortness of Breath   The history is provided by the patient. This is a chronic problem. The problem occurs frequently. The problem has been gradually worsening. Pertinent negatives include no fever, no ear pain, no neck pain, no cough, no sputum production, no hemoptysis, no wheezing, no PND, no orthopnea, no vomiting, no rash, no leg swelling and no claudication. Review of Systems   Constitutional: Negative for chills, diaphoresis, fever and weight loss. HENT: Negative for ear pain and hearing loss. Eyes: Negative for blurred vision. Respiratory: Positive for shortness of breath. Negative for cough, hemoptysis, sputum production, wheezing and stridor. Cardiovascular: Negative for palpitations, orthopnea, claudication, leg swelling and PND. Gastrointestinal: Negative for heartburn, nausea and vomiting. Musculoskeletal: Negative for myalgias and neck pain. Skin: Negative for rash. Neurological: Negative for dizziness, tingling, tremors, focal weakness, loss of consciousness and weakness. Psychiatric/Behavioral: Negative for depression and suicidal ideas.      Family History   Problem Relation Age of Onset    Arthritis-osteo Mother     Gout Mother     Diabetes Mother     Hypertension Mother     Diabetes Father     Diabetes Brother     Diabetes Maternal Grandmother        Past Medical History:   Diagnosis Date    Arthritis     Chest pain     CHF (congestive heart failure) ([address]     CVA (cerebral vascular accident) [facility] [facility] 2011    r side stroke    CVA (cerebral vascular accident) ([facility] [address] 09/05/2018    admitted at [address] [place] [address] attack [facility]) 2010    chest pains    Hypercholesterolemia     Hypertension     Loss of hearing     right ear, patient states it comes and goes    Polyuria     Shortness of breath 2011    Type 2 diabetes mellitus without complication, with long-term current use of insulin ([facility] [address] 07/23/2013    Type 2 diabetes mellitus, with long-term current use of insulin ([facility] [address] 7/23/2013       Past Surgical History:   Procedure Laterality Date    HX HEART CATHETERIZATION         Social History     Tobacco Use    Smoking status: Never Smoker    Smokeless tobacco: Never Used   Substance Use Topics    Alcohol use: Yes     Frequency: Monthly or less     Drinks per session: 1 or 2     Binge frequency: Never     Comment: occassionally       Allergies   Allergen Reactions    Lisinopril Cough       Outpatient Medications Marked as Taking for the 10/30/20 encounter (Office Visit) with Sandra Byrnes MD   Medication Sig Dispense Refill    metoprolol tartrate (LOPRESSOR) 25 mg tablet Take 1 Tab by mouth two (2) times a day. 60 Tab 2    gabapentin (NEURONTIN) 300 mg capsule TAKE TWO CAPSULES BY MOUTH IN THE EVENING 60 Cap 5    glimepiride (AMARYL) 2 mg tablet TAKE 1 TABLET BY MOUTH EVERY DAY 90 Tab 4    insulin glargine (Lantus Solostar U-100 Insulin) 100 unit/mL (3 mL) inpn 40 Units by SubCUTAneous route nightly. 12 Pen 4    furosemide (LASIX) 40 mg tablet Take 1 Tab by mouth daily. 30 Tab 4    hydroCHLOROthiazide (HYDRODIURIL) 12.5 mg tablet TAKE ONE TABLET BY MOUTH EVERY DAY 30 Tab 5    losartan (COZAAR) 50 mg tablet TAKE ONE TABLET BY MOUTH DAILY 30 Tab 5    SITagliptin-metFORMIN (Janumet XR) 50-1,000 mg TM24 Take 1 Tab by mouth daily.  90 Tab 5    loratadine (CLARITIN) 10 mg tablet TAKE ONE TABLET BY MOUTH EVERY DAY 30 Tab 5    atorvastatin (LIPITOR) 40 mg tablet TAKE ONE TABLET BY MOUTH EVERY DAY 30 Tab 12    fluticasone propionate (FLONASE) 50 mcg/actuation nasal spray INHALE 2 SPRAYS into both nostrils EVERY DAY 16 g 12    glucose blood VI test strips ([employer] BLOOD GLUCOSE SYSTEM) strip Test blood sugar twice daily as directed 100 Strip 11    aspirin (ASPIRIN) 325 mg tablet Take 325 mg by mouth daily.  Insulin Needles, Disposable, (SURE-FINE PEN NEEDLES) 31 gauge x 5/16\" ndle by Does Not Apply route daily. Use as directed with insulin. 1 Package 12    multivitamin (ONE A DAY) tablet Take 1 Tab by Mouth Once a Day. Similar alternatives are ok.  thiamine HCL (B-1) 100 mg tablet Take 1 Tab by mouth daily. 90 Tab 3    Blood-Glucose Meter monitoring kit TEST GLUCOSE TWICE DAILY. DX:E11.9 1 Kit 0    lancets (FREESTYLE LANCETS) 28 gauge misc TEST BLOOD GLUCOSE TWICE DAILY. DX E11.9 50 Lancet 12    Lancets Misc Use with Relion Prime Meter. Test Blood sugar Daily. DX: 250.02 50 Each 1        Visit Vitals  BP (!) 163/91   Pulse 96   Temp 97.9 °F (36.6 °C) (Temporal)   Ht 5' 8\" (1.727 m)   Wt 87.1 kg (192 lb)   BMI 29.19 kg/m²       Physical Exam   Constitutional: He is oriented to person, place, and time. He appears well-developed and well-nourished. No distress. HENT:   Head: Atraumatic. Mouth/Throat: No oropharyngeal exudate. Eyes: Conjunctivae are normal. No scleral icterus. Neck: Normal range of motion. Neck supple. No JVD present. No tracheal deviation present. No thyromegaly present. Cardiovascular: Normal rate and regular rhythm. Exam reveals no gallop. No murmur heard. Pulmonary/Chest: Effort normal and breath sounds normal. No stridor. He has no wheezes. He has no rales. Abdominal: Soft. There is no abdominal tenderness. There is no rebound and no guarding. Musculoskeletal: Normal range of motion. General: No tenderness or edema. Neurological: He is alert and oriented to person, place, and time. He exhibits normal muscle tone. Skin: Skin is warm. He is not diaphoretic. Psychiatric: He has a normal mood and affect.  His behavior is normal.     9/2018 - 14 day event - NSR  Echo 09/2018:  NORMAL LEFT VENTRICULAR CAVITY SIZE AND SYSTOLIC FUNCTION WITH AN EJECTION FRACTION   VISUALLY ESTIMATED AT 60%. NORMAL DIASTOLIC FUNCTION. MILD LEFT VENTRICULAR HYPERTROPHY PRESENT. TRACE MITRAL REGURGITATION. MILDLY SCLEROTIC TRILEAFLET AORTIC VALVE. INSUFFICIENT TRICUSPID REGURGITANT JET TO ESTIMATE PULMONARY ARTERY PRESSURE. NEGATIVE BUBBLE STUDY ON PRIOR ECHO REPORT 4/09/2012. NO SIGNIFICANT CHANGES FROM PRIOR ECHO REPORT ON 4/09/2012. Sitemasher 2018-  THIS MYOCARDIAL PERFUSION STUDY IS ABNORMAL   THIS IS A MEDIUM RISK STUDY   ABNORMAL NUCLEAR SCAN WITH  A SMALL AREA OF NON PERFUSION ON BOTH THE STRESS AND REST IMAGES   IN THE INFERIOR APICAL WALL CONSISTENT WITH EITHER PRIOR INFERIOR APICAL INFARCTION OR HIGH GRADE   OCCLUSION TO THE DISTAL RCA        INFERIOR APICAL AKINESIS WITH AN EJECTION FRACTION  ESTIMATED AT   50%  ASSESSMENT and PLAN    ICD-10-CM ICD-9-CM    1. Essential hypertension  I10 401.9    2. History of CVA (cerebrovascular accident)  Z86.73 V12.54    3. Type 2 diabetes mellitus without complication, with long-term current use of insulin (HCC)  E11.9 250.00     Z79.4 V58.67    4. Abnormal stress test  R94.39 794.39    5. Dyslipidemia  E78.5 272.4    6. Stable angina pectoris (HCC)  I31.8 515.9 METABOLIC PANEL, COMPREHENSIVE      PROTHROMBIN TIME + INR      PTT      CBC W/O DIFF      CASE REQUEST CATH LAB    CCS 3     Orders Placed This Encounter    METABOLIC PANEL, COMPREHENSIVE     Standing Status:   Future     Standing Expiration Date:   10/31/2021    PROTHROMBIN TIME + INR     Standing Status:   Future     Standing Expiration Date:   10/31/2021    PTT     Standing Status:   Future     Standing Expiration Date:   10/31/2021    CBC W/O DIFF     Standing Status:   Future     Standing Expiration Date:   10/31/2021    metoprolol tartrate (LOPRESSOR) 25 mg tablet     Sig: Take 1 Tab by mouth two (2) times a day.      Dispense:  60 Tab     Refill:  2       current treatment plan is effective, no change in therapy  reviewed diet, exercise and weight control    cardiovascular risk and specific lipid/LDL goals reviewed  use of aspirin to prevent MI and TIA's discussed. Patient seen for follow-up. Has prior abnormal nuclear stress test.    Complained of worsening exertional dyspnea while cutting grass. Has occasional chest tightness. Stress test report reviewed with patient. Shows small fixed apical inferior defect. Discussed with patient regarding further management. Both medical management versus invasive approach were discussed. He prefers cardiac cath due to worsening symptoms. Has CCS 3 angina. Start beta-blocker and proceed with coronary angiogram to evaluate CAD.

## 2020-11-02 ENCOUNTER — HOSPITAL ENCOUNTER (OUTPATIENT)
Dept: LAB | Age: 55
Discharge: HOME | End: 2020-11-02
Payer: MEDICAID

## 2020-11-02 DIAGNOSIS — I20.8 STABLE ANGINA PECTORIS (HCC): ICD-10-CM

## 2020-11-02 LAB
ALBUMIN SERPL-MCNC: 3.4 G/DL (ref 3.4–5)
ALBUMIN/GLOB SERPL: 1 {RATIO} (ref 0.8–1.7)
ALP SERPL-CCNC: 91 U/L (ref 45–117)
ALT SERPL-CCNC: 15 U/L (ref 16–61)
ANION GAP SERPL CALC-SCNC: 5 MMOL/L (ref 3–18)
APTT PPP: 29.2 SEC (ref 23–36.4)
AST SERPL-CCNC: 7 U/L (ref 10–38)
BILIRUB SERPL-MCNC: 0.2 MG/DL (ref 0.2–1)
BUN SERPL-MCNC: 14 MG/DL (ref 7–18)
BUN/CREAT SERPL: 14 (ref 12–20)
CALCIUM SERPL-MCNC: 9.7 MG/DL (ref 8.5–10.1)
CHLORIDE SERPL-SCNC: 103 MMOL/L (ref 100–111)
CO2 SERPL-SCNC: 31 MMOL/L (ref 21–32)
CREAT SERPL-MCNC: 1 MG/DL (ref 0.6–1.3)
ERYTHROCYTE [DISTWIDTH] IN BLOOD BY AUTOMATED COUNT: 11.8 % (ref 11.6–14.5)
GLOBULIN SER CALC-MCNC: 3.5 G/DL (ref 2–4)
GLUCOSE SERPL-MCNC: 372 MG/DL (ref 74–99)
HCT VFR BLD AUTO: 40.5 % (ref 36–48)
HGB BLD-MCNC: 13.7 G/DL (ref 13–16)
INR PPP: 0.9 (ref 0.8–1.2)
MCH RBC QN AUTO: 30 PG (ref 24–34)
MCHC RBC AUTO-ENTMCNC: 33.8 G/DL (ref 31–37)
MCV RBC AUTO: 88.6 FL (ref 74–97)
PLATELET # BLD AUTO: 292 K/UL (ref 135–420)
PMV BLD AUTO: 11.9 FL (ref 9.2–11.8)
POTASSIUM SERPL-SCNC: 4.6 MMOL/L (ref 3.5–5.5)
PROT SERPL-MCNC: 6.9 G/DL (ref 6.4–8.2)
PROTHROMBIN TIME: 12.2 SEC (ref 11.5–15.2)
RBC # BLD AUTO: 4.57 M/UL (ref 4.7–5.5)
SODIUM SERPL-SCNC: 139 MMOL/L (ref 136–145)
WBC # BLD AUTO: 8.6 K/UL (ref 4.6–13.2)

## 2020-11-02 PROCEDURE — 36415 COLL VENOUS BLD VENIPUNCTURE: CPT

## 2020-11-02 PROCEDURE — 85027 COMPLETE CBC AUTOMATED: CPT

## 2020-11-02 PROCEDURE — 85730 THROMBOPLASTIN TIME PARTIAL: CPT

## 2020-11-02 PROCEDURE — 85610 PROTHROMBIN TIME: CPT

## 2020-11-02 PROCEDURE — 80053 COMPREHEN METABOLIC PANEL: CPT

## 2020-11-05 ENCOUNTER — HOSPITAL ENCOUNTER (OUTPATIENT)
Age: 55
Setting detail: OBSERVATION
Discharge: HOME | End: 2020-11-06
Attending: INTERNAL MEDICINE | Admitting: INTERNAL MEDICINE
Payer: MEDICAID

## 2020-11-05 DIAGNOSIS — I20.8 STABLE ANGINA PECTORIS (HCC): ICD-10-CM

## 2020-11-05 DIAGNOSIS — I25.10 CAD (CORONARY ARTERY DISEASE): ICD-10-CM

## 2020-11-05 DIAGNOSIS — I25.10 CORONARY ARTERY DISEASE INVOLVING NATIVE CORONARY ARTERY, ANGINA PRESENCE UNSPECIFIED, UNSPECIFIED WHETHER NATIVE OR TRANSPLANTED HEART: ICD-10-CM

## 2020-11-05 LAB
ACT BLD: 219 SECS (ref 79–138)
GLUCOSE BLD STRIP.AUTO-MCNC: 297 MG/DL (ref 70–110)
GLUCOSE BLD STRIP.AUTO-MCNC: 311 MG/DL (ref 70–110)

## 2020-11-05 PROCEDURE — 99218 HC RM OBSERVATION: CPT

## 2020-11-05 PROCEDURE — 77030028790 HC ACC ST CTRL VLV COPLT ABBT -B: Performed by: INTERNAL MEDICINE

## 2020-11-05 PROCEDURE — 99152 MOD SED SAME PHYS/QHP 5/>YRS: CPT | Performed by: INTERNAL MEDICINE

## 2020-11-05 PROCEDURE — 93458 L HRT ARTERY/VENTRICLE ANGIO: CPT | Performed by: INTERNAL MEDICINE

## 2020-11-05 PROCEDURE — C1769 GUIDE WIRE: HCPCS | Performed by: INTERNAL MEDICINE

## 2020-11-05 PROCEDURE — 74011000250 HC RX REV CODE- 250: Performed by: INTERNAL MEDICINE

## 2020-11-05 PROCEDURE — C1887 CATHETER, GUIDING: HCPCS | Performed by: INTERNAL MEDICINE

## 2020-11-05 PROCEDURE — 92928 PRQ TCAT PLMT NTRAC ST 1 LES: CPT | Performed by: INTERNAL MEDICINE

## 2020-11-05 PROCEDURE — 77030015766: Performed by: INTERNAL MEDICINE

## 2020-11-05 PROCEDURE — 82962 GLUCOSE BLOOD TEST: CPT

## 2020-11-05 PROCEDURE — 74011250636 HC RX REV CODE- 250/636: Performed by: INTERNAL MEDICINE

## 2020-11-05 PROCEDURE — C1725 CATH, TRANSLUMIN NON-LASER: HCPCS | Performed by: INTERNAL MEDICINE

## 2020-11-05 PROCEDURE — 74011250637 HC RX REV CODE- 250/637: Performed by: INTERNAL MEDICINE

## 2020-11-05 PROCEDURE — 77030029997 HC DEV COM RDL R BND TELE -B: Performed by: INTERNAL MEDICINE

## 2020-11-05 PROCEDURE — C1760 CLOSURE DEV, VASC: HCPCS | Performed by: INTERNAL MEDICINE

## 2020-11-05 PROCEDURE — C1894 INTRO/SHEATH, NON-LASER: HCPCS | Performed by: INTERNAL MEDICINE

## 2020-11-05 PROCEDURE — 77030016699 HC CATH ANGI DX INFN1 CARD -A: Performed by: INTERNAL MEDICINE

## 2020-11-05 PROCEDURE — 85347 COAGULATION TIME ACTIVATED: CPT

## 2020-11-05 PROCEDURE — C1874 STENT, COATED/COV W/DEL SYS: HCPCS | Performed by: INTERNAL MEDICINE

## 2020-11-05 PROCEDURE — 74011000636 HC RX REV CODE- 636: Performed by: INTERNAL MEDICINE

## 2020-11-05 PROCEDURE — 77030013797 HC KT TRNSDUC PRSSR EDWD -A: Performed by: INTERNAL MEDICINE

## 2020-11-05 PROCEDURE — 99153 MOD SED SAME PHYS/QHP EA: CPT | Performed by: INTERNAL MEDICINE

## 2020-11-05 PROCEDURE — 74011636637 HC RX REV CODE- 636/637: Performed by: INTERNAL MEDICINE

## 2020-11-05 DEVICE — STENT RONYX20022UX RESOLUTE ONYX 2.00X22
Type: IMPLANTABLE DEVICE | Status: FUNCTIONAL
Brand: RESOLUTE ONYX™

## 2020-11-05 RX ORDER — INSULIN LISPRO 100 [IU]/ML
INJECTION, SOLUTION INTRAVENOUS; SUBCUTANEOUS
Status: DISCONTINUED | OUTPATIENT
Start: 2020-11-05 | End: 2020-11-06 | Stop reason: HOSPADM

## 2020-11-05 RX ORDER — FENTANYL CITRATE 50 UG/ML
INJECTION, SOLUTION INTRAMUSCULAR; INTRAVENOUS AS NEEDED
Status: DISCONTINUED | OUTPATIENT
Start: 2020-11-05 | End: 2020-11-05 | Stop reason: HOSPADM

## 2020-11-05 RX ORDER — HYDRALAZINE HYDROCHLORIDE 20 MG/ML
INJECTION INTRAMUSCULAR; INTRAVENOUS AS NEEDED
Status: DISCONTINUED | OUTPATIENT
Start: 2020-11-05 | End: 2020-11-05 | Stop reason: HOSPADM

## 2020-11-05 RX ORDER — MIDAZOLAM HYDROCHLORIDE 1 MG/ML
INJECTION, SOLUTION INTRAMUSCULAR; INTRAVENOUS AS NEEDED
Status: DISCONTINUED | OUTPATIENT
Start: 2020-11-05 | End: 2020-11-05 | Stop reason: HOSPADM

## 2020-11-05 RX ORDER — SODIUM CHLORIDE 0.9 % (FLUSH) 0.9 %
5-40 SYRINGE (ML) INJECTION AS NEEDED
Status: DISCONTINUED | OUTPATIENT
Start: 2020-11-05 | End: 2020-11-05 | Stop reason: HOSPADM

## 2020-11-05 RX ORDER — SODIUM CHLORIDE 0.9 % (FLUSH) 0.9 %
5-40 SYRINGE (ML) INJECTION AS NEEDED
Status: DISCONTINUED | OUTPATIENT
Start: 2020-11-05 | End: 2020-11-06 | Stop reason: HOSPADM

## 2020-11-05 RX ORDER — VANCOMYCIN HYDROCHLORIDE 1 G/20ML
INJECTION, POWDER, LYOPHILIZED, FOR SOLUTION INTRAVENOUS AS NEEDED
Status: DISCONTINUED | OUTPATIENT
Start: 2020-11-05 | End: 2020-11-05 | Stop reason: HOSPADM

## 2020-11-05 RX ORDER — GUAIFENESIN 100 MG/5ML
81 LIQUID (ML) ORAL DAILY
Status: DISCONTINUED | OUTPATIENT
Start: 2020-11-06 | End: 2020-11-06 | Stop reason: HOSPADM

## 2020-11-05 RX ORDER — HYDROCHLOROTHIAZIDE 25 MG/1
12.5 TABLET ORAL DAILY
Status: DISCONTINUED | OUTPATIENT
Start: 2020-11-06 | End: 2020-11-06 | Stop reason: HOSPADM

## 2020-11-05 RX ORDER — ATORVASTATIN CALCIUM 40 MG/1
40 TABLET, FILM COATED ORAL
Status: DISCONTINUED | OUTPATIENT
Start: 2020-11-05 | End: 2020-11-06 | Stop reason: HOSPADM

## 2020-11-05 RX ORDER — GLIMEPIRIDE 2 MG/1
2 TABLET ORAL
Status: DISCONTINUED | OUTPATIENT
Start: 2020-11-06 | End: 2020-11-06 | Stop reason: HOSPADM

## 2020-11-05 RX ORDER — GUAIFENESIN 100 MG/5ML
81 LIQUID (ML) ORAL
Status: COMPLETED | OUTPATIENT
Start: 2020-11-05 | End: 2020-11-05

## 2020-11-05 RX ORDER — LOSARTAN POTASSIUM 50 MG/1
50 TABLET ORAL DAILY
Status: DISCONTINUED | OUTPATIENT
Start: 2020-11-06 | End: 2020-11-06 | Stop reason: HOSPADM

## 2020-11-05 RX ORDER — DEXTROSE 50 % IN WATER (D50W) INTRAVENOUS SYRINGE
25-50 AS NEEDED
Status: DISCONTINUED | OUTPATIENT
Start: 2020-11-05 | End: 2020-11-06 | Stop reason: HOSPADM

## 2020-11-05 RX ORDER — SODIUM CHLORIDE 0.9 % (FLUSH) 0.9 %
5-40 SYRINGE (ML) INJECTION EVERY 8 HOURS
Status: DISCONTINUED | OUTPATIENT
Start: 2020-11-05 | End: 2020-11-06 | Stop reason: HOSPADM

## 2020-11-05 RX ORDER — SODIUM CHLORIDE 0.9 % (FLUSH) 0.9 %
5-40 SYRINGE (ML) INJECTION EVERY 8 HOURS
Status: DISCONTINUED | OUTPATIENT
Start: 2020-11-05 | End: 2020-11-05 | Stop reason: HOSPADM

## 2020-11-05 RX ORDER — METOPROLOL TARTRATE 25 MG/1
25 TABLET, FILM COATED ORAL 2 TIMES DAILY
Status: DISCONTINUED | OUTPATIENT
Start: 2020-11-05 | End: 2020-11-06 | Stop reason: HOSPADM

## 2020-11-05 RX ORDER — MAGNESIUM SULFATE 100 %
4 CRYSTALS MISCELLANEOUS AS NEEDED
Status: DISCONTINUED | OUTPATIENT
Start: 2020-11-05 | End: 2020-11-06 | Stop reason: HOSPADM

## 2020-11-05 RX ORDER — HEPARIN SODIUM 1000 [USP'U]/ML
INJECTION, SOLUTION INTRAVENOUS; SUBCUTANEOUS AS NEEDED
Status: DISCONTINUED | OUTPATIENT
Start: 2020-11-05 | End: 2020-11-05 | Stop reason: HOSPADM

## 2020-11-05 RX ORDER — NITROGLYCERIN 40 MG/100ML
INJECTION INTRAVENOUS
Status: COMPLETED | OUTPATIENT
Start: 2020-11-05 | End: 2020-11-05

## 2020-11-05 RX ORDER — LIDOCAINE HYDROCHLORIDE 10 MG/ML
INJECTION, SOLUTION EPIDURAL; INFILTRATION; INTRACAUDAL; PERINEURAL AS NEEDED
Status: DISCONTINUED | OUTPATIENT
Start: 2020-11-05 | End: 2020-11-05 | Stop reason: HOSPADM

## 2020-11-05 RX ORDER — FUROSEMIDE 40 MG/1
40 TABLET ORAL DAILY
Status: DISCONTINUED | OUTPATIENT
Start: 2020-11-06 | End: 2020-11-06 | Stop reason: HOSPADM

## 2020-11-05 RX ORDER — GABAPENTIN 300 MG/1
300 CAPSULE ORAL
Status: DISCONTINUED | OUTPATIENT
Start: 2020-11-05 | End: 2020-11-06 | Stop reason: HOSPADM

## 2020-11-05 RX ORDER — ALOGLIPTIN 6.25 MG/1
12.5 TABLET, FILM COATED ORAL DAILY
Status: DISCONTINUED | OUTPATIENT
Start: 2020-11-08 | End: 2020-11-06 | Stop reason: HOSPADM

## 2020-11-05 RX ORDER — SODIUM CHLORIDE 9 MG/ML
100 INJECTION, SOLUTION INTRAVENOUS CONTINUOUS
Status: DISCONTINUED | OUTPATIENT
Start: 2020-11-05 | End: 2020-11-05 | Stop reason: ALTCHOICE

## 2020-11-05 RX ORDER — FLUTICASONE PROPIONATE 50 MCG
2 SPRAY, SUSPENSION (ML) NASAL DAILY
Status: DISCONTINUED | OUTPATIENT
Start: 2020-11-06 | End: 2020-11-06 | Stop reason: HOSPADM

## 2020-11-05 RX ORDER — METFORMIN HYDROCHLORIDE 500 MG/1
1000 TABLET, EXTENDED RELEASE ORAL DAILY
Status: DISCONTINUED | OUTPATIENT
Start: 2020-11-08 | End: 2020-11-06 | Stop reason: HOSPADM

## 2020-11-05 RX ORDER — INSULIN GLARGINE 100 [IU]/ML
40 INJECTION, SOLUTION SUBCUTANEOUS
Status: DISCONTINUED | OUTPATIENT
Start: 2020-11-05 | End: 2020-11-06 | Stop reason: HOSPADM

## 2020-11-05 RX ADMIN — INSULIN LISPRO 6 UNITS: 100 INJECTION, SOLUTION INTRAVENOUS; SUBCUTANEOUS at 23:32

## 2020-11-05 RX ADMIN — ASPIRIN 81 MG: 81 TABLET, CHEWABLE ORAL at 10:00

## 2020-11-05 RX ADMIN — GABAPENTIN 300 MG: 300 CAPSULE ORAL at 23:32

## 2020-11-05 RX ADMIN — TICAGRELOR 90 MG: 90 TABLET ORAL at 23:32

## 2020-11-05 RX ADMIN — INSULIN GLARGINE 40 UNITS: 100 INJECTION, SOLUTION SUBCUTANEOUS at 23:34

## 2020-11-05 RX ADMIN — METOPROLOL TARTRATE 25 MG: 25 TABLET, FILM COATED ORAL at 17:03

## 2020-11-05 RX ADMIN — ATORVASTATIN CALCIUM 40 MG: 40 TABLET, FILM COATED ORAL at 23:32

## 2020-11-05 RX ADMIN — Medication 10 ML: at 17:07

## 2020-11-05 RX ADMIN — Medication 10 ML: at 23:35

## 2020-11-05 RX ADMIN — INSULIN LISPRO 8 UNITS: 100 INJECTION, SOLUTION INTRAVENOUS; SUBCUTANEOUS at 17:06

## 2020-11-05 RX ADMIN — SODIUM CHLORIDE 100 ML/HR: 900 INJECTION, SOLUTION INTRAVENOUS at 09:45

## 2020-11-05 NOTE — PROGRESS NOTES
Right wrist R-Band removed @ 1345, by JT. No bleeding or swelling. Sterile hemostatic dressing applied. Normal radial pulse. Normal distal circulation and neuro check. Safety splint applied. Safety instructions reviewed with the patient.

## 2020-11-05 NOTE — H&P
No new symptoms  Risks, benefits and alternatives of LHC/ptca/stent explained to patient.   All questions answered

## 2020-11-05 NOTE — Clinical Note
TRANSFER - IN REPORT:     Verbal report received from: [facility]. Report consisted of patient's Situation, Background, Assessment and   Recommendations(SBAR). Opportunity for questions and clarification was provided. Assessment completed upon patient's arrival to unit and care assumed. Patient transported with a Cardiac Cath Tech / Patient Care Tech.

## 2020-11-05 NOTE — Clinical Note
Balloon inserted. Balloon inflated using multiple inflations inflation technique. Lesion #1: Pressure = 14 lynne; Duration = 16 sec.

## 2020-11-05 NOTE — PROGRESS NOTES
TRANSFER - IN REPORT:    Verbal report received from STEPHANY(name) on Rosa Moraless  being received from cath holding(unit) for routine progression of care      Report consisted of patients Situation, Background, Assessment and   Recommendations(SBAR). Information from the following report(s) SBAR, Kardex, Procedure Summary, Intake/Output, MAR, Recent Results, Med Rec Status and Cardiac Rhythm SR RBBB was reviewed with the receiving nurse. Opportunity for questions and clarification was provided. Assessment completed upon patients arrival to unit and care assumed. 1525: pt placed on tele, denies pain. Bedrest until 1800.     1550: page to Dr Sesar Lambert to clarify blood sugar and insulin orders. Pt takes metformin at home. Per Dr Sesar Lambert, ok to switch to SSI while here, call to Rx to get order placed.

## 2020-11-05 NOTE — Clinical Note
Balloon inflated using multiple inflations inflation technique. Lesion #1: Pressure = 15 lynne; Duration = 15 sec. Inflation 2: Pressure = 18 lynne; Duration = 12 sec.

## 2020-11-05 NOTE — Clinical Note
Right groin and right radial clipped prepped with ChloraPrep and draped. Wet prep solution dried at: 3.

## 2020-11-05 NOTE — Clinical Note
TRANSFER - OUT REPORT:     [facility] Report consisted of patient's Situation, Background, Assessment and   Recommendations(SBAR). Opportunity for questions and clarification was provided. Patient transported to: [address]

## 2020-11-05 NOTE — ROUTINE PROCESS
TRANSFER - OUT REPORT:    Verbal report given to CONRADO Sams(name) on Abebe Kim  being transferred to 2302(unit) for routine progression of care       Report consisted of patients Situation, Background, Assessment and   Recommendations(SBAR). Information from the following report(s) SBAR was reviewed with the receiving nurse. Lines:   Peripheral IV 11/05/20 Anterior;Proximal;Right Forearm (Active)   Site Assessment Clean, dry, & intact 11/05/20 1523   Phlebitis Assessment 0 11/05/20 1523   Infiltration Assessment 0 11/05/20 1523   Dressing Status Clean, dry, & intact 11/05/20 1523   Dressing Type Tape;Transparent 11/05/20 1523   Hub Color/Line Status Capped 11/05/20 1523   Action Taken Open ports on tubing capped 11/05/20 1523   Alcohol Cap Used Yes 11/05/20 1523       Peripheral IV 11/05/20 Left Antecubital (Active)   Site Assessment Clean, dry, & intact 11/05/20 1523   Phlebitis Assessment 0 11/05/20 1523   Infiltration Assessment 0 11/05/20 1523   Dressing Status Clean, dry, & intact 11/05/20 1523   Dressing Type Tape;Transparent 11/05/20 1523   Hub Color/Line Status Pink;Capped 11/05/20 1523   Action Taken Open ports on tubing capped 11/05/20 1523   Alcohol Cap Used Yes 11/05/20 1523        Opportunity for questions and clarification was provided. Patient transported with:   Registered Nurse:Deena Schaffer

## 2020-11-05 NOTE — PROGRESS NOTES
Problem: Patient Education: Go to Patient Education Activity  Goal: Patient/Family Education  Outcome: Progressing Towards Goal     Problem: Falls - Risk of  Goal: *Absence of Falls  Description: Document Adonis Click Fall Risk and appropriate interventions in the flowsheet. Outcome: Progressing Towards Goal  Note: Fall Risk Interventions:            Medication Interventions: Evaluate medications/consider consulting pharmacy, Patient to call before getting OOB, Teach patient to arise slowly                   Problem: Patient Education: Go to Patient Education Activity  Goal: Patient/Family Education  Outcome: Progressing Towards Goal     Problem: Pressure Injury - Risk of  Goal: *Prevention of pressure injury  Description: Document Cornelius Scale and appropriate interventions in the flowsheet. Outcome: Progressing Towards Goal  Note: Pressure Injury Interventions:  Sensory Interventions: Discuss PT/OT consult with provider, Float heels, Keep linens dry and wrinkle-free, Maintain/enhance activity level, Minimize linen layers, Monitor skin under medical devices, Turn and reposition approx. every two hours (pillows and wedges if needed)         Activity Interventions: Pressure redistribution bed/mattress(bed type)    Mobility Interventions: Pressure redistribution bed/mattress (bed type), PT/OT evaluation, Turn and reposition approx.  every two hours(pillow and wedges)    Nutrition Interventions: Document food/fluid/supplement intake                     Problem: Patient Education: Go to Patient Education Activity  Goal: Patient/Family Education  Outcome: Progressing Towards Goal

## 2020-11-06 VITALS
TEMPERATURE: 98.4 F | BODY MASS INDEX: 31.32 KG/M2 | HEIGHT: 65 IN | HEART RATE: 80 BPM | SYSTOLIC BLOOD PRESSURE: 157 MMHG | DIASTOLIC BLOOD PRESSURE: 91 MMHG | OXYGEN SATURATION: 97 % | RESPIRATION RATE: 20 BRPM | WEIGHT: 188 LBS

## 2020-11-06 LAB
GLUCOSE BLD STRIP.AUTO-MCNC: 164 MG/DL (ref 70–110)
GLUCOSE BLD STRIP.AUTO-MCNC: 231 MG/DL (ref 70–110)

## 2020-11-06 PROCEDURE — 74011636637 HC RX REV CODE- 636/637: Performed by: INTERNAL MEDICINE

## 2020-11-06 PROCEDURE — 74011250637 HC RX REV CODE- 250/637: Performed by: INTERNAL MEDICINE

## 2020-11-06 PROCEDURE — 93005 ELECTROCARDIOGRAM TRACING: CPT

## 2020-11-06 PROCEDURE — 99217 PR OBSERVATION CARE DISCHARGE MANAGEMENT: CPT | Performed by: NURSE PRACTITIONER

## 2020-11-06 PROCEDURE — 99218 HC RM OBSERVATION: CPT

## 2020-11-06 PROCEDURE — 82962 GLUCOSE BLOOD TEST: CPT

## 2020-11-06 RX ORDER — GUAIFENESIN 100 MG/5ML
81 LIQUID (ML) ORAL DAILY
Qty: 90 TAB | Refills: 1 | Status: SHIPPED | OUTPATIENT
Start: 2020-11-07 | End: 2021-05-13

## 2020-11-06 RX ADMIN — ASPIRIN 81 MG CHEWABLE TABLET 81 MG: 81 TABLET CHEWABLE at 08:47

## 2020-11-06 RX ADMIN — GLIMEPIRIDE 2 MG: 2 TABLET ORAL at 07:24

## 2020-11-06 RX ADMIN — FUROSEMIDE 40 MG: 40 TABLET ORAL at 08:48

## 2020-11-06 RX ADMIN — INSULIN LISPRO 4 UNITS: 100 INJECTION, SOLUTION INTRAVENOUS; SUBCUTANEOUS at 11:42

## 2020-11-06 RX ADMIN — HYDROCHLOROTHIAZIDE 12.5 MG: 25 TABLET ORAL at 08:47

## 2020-11-06 RX ADMIN — METOPROLOL TARTRATE 25 MG: 25 TABLET, FILM COATED ORAL at 08:47

## 2020-11-06 RX ADMIN — Medication 10 ML: at 07:29

## 2020-11-06 RX ADMIN — TICAGRELOR 90 MG: 90 TABLET ORAL at 08:47

## 2020-11-06 RX ADMIN — INSULIN LISPRO 2 UNITS: 100 INJECTION, SOLUTION INTRAVENOUS; SUBCUTANEOUS at 07:23

## 2020-11-06 RX ADMIN — LOSARTAN POTASSIUM 50 MG: 50 TABLET, FILM COATED ORAL at 08:48

## 2020-11-06 NOTE — DIABETES MGMT
Diabetes Patient/Family Education Record  Factors That  May Influence Patients Ability  to Learn or  Comply with Recommendations   []   Language barrier    []   Cultural needs   []   Motivation    []   Cognitive limitation    []   Physical   [x]   Education    []   Physiological factors   []   Hearing/vision/speaking impairment   []   Orthodox beliefs    []   Financial factors   []  Other:   []  No factors identified at this time. Person Instructed:   [x]   Patient   []   Family   []  Other     Preference for Learning:   [x]   Verbal   []   Written   []  Demonstration     Level of Comprehension & Competence:    [x]  Good                                      [] Fair                                     []  Poor                             []  Needs Reinforcement   [x]  Teachback completed    Education Component:    Patient was admitted on 11/05 with diagnosis of CAD and history of MI and CVA  Status post PTCA/Stent on 11/05. Seen patient earlier today prior to discharge. [x]  Medication management, including how to administer insulin (if appropriate) and potential medication interactions: Yes. Patient reported list of home diabetes medications:  Lantus (SoloStar) pen insulin 40 units daily at bedtime. Patient verbalized understanding about diabetes: body no longer have enough insulin therefore insulin injection is necessary to help prevent high blood sugar. Janumet XR  daily every morning. Patient did not know that this is two medications in one pill and prescribed to be taken with meal to help minimize GI side effects because of metfomin: gas, bloating and diarrhea. He will take it with food from now on. [x]  Nutritional management -obtain usual meal pattern: Yes. Patient receptive to further education.   Encouraged patient to eat 3 meals with consistent carb intake and explained the plate method for the recommended serving size/portion control of carbs (starches, fruits, dairy). Also encouraged to limit intake of concentrated sweets including regular beverages and sugary snacks. [x]  Exercise: Yes. Patient reported that he actually started exercising again by riding his bike. [x]  Signs, symptoms, and treatment of hyperglycemia and hypoglycemia: Yes. Patient verbalized understanding that it is necessary to follow the diabetes treatment plan: meds, eating healthy, and regular physical activity to help prevent high blood sugar. Discussed the symptoms of high blood sugar. Discussed what to do when blood glucose meter screen is showing \"error\" and \"hi. \"  Encouraged to contact his medial provider if for pattern of blood sugar 250 and above that he cannot explain. [x] Prevention, recognition and treatment of hyperglycemia and hypoglycemia: Yes. Yes. Patient reported cold sweat in the past but he did not know that they were possible symptoms of low blood sugar. Educated patient about hypoglycemia: symptoms, appropriate treatment to above 70, common cause, how to prevent, and when to get medical help. [x]  Importance of blood glucose monitoring and how to obtain a blood glucose meter: Yes. Patient stated that he can check his blood sugar more often/regularly. Educated patient on the following:  Fasting blood sugar range before meals:   Random blood sugar two hours after meals: less than 180      []  Instruction on use of the blood glucose meter   [x]  Discuss the importance of HbA1C monitoring: Yes. Patient cannot remember his prior A1c level. Discussed that his level was 10.6% (7/21/2020). Informed patient the recommended A1c level is 7% and high level indicates diabetes not controlled. Patient stated that he already have an appointment with his medical provider and will inquire about A1c level lab. []  Sick day guidelines   [x]  Proper use and disposal of lancets, needles, syringes or insulin pens (if appropriate): Yes.    [x]  Potential long-term complications (retinopathy, kidney disease, neuropathy, foot care): Yes. [x] Information about whom to contact in case of emergency or for more information: Yes. [x]  Goal:  Patient/family will demonstrate understanding of Diabetes Self Management Skills by: 11/13/2020  Plan for post-discharge education or self-management support:    [x] Outpatient class schedule provided            [] Patient Declined    [] Scheduled for outpatient classes (date) _______  Verify:  Does patient understand how diabetes medications work? Yes. Educated. Does patient know what their most recent A1c is? No. Educated. Does patient monitor glucose at home? No, not regularly. Educated. Does patient have difficulty obtaining diabetes medications and testing supplies?  No.       Sean Faria RN [facility]  Pager: [phone]

## 2020-11-06 NOTE — DISCHARGE INSTRUCTIONS
Patient Education        Coronary Angiogram: What to Expect at Home  Your Recovery    A coronary angiogram is a test to examine the large blood vessel of your heart (coronary artery). The doctor inserted a thin, flexible tube (catheter) into a blood vessel in your groin. In some cases, the catheter is placed in a blood vessel in the arm. Your groin or arm may have a bruise and feel sore for a day or two after the procedure. You can do light activities around the house but nothing strenuous for several days. This care sheet gives you a general idea about how long it will take for you to recover. But each person recovers at a different pace. Follow the steps below to feel better as quickly as possible. How can you care for yourself at home? Activity    · If the doctor gave you a sedative:  ? For 24 hours, don't do anything that requires attention to detail, such as going to work, making important decisions, or signing any legal documents. It takes time for the medicine's effects to completely wear off.  ? For your safety, do not drive or operate any machinery that could be dangerous. Wait until the medicine wears off and you can think clearly and react easily.     · Do not do strenuous exercise and do not lift, pull, or push anything heavy until your doctor says it is okay. This may be for a day or two. You can walk around the house and do light activity, such as cooking.     · If the catheter was placed in your groin, try not to walk up stairs for the first couple of days.     · If the catheter was placed in your arm near your wrist, do not bend your wrist deeply for the first couple of days. Be careful using your hand to get into and out of a chair or bed.     · If your doctor recommends it, get more exercise. Walking is a good choice. Bit by bit, increase the amount you walk every day. Try for at least 30 minutes on most days of the week. Diet    · Drink plenty of fluids to help your body flush out the dye. If you have kidney, heart, or liver disease and have to limit fluids, talk with your doctor before you increase the amount of fluids you drink.     · Keep eating a heart-healthy diet that has lots of fruits, vegetables, and whole grains. If you have not been eating this way, talk to your doctor. You also may want to talk to a dietitian. This expert can help you to learn about healthy foods and plan meals. Medicines    · Your doctor will tell you if and when you can restart your medicines. He or she will also give you instructions about taking any new medicines.     · If you take aspirin or some other blood thinner, ask your doctor if and when to start taking it again. Make sure that you understand exactly what your doctor wants you to do.     · Your doctor may prescribe a blood-thinning medicine like aspirin or clopidogrel (Plavix). It is very important that you take these medicines exactly as directed in order to keep the coronary artery open and reduce your risk of a heart attack. Be safe with medicines. Call your doctor if you think you are having a problem with your medicine. Care of the catheter site    · For 1 or 2 days, keep a bandage over the spot where the catheter was inserted. The bandage probably will fall off in this time.     · Put ice or a cold pack on the area for 10 to 20 minutes at a time to help with soreness or swelling. Put a thin cloth between the ice and your skin.     · You may shower 24 to 48 hours after the procedure, if your doctor okays it. Pat the incision dry.     · Do not soak the catheter site until it is healed. Don't take a bath for 1 week, or until your doctor tells you it is okay.     · Watch for bleeding from the site. A small amount of blood (up to the size of a quarter) on the bandage can be normal.     · If you are bleeding, lie down and press on the area for 15 minutes to try to make it stop. If the bleeding does not stop, call your doctor or seek immediate medical care. Follow-up care is a key part of your treatment and safety. Be sure to make and go to all appointments, and call your doctor if you are having problems. It's also a good idea to know your test results and keep a list of the medicines you take. When should you call for help? Call 911 anytime you think you may need emergency care. For example, call if:    · You passed out (lost consciousness).     · You have severe trouble breathing.     · You have sudden chest pain and shortness of breath, or you cough up blood.     · You have symptoms of a heart attack. These may include:  ? Chest pain or pressure, or a strange feeling in the chest.  ? Sweating. ? Shortness of breath. ? Nausea or vomiting. ? Pain, pressure, or a strange feeling in the back, neck, jaw, or upper belly, or in one or both shoulders or arms. ? Lightheadedness or sudden weakness. ? A fast or irregular heartbeat. After you call 911, the  may tel you to chew 1 adult-strength or 2 to 4 low-dose aspirin. Wait for an ambulance. Do not try to drive yourself.     · You have been diagnosed with angina, and you have symptoms that do not go away with rest or are not getting better within 5 minutes after you take a dose of nitroglycerin. Call your doctor now or seek immediate medical care if:    · You are bleeding from the area where the catheter was put in your artery.     · You have a fast-growing, painful lump at the catheter site.     · You have signs of infection, such as:  ? Increased pain, swelling, warmth, or redness. ? Red streaks leading from the catheter site. ? Pus draining from the catheter site. ? A fever.     · Your leg, arm, or hand is painful, looks blue, or feels cold, numb, or tingly. Watch closely for changes in your health, and be sure to contact your doctor if you have any problems. Where can you learn more?   Go to http://www.gray.com/  Enter D192 in the search box to learn more about \"Coronary Angiogram: What to Expect at Home. \"  Current as of: December 16, 2019               Content Version: 12.6  © 3392-3974 [employer]. Care instructions adapted under license by [employer] (which disclaims liability or warranty for this information). If you have questions about a medical condition or this instruction, always ask your healthcare professional. [address] any warranty or liability for your use of this information.

## 2020-11-06 NOTE — PROGRESS NOTES
Patient without any c/o chest pain or SOB throughout the night. Both cardiac cath sites c/d/i. No hematoma present, no tenderness, slight bruising to right groin.

## 2020-11-06 NOTE — PROGRESS NOTES
DC orders received, AVS printed and reviewed with pt. Brilinta was filled by outpt pharmacy for first month and pt sent home with month's supply. Reviewed follow up appointments and medication list. Printed instructions regarding care of cath sites. Pt states understanding, all questions answered. Plan for Pt's mother to  and take home via private vehicle. IV and tele removed. Stable at time of discharge.

## 2020-11-06 NOTE — PROGRESS NOTES
Discharge order noted for today. Orders reviewed. Brilinta discount card placed on pt's chart. Pt reports that he is independent and does not use any assistive devices. He lives with his fiance and his mother will be transporting him open today. Observation notice provided in writing to patient and/or caregiver as well as verbal explanation of the policy. Patients who are in outpatient status also receive the Observation notice.  remains available if needed.      Markell Montalvo RN BSN  Care Manager  [phone]

## 2020-11-06 NOTE — PROGRESS NOTES
Problem: Patient Education: Go to Patient Education Activity  Goal: Patient/Family Education  Outcome: Resolved/Met     Problem: Falls - Risk of  Goal: *Absence of Falls  Description: Document Ericaamy Johnson Fall Risk and appropriate interventions in the flowsheet. Outcome: Resolved/Met  Note: Fall Risk Interventions:  Mobility Interventions: Communicate number of staff needed for ambulation/transfer         Medication Interventions: Evaluate medications/consider consulting pharmacy, Patient to call before getting OOB, Teach patient to arise slowly                   Problem: Patient Education: Go to Patient Education Activity  Goal: Patient/Family Education  Outcome: Resolved/Met     Problem: Pressure Injury - Risk of  Goal: *Prevention of pressure injury  Description: Document Cornelius Scale and appropriate interventions in the flowsheet. Outcome: Resolved/Met  Note: Pressure Injury Interventions:  Sensory Interventions: Float heels, Keep linens dry and wrinkle-free, Maintain/enhance activity level, Minimize linen layers, Monitor skin under medical devices, Turn and reposition approx. every two hours (pillows and wedges if needed)         Activity Interventions: Increase time out of bed, Pressure redistribution bed/mattress(bed type)    Mobility Interventions: Pressure redistribution bed/mattress (bed type), PT/OT evaluation, Turn and reposition approx.  every two hours(pillow and wedges)    Nutrition Interventions: Document food/fluid/supplement intake                     Problem: Patient Education: Go to Patient Education Activity  Goal: Patient/Family Education  Outcome: Resolved/Met

## 2020-11-06 NOTE — DISCHARGE SUMMARY
Discharge Summary     Patient: Rafiq Valadez MRN: [id number]  SSN: [id number]    YOB: 1965  Age: 54 y.o. Sex: male       Admit Date: 11/5/2020    Discharge Date: 11/6/2020      Admission Diagnoses: CAD (coronary artery disease) [I25.10]  CAD (coronary artery disease) [I25.10]  Stable angina pectoris ([facility] [I20.8]    Discharge Diagnoses:   Problem List as of 11/6/2020 Date Reviewed: 10/23/2020          Codes Class Noted - Resolved    CAD (coronary artery disease) ICD-10-CM: I25.10  ICD-9-CM: 414.00  11/5/2020 - Present        Stable angina pectoris ([facility] ICD-10-CM: I20.8  ICD-9-CM: 413.9  10/30/2020 - Present    Overview Signed 10/30/2020 10:41 AM by Hemalatha Donohue MD     Added automatically from request for surgery [id number]             Acute right-sided low back pain without sciatica ICD-10-CM: M54.5  ICD-9-CM: 724.2  1/15/2020 - Present        Right sided abdominal pain ICD-10-CM: R10.9  ICD-9-CM: 789.09  1/15/2020 - Present        CHF (congestive heart failure) ([facility] ICD-10-CM: I50.9  ICD-9-CM: 428.0  6/18/2019 - Present        Type 2 diabetes mellitus with diabetic neuropathy ([facility] ICD-10-CM: E11.40  ICD-9-CM: 250.60, 357.2  4/25/2019 - Present        Essential hypertension ICD-10-CM: I10  ICD-9-CM: 401.9  7/23/2013 - Present        History of CVA (cerebrovascular accident) ICD-10-CM: Z86.73  ICD-9-CM: V12.54  7/23/2013 - Present        History of MI (myocardial infarction) ICD-10-CM: I25.2  ICD-9-CM: 222  7/23/2013 - Present        Type 2 diabetes mellitus, with long-term current use of insulin (HCC) ICD-10-CM: E11.9, Z79.4  ICD-9-CM: 250.00, V58.67  7/23/2013 - Present        Atherosclerosis of coronary artery ICD-10-CM: I25.10  ICD-9-CM: 414.00  1/1/2010 - Present               Discharge Condition: Stable    Physical Exam:   Physical Exam   Constitutional: He is oriented to person, place, and time. He appears well-developed and well-nourished. No distress.    HENT:   Head: Normocephalic. Neck: No JVD present. Cardiovascular: Normal rate, regular rhythm, normal heart sounds and intact distal pulses. Exam reveals no gallop and no friction rub. No murmur heard. Right groin and right wrist cath sites intact, soft no hematoma, no bruit. Pulmonary/Chest: Effort normal and breath sounds normal. No respiratory distress. He has no wheezes. He has no rales. He exhibits no tenderness. Musculoskeletal: Normal range of motion. General: No edema. Neurological: He is alert and oriented to person, place, and time. Skin: Skin is warm and dry. Nursing note and vitals reviewed. Hospital Course: Patient admitted for elective cardiac catheterization due to exertional dyspnea and abnormal stress test.  He is s/p PTCA/ STENT to mid LCX. He tolerated procedure well. He is ambulating without dyspnea this AM.    Consults: None    Significant Diagnostic Studies:   Coronary artery disease of native artery of native heart with stable angina pectoris (HCC) [I25.118 (ICD-10-CM)]    Conclusion     Critical single vessel coronary artery disease with preserved LV function. S/p ptca/stent to mid LCX. Continue DAPT and intense risk factor modification. Disposition: home    Discharge Medications:   Current Discharge Medication List      START taking these medications    Details   aspirin 81 mg chewable tablet Take 1 Tab by mouth daily. Qty: 90 Tab, Refills: 1      ticagrelor (BRILINTA) 90 mg tablet Take 1 Tab by mouth two (2) times a day. Qty: 60 Tab, Refills: 1         CONTINUE these medications which have NOT CHANGED    Details   metoprolol tartrate (LOPRESSOR) 25 mg tablet Take 1 Tab by mouth two (2) times a day.   Qty: 60 Tab, Refills: 2      gabapentin (NEURONTIN) 300 mg capsule TAKE TWO CAPSULES BY MOUTH IN THE EVENING  Qty: 60 Cap, Refills: 5    Associated Diagnoses: Polyneuropathy associated with underlying disease (HCC)      glimepiride (AMARYL) 2 mg tablet TAKE 1 TABLET BY MOUTH EVERY DAY  Qty: 90 Tab, Refills: 4    Comments: This prescription was filled on 9/21/2020. Any refills authorized will be placed on file. Associated Diagnoses: Type 2 diabetes mellitus without complication, without long-term current use of insulin (HCC)      insulin glargine (Lantus Solostar U-100 Insulin) 100 unit/mL (3 mL) inpn 40 Units by SubCUTAneous route nightly. Qty: 12 Pen, Refills: 4    Comments: DUE TO COVID-19 WE ARE ASKING TO FILL ALL MEDICATIONS FOR 80 DAY SUPPLY PLEASE, THANK YOU  Associated Diagnoses: Type 2 diabetes mellitus with diabetic neuropathy, without long-term current use of insulin (HCC)      furosemide (LASIX) 40 mg tablet Take 1 Tab by mouth daily. Qty: 30 Tab, Refills: 4    Associated Diagnoses: Chronic diastolic congestive heart failure (HCC)      hydroCHLOROthiazide (HYDRODIURIL) 12.5 mg tablet TAKE ONE TABLET BY MOUTH EVERY DAY  Qty: 30 Tab, Refills: 5    Comments: This prescription was filled on 7/10/2020. Any refills authorized will be placed on file. losartan (COZAAR) 50 mg tablet TAKE ONE TABLET BY MOUTH DAILY  Qty: 30 Tab, Refills: 5    Comments: This prescription was filled on 7/10/2020. Any refills authorized will be placed on file. SITagliptin-metFORMIN (Janumet XR) 50-1,000 mg TM24 Take 1 Tab by mouth daily. Qty: 90 Tab, Refills: 5    Comments: DUE TO COVID-19 WE ARE ASKING TO FILL ALL MEDICATIONS FOR 90 DAY SUPPLY PLEASE, THANK YOU      loratadine (CLARITIN) 10 mg tablet TAKE ONE TABLET BY MOUTH EVERY DAY  Qty: 30 Tab, Refills: 5    Comments: DUE TO COVID-19 WE ARE ASKING TO FILL ALL MEDICATIONS FOR 80 DAY SUPPLY PLEASE, THANK YOU  Associated Diagnoses: Seasonal allergic rhinitis, unspecified trigger; Type 2 diabetes mellitus with diabetic neuropathy, without long-term current use of insulin (HCC)      atorvastatin (LIPITOR) 40 mg tablet TAKE ONE TABLET BY MOUTH EVERY DAY  Qty: 30 Tab, Refills: 12    Comments: This prescription was filled on 10/11/2019. Any refills authorized will be placed on file. Associated Diagnoses: Mixed hyperlipidemia      multivitamin (ONE A DAY) tablet Take 1 Tab by Mouth Once a Day. Similar alternatives are ok. thiamine HCL (B-1) 100 mg tablet Take 1 Tab by mouth daily. Qty: 90 Tab, Refills: 3    Associated Diagnoses: Vitamin B1 deficiency      fluticasone propionate (FLONASE) 50 mcg/actuation nasal spray INHALE 2 SPRAYS into both nostrils EVERY DAY  Qty: 16 g, Refills: 12    Comments: This prescription was filled on 9/18/2019. Any refills authorized will be placed on file. Associated Diagnoses: Seasonal allergic rhinitis, unspecified trigger; Type 2 diabetes mellitus with diabetic neuropathy, without long-term current use of insulin (HCC)         STOP taking these medications       glucose blood VI test strips ([employer] BLOOD GLUCOSE SYSTEM) strip Comments:   Reason for Stopping:         Insulin Needles, Disposable, (SURE-FINE PEN NEEDLES) 31 gauge x 5/16\" ndle Comments:   Reason for Stopping:         Blood-Glucose Meter monitoring kit Comments:   Reason for Stopping:         lancets (FREESTYLE LANCETS) 28 gauge misc Comments:   Reason for Stopping:         Lancets Misc Comments:   Reason for Stopping:               Activity: Activity as tolerated and no driving for today  Diet: Cardiac Diet  Wound Care: . Monitor right groin and right wrist for redness, edema or drainage. Do not submerge in water x 48 hours. Follow-up Appointments   Procedures    FOLLOW UP VISIT Appointment in: Two Weeks Dr. Kim Beckett     Standing Status:   Standing     Number of Occurrences:   1     Order Specific Question:   Appointment in     Answer: Two Weeks       Signed By: Etha Severs, NP     November 6, 2020    I have independently evaluated but did not examine the patient personally today. All relevant labs and testing data are reviewed.   Care plan discussed with nurse practitioner, SHANTE GRANT MD

## 2020-11-07 ENCOUNTER — PATIENT OUTREACH (OUTPATIENT)
Dept: CASE MANAGEMENT | Age: 55
End: 2020-11-07

## 2020-11-07 LAB
ATRIAL RATE: 82 BPM
CALCULATED P AXIS, ECG09: 51 DEGREES
CALCULATED R AXIS, ECG10: -76 DEGREES
CALCULATED T AXIS, ECG11: 11 DEGREES
DIAGNOSIS, 93000: NORMAL
P-R INTERVAL, ECG05: 158 MS
Q-T INTERVAL, ECG07: 380 MS
QRS DURATION, ECG06: 102 MS
QTC CALCULATION (BEZET), ECG08: 443 MS
VENTRICULAR RATE, ECG03: 82 BPM

## 2020-11-07 NOTE — PROGRESS NOTES
Patient contacted regarding recent discharge and COVID-19 risk. Discussed COVID-19 related testing which was not done at this time. Test results were not done. Patient informed of results, if available? n/a    Care Transition Nurse/ Ambulatory Care Manager/ LPN Care Coordinator contacted the patient by telephone to perform post discharge assessment. Verified name and  with patient as identifiers. Patient has following risk factors of: diabetes. CTN/ACM/LPN reviewed discharge instructions, medical action plan and red flags related to discharge diagnosis. Reviewed and educated them on any new and changed medications related to discharge diagnosis. Advised obtaining a 90-day supply of all daily and as-needed medications. Advance Care Planning:   Does patient have an Advance Directive: not on file    Education provided regarding infection prevention, and signs and symptoms of COVID-19 and when to seek medical attention with patient who verbalized understanding. Discussed exposure protocols and quarantine from [address] you at higher risk for severe illness  and given an opportunity for questions and concerns. The patient agrees to contact the COVID-19 hotline [phone] or PCP office for questions related to their healthcare. CTN/ACM/LPN provided contact information for future reference. From CDC: Are you at higher risk for severe illness?  Wash your hands often.  Avoid close contact (6 feet, which is about two arm lengths) with people who are sick.  Put distance between yourself and other people if COVID-19 is spreading in your community.  Clean and disinfect frequently touched surfaces.  Avoid all cruise travel and non-essential air travel.  Call your healthcare professional if you have concerns about COVID-19 and your underlying condition or if you are sick.     For more information on steps you can take to protect yourself, see CDC's How to Protect Yourself Patient/family/caregiver given information for [employer] and agrees to enroll no   by nurse care manager for worsening symptoms. Plan for follow-up call in 7-14 days based on severity of symptoms and risk factors.

## 2020-11-09 ENCOUNTER — VIRTUAL VISIT (OUTPATIENT)
Dept: FAMILY MEDICINE CLINIC | Age: 55
End: 2020-11-09
Payer: MEDICAID

## 2020-11-09 DIAGNOSIS — Z95.820 S/P ANGIOPLASTY WITH STENT: ICD-10-CM

## 2020-11-09 DIAGNOSIS — E11.40 TYPE 2 DIABETES MELLITUS WITH DIABETIC NEUROPATHY, WITHOUT LONG-TERM CURRENT USE OF INSULIN (HCC): ICD-10-CM

## 2020-11-09 DIAGNOSIS — I25.118 CORONARY ARTERY DISEASE OF NATIVE HEART WITH STABLE ANGINA PECTORIS, UNSPECIFIED VESSEL OR LESION TYPE (HCC): Primary | ICD-10-CM

## 2020-11-09 PROCEDURE — 99443 PR PHYS/QHP TELEPHONE EVALUATION 21-30 MIN: CPT | Performed by: FAMILY MEDICINE

## 2020-11-09 NOTE — PROGRESS NOTES
Maddie Cheng presents today for   Chief Complaint   Patient presents with   [facility] Follow Up     11-5-2020 home number to call [phone] No Doxy       Virtual/telephone visit    Depression Screening:  3 most recent PHQ Screens 11/9/2020   Little interest or pleasure in doing things Not at all   Feeling down, depressed, irritable, or hopeless Not at all   Total Score PHQ 2 0       Learning Assessment:  Learning Assessment 1/15/2020   PRIMARY LEARNER Patient   HIGHEST LEVEL OF EDUCATION - PRIMARY LEARNER  DID NOT GRADUATE HIGH SCHOOL   BARRIERS PRIMARY LEARNER NONE   CO-LEARNER CAREGIVER No   PRIMARY LANGUAGE ENGLISH   LEARNER PREFERENCE PRIMARY LISTENING     -   ANSWERED BY self   RELATIONSHIP SELF       Health Maintenance reviewed and discussed and ordered per Provider. Health Maintenance Due   Topic Date Due    Shingrix Vaccine Age 49> (1 of 2) 07/26/2015    Foot Exam Q1  02/13/2018    Eye Exam Retinal or Dilated  09/22/2019    Colorectal Cancer Screening Combo  08/23/2020    Flu Vaccine (1) 09/01/2020    A1C test (Diabetic or Prediabetic)  10/21/2020   . Coordination of Care:  1. Have you been to the ER, urgent care clinic since your last visit? Hospitalized since your last visit? Yes, Hospital 11-5-2020    2. Have you seen or consulted any other health care providers outside of the [address] [place] [address] since your last visit? Include any pap smears or colon screening. no    Patient hasn't checked his blood pressure or blood sugar since he has been released.

## 2020-11-09 NOTE — PROGRESS NOTES
Yehuda Jean Baptiste is a 54 y.o. male, evaluated via audio-only technology on 11/9/2020 for Hospital Follow Up (11-5-2020 home number to call [phone] No Doxy)  . Assessment & Plan:     Diagnoses and all orders for this visit:    1. Coronary artery disease of native heart with stable angina pectoris, unspecified vessel or lesion type ([address]  -     METABOLIC PANEL, COMPREHENSIVE; Future  -     LIPID PANEL; Future  Care as per cards    2. S/P angioplasty with stent  Care as per cards    3. Type 2 diabetes mellitus with diabetic neuropathy, without long-term current use of insulin ([facility])  -     METABOLIC PANEL, COMPREHENSIVE; Future  -     LIPID PANEL; Future  -     HEMOGLOBIN A1C WITH EAG; Future  -     MICROALBUMIN, UR, RAND W/ MICROALB/CREAT RATIO; Future  Pt and partner aware of need for improvement of diet. The complexity of medical decision making for this visit is moderate   Follow-up and Dispositions    · Return in about 6 weeks (around 12/21/2020) for diabetes, high blood pressure, cad, lab review. 12  Subjective:   Pt contacted for hospital f/u. He was admitted for cardiac cath. Pt had stent to the LCX. Pt was started on asa 81mg and brilinta 90mg bid. Pt has been moving as much as he can but is still sore. Pt was advised regarding appropriate diet. He was also advised to start checking home bp readings. Pt and significant other made aware of appt on 11/17/2020; they will need to resched as she has a procedure on that same day. Home bs readings were still elevated prior to his admission due to ongoing dietary indiscretion. He did have a visit with the DM Educator prior to discharge. Prior to Admission medications    Medication Sig Start Date End Date Taking? Authorizing Provider   aspirin 81 mg chewable tablet Take 1 Tab by mouth daily. 11/7/20  Yes Jennifer Dunbar NP   ticagrelor (BRILINTA) 90 mg tablet Take 1 Tab by mouth two (2) times a day.  11/6/20  Yes Collin Jaquez G, NP   metoprolol tartrate (LOPRESSOR) 25 mg tablet Take 1 Tab by mouth two (2) times a day. 10/30/20  Yes Jessica Yeager MD   gabapentin (NEURONTIN) 300 mg capsule TAKE TWO CAPSULES BY MOUTH IN THE EVENING 10/28/20  Yes Shanice Marion MD   glimepiride (AMARYL) 2 mg tablet TAKE 1 TABLET BY MOUTH EVERY DAY 9/22/20  Yes Shanice Marion MD   insulin glargine (Lantus Solostar U-100 Insulin) 100 unit/mL (3 mL) inpn 40 Units by SubCUTAneous route nightly. 9/21/20  Yes Idalia Rhodes MD   furosemide (LASIX) 40 mg tablet Take 1 Tab by mouth daily. 9/21/20  Yes Shanice Marion MD   hydroCHLOROthiazide (HYDRODIURIL) 12.5 mg tablet TAKE ONE TABLET BY MOUTH EVERY DAY 7/10/20  Yes Rossi Nath NP   losartan (COZAAR) 50 mg tablet TAKE ONE TABLET BY MOUTH DAILY 7/10/20  Yes Rossi Nath NP   SITagliptin-metFORMIN (Janumet XR) 50-1,000 mg TM24 Take 1 Tab by mouth daily. 6/2/20  Yes Idalia Rhodes MD   loratadine (CLARITIN) 10 mg tablet TAKE ONE TABLET BY MOUTH EVERY DAY 4/18/20  Yes Shanice Marion MD   atorvastatin (LIPITOR) 40 mg tablet TAKE ONE TABLET BY MOUTH EVERY DAY 10/13/19  Yes Shanice Marion MD   fluticasone propionate (FLONASE) 50 mcg/actuation nasal spray INHALE 2 SPRAYS into both nostrils EVERY DAY 9/19/19  Yes Idalia Rhodes MD   multivitamin (ONE A DAY) tablet Take 1 Tab by Mouth Once a Day. Similar alternatives are ok. 9/7/18  Yes Provider, Historical   thiamine HCL (B-1) 100 mg tablet Take 1 Tab by mouth daily.  9/20/18  Yes Idalia Rhodes MD     Patient Active Problem List   Diagnosis Code    Essential hypertension I10    History of CVA (cerebrovascular accident) Z80.78    History of MI (myocardial infarction) I25.2    Type 2 diabetes mellitus, with long-term current use of insulin ([facility] E11.9, Z79.4    Type 2 diabetes mellitus with diabetic neuropathy ([facility] E11.40    CHF (congestive heart failure) ([facility] I50.9    Atherosclerosis of coronary artery I25.10    Acute right-sided low back pain without sciatica M54.5    Right sided abdominal pain R10.9    Stable angina pectoris (HCC) I20.8    CAD (coronary artery disease) I25.10     Current Outpatient Medications   Medication Sig Dispense Refill    aspirin 81 mg chewable tablet Take 1 Tab by mouth daily. 90 Tab 1    ticagrelor (BRILINTA) 90 mg tablet Take 1 Tab by mouth two (2) times a day. 60 Tab 1    metoprolol tartrate (LOPRESSOR) 25 mg tablet Take 1 Tab by mouth two (2) times a day. 60 Tab 2    gabapentin (NEURONTIN) 300 mg capsule TAKE TWO CAPSULES BY MOUTH IN THE EVENING 60 Cap 5    glimepiride (AMARYL) 2 mg tablet TAKE 1 TABLET BY MOUTH EVERY DAY 90 Tab 4    insulin glargine (Lantus Solostar U-100 Insulin) 100 unit/mL (3 mL) inpn 40 Units by SubCUTAneous route nightly. 12 Pen 4    furosemide (LASIX) 40 mg tablet Take 1 Tab by mouth daily. 30 Tab 4    hydroCHLOROthiazide (HYDRODIURIL) 12.5 mg tablet TAKE ONE TABLET BY MOUTH EVERY DAY 30 Tab 5    losartan (COZAAR) 50 mg tablet TAKE ONE TABLET BY MOUTH DAILY 30 Tab 5    SITagliptin-metFORMIN (Janumet XR) 50-1,000 mg TM24 Take 1 Tab by mouth daily. 90 Tab 5    loratadine (CLARITIN) 10 mg tablet TAKE ONE TABLET BY MOUTH EVERY DAY 30 Tab 5    atorvastatin (LIPITOR) 40 mg tablet TAKE ONE TABLET BY MOUTH EVERY DAY 30 Tab 12    fluticasone propionate (FLONASE) 50 mcg/actuation nasal spray INHALE 2 SPRAYS into both nostrils EVERY DAY 16 g 12    multivitamin (ONE A DAY) tablet Take 1 Tab by Mouth Once a Day. Similar alternatives are ok.  thiamine HCL (B-1) 100 mg tablet Take 1 Tab by mouth daily.  90 Tab 3     Allergies   Allergen Reactions    Lisinopril Cough     Past Medical History:   Diagnosis Date    Arthritis     Chest pain     CH[facility]congestive heart failure) [facility]     CVA (cerebral vascular accident) ([facility] 2011    r side stroke    CVA (cerebral vascular accident) ([facility] 09/05/2018    admitted at [address] [place] [address]) 2010 chest pains    Hypercholesterolemia     Hypertension     Loss of hearing     right ear, patient states it comes and goes    Polyuria     Shortness of breath 2011    Type 2 diabetes mellitus without complication, with long-term current use of insulin ([facility] 07/23/2013    Type 2 diabetes mellitus, with long-term current use of insulin ([facility] 7/23/2013     Past Surgical History:   Procedure Laterality Date    HX HEART CATHETERIZATION       Family History   Problem Relation Age of Onset    Arthritis-osteo Mother     Gout Mother     Diabetes Mother     Hypertension Mother     Diabetes Father     Diabetes Brother     Diabetes Maternal Grandmother      Social History     Tobacco Use    Smoking status: Never Smoker    Smokeless tobacco: Never Used   Substance Use Topics    Alcohol use: Yes     Frequency: Monthly or less     Drinks per session: 1 or 2     Binge frequency: Never     Comment: occassionally       Review of Systems   Constitutional: Negative. HENT: Negative. Respiratory: Negative. Cardiovascular: Negative. All other systems reviewed and are negative. No flowsheet data found. Carter Nickerson, who was evaluated through a patient-initiated, synchronous (real-time) audio only encounter, and/or her healthcare decision maker, is aware that it is a billable service, with coverage as determined by his insurance carrier. He provided verbal consent to proceed: n/a- consent obtained within past 12 months. He has not had a related appointment within my department in the past 7 days or scheduled within the next 24 hours.       Total Time: minutes: 21-30 minutes    Jacek Kelley MD

## 2020-11-13 ENCOUNTER — OFFICE VISIT (OUTPATIENT)
Dept: ORTHOPEDIC SURGERY | Age: 55
End: 2020-11-13
Payer: MEDICAID

## 2020-11-13 VITALS
SYSTOLIC BLOOD PRESSURE: 112 MMHG | DIASTOLIC BLOOD PRESSURE: 71 MMHG | TEMPERATURE: 96.6 F | HEART RATE: 81 BPM | OXYGEN SATURATION: 98 % | WEIGHT: 191.4 LBS | RESPIRATION RATE: 16 BRPM | HEIGHT: 65 IN | BODY MASS INDEX: 31.89 KG/M2

## 2020-11-13 DIAGNOSIS — M25.551 RIGHT HIP PAIN: Primary | ICD-10-CM

## 2020-11-13 DIAGNOSIS — M25.552 LEFT HIP PAIN: ICD-10-CM

## 2020-11-13 PROCEDURE — 99214 OFFICE O/P EST MOD 30 MIN: CPT | Performed by: PHYSICIAN ASSISTANT

## 2020-11-13 NOTE — PROGRESS NOTES
[address] [place] [address]  [phone]           Patient: John Clark                MRN: [id number]       SSN: [id number]  YOB: 1965        AGE: 54 y.o. SEX: male  Body mass index is 31.85 kg/m². PCP: Lydia Serrano MD  11/13/20      This office note has been dictated. REVIEW OF SYSTEMS:  Constitutional: Negative for fever, chills, weight loss and malaise/fatigue. HENT: Negative. Eyes: Negative. Respiratory: Negative. Cardiovascular: Negative. Gastrointestinal: No bowel incontinence or constipation. Genitourinary: No bladder incontinence or saddle anesthesia. Skin: Negative. Neurological: Negative. Endo/Heme/Allergies: Negative. Psychiatric/Behavioral: Negative. Musculoskeletal: As per HPI above. Past Medical History:   Diagnosis Date    Arthritis     Chest pain     CHF (congestive heart failure) (HCC)     [facility]A (cerebral vascular accident) ([facility] 2011    r side stroke    CVA (cerebral vascular accident) ([facility] 09/05/2018    admitted at [address] [place] [address] Heart attack [facility]) 2010    chest pains    Hypercholesterolemia     Hypertension     Loss of hearing     right ear, patient states it comes and goes    Polyuria     Shortness of breath 2011    Type 2 diabetes mellitus without complication, with long-term current use of insulin ([facility] 07/23/2013    Type 2 diabetes mellitus, with long-term current use of insulin ([facility] 7/23/2013         Current Outpatient Medications:     aspirin 81 mg chewable tablet, Take 1 Tab by mouth daily. , Disp: 90 Tab, Rfl: 1    ticagrelor (BRILINTA) 90 mg tablet, Take 1 Tab by mouth two (2) times a day., Disp: 60 Tab, Rfl: 1    metoprolol tartrate (LOPRESSOR) 25 mg tablet, Take 1 Tab by mouth two (2) times a day., Disp: 60 Tab, Rfl: 2    gabapentin (NEURONTIN) 300 mg capsule, TAKE TWO CAPSULES BY MOUTH IN THE EVENING, Disp: 60 Cap, Rfl: 5    glimepiride (AMARYL) 2 mg tablet, TAKE 1 TABLET BY MOUTH EVERY DAY, Disp: 90 Tab, Rfl: 4    insulin glargine (Lantus Solostar U-100 Insulin) 100 unit/mL (3 mL) inpn, 40 Units by SubCUTAneous route nightly., Disp: 12 Pen, Rfl: 4    furosemide (LASIX) 40 mg tablet, Take 1 Tab by mouth daily. , Disp: 30 Tab, Rfl: 4    hydroCHLOROthiazide (HYDRODIURIL) 12.5 mg tablet, TAKE ONE TABLET BY MOUTH EVERY DAY, Disp: 30 Tab, Rfl: 5    losartan (COZAAR) 50 mg tablet, TAKE ONE TABLET BY MOUTH DAILY, Disp: 30 Tab, Rfl: 5    SITagliptin-metFORMIN (Janumet XR) 50-1,000 mg TM24, Take 1 Tab by mouth daily. , Disp: 90 Tab, Rfl: 5    loratadine (CLARITIN) 10 mg tablet, TAKE ONE TABLET BY MOUTH EVERY DAY, Disp: 30 Tab, Rfl: 5    atorvastatin (LIPITOR) 40 mg tablet, TAKE ONE TABLET BY MOUTH EVERY DAY, Disp: 30 Tab, Rfl: 12    fluticasone propionate (FLONASE) 50 mcg/actuation nasal spray, INHALE 2 SPRAYS into both nostrils EVERY DAY, Disp: 16 g, Rfl: 12    multivitamin (ONE A DAY) tablet, Take 1 Tab by Mouth Once a Day. Similar alternatives are ok., Disp: , Rfl:     thiamine HCL (B-1) 100 mg tablet, Take 1 Tab by mouth daily. , Disp: 90 Tab, Rfl: 3    Allergies   Allergen Reactions    Lisinopril Cough       Social History     Socioeconomic History    Marital status: SINGLE     Spouse name: Not on file    Number of children: Not on file    Years of education: Not on file    Highest education level: Not on file   Occupational History    Occupation: disabled     Comment: prior ; disabled after CVA   Social Needs    Financial resource strain: Not on file    Food insecurity     Worry: Not on file     Inability: Not on file   [employer] needs     Medical: Not on file     Non-medical: Not on file   Tobacco Use    Smoking status: Never Smoker    Smokeless tobacco: Never Used   Substance and Sexual Activity    Alcohol use: Yes     Frequency: Monthly or less     Drinks per session: 1 or 2 Binge frequency: Never     Comment: occassionally    Drug use: Not Currently     Types: Prescription, OTC, Marijuana    Sexual activity: Yes   Lifestyle    Physical activity     Days per week: Not on file     Minutes per session: Not on file    Stress: Not on file   Relationships    Social connections     Talks on phone: Not on file     Gets together: Not on file     Attends Congregational service: Not on file     Active member of club or organization: Not on file     Attends meetings of clubs or organizations: Not on file     Relationship status: Not on file    Intimate partner violence     Fear of current or ex partner: Not on file     Emotionally abused: Not on file     Physically abused: Not on file     Forced sexual activity: Not on file   Other Topics Concern    Not on file   Social History Narrative    Not on file       Past Surgical History:   Procedure Laterality Date    HX HEART CATHETERIZATION           Patient seen evaluate today for bilateral hips. He does still get some intermittent discomfort of his hips. It occurs when he is sitting, lying, as well as walking. Reports little groin discomfort at times. .  Denies laterally based discomfort. He denies radiating pain down the lower extremities. Patient denies recent fevers, chills, chest pain, SOB, or injuries. No recent systemic changes noted. A 12-point review of systems is performed today. Pertinent positives are noted. All other systems reviewed and otherwise are negative. Physical exam: General: Alert and oriented x3, nad.  well-developed, well nourished. normal affect, AF. NC/AT, EOMI, neck supple, trachea midline, no JVD present. Breathing is non-labored. Examination of lower extremities reveals well-maintained range of motion of the hips without reproduction of symptoms today. There is no pain to palpation the trochanteric bursa. Negative straight leg raise. Negative calf tenderness. Negative Homans.   No signs of DVT present. Review of previous radiographs of his hips does show shadowing the femoral head bilaterally. Review of previous radiographs of his knees show moderate arthritis. Assessment: Intermittent bilateral hip pain    Plan: At this point, we will move forward with reordering the MRI of the hips. We will see him back afterwards for review. He will call with any questions or concerns that shall arise.             JR Wilfrido HASKINS, PA-C, ATC

## 2020-11-16 ENCOUNTER — TELEPHONE (OUTPATIENT)
Dept: CARDIAC REHAB | Age: 55
End: 2020-11-16

## 2020-11-16 NOTE — TELEPHONE ENCOUNTER
Cardiac Rehab called patient and spoke to him about the program. He declined services and stated that he is going to start riding his bike at home. He is planning to start exercising more at home.      Thank you,  Jaimee Razo

## 2020-11-20 DIAGNOSIS — M25.552 LEFT HIP PAIN: ICD-10-CM

## 2020-11-20 DIAGNOSIS — M25.551 RIGHT HIP PAIN: ICD-10-CM

## 2020-11-24 ENCOUNTER — OFFICE VISIT (OUTPATIENT)
Dept: CARDIOLOGY CLINIC | Age: 55
End: 2020-11-24
Payer: MEDICAID

## 2020-11-24 VITALS
DIASTOLIC BLOOD PRESSURE: 78 MMHG | HEART RATE: 73 BPM | TEMPERATURE: 96.8 F | BODY MASS INDEX: 32.15 KG/M2 | SYSTOLIC BLOOD PRESSURE: 136 MMHG | OXYGEN SATURATION: 97 % | HEIGHT: 65 IN | WEIGHT: 193 LBS

## 2020-11-24 DIAGNOSIS — Z79.4 TYPE 2 DIABETES MELLITUS WITHOUT COMPLICATION, WITH LONG-TERM CURRENT USE OF INSULIN (HCC): ICD-10-CM

## 2020-11-24 DIAGNOSIS — E78.5 DYSLIPIDEMIA: ICD-10-CM

## 2020-11-24 DIAGNOSIS — E11.9 TYPE 2 DIABETES MELLITUS WITHOUT COMPLICATION, WITH LONG-TERM CURRENT USE OF INSULIN (HCC): ICD-10-CM

## 2020-11-24 DIAGNOSIS — I25.118 CORONARY ARTERY DISEASE OF NATIVE ARTERY OF NATIVE HEART WITH STABLE ANGINA PECTORIS (HCC): Primary | ICD-10-CM

## 2020-11-24 DIAGNOSIS — Z95.5 STATUS POST INSERTION OF DRUG ELUTING CORONARY ARTERY STENT: ICD-10-CM

## 2020-11-24 DIAGNOSIS — I10 ESSENTIAL HYPERTENSION: ICD-10-CM

## 2020-11-24 DIAGNOSIS — Z86.73 HISTORY OF CVA (CEREBROVASCULAR ACCIDENT): ICD-10-CM

## 2020-11-24 PROCEDURE — 99214 OFFICE O/P EST MOD 30 MIN: CPT | Performed by: INTERNAL MEDICINE

## 2020-11-24 NOTE — PROGRESS NOTES
HISTORY OF PRESENT ILLNESS  William Davis is a 54 y.o. male. Hypertension   The history is provided by the patient. This is a chronic problem. The problem occurs every several days. The problem has not changed since onset. Associated symptoms include shortness of breath. Shortness of Breath   The history is provided by the patient. This is a chronic problem. The problem occurs intermittently. The problem has been gradually improving. Pertinent negatives include no fever, no ear pain, no neck pain, no cough, no sputum production, no hemoptysis, no wheezing, no PND, no orthopnea, no vomiting, no rash, no leg swelling and no claudication. Hospital Follow Up   The history is provided by the patient. The problem has been gradually improving. Associated symptoms include shortness of breath. Review of Systems   Constitutional: Negative for chills, diaphoresis, fever and weight loss. HENT: Negative for ear pain and hearing loss. Eyes: Negative for blurred vision. Respiratory: Positive for shortness of breath. Negative for cough, hemoptysis, sputum production, wheezing and stridor. Cardiovascular: Negative for palpitations, orthopnea, claudication, leg swelling and PND. Gastrointestinal: Negative for heartburn, nausea and vomiting. Musculoskeletal: Negative for myalgias and neck pain. Skin: Negative for rash. Neurological: Negative for dizziness, tingling, tremors, focal weakness, loss of consciousness and weakness. Psychiatric/Behavioral: Negative for depression and suicidal ideas.      Family History   Problem Relation Age of Onset   [facility] Arthritis-osteo Mother     Gout Mother     Diabetes Mother     Hypertension Mother     Diabetes Father     Diabetes Brother     Diabetes Maternal Grandmother        Past Medical History:   Diagnosis Date    Arthritis     Chest pain     CHF (congestive heart failure) ([address]     CVA (cerebral vascular accident) ([address] 2011    r side stroke    CVA (cerebral vascular accident) ([facility] 09/05/2018    [address] attack [facility]) 2010    chest pains    Hypercholesterolemia     Hypertension     Loss of hearing     right ear, patient states it comes and goes    Polyuria     Shortness of breath 2011    Type 2 diabetes mellitus without complication, with long-term current use of insulin ([facility] 07/23/2013    Type 2 diabetes mellitus, with long-term current use of insulin ([facility] 7/23/2013       Past Surgical History:   Procedure Laterality Date    HX HEART CATHETERIZATION         Social History     Tobacco Use    Smoking status: Never Smoker    Smokeless tobacco: Never Used   Substance Use Topics    Alcohol use: Yes     Frequency: Monthly or less     Drinks per session: 1 or 2     Binge frequency: Never     Comment: occassionally       Allergies   Allergen Reactions    Lisinopril Cough       Outpatient Medications Marked as Taking for the 11/24/20 encounter (Office Visit) with Abraham Fu MD   Medication Sig Dispense Refill    aspirin 81 mg chewable tablet Take 1 Tab by mouth daily. 90 Tab 1    ticagrelor (BRILINTA) 90 mg tablet Take 1 Tab by mouth two (2) times a day. 60 Tab 1    metoprolol tartrate (LOPRESSOR) 25 mg tablet Take 1 Tab by mouth two (2) times a day. 60 Tab 2    gabapentin (NEURONTIN) 300 mg capsule TAKE TWO CAPSULES BY MOUTH IN THE EVENING 60 Cap 5    glimepiride (AMARYL) 2 mg tablet TAKE 1 TABLET BY MOUTH EVERY DAY 90 Tab 4    insulin glargine (Lantus Solostar U-100 Insulin) 100 unit/mL (3 mL) inpn 40 Units by SubCUTAneous route nightly. 12 Pen 4    furosemide (LASIX) 40 mg tablet Take 1 Tab by mouth daily. 30 Tab 4    hydroCHLOROthiazide (HYDRODIURIL) 12.5 mg tablet TAKE ONE TABLET BY MOUTH EVERY DAY 30 Tab 5    losartan (COZAAR) 50 mg tablet TAKE ONE TABLET BY MOUTH DAILY 30 Tab 5    SITagliptin-metFORMIN (Janumet XR) 50-1,000 mg TM24 Take 1 Tab by mouth daily.  90 Tab 5    loratadine (CLARITIN) 10 mg tablet TAKE ONE TABLET BY MOUTH EVERY DAY 30 Tab 5    atorvastatin (LIPITOR) 40 mg tablet TAKE ONE TABLET BY MOUTH EVERY DAY 30 Tab 12    multivitamin (ONE A DAY) tablet Take 1 Tab by Mouth Once a Day. Similar alternatives are ok.  thiamine HCL (B-1) 100 mg tablet Take 1 Tab by mouth daily. 90 Tab 3        Visit Vitals  /78 (BP 1 Location: Left arm, BP Patient Position: Sitting)   Pulse 73   Temp 96.8 °F (36 °C) (Temporal)   Ht 5' 5\" (1.651 m)   Wt 87.5 kg (193 lb)   SpO2 97%   BMI 32.12 kg/m²       Physical Exam   Constitutional: He is oriented to person, place, and time. He appears well-developed and well-nourished. No distress. HENT:   Head: Atraumatic. Mouth/Throat: No oropharyngeal exudate. Eyes: Conjunctivae are normal. No scleral icterus. Neck: Normal range of motion. Neck supple. No JVD present. No tracheal deviation present. No thyromegaly present. Cardiovascular: Normal rate and regular rhythm. Exam reveals no gallop. No murmur heard. Pulmonary/Chest: Effort normal and breath sounds normal. No stridor. He has no wheezes. He has no rales. Abdominal: Soft. There is no abdominal tenderness. There is no rebound and no guarding. Musculoskeletal: Normal range of motion. General: No tenderness or edema. Neurological: He is alert and oriented to person, place, and time. He exhibits normal muscle tone. Skin: Skin is warm. He is not diaphoretic. Psychiatric: He has a normal mood and affect. His behavior is normal.     9/2018 - 14 day event - NSR  Echo 09/2018:  NORMAL LEFT VENTRICULAR CAVITY SIZE AND SYSTOLIC FUNCTION WITH AN EJECTION FRACTION   VISUALLY ESTIMATED AT 60%. NORMAL DIASTOLIC FUNCTION. MILD LEFT VENTRICULAR HYPERTROPHY PRESENT. TRACE MITRAL REGURGITATION. MILDLY SCLEROTIC TRILEAFLET AORTIC VALVE. INSUFFICIENT TRICUSPID REGURGITANT JET TO ESTIMATE PULMONARY ARTERY PRESSURE. NEGATIVE BUBBLE STUDY ON PRIOR ECHO REPORT 4/09/2012.    NO SIGNIFICANT CHANGES FROM PRIOR ECHO REPORT ON 4/09/2012. tamyca 2018-  THIS MYOCARDIAL PERFUSION STUDY IS ABNORMAL   THIS IS A MEDIUM RISK STUDY   ABNORMAL NUCLEAR SCAN WITH  A SMALL AREA OF NON PERFUSION ON BOTH THE STRESS AND REST IMAGES   IN THE INFERIOR APICAL WALL CONSISTENT WITH EITHER PRIOR INFERIOR APICAL INFARCTION OR HIGH GRADE   OCCLUSION TO THE DISTAL RCA        INFERIOR APICAL AKINESIS WITH AN EJECTION FRACTION  ESTIMATED AT   50%  11/05/20   CARDIAC PROCEDURE 11/05/2020 11/5/2020    Narrative Critical single vessel coronary artery disease with preserved LV function. S/p ptca/stent to mid LCX. Continue DAPT and intense risk factor modification. Signed by: Damien Wellington MD     ASSESSMENT and PLAN    ICD-10-CM ICD-9-CM    1. Coronary artery disease of native artery of native heart with stable angina pectoris ([address]  I25.118 414.01      413.9    2. Dyslipidemia  E78.5 272.4 LIPID PANEL      HEPATIC FUNCTION PANEL   3. Essential hypertension  I10 401.9    4. History of CVA (cerebrovascular accident)  Z86.73 V12.54    5. Type 2 diabetes mellitus without complication, with long-term current use of insulin (HCC)  E11.9 250.00     Z79.4 V58.67    6. Status post insertion of drug eluting coronary artery stent  Z95.5 V45.82      Orders Placed This Encounter    LIPID PANEL     Standing Status:   Future     Standing Expiration Date:   11/25/2021    HEPATIC FUNCTION PANEL     Standing Status:   Future     Standing Expiration Date:   11/25/2021     Follow-up and Dispositions    · Return in about 4 months (around 3/24/2021). current treatment plan is effective, no change in therapy  reviewed diet, exercise and weight control    cardiovascular risk and specific lipid/LDL goals reviewed  use of aspirin to prevent MI and TIA's discussed. Patient seen for follow-up. Has prior abnormal nuclear stress test.    Complained of worsening exertional dyspnea while cutting grass.   Has occasional chest tightness. Recent cardiac cath- s/p PCI to mid LCX. Symptoms significantly better. Continue DAPT. Will repeat lipid and hepatic panel prior to next appt.   F/u in 4 months

## 2020-12-16 DIAGNOSIS — G63 POLYNEUROPATHY ASSOCIATED WITH UNDERLYING DISEASE (HCC): ICD-10-CM

## 2020-12-16 RX ORDER — LOSARTAN POTASSIUM 50 MG/1
TABLET ORAL
Qty: 30 TAB | Refills: 5 | Status: SHIPPED | OUTPATIENT
Start: 2020-12-16 | End: 2021-03-30 | Stop reason: DRUGHIGH

## 2020-12-16 RX ORDER — HYDROCHLOROTHIAZIDE 12.5 MG/1
TABLET ORAL
Qty: 30 TAB | Refills: 5 | Status: SHIPPED | OUTPATIENT
Start: 2020-12-16 | End: 2021-05-13

## 2020-12-17 RX ORDER — GABAPENTIN 300 MG/1
CAPSULE ORAL
Qty: 60 CAP | Refills: 5 | Status: SHIPPED | OUTPATIENT
Start: 2020-12-17 | End: 2021-07-27 | Stop reason: SDUPTHER

## 2021-01-05 ENCOUNTER — VIRTUAL VISIT (OUTPATIENT)
Dept: FAMILY MEDICINE CLINIC | Age: 56
End: 2021-01-05
Payer: MEDICAID

## 2021-01-05 DIAGNOSIS — G63 POLYNEUROPATHY ASSOCIATED WITH UNDERLYING DISEASE (HCC): ICD-10-CM

## 2021-01-05 DIAGNOSIS — E78.2 MIXED HYPERLIPIDEMIA: ICD-10-CM

## 2021-01-05 DIAGNOSIS — E11.8 TYPE 2 DIABETES MELLITUS WITH COMPLICATION, WITH LONG-TERM CURRENT USE OF INSULIN (HCC): Primary | ICD-10-CM

## 2021-01-05 DIAGNOSIS — I10 ESSENTIAL HYPERTENSION: ICD-10-CM

## 2021-01-05 DIAGNOSIS — Z79.4 TYPE 2 DIABETES MELLITUS WITH COMPLICATION, WITH LONG-TERM CURRENT USE OF INSULIN (HCC): Primary | ICD-10-CM

## 2021-01-05 PROCEDURE — 99214 OFFICE O/P EST MOD 30 MIN: CPT | Performed by: FAMILY MEDICINE

## 2021-01-05 NOTE — PROGRESS NOTES
Maya Weeks presents today for   Chief Complaint   Patient presents with   • Hypertension     Follow  up    • Diabetes     Follow up   • Coronary Artery Disease     Follow up        Virtual/telephone visit    Depression Screening:  3 most recent PHQ Screens 1/5/2021   Little interest or pleasure in doing things Not at all   Feeling down, depressed, irritable, or hopeless Not at all   Total Score PHQ 2 0       Learning Assessment:  Learning Assessment 1/5/2021   PRIMARY LEARNER Patient   HIGHEST LEVEL OF EDUCATION - PRIMARY LEARNER  DID NOT GRADUATE HIGH SCHOOL   BARRIERS PRIMARY LEARNER NONE   CO-LEARNER CAREGIVER No   PRIMARY LANGUAGE ENGLISH   LEARNER PREFERENCE PRIMARY DEMONSTRATION     -   ANSWERED BY Patient   RELATIONSHIP SELF       Travel Screening:   Travel Screening     Question   Response    In the last month, have you been in contact with someone who was confirmed or suspected to have Coronavirus / COVID-19?  No / Unsure    Have you had a COVID-19 viral test in the last 14 days?  No    Do you have any of the following new or worsening symptoms?      Have you traveled internationally in the last month?  No      Travel History   Travel since 12/05/20     No documented travel since 12/05/20          Health Maintenance reviewed and discussed and ordered per Provider.    Health Maintenance Due   Topic Date Due   • Shingrix Vaccine Age 50> (1 of 2) 07/26/2015   • Foot Exam Q1  02/13/2018   • Eye Exam Retinal or Dilated  09/22/2019   • Colorectal Cancer Screening Combo  08/23/2020   • Flu Vaccine (1) 09/01/2020   • A1C test (Diabetic or Prediabetic)  10/21/2020   • MICROALBUMIN Q1  01/15/2021   .      Coordination of Care:  1. Have you been to the ER, urgent care clinic since your last visit? Hospitalized since your last visit? No    2. Have you seen or consulted any other health care providers outside of the [facility] since your last visit? Include any pap smears or colon screening. No

## 2021-01-05 NOTE — PROGRESS NOTES
Anabell Seay is a 54 y.o. male who was seen by synchronous (real-time) audio-video technology on 1/5/2021 for Hypertension (Follow  up ), Diabetes (Follow up), and Coronary Artery Disease (Follow up )        Assessment & Plan:   Diagnoses and all orders for this visit:    1. Type 2 diabetes mellitus with complication, with long-term current use of insulin ([facility]  Await labs. Pt's partner will contact his insurer to find out if cgm is covered and if so, which brand they will cover. 2. Essential hypertension  Recommend routine home blood pressure monitoring. Await labs    3. Mixed hyperlipidemia  Await labs. 4. Polyneuropathy associated with underlying disease ([facility]  Stable, cont pres tx plan. The complexity of medical decision making for this visit is moderate   Follow-up and Dispositions    · Return in about 6 weeks (around 2/16/2021) for diabetes, high blood pressure, high cholesterol, lab review. 712  Subjective:   Patient contacted via doxy. me. Pt did not get his labs done. He was not aware that he had labs. Pt is not checking his bp regularly. Pt has not been checking blood sugars regularly. Pt feels he has made some improvement in his diet. Pt's partner would like to know if he can get a continuous glucose monitor so that he does not have to stick his fingers to monitor blood sugar. Prior to Admission medications    Medication Sig Start Date End Date Taking? Authorizing Provider   gabapentin (NEURONTIN) 300 mg capsule TAKE TWO CAPSULES BY MOUTH EVERY EVENING 12/17/20  Yes Carlita Marion MD   hydroCHLOROthiazide (HYDRODIURIL) 12.5 mg tablet TAKE ONE TABLET BY MOUTH DAILY 12/16/20  Yes Jennifer Dunbar NP   losartan (COZAAR) 50 mg tablet TAKE ONE TABLET BY MOUTH DAILY 12/16/20  Yes Jennifer Dunbar NP   aspirin 81 mg chewable tablet Take 1 Tab by mouth daily. 11/7/20  Yes Jennifer Dunbar NP   ticagrelor (BRILINTA) 90 mg tablet Take 1 Tab by mouth two (2) times a day. 11/6/20  Yes Jennifer Dunbar NP   metoprolol tartrate (LOPRESSOR) 25 mg tablet Take 1 Tab by mouth two (2) times a day. 10/30/20  Yes Faith Velez MD   glimepiride (AMARYL) 2 mg tablet TAKE 1 TABLET BY MOUTH EVERY DAY 9/22/20  Yes Karen Mckeon MD   insulin glargine (Lantus Solostar U-100 Insulin) 100 unit/mL (3 mL) inpn 40 Units by SubCUTAneous route nightly. 9/21/20  Yes Karen Mckeon MD   furosemide (LASIX) 40 mg tablet Take 1 Tab by mouth daily. 9/21/20  Yes Karen Mckeon MD   SITagliptin-metFORMIN (Janumet XR) 50-1,000 mg TM24 Take 1 Tab by mouth daily. 6/2/20  Yes Karen Mckeon MD   loratadine (CLARITIN) 10 mg tablet TAKE ONE TABLET BY MOUTH EVERY DAY 4/18/20  Yes Bk Marion MD   atorvastatin (LIPITOR) 40 mg tablet TAKE ONE TABLET BY MOUTH EVERY DAY 10/13/19  Yes Karen Mckeon MD   multivitamin (ONE A DAY) tablet Take 1 Tab by Mouth Once a Day. Similar alternatives are ok. 9/7/18  Yes Rita Graf   thiamine HCL (B-1) 100 mg tablet Take 1 Tab by mouth daily.  9/20/18  Yes Karen Mckeon MD   fluticasone propionate (FLONASE) 50 mcg/actuation nasal spray INHALE 2 SPRAYS into both nostrils EVERY DAY 9/19/19   Karen Mckeon MD     Patient Active Problem List   Diagnosis Code    Essential hypertension I10    History of CVA (cerebrovascular accident) Z80.78    History of MI (myocardial infarction) I25.2    Type 2 diabetes mellitus, with long-term current use of insulin ([facility] E11.9, Z79.4    Type 2 diabetes mellitus with diabetic neuropathy ([facility] E11.40    CHF (congestive heart failure) ([facility] I50.9    Atherosclerosis of coronary artery I25.10    Acute right-sided low back pain without sciatica M54.5    Right sided abdominal pain R10.9    Stable angina pectoris (HCC) I20.8    CAD (coronary artery disease) I25.10     Current Outpatient Medications   Medication Sig Dispense Refill    gabapentin (NEURONTIN) 300 mg capsule TAKE TWO CAPSULES BY MOUTH EVERY EVENING 60 Cap 5    hydroCHLOROthiazide (HYDRODIURIL) 12.5 mg tablet TAKE ONE TABLET BY MOUTH DAILY 30 Tab 5    losartan (COZAAR) 50 mg tablet TAKE ONE TABLET BY MOUTH DAILY 30 Tab 5    aspirin 81 mg chewable tablet Take 1 Tab by mouth daily. 90 Tab 1    ticagrelor (BRILINTA) 90 mg tablet Take 1 Tab by mouth two (2) times a day. 60 Tab 1    metoprolol tartrate (LOPRESSOR) 25 mg tablet Take 1 Tab by mouth two (2) times a day. 60 Tab 2    glimepiride (AMARYL) 2 mg tablet TAKE 1 TABLET BY MOUTH EVERY DAY 90 Tab 4    insulin glargine (Lantus Solostar U-100 Insulin) 100 unit/mL (3 mL) inpn 40 Units by SubCUTAneous route nightly. 12 Pen 4    furosemide (LASIX) 40 mg tablet Take 1 Tab by mouth daily. 30 Tab 4    SITagliptin-metFORMIN (Janumet XR) 50-1,000 mg TM24 Take 1 Tab by mouth daily. 90 Tab 5    loratadine (CLARITIN) 10 mg tablet TAKE ONE TABLET BY MOUTH EVERY DAY 30 Tab 5    atorvastatin (LIPITOR) 40 mg tablet TAKE ONE TABLET BY MOUTH EVERY DAY 30 Tab 12    multivitamin (ONE A DAY) tablet Take 1 Tab by Mouth Once a Day. Similar alternatives are ok.  thiamine HCL (B-1) 100 mg tablet Take 1 Tab by mouth daily.  90 Tab 3    fluticasone propionate (FLONASE) 50 mcg/actuation nasal spray INHALE 2 SPRAYS into both nostrils EVERY DAY 16 g 12     Allergies   Allergen Reactions    Lisinopril Cough     Past Medical History:   Diagnosis Date    Arthritis     Chest pain     CHF (congestive heart failure) ([facility]     CVA (cerebral vascular accident) ([facility] 2011    r side stroke    CVA (cerebral vascular accident) ([facility] 09/05/2018    admitted at [address] [place] [address]) 2010    chest pains    Hypercholesterolemia     Hypertension     Loss of hearing     right ear, patient states it comes and goes    Polyuria     Shortness of breath 2011    Type 2 diabetes mellitus without complication, with long-term current use of insulin ([facility] 07/23/2013    Type 2 diabetes mellitus, with [facility]-term [facility] 7/23/2013     Past Surgical History:   Procedure Laterality Date    HX HEART CATHETERIZATION       Family History   Problem Relation Age of Onset   [de-identified] Arthritis-osteo Mother     Gout Mother     Diabetes Mother     Hypertension Mother     Diabetes Father     Diabetes Brother     Diabetes Maternal Grandmother      Social History     Tobacco Use    Smoking status: Never Smoker    Smokeless tobacco: Never Used   Substance Use Topics    Alcohol use: Yes     Frequency: Monthly or less     Drinks per session: 1 or 2     Binge frequency: Never     Comment: occassionally       Review of Systems   Constitutional: Negative. HENT: Negative. Respiratory: Negative. Cardiovascular: Negative. All other systems reviewed and are negative. Objective:   No flowsheet data found. General: alert, cooperative, no distress   Mental  status: normal mood, behavior, speech, dress, motor activity, and thought processes, able to follow commands   HENT: NCAT   Neck: no visualized mass   Resp: no respiratory distress   Neuro: no gross deficits   Skin: no discoloration or lesions of concern on visible areas   Psychiatric: normal affect, consistent with stated mood, no evidence of hallucinations     Additional exam findings: none      We discussed the expected course, resolution and complications of the diagnosis(es) in detail. Medication risks, benefits, costs, interactions, and alternatives were discussed as indicated. I advised him to contact the office if his condition worsens, changes or fails to improve as anticipated. He expressed understanding with the diagnosis(es) and plan. Sven Dallin, who was evaluated through a patient-initiated, synchronous (real-time) audio-video encounter, and/or his healthcare decision maker, is aware that it is a billable service, with coverage as determined by his insurance carrier.  He provided verbal consent to proceed: n/a- consent obtained within past 12 months, and patient identification was verified. It was conducted pursuant to the emergency declaration under the [address] [place] [address] and the [employer] and [employer]. A caregiver was present when appropriate. Ability to conduct physical exam was limited. I was in the office. The patient was at home.       Graciela Adams MD

## 2021-01-15 ENCOUNTER — HOSPITAL ENCOUNTER (OUTPATIENT)
Dept: LAB | Age: 56
Discharge: HOME | End: 2021-01-15

## 2021-01-15 LAB
SENTARA SPECIMEN COL,SENBCF: NORMAL
SENTARA SPECIMEN COL,SENBCF: NORMAL

## 2021-01-15 PROCEDURE — 99001 SPECIMEN HANDLING PT-LAB: CPT

## 2021-01-18 LAB
A-G RATIO,AGRAT: 1.3 RATIO (ref 1.1–2.6)
ALBUMIN SERPL-MCNC: 3.7 G/DL (ref 3.5–5)
ALP SERPL-CCNC: 87 U/L (ref 25–115)
ALT SERPL-CCNC: 9 U/L (ref 5–40)
ANION GAP SERPL CALC-SCNC: 11 MMOL/L (ref 3–15)
AST SERPL W P-5'-P-CCNC: 7 U/L (ref 10–37)
AVG GLU, 10930: 287 MG/DL (ref 91–123)
BILIRUB SERPL-MCNC: 0.4 MG/DL (ref 0.2–1.2)
BUN SERPL-MCNC: 10 MG/DL (ref 6–22)
CALCIUM SERPL-MCNC: 9.1 MG/DL (ref 8.4–10.5)
CHLORIDE SERPL-SCNC: 97 MMOL/L (ref 98–110)
CHOLEST SERPL-MCNC: 182 MG/DL (ref 110–200)
CO2 SERPL-SCNC: 25 MMOL/L (ref 20–32)
CREAT SERPL-MCNC: 0.9 MG/DL (ref 0.5–1.2)
GFRAA, 66117: >60
GFRNA, 66118: >60
GLOBULIN,GLOB: 2.8 G/DL (ref 2–4)
GLUCOSE SERPL-MCNC: 470 MG/DL (ref 70–99)
HBA1C MFR BLD HPLC: 11.6 % (ref 4.8–5.6)
HDLC SERPL-MCNC: 30 MG/DL
HDLC SERPL-MCNC: 6.1 MG/DL (ref 0–5)
LDL/HDL RATIO,LDHD: 4.2
LDLC SERPL CALC-MCNC: 127 MG/DL (ref 50–99)
NON-HDL CHOLESTEROL, 011976: 152 MG/DL
POTASSIUM SERPL-SCNC: 4.2 MMOL/L (ref 3.5–5.5)
PROT SERPL-MCNC: 6.5 G/DL (ref 6.4–8.3)
SODIUM SERPL-SCNC: 133 MMOL/L (ref 133–145)
TRIGL SERPL-MCNC: 124 MG/DL (ref 40–149)
VLDLC SERPL CALC-MCNC: 25 MG/DL (ref 8–30)

## 2021-01-19 NOTE — TELEPHONE ENCOUNTER
Per Dr Bello Mittal after review labs done on 1/15/21 @ [facility]:     Increase Lipitor to 80 mg qhs  Repeat Lipid, LFT x 3 months

## 2021-02-01 NOTE — PROGRESS NOTES
Maya Weeks is a 55 y.o. male who was seen by synchronous (real-time) audio-video technology on 2/2/2021 for Sleep Problem (pt states he just lost his son )      Assessment & Plan:   1. Mild depression (HCC)  Comments:  Stable, trial Trazodone, given associated insomnia    Orders:  -     traZODone (DESYREL) 50 mg tablet; Take 1 Tab by mouth nightly., Normal, Disp-90 Tab, R-0  -     REFERRAL TO PSYCHOLOGY    2. Psychophysiological insomnia  Comments:  Worsening, start Trazodone and follow-up with PCP as scheduled  Orders:  -     traZODone (DESYREL) 50 mg tablet; Take 1 Tab by mouth nightly., Normal, Disp-90 Tab, R-0  -     REFERRAL TO PSYCHOLOGY       Follow-up and Dispositions    · Follow-up as scheduled with PCP in 4 weeks for blood pressure, CAD, lab review.       SUBJECTIVE:     HPI    Presents today with c/o insomnia and mild depression.  Patient recently lost his son, who passed away last Friday.  He states he has good and bad moments.  His appetite has been poor, he has not been sleeping.  He does not have any trouble falling asleep but he does report waking up in the middle of the night, around the time his son passed away.  He states his son was his only child.  He denies any history of depression or insomnia in the past.  He denies any SIHI.      3 most recent PHQ Screens 2/2/2021   Little interest or pleasure in doing things Several days   Feeling down, depressed, irritable, or hopeless More than half the days   Total Score PHQ 2 3   Trouble falling or staying asleep, or sleeping too much Nearly every day   Feeling tired or having little energy More than half the days   Poor appetite, weight loss, or overeating Several days   Feeling bad about yourself - or that you are a failure or have let yourself or your family down Not at all   Trouble concentrating on things such as school, work, reading, or watching TV Not at all   Moving or speaking so slowly that other people could have noticed; or the opposite  being so fidgety that others notice Not at all   Thoughts of being better off dead, or hurting yourself in some way Not at all   PHQ 9 Score 9   How difficult have these problems made it for you to do your work, take care of your home and get along with others Somewhat difficult       Current Outpatient Medications   Medication Sig Dispense Refill    traZODone (DESYREL) 50 mg tablet Take 1 Tab by mouth nightly. 90 Tab 0    gabapentin (NEURONTIN) 300 mg capsule TAKE TWO CAPSULES BY MOUTH EVERY EVENING 60 Cap 5    hydroCHLOROthiazide (HYDRODIURIL) 12.5 mg tablet TAKE ONE TABLET BY MOUTH DAILY 30 Tab 5    losartan (COZAAR) 50 mg tablet TAKE ONE TABLET BY MOUTH DAILY 30 Tab 5    aspirin 81 mg chewable tablet Take 1 Tab by mouth daily. 90 Tab 1    ticagrelor (BRILINTA) 90 mg tablet Take 1 Tab by mouth two (2) times a day. 60 Tab 1    metoprolol tartrate (LOPRESSOR) 25 mg tablet Take 1 Tab by mouth two (2) times a day. 60 Tab 2    glimepiride (AMARYL) 2 mg tablet TAKE 1 TABLET BY MOUTH EVERY DAY 90 Tab 4    insulin glargine (Lantus Solostar U-100 Insulin) 100 unit/mL (3 mL) inpn 40 Units by SubCUTAneous route nightly. 12 Pen 4    furosemide (LASIX) 40 mg tablet Take 1 Tab by mouth daily. 30 Tab 4    SITagliptin-metFORMIN (Janumet XR) 50-1,000 mg TM24 Take 1 Tab by mouth daily. 90 Tab 5    loratadine (CLARITIN) 10 mg tablet TAKE ONE TABLET BY MOUTH EVERY DAY 30 Tab 5    multivitamin (ONE A DAY) tablet Take 1 Tab by Mouth Once a Day. Similar alternatives are ok.  thiamine HCL (B-1) 100 mg tablet Take 1 Tab by mouth daily. 90 Tab 3    fluticasone propionate (FLONASE) 50 mcg/actuation nasal spray INHALE 2 SPRAYS into both nostrils EVERY DAY 16 g 12       Review of Systems   Constitutional: Positive for malaise/fatigue. Respiratory: Negative for shortness of breath. Cardiovascular: Negative for chest pain. Neurological: Negative for dizziness, weakness and headaches.    Psychiatric/Behavioral: Positive for depression. Negative for suicidal ideas. The patient has insomnia. OBJECTIVE:     Physical Exam   Constitutional: Stable-appearing, in no distress, alert and oriented  HENT:   Head: Normocephalic and atraumatic. Ears:  Hearing grossly intact. Mouth:  No visible perioral lesions, cyanosis, or lip swelling. Pulmonary/Chest: Does not appear dyspneic, no audible wheezes or nasal flaring. Musculoskeletal: Grossly normal active ROM in upper extremities. Neurological:  Intact recent memory, no facial or eyelid drooping, no speech impairment, answering questions appropriately. Psychiatric: Judgment and insight good, flat affect, poor eye contact    We discussed the expected course, resolution and complications of the diagnosis(es) in detail. Medication risks, benefits, costs, interactions, and alternatives were discussed as indicated. I advised him to contact the office if his condition worsens, changes or fails to improve as anticipated. He expressed understanding with the diagnosis(es) and plan. Tiff Angela, who was evaluated through a synchronous (real-time) audio-video encounter, and/or his healthcare decision maker, is aware that it is a billable service, with coverage as determined by his insurance carrier. provided verbal consent to proceed: Yes, and patient identification was verified. It was conducted pursuant to the emergency declaration under the [address] [place] [address], [address] [place] [address] authority and the [employer] and [employer] Act. A caregiver was present when appropriate. Ability to conduct physical exam was limited. I was in the office. The patient was at home.     RADHA Dodson

## 2021-02-02 ENCOUNTER — VIRTUAL VISIT (OUTPATIENT)
Dept: FAMILY MEDICINE CLINIC | Age: 56
End: 2021-02-02
Payer: MEDICAID

## 2021-02-02 DIAGNOSIS — F51.04 PSYCHOPHYSIOLOGICAL INSOMNIA: ICD-10-CM

## 2021-02-02 DIAGNOSIS — F32.A MILD DEPRESSION: Primary | ICD-10-CM

## 2021-02-02 PROCEDURE — 99213 OFFICE O/P EST LOW 20 MIN: CPT | Performed by: NURSE PRACTITIONER

## 2021-02-02 RX ORDER — TRAZODONE HYDROCHLORIDE 50 MG/1
50 TABLET ORAL
Qty: 90 TAB | Refills: 0 | Status: SHIPPED | OUTPATIENT
Start: 2021-02-02 | End: 2021-03-01

## 2021-02-02 NOTE — PROGRESS NOTES
Sven Zamarripa presents today for   Chief Complaint   Patient presents with    Sleep Problem     pt states he just lost his son        Virtual/telephone visit    Depression Screening:  3 most recent PHQ Screens 2/2/2021   Little interest or pleasure in doing things Several days   Feeling down, depressed, irritable, or hopeless More than half the days   Total Score PHQ 2 3   Trouble falling or staying asleep, or sleeping too much Nearly every day   Feeling tired or having little energy More than half the days   Poor appetite, weight loss, or overeating Several days   Feeling bad about yourself - or that you are a failure or have let yourself or your family down Not at all   Trouble concentrating on things such as school, work, reading, or watching TV Not at all   Moving or speaking so slowly that other people could have noticed; or the opposite being so fidgety that others notice Not at all   Thoughts of being better off dead, or hurting yourself in some way Not at all   PHQ 9 Score 9   How difficult have these problems made it for you to do your work, take care of your home and get along with others Somewhat difficult       Learning Assessment:  Learning Assessment 1/5/2021   PRIMARY LEARNER Patient   HIGHEST LEVEL OF EDUCATION - PRIMARY LEARNER  DID NOT GRADUATE HIGH SCHOOL   BARRIERS PRIMARY LEARNER NONE   CO-LEARNER CAREGIVER No   PRIMARY LANGUAGE ENGLISH   LEARNER PREFERENCE PRIMARY DEMONSTRATION     -   ANSWERED BY Patient   RELATIONSHIP SELF       Health Maintenance reviewed and discussed and ordered per Provider. Health Maintenance Due   Topic Date Due    COVID-19 Vaccine (1 of 2) 07/26/1981    Shingrix Vaccine Age 50> (1 of 2) 07/26/2015    Foot Exam Q1  02/13/2018    Eye Exam Retinal or Dilated  09/22/2019    Colorectal Cancer Screening Combo  08/23/2020    Flu Vaccine (1) 09/01/2020    MICROALBUMIN Q1  01/15/2021   . Coordination of Care:  1.  Have you been to the ER, urgent care clinic since your last visit? Hospitalized since your last visit? no    2. Have you seen or consulted any other health care providers outside of the [address] [place] [address] since your last visit? Include any pap smears or colon screening.  No

## 2021-02-08 RX ORDER — ATORVASTATIN CALCIUM 80 MG/1
80 TABLET, FILM COATED ORAL DAILY
Qty: 90 TAB | Refills: 2 | Status: SHIPPED | OUTPATIENT
Start: 2021-02-08 | End: 2022-06-22 | Stop reason: SDUPTHER

## 2021-02-08 NOTE — TELEPHONE ENCOUNTER
Patient returned call, advised to increase Lipitor to 80 mg every day and repeat labs in 3 months. Patient states understanding and will call if any further questions or concerns.

## 2021-03-01 ENCOUNTER — VIRTUAL VISIT (OUTPATIENT)
Dept: FAMILY MEDICINE CLINIC | Age: 56
End: 2021-03-01
Payer: COMMERCIAL

## 2021-03-01 DIAGNOSIS — F32.A DEPRESSION, UNSPECIFIED DEPRESSION TYPE: Primary | ICD-10-CM

## 2021-03-01 DIAGNOSIS — F51.04 PSYCHOPHYSIOLOGICAL INSOMNIA: ICD-10-CM

## 2021-03-01 DIAGNOSIS — Z79.4 TYPE 2 DIABETES MELLITUS WITH COMPLICATION, WITH LONG-TERM CURRENT USE OF INSULIN (HCC): ICD-10-CM

## 2021-03-01 DIAGNOSIS — E78.2 MIXED HYPERLIPIDEMIA: ICD-10-CM

## 2021-03-01 DIAGNOSIS — E11.8 TYPE 2 DIABETES MELLITUS WITH COMPLICATION, WITH LONG-TERM CURRENT USE OF INSULIN (HCC): ICD-10-CM

## 2021-03-01 DIAGNOSIS — I10 ESSENTIAL HYPERTENSION: ICD-10-CM

## 2021-03-01 DIAGNOSIS — Z63.4 GRIEF AT LOSS OF CHILD: ICD-10-CM

## 2021-03-01 DIAGNOSIS — G63 POLYNEUROPATHY ASSOCIATED WITH UNDERLYING DISEASE (HCC): ICD-10-CM

## 2021-03-01 DIAGNOSIS — F43.21 GRIEF AT LOSS OF CHILD: ICD-10-CM

## 2021-03-01 PROCEDURE — 99214 OFFICE O/P EST MOD 30 MIN: CPT | Performed by: FAMILY MEDICINE

## 2021-03-01 RX ORDER — TRAZODONE HYDROCHLORIDE 100 MG/1
100-200 TABLET ORAL
Qty: 60 TAB | Refills: 5 | Status: SHIPPED | OUTPATIENT
Start: 2021-03-01 | End: 2021-11-10 | Stop reason: SDUPTHER

## 2021-03-01 RX ORDER — ESCITALOPRAM OXALATE 10 MG/1
10 TABLET ORAL DAILY
Qty: 30 TAB | Refills: 5 | Status: SHIPPED | OUTPATIENT
Start: 2021-03-01 | End: 2021-04-30 | Stop reason: DRUGHIGH

## 2021-03-01 SDOH — SOCIAL STABILITY - SOCIAL INSECURITY: DISSAPEARANCE AND DEATH OF FAMILY MEMBER: Z63.4

## 2021-03-01 NOTE — PROGRESS NOTES
Anabell Seay is a 54 y.o. male who was seen by synchronous (real-time) audio-video technology on 3/1/2021 for Diabetes, Hypertension, Cholesterol Problem, and Labs (Completed 1/15/21)        Assessment & Plan:   Diagnoses and all orders for this visit:    1. Depression, unspecified depression type    Assessment & Plan:  No improvement. Appt with neuropsych in 1 wk. Orders:  -     traZODone (DESYREL) 100 mg tablet; Take 1-2 Tabs by mouth nightly as needed for Sleep. -     escitalopram oxalate (LEXAPRO) 10 mg tablet; Take 1 Tab by mouth daily. 2. Grief at loss of child  -     traZODone (DESYREL) 100 mg tablet; Take 1-2 Tabs by mouth nightly as needed for Sleep. -     escitalopram oxalate (LEXAPRO) 10 mg tablet; Take 1 Tab by mouth daily. Anticipate pt will need grief counseling. 3. Type 2 diabetes mellitus with complication, with long-term current use of insulin ([facility])  -     REFERRAL TO PHARMACIST    4. Essential hypertension  Recommend routine home blood pressure monitoring. 5. Mixed hyperlipidemia  Pt has started atorva 80mg every day. Has labs ordered by cards. 6. Polyneuropathy associated with underlying disease ([address]  Stable, cont pres tx plan. 7. Psychophysiological insomnia  Comments:  Related to grief. Orders:  -     traZODone (DESYREL) 100 mg tablet; Take 1-2 Tabs by mouth nightly as needed for Sleep. The complexity of medical decision making for this visit is moderate   Follow-up and Dispositions    · Return in about 6 weeks (around 4/12/2021) for depression, medication change(s). 712  Subjective:   Patient contacted via doxy. me. Pt reports that his son was murdered last month. He had an appt with JUJU Henriquez. He is not sleeping with the trazodone. His appetite is poor. He is not taking his medications regularly. His energy level is \"not good. \"      Pt's partner is concerned about his cholesterol. He was rx'ed atorva 80mg by cards.   His partner is eating better and has improved her wt; she wonders if he should be eating the same. Recent labs reviewed. Prior to Admission medications    Medication Sig Start Date End Date Taking? Authorizing Provider   traZODone (DESYREL) 100 mg tablet Take 1-2 Tabs by mouth nightly as needed for Sleep. 3/1/21  Yes Evin Kuhn MD   escitalopram oxalate (LEXAPRO) 10 mg tablet Take 1 Tab by mouth daily. 3/1/21  Yes Evin Kuhn MD   atorvastatin (LIPITOR) 80 mg tablet Take 1 Tab by mouth daily. 2/8/21  Yes Rossi Nath NP   gabapentin (NEURONTIN) 300 mg capsule TAKE TWO CAPSULES BY MOUTH EVERY EVENING 12/17/20  Yes Carlita Marion MD   hydroCHLOROthiazide (HYDRODIURIL) 12.5 mg tablet TAKE ONE TABLET BY MOUTH DAILY 12/16/20  Yes Jennifer Dunbar NP   losartan (COZAAR) 50 mg tablet TAKE ONE TABLET BY MOUTH DAILY 12/16/20  Yes Jennifer Dunbar NP   aspirin 81 mg chewable tablet Take 1 Tab by mouth daily. 11/7/20  Yes Jennifer Dunbar NP   ticagrelor (BRILINTA) 90 mg tablet Take 1 Tab by mouth two (2) times a day. 11/6/20  Yes Jennifer Dunbar NP   metoprolol tartrate (LOPRESSOR) 25 mg tablet Take 1 Tab by mouth two (2) times a day. 10/30/20  Yes Nat Sanchez MD   glimepiride (AMARYL) 2 mg tablet TAKE 1 TABLET BY MOUTH EVERY DAY 9/22/20  Yes Evin Kuhn MD   insulin glargine (Lantus Solostar U-100 Insulin) 100 unit/mL (3 mL) inpn 40 Units by SubCUTAneous route nightly. 9/21/20  Yes Evin Kuhn MD   furosemide (LASIX) 40 mg tablet Take 1 Tab by mouth daily. 9/21/20  Yes Evin Kuhn MD   SITagliptin-metFORMIN (Janumet XR) 50-1,000 mg TM24 Take 1 Tab by mouth daily. 6/2/20  Yes Evin Kuhn MD   loratadine (CLARITIN) 10 mg tablet TAKE ONE TABLET BY MOUTH EVERY DAY 4/18/20  Yes Evin Kuhn MD   multivitamin (ONE A DAY) tablet Take 1 Tab by Mouth Once a Day. Similar alternatives are ok. 9/7/18  Yes Provider, Historical   thiamine HCL (B-1) 100 mg tablet Take 1 Tab by mouth daily. 9/20/18  Yes Kumar Taylor MD   fluticasone propionate (FLONASE) 50 mcg/actuation nasal spray INHALE 2 SPRAYS into both nostrils EVERY DAY 9/19/19   Kumar Taylor MD     Patient Active Problem List   Diagnosis Code    Essential hypertension I10    History of CVA (cerebrovascular accident) Z80.78    History of MI (myocardial infarction) I25.2    Type 2 diabetes mellitus, with long-term current use of insulin ([facility] E11.9, Z79.4    Type 2 diabetes mellitus with diabetic neuropathy ([facility] E11.40    CHF (congestive heart failure) ([facility] I50.9    Atherosclerosis of coronary artery I25.10    Acute right-sided low back pain without sciatica M54.5    Right sided abdominal pain R10.9    Stable angina pectoris (HCC) I20.8    CAD (coronary artery disease) I25.10    Depression F32.9     Current Outpatient Medications   Medication Sig Dispense Refill    traZODone (DESYREL) 100 mg tablet Take 1-2 Tabs by mouth nightly as needed for Sleep. 60 Tab 5    escitalopram oxalate (LEXAPRO) 10 mg tablet Take 1 Tab by mouth daily. 30 Tab 5    atorvastatin (LIPITOR) 80 mg tablet Take 1 Tab by mouth daily. 90 Tab 2    gabapentin (NEURONTIN) 300 mg capsule TAKE TWO CAPSULES BY MOUTH EVERY EVENING 60 Cap 5    hydroCHLOROthiazide (HYDRODIURIL) 12.5 mg tablet TAKE ONE TABLET BY MOUTH DAILY 30 Tab 5    losartan (COZAAR) 50 mg tablet TAKE ONE TABLET BY MOUTH DAILY 30 Tab 5    aspirin 81 mg chewable tablet Take 1 Tab by mouth daily. 90 Tab 1    ticagrelor (BRILINTA) 90 mg tablet Take 1 Tab by mouth two (2) times a day. 60 Tab 1    metoprolol tartrate (LOPRESSOR) 25 mg tablet Take 1 Tab by mouth two (2) times a day. 60 Tab 2    glimepiride (AMARYL) 2 mg tablet TAKE 1 TABLET BY MOUTH EVERY DAY 90 Tab 4    insulin glargine (Lantus Solostar U-100 Insulin) 100 unit/mL (3 mL) inpn 40 Units by SubCUTAneous route nightly. 12 Pen 4    furosemide (LASIX) 40 mg tablet Take 1 Tab by mouth daily.  30 Tab 4    SITagliptin-metFORMIN (Janumet XR) 50-1,000 mg TM24 Take 1 Tab by mouth daily. 90 Tab 5    loratadine (CLARITIN) 10 mg tablet TAKE ONE TABLET BY MOUTH EVERY DAY 30 Tab 5    multivitamin (ONE A DAY) tablet Take 1 Tab by Mouth Once a Day. Similar alternatives are ok.  thiamine HCL (B-1) 100 mg tablet Take 1 Tab by mouth daily. 90 Tab 3    fluticasone propionate (FLONASE) 50 mcg/actuation nasal spray INHALE 2 SPRAYS into both nostrils EVERY DAY 16 g 12     Allergies   Allergen Reactions    Lisinopril Cough     Past Medical History:   Diagnosis Date    Arthritis     Chest pain     CHF [facility]congestive heart failure) ([facility])     CVA (cerebral vascular accident) ([facility]) 2011    r side stroke    CVA (cerebral vascular accident) ([facility]) 09/05/2018    admitted at [facility] [address] [facility] Heart [facility]) 2010    chest pains    Hypercholesterolemia     Hypertension     Loss of hearing     right ear, patient states it comes and goes    Polyuria     Shortness of breath 2011    Type 2 diabetes mellitus without complication, with long-term current use of insulin ([facility]) 07/23/2013    Type 2 diabetes mellitus, with long-term current use of insulin ([facility]) 7/23/2013     Past Surgical History:   Procedure Laterality Date    HX HEART CATHETERIZATION       Family History   Problem Relation Age of Onset    Arthritis-osteo Mother     Gout Mother     Diabetes Mother     Hypertension Mother     Diabetes Father     Diabetes Brother     Diabetes Maternal Grandmother     Other Son         homicide     Social History     Tobacco Use    Smoking status: Never Smoker    Smokeless tobacco: Never Used   Substance Use Topics    Alcohol use: Yes     Frequency: Monthly or less     Drinks per session: 1 or 2     Binge frequency: Never     Comment: occassionally       Review of Systems   Constitutional: Negative. HENT: Negative. Respiratory: Negative. Cardiovascular: Negative. Psychiatric/Behavioral: Positive for depression.  The patient has insomnia. All other systems reviewed and are negative. Objective:   No flowsheet data found. General: alert, cooperative, no distress   Mental  status: normal mood, behavior, speech, dress, motor activity, and thought processes, able to follow commands   HENT: NCAT   Neck: no visualized mass   Resp: no respiratory distress   Neuro: no gross deficits   Skin: no discoloration or lesions of concern on visible areas   Psychiatric: normal affect, consistent with stated mood, no evidence of hallucinations     Additional exam findings: We discussed the expected course, resolution and complications of the diagnosis(es) in detail. Medication risks, benefits, costs, interactions, and alternatives were discussed as indicated. I advised him to contact the office if his condition worsens, changes or fails to improve as anticipated. He expressed understanding with the diagnosis(es) and plan. Tip Shirley, was evaluated through a synchronous (real-time) audio-video encounter. The patient (or guardian if applicable) is aware that this is a billable service. Verbal consent to proceed has been obtained within the past 12 months. The visit was conducted pursuant to the emergency declaration under the [facility], [address] [place] [address] authority and the [employer] General Act. Patient identification was verified, and a caregiver was present when appropriate. The patient was located in a state where the provider was credentialed to provide care.     Mario Zarate MD

## 2021-03-05 ENCOUNTER — TELEPHONE (OUTPATIENT)
Dept: FAMILY MEDICINE CLINIC | Age: 56
End: 2021-03-05

## 2021-03-05 NOTE — TELEPHONE ENCOUNTER
Pharmacy Progress Note - Telephone Call    Mr. Darrius Garner 54 y.o. was contacted via an outbound telephone call regarding scheduling appointment for diabetes education today. Patient states that his girlfriend usually handles is medication and appointments. He will give PharmD a call back once he speaks with her. Thank you,  Betzaida Higuera.  Jann Guerra, PHARMD, [facility]      CLINICAL PHARMACY CONSULT: MED RECONCILIATION/REVIEW ADDENDUM    For Pharmacy Admin Tracking Only    PHSO: PHSO Patient?: No  Time Spent (min): 5

## 2021-03-08 ENCOUNTER — OFFICE VISIT (OUTPATIENT)
Dept: NEUROLOGY | Age: 56
End: 2021-03-08
Payer: COMMERCIAL

## 2021-03-08 DIAGNOSIS — F43.21 GRIEF AT LOSS OF CHILD: ICD-10-CM

## 2021-03-08 DIAGNOSIS — Z63.4 GRIEF AT LOSS OF CHILD: ICD-10-CM

## 2021-03-08 DIAGNOSIS — R41.3 MEMORY LOSS: Primary | ICD-10-CM

## 2021-03-08 DIAGNOSIS — I63.9 CEREBROVASCULAR ACCIDENT (CVA), UNSPECIFIED MECHANISM (HCC): ICD-10-CM

## 2021-03-08 DIAGNOSIS — R45.89 DEPRESSED MOOD: ICD-10-CM

## 2021-03-08 PROCEDURE — 90791 PSYCH DIAGNOSTIC EVALUATION: CPT | Performed by: PSYCHOLOGIST

## 2021-03-08 SDOH — SOCIAL STABILITY - SOCIAL INSECURITY: DISSAPEARANCE AND DEATH OF FAMILY MEMBER: Z63.4

## 2021-03-08 NOTE — PROGRESS NOTES
[facility]  Neuroscience   [address] [place] [address]. [facility], [address]  Office:  [phone]  Fax: [phone]                  Initial Office Exam  Patient Name: Sabina Avila  Age: 54 y.o. Gender: male   Handedness: right handed   Presenting Concern: memory loss  Primary Care Physician: Evin Kuhn MD  Referring Provider: Dena Billy NP      REASON FOR REFERRAL:  This comprehensive and medically necessary neuropsychological assessment was requested to assist a differential diagnosis of memory complaints. The use and purpose of this examination, as well as the extent and limitations of confidentiality, were explained prior to obtaining permission to participate. Instructions were provided regarding the necessity to put forth optimal effort and answer questions truthfully in order to obtain reliable and accurate test results. REVIEW OF RECORDS:  Mr. Lashay Best was referred by his PCP for unresolved grief that is thought to be associated with insomnia. Notes indicate that Mr. Weeks's son  in late January via homicide. Since that time, Mr. Lashay Best has experienced insomnia (trazodone started). An MRI on 2018 showed the followin. Small right occipital cortical lesions in part acute infarctions likely embolic. No hemorrhage or mass effect. 2. 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 3. Small chronic medial inferior left temporal occipital infarction, likely embolic. 4. Right basal ganglia and thalamic lacunar infarctions, nonspecific but potentially small vessel in nature 5. Ischemic changes periventricular and deep white matter of the frontal lobes. Right is very important [employer] order      Current Outpatient Medications   Medication Sig    traZODone (DESYREL) 100 mg tablet Take 1-2 Tabs by mouth nightly as needed for Sleep.     escitalopram oxalate (LEXAPRO) 10 mg tablet Take 1 Tab by mouth daily.    atorvastatin (LIPITOR) 80 mg tablet Take 1 Tab by mouth daily.  gabapentin (NEURONTIN) 300 mg capsule TAKE TWO CAPSULES BY MOUTH EVERY EVENING    hydroCHLOROthiazide (HYDRODIURIL) 12.5 mg tablet TAKE ONE TABLET BY MOUTH DAILY    losartan (COZAAR) 50 mg tablet TAKE ONE TABLET BY MOUTH DAILY    aspirin 81 mg chewable tablet Take 1 Tab by mouth daily.  ticagrelor (BRILINTA) 90 mg tablet Take 1 Tab by mouth two (2) times a day.  metoprolol tartrate (LOPRESSOR) 25 mg tablet Take 1 Tab by mouth two (2) times a day.  glimepiride (AMARYL) 2 mg tablet TAKE 1 TABLET BY MOUTH EVERY DAY    insulin glargine (Lantus Solostar U-100 Insulin) 100 unit/mL (3 mL) inpn 40 Units by SubCUTAneous route nightly.  furosemide (LASIX) 40 mg tablet Take 1 Tab by mouth daily.  SITagliptin-metFORMIN (Janumet XR) 50-1,000 mg TM24 Take 1 Tab by mouth daily.  loratadine (CLARITIN) 10 mg tablet TAKE ONE TABLET BY MOUTH EVERY DAY    fluticasone propionate (FLONASE) 50 mcg/actuation nasal spray INHALE 2 SPRAYS into both nostrils EVERY DAY    multivitamin (ONE A DAY) tablet Take 1 Tab by Mouth Once a Day. Similar alternatives are ok.  thiamine HCL (B-1) 100 mg tablet Take 1 Tab by mouth daily. No current facility-administered medications for this visit.         Past Medical History:   Diagnosis Date    Arthritis     Chest pain     CHF (congestive heart failure) (HCC)     [facility]A [facility]cere[facility]) ([facility] 2011    r side stroke    CVA (cerebral vascular accident) ([facility] 09/05/2018    admitted at [address] [place] [address] Heart attack [facility]) 2010    chest pains    Hypercholesterolemia     Hypertension     Loss of hearing     right ear, patient states it comes and goes    Polyuria     Shortness of breath 2011    Type 2 diabetes mellitus without complication, with long-term current use of insulin ([address] 07/23/2013    Type 2 diabetes mellitus, with long-term current use of insulin ([facility] 7/23/2013         Past Surgical History:   Procedure Laterality Date    HX HEART CATHETERIZATION         CLINICAL INTERVIEW:  Mr. Maurice Pitts was accompanied by his wife for his initial interview. Consistent with records, they reported memory loss which first emerged in 2001, coinciding with Mr. Jerrod Davis for stroke. Memory loss has remained stable since that time. Mr. Jerrod chatterjee indicated that he will often require repeat instructions for grocery shopping and banking. Neurologic history is negative for seizures, syncope, and significant head trauma. Insomnia first emerged in January at the time of Mr. Jerrod Davis son's murder. Snoring was denied. Pain complaints include arthritis in the hips. Mr. Maurice Pitts is not followed by a rheumatologist.  Alcohol use is rare. There is no history of heavy alcohol consumption or illicit substance use. Tobacco use ceased 5-6 years ago. Family history of neurologic illness is significant for stroke in the brother. With regard to emotional functioning, Mr. Maurice Pitts denied a significant history of psychiatric illness. There is no history of psychiatric hospitalization, self-harm behavior, suicidal ideation. Psychological trauma is significant for the recent death of Mr. Maurice Pitts son via homicide. The importance of grief counseling was discussed in the context of this interview. Socially, Mr. Maurice Pitts has been in and on again, off again relationship with his fiancée for 33 years. They have been engaged for 2 years. They live together. Academically, Mr. Maurice Pitts completed 11 years of education and denied a history of LD and ADHD. He has been on disability for 10 years due to CVA. He was previously employed in [employer]. Hobbies are limited due to depressive symptomatology. That said, Mr. Maurice Pitts typically enjoys fishing, crabbing, riding his bike, and walking. Functionally, Mr. Maurice Pitts is dependent on his sene for medication management and bill payment.   She has been doing this for years. He continues to drive without incident. MENTAL STATUS:    Sensorium  Awake, Aware, Alert   Orientation person, place, situation, day of week, month of year and year   Relations passive   Eye Contact appropriate   Appearance:  age appropriate   Motor Behavior:  within normal limits   Speech:  monotone   Vocabulary average   Thought Process: within normal limits   Thought Content free of delusions and free of hallucinations   Suicidal ideations none   Homicidal ideations none   Mood:  depressed   Affect:  mood-congruent   Memory recent  impaired   Memory remote:  adequate   Concentration:  adequate   Abstraction:  abstract   Insight:  fair   Reliability fair   Judgment:  fair         DIAGNOSTIC IMPRESSIONS:  1. Cognitive Decline: R/O Mild Neurocognitive Disorder  2. Depressed Mood  3. Grief at Loss of Child      PLAN:  1. Complete a comprehensive neuropsychological assessment to provide a differential diagnosis of presenting concerns as well as to assist with disposition and treatment planning as appropriate. 2. Consider compensatory and remedial cognitive training. 3. Consider nonpharmacological interventions for mood disorder. 4. Consider an adaptive driving evaluation. [id number] x 1 Review of records. Face to face interview w/ patient. Determine test protocol: 60 minutes. Total 1 unit      Ava Tillman, PHD  Licensed Clinical Psychologist    This note was created using voice recognition software. Despite editing, there may be syntax errors. This note will not be viewable in [employer] for the following reason(s). This is a Psychotherapy Note.  ([address] Providers Only)

## 2021-03-11 ENCOUNTER — OFFICE VISIT (OUTPATIENT)
Dept: NEUROLOGY | Age: 56
End: 2021-03-11
Payer: COMMERCIAL

## 2021-03-11 DIAGNOSIS — F41.8 ANXIETY ABOUT HEALTH: ICD-10-CM

## 2021-03-11 DIAGNOSIS — F33.1 MODERATE EPISODE OF RECURRENT MAJOR DEPRESSIVE DISORDER (HCC): ICD-10-CM

## 2021-03-11 DIAGNOSIS — R41.83 BORDERLINE INTELLECTUAL FUNCTIONING: ICD-10-CM

## 2021-03-11 DIAGNOSIS — F01.50 VASCULAR DEMENTIA WITHOUT BEHAVIORAL DISTURBANCE (HCC): Primary | ICD-10-CM

## 2021-03-11 PROCEDURE — 96137 PSYCL/NRPSYC TST PHY/QHP EA: CPT | Performed by: PSYCHOLOGIST

## 2021-03-11 PROCEDURE — 96132 NRPSYC TST EVAL PHYS/QHP 1ST: CPT | Performed by: PSYCHOLOGIST

## 2021-03-11 PROCEDURE — 96138 PSYCL/NRPSYC TECH 1ST: CPT | Performed by: PSYCHOLOGIST

## 2021-03-11 PROCEDURE — 96133 NRPSYC TST EVAL PHYS/QHP EA: CPT | Performed by: PSYCHOLOGIST

## 2021-03-11 PROCEDURE — 96139 PSYCL/NRPSYC TST TECH EA: CPT | Performed by: PSYCHOLOGIST

## 2021-03-11 PROCEDURE — 96136 PSYCL/NRPSYC TST PHY/QHP 1ST: CPT | Performed by: PSYCHOLOGIST

## 2021-03-11 NOTE — PROGRESS NOTES
[facility]  Neuroscience   [address] [place] [address]. [facility], [address]  Office:  [phone]  Fax: [phone]                Neuropsychological Evaluation Report    Patient Name: Jany Howell  Age: 54 y.o. Gender: male   Handedness: right handed   Presenting Concern: memory loss  Primary Care Physician: Rocky Armenta MD  Referring Provider: Juvencio Wolf NP    PATIENT HISTORY (OBTAINED DURING INITIAL CLINICAL EVALUATION):    REASON FOR REFERRAL:  This comprehensive and medically necessary neuropsychological assessment was requested to assist a differential diagnosis of memory complaints. The use and purpose of this examination, as well as the extent and limitations of confidentiality, were explained prior to obtaining permission to participate. Instructions were provided regarding the necessity to put forth optimal effort and answer questions truthfully in order to obtain reliable and accurate test results. REVIEW OF RECORDS:  Mr. Myranda Patel was referred by his PCP for unresolved grief that is thought to be associated with insomnia. Notes indicate that Mr. Weeks's son  in late January via homicide. Since that time, Mr. Myranda Patel has experienced insomnia (trazodone started). An MRI on 2018 showed the followin. Small right occipital cortical lesions in part acute infarctions likely embolic. No hemorrhage or mass effect. 2. 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 3. Small chronic medial inferior left temporal occipital infarction, likely embolic. 4. Right basal ganglia and thalamic lacunar infarctions, nonspecific but potentially small vessel in nature 5. Ischemic changes periventricular and deep white matter of the frontal lobes. Current Outpatient Medications   Medication Sig    traZODone (DESYREL) 100 mg tablet Take 1-2 Tabs by mouth nightly as needed for Sleep.     escitalopram oxalate (LEXAPRO) 10 mg tablet Take 1 Tab by mouth daily.  atorvastatin (LIPITOR) 80 mg tablet Take 1 Tab by mouth daily.  gabapentin (NEURONTIN) 300 mg capsule TAKE TWO CAPSULES BY MOUTH EVERY EVENING    hydroCHLOROthiazide (HYDRODIURIL) 12.5 mg tablet TAKE ONE TABLET BY MOUTH DAILY    losartan (COZAAR) 50 mg tablet TAKE ONE TABLET BY MOUTH DAILY    aspirin 81 mg chewable tablet Take 1 Tab by mouth daily.  ticagrelor (BRILINTA) 90 mg tablet Take 1 Tab by mouth two (2) times a day.  metoprolol tartrate (LOPRESSOR) 25 mg tablet Take 1 Tab by mouth two (2) times a day.  glimepiride (AMARYL) 2 mg tablet TAKE 1 TABLET BY MOUTH EVERY DAY    insulin glargine (Lantus Solostar U-100 Insulin) 100 unit/mL (3 mL) inpn 40 Units by SubCUTAneous route nightly.  furosemide (LASIX) 40 mg tablet Take 1 Tab by mouth daily.  SITagliptin-metFORMIN (Janumet XR) 50-1,000 mg TM24 Take 1 Tab by mouth daily.  loratadine (CLARITIN) 10 mg tablet TAKE ONE TABLET BY MOUTH EVERY DAY    fluticasone propionate (FLONASE) 50 mcg/actuation nasal spray INHALE 2 SPRAYS into both nostrils EVERY DAY    multivitamin (ONE A DAY) tablet Take 1 Tab by Mouth Once a Day. Similar alternatives are ok.  thiamine HCL (B-1) 100 mg tablet Take 1 Tab by mouth daily. No current facility-administered medications for this visit.         Past Medical History:   Diagnosis Date    Arthritis     Chest pain     CHF (congestive heart failure) (HCC)     [facility]cere[facility] vascular [facility]) [facility] 2011    r side stroke    CVA (cerebral vascular accident) ([facility] 09/05/2018    ad[facility]) 2010    chest pains    Hypercholesterolemia     Hypertension     Loss of hearing     right ear, patient states it comes and goes    Polyuria     Shortness of breath 2011    Type 2 diabetes [facility] without complication[facility] 07/23/2013    Type 2 diabetes mellitus, with long-term current use of insulin ([facility] 7/23/2013         Past Surgical History:   Procedure Laterality Date    HX HEART CATHETERIZATION         CLINICAL INTERVIEW:  Mr. Jeneen Cooks was accompanied by his wife for his initial interview. Consistent with records, they reported memory loss which first emerged in 2011, coinciding with Mr. Arabella Logan first stroke. Memory loss has remained stable since that time. Mr. Arabella chatterjee indicated that he will often require repeat instructions for grocery shopping and banking. Neurologic history is negative for seizures, syncope, and significant head trauma. Insomnia first emerged in January at the time of Mr. Arabella Logan son's murder. Snoring was denied. Pain complaints include arthritis in the hips. Mr. Jeneen Cooks is not followed by a rheumatologist.  Alcohol use is rare. There is no history of heavy alcohol consumption or illicit substance use. Tobacco use ceased 5-6 years ago. Family history of neurologic illness is significant for stroke in the brother. With regard to emotional functioning, Mr. Jeneen Cooks denied a significant history of psychiatric illness. There is no history of psychiatric hospitalization, self-harm behavior, suicidal ideation. Psychological trauma is significant for the recent death of Mr. Jeneen Cooks son via homicide. The importance of grief counseling was discussed in the context of this interview. Socially, Mr. Jeneen Cooks has been in an on-again, off-again relationship with his fishilpae for 33 years. They have been engaged for 2 years. They live together. Academically, Mr. Jeneen Cooks completed 11 years of education and denied a history of LD and ADHD. He has been on disability for 10 years due to CVA. He was previously employed in [employer]. Hobbies are limited due to depressive symptomatology. That said, Mr. Jeneen Cooks typically enjoys fishing, crabbing, riding his bike, and walking.     Functionally, Mr. Jeneen Cooks is dependent on his sene for medication management and bill payment. She has been doing this for years. He continues to drive without incident. MENTAL STATUS:    Sensorium  Awake, Aware, Alert   Orientation person, place, situation, day of week, month of year and year   Relations passive   Eye Contact appropriate   Appearance:  age appropriate   Motor Behavior:  within normal limits   Speech:  monotone   Vocabulary average   Thought Process: within normal limits   Thought Content free of delusions and free of hallucinations   Suicidal ideations none   Homicidal ideations none   Mood:  depressed   Affect:  mood-congruent   Memory recent  impaired   Memory remote:  adequate   Concentration:  adequate   Abstraction:  abstract   Insight:  fair   Reliability fair   Judgment:  fair     METHODS OF ASSESSMENT (Current Evaluation):  Clinician Administered: Adaptive Behavior Assessment System (ABAS-3)  Clinical Assessment of Geriatric Emotional Status (CASE)  Clock Drawing Task  Mini Mental State Examination (MMSE)  Personality Assessment Inventory (CARMEN)    Technician Administered:  Molradhaa April Anxiety Scale (YVONNE)  Simeon Depression Scale-II (BDI-II)  Ernesto's Continuous Performance Test  Controlled Oral Word Association Test  Finger Oscillation Test  Grooved Peg Board Test  Hand Dynamometer Test  Neuropsychological Assessment Battery-Memory Module and Select Subtests  Test of Memory Malingering  [place] Functional Living Scale  Trailmaking Test  Wechsler Adult Intelligence Scale-IV    TEST OBSERVATIONS:  Mr. Simone Bajwa arrived promptly for the testing session. Dress and grooming were appropriate; physical presentation was unchanged from that observed during the clinical interview. Speech was fluent and coherent with normal rate, rhythm, tone, and volume. Comprehension difficulties were noted for more complex instructions. No unusual behaviors or psychomotor abnormalities were observed. Thought process was inattentive. Thought content showed no apparent delusional ideation.  Auditory and visual hallucinations were denied and there was no obvious response to internal stimuli. Affect was congruent with the euthymic mood conveyed. Mr. Davon Brown was adequately cooperative and appeared to put forth good effort throughout this examination. Rapport with the examiner was adequately established and maintained. Minimal prompting was required. Performance motivation was objectively measured with one instrument (TOMM); Mr. Davon Brown produced a normal score on this measure. Accordingly, test findings below do not appear to be the product of disingenuous effort. Given the above observations, plus comments contained in the Mental Status section, the results of this examination are regarded as reasonably reliable and valid. TEST RESULTS:  Quantitative test results are derived from comparisons to age and education corrected cohort normative data, where applicable. Percentiles are included in these instances. Qualitative test results are determined using clinical observations. General Orientation and Awareness:       Orientation to person yes   Time yes  Place yes  Circumstance yes                     Sensory-Perceptual and Motor Functioning:    Visual and auditory acuity:  Difficulty seeing/reading stimuli      Gait and balance:   Ambulated independently                                         Classification:  Manual dexterity right dominant hand (8 percentile)                      Mildly Impaired   Manual dexterity left nondominant hand (7 percentile)                      Mildly Impaired  Simple fine motor speed right dominant hand (7 percentile)           Mildly Impaired  Simple fine motor speed left nondominant hand (18 percentile)        Low Average   strength right dominant hand (<1 percentile)                  Severely Impaired   strength left nondominant hand (1 percentile)       Severely Impaired    Cognitive Screening: On the MMSE, Ms. Davon Brown produced a score suggestive of mild cognitive impairment. Difficulties were noted in the following areas: immediate recall, following a three-step command, writing, and figure copy. Clock drawing was significant for incorrect placement of clock numbers. Attention/Concentration:       Simple visuomotor tracking (<1 percentile)                   Severely Impaired     On a continuous performance test, the results did not suggest a disorder characterized by attention deficits.     Visuospatial and Constructional Praxis:     Visual discrimination (34 percentile)                               Average     Language:         Phonemic verbal fluency (18 percentile)                                  Low Average   Categorical verbal fluency (16 percentile)                   Low Average    Memory and Learning:       Word list immediate recall (5 percentile)                     Mildly Impaired  Word list short delayed recall (8 percentile)                   Mildly Impaired  Word list long delayed recall (4 percentile)                              Moderately Impaired  Forced Choice Recognition (50 percentile)        Average  Shape learning immediate recognition (2 percentile)        Moderately Impaired    Shape learning delayed recognition (10 percentile)                  Low Average  Forced Choice Recognition (4 percentile)         Moderately Impaired  Story learning immediate recall (4 percentile)         Moderately Impaired  Story learning delayed recall (8 percentile)                              Mildly Impaired    Cognitive Tests of Executive Functioning:     Ability to think flexibly, Trailmaking B (N/A)         Discontinued due to time and errors                    Intellectual and Basic Cognitive Functioning (WAIS-IV)  Verbal Comprehension Index: 72  Percentile: 3        Borderline   Similarities: 6    Percentile: 9      Vocabulary: 4     Percentile: 2           Information: 5    Percentile: 5     Perceptual Reasoning Index: 81  Percentile: 10        Low Average   Block Design: 7   Percentile: 16      Matrix Reasonin   Percentile: 16           Visual Puzzles: 6   Percentile: 9     Working Memory Index: 74  Percentile: 4       Borderline   Digit Span: 5    Percentile: 5      Arithmetic: 6    Percentile: 9     Processing Speed: 71   Percentile: 3       Borderline    Symbol Search: 6   Percentile: 9      Coding: 3    Percentile: 1     Full Scale IQ: 70    Percentile: 2       Borderline    Adaptive Behavior Measure for Independent Living [facility] Functional Living Scale):  Time                 10-16 percentile       Low Average  Money and calculation   3-9 percentile       Mildly Impaired  Communication     <2 percentile        Severely Impaired  Memory      3-9 percentile       Mildly Impaired  Total                 1 percentile        Severely Impaired    Adaptive Behavior (Adaptive Behavior Assessment System)  General Adaptive Composite:  73   Percentile: 4  Moderately Impaired   Conceptual:  62    Percentile: 1  Severely Impaired   Social:  78    Percentile: 7  Mildly Impaired   Practical:  81    Percentile: 10  Low Average    Emotional Functioning:  Screening of Emotional/Psychiatric Status:  Level of self-reported anxiety    (863)      Mild  Level of self-reported depression   (21/63)      Moderate    On a measure of general emotional functioning completed by Mr. Weeks's fiancé, she reported observing the following: Cognitive deficits. On a measure of general emotional functioning, Mr. Tc Gautam profile was invalid. IMPRESSIONS/RECOMMENDATIONS:  In my view, this is a case of mild vascular dementia superimposed on chronic intellectual deficits. Motor deficits were also noted. Taken together, Mr. Destiney Matthew might benefit from OT and ST/cognitive rehabilitation. Given the degree of Mr. Tc Gautam cognitive deficiencies, assistance for bill payment and medication management is strongly encouraged. Psychotherapy for unresolved grief is also advised.     Serial testing in 12 months is recommended to compare with baseline and to inform treatment accordingly. In the interim, Mr. Lashay Best is encouraged to adopt and adhere to a brain fitness regimen (sleep hygiene, physician-approved level of exercise, healthy diet, mentally stimulating activities, minimal alcohol consumption, avoid nicotine). DIAGNOSTIC IMPRESSIONS:  1. Vascular Dementia, mild  2. Borderline Intellectual Functioning  3. Major Depressive Disorder, moderate  4. Anxiety about health    Thank you for allowing me the opportunity to assist you in Mr. Weeks's care. Please do not hesitate to contact me should you have additional questions that I may not have addressed. 04491 x 1  96137 x 1  96138 x 1  96139 x 4  96132 x 1  96133 x 1    [facility] Gage Laguna, Ph.D.   Licensed Clinical Psychologist        Time Documentation:     87907 x 1   59593 x 1 Neuropsych testing/data gathering by Neuropsychologist: (1 hour) [phone] x 1 (first 30 minutes), 96137 x 1 (additional 30 minutes)      96138 x 1  96139 x 4 Test Administration/Data Gathering By Technician: (2.5 hours). 19709 x 1 (first 30 minutes), 96139 x 4 (each additional 30 minutes)     96132 x 1  96133 x 1 Testing Evaluation Services by Neuropsychologist (1 hour, 50 minutes) 96132 x 1 (first hour), 96133 x 1 (50 minutes)    The above includes: Record review. Review of history provided by patient. Review of collaborative information. Testing by Clinician. Review of raw data. Scoring. Report writing of individual tests administered by Clinician. Integration of individual tests administered by psychometrist with NSE/testing by clinician, review of records/history/collaborative information, case Conceptualization, treatment planning, clinical decision making, report writing, coordination Of Care.  Psychometry test codes as time spent by psychometrist administering and scoring neurocognitive/psychological tests under supervision of neuropsychologist.  Integral services including scoring of raw data, data interpretation, case conceptualization, report writing etcetera were initiated after the patient finished testing/raw data collected and was completed on the date the report was signed. This note was created using voice recognition software. Despite editing, there may be syntax errors. This note will not be viewable in [employer] for the following reason(s). This is a Psychotherapy Note. ([address], [facility] Providers Only)    I have reviewed the documentation provided by Dr. Chuy Carcamo, PhD, [place]. Dr. Dorinda Garnett is in her second year of Fellowship in Clinical Neuropsychology. Dr. Dorinda Garnett is licensed and credentialed to practice in the [facility], is providing the current services via her NPI and licensure, and had been providing similar services prior to her employment with [employer]. No additional insurance billing is done by me on her cases, my NPI is not being used, etc.   I have not had any face to face engagement with her patients, and am providing supervision and consultation services with her as she works towards advancing her career and subspecialities. I have reviewed the history, the neurocognitive and psychological test results, the medical records available, and the impressions and recommendations generated by Dr. Dorinda Garnett. We have engaged in peer to peer supervision as needed. I have reviewed history noted in the records, the tests administered, the test scores, and the overall case history and profile and report generated by Dr. Dorinda Garnett. Dr. Lana Martinez clinical case formulation, diagnostic impressions, and the proposed management plans/treatment recommendations are her own and based on her clinical training, level of expertise, and judgment. Any additional comments, concerns, or recommendations that I am making are offered here: Concur with vascular dementia issue as noted above, tx recommendations as noted above. Eliu Ny, ISABEL  Neuropsychology

## 2021-03-30 ENCOUNTER — VIRTUAL VISIT (OUTPATIENT)
Dept: NEUROLOGY | Age: 56
End: 2021-03-30

## 2021-03-30 ENCOUNTER — OFFICE VISIT (OUTPATIENT)
Dept: CARDIOLOGY CLINIC | Age: 56
End: 2021-03-30
Payer: COMMERCIAL

## 2021-03-30 VITALS
BODY MASS INDEX: 29.92 KG/M2 | DIASTOLIC BLOOD PRESSURE: 94 MMHG | TEMPERATURE: 97.2 F | OXYGEN SATURATION: 97 % | HEIGHT: 65 IN | WEIGHT: 179.6 LBS | SYSTOLIC BLOOD PRESSURE: 150 MMHG | HEART RATE: 87 BPM

## 2021-03-30 DIAGNOSIS — I25.118 CORONARY ARTERY DISEASE OF NATIVE ARTERY OF NATIVE HEART WITH STABLE ANGINA PECTORIS (HCC): ICD-10-CM

## 2021-03-30 DIAGNOSIS — R41.83 BORDERLINE INTELLECTUAL FUNCTIONING: ICD-10-CM

## 2021-03-30 DIAGNOSIS — I10 ESSENTIAL HYPERTENSION: ICD-10-CM

## 2021-03-30 DIAGNOSIS — E78.5 DYSLIPIDEMIA: Primary | ICD-10-CM

## 2021-03-30 DIAGNOSIS — F33.1 MODERATE EPISODE OF RECURRENT MAJOR DEPRESSIVE DISORDER (HCC): ICD-10-CM

## 2021-03-30 DIAGNOSIS — Z86.73 HISTORY OF CVA (CEREBROVASCULAR ACCIDENT): ICD-10-CM

## 2021-03-30 DIAGNOSIS — Z95.5 STATUS POST INSERTION OF DRUG ELUTING CORONARY ARTERY STENT: ICD-10-CM

## 2021-03-30 DIAGNOSIS — E11.9 TYPE 2 DIABETES MELLITUS WITHOUT COMPLICATION, WITH LONG-TERM CURRENT USE OF INSULIN (HCC): ICD-10-CM

## 2021-03-30 DIAGNOSIS — Z79.4 TYPE 2 DIABETES MELLITUS WITHOUT COMPLICATION, WITH LONG-TERM CURRENT USE OF INSULIN (HCC): ICD-10-CM

## 2021-03-30 DIAGNOSIS — F01.50 VASCULAR DEMENTIA WITHOUT BEHAVIORAL DISTURBANCE (HCC): Primary | ICD-10-CM

## 2021-03-30 DIAGNOSIS — F41.8 ANXIETY ABOUT HEALTH: ICD-10-CM

## 2021-03-30 PROCEDURE — 90832 PSYTX W PT 30 MINUTES: CPT | Performed by: PSYCHOLOGIST

## 2021-03-30 PROCEDURE — 99214 OFFICE O/P EST MOD 30 MIN: CPT | Performed by: INTERNAL MEDICINE

## 2021-03-30 RX ORDER — LOSARTAN POTASSIUM 100 MG/1
100 TABLET ORAL DAILY
Qty: 90 TAB | Refills: 2 | Status: SHIPPED | OUTPATIENT
Start: 2021-03-30 | End: 2022-05-31 | Stop reason: SDUPTHER

## 2021-03-30 NOTE — PROGRESS NOTES
1. Have you been to the ER, urgent care clinic since your last visit? Hospitalized since your last visit? No    2. Have you seen or consulted any other health care providers outside of the [address] [place] [address] since your last visit? Include any pap smears or colon screening.  No

## 2021-03-30 NOTE — PROGRESS NOTES
Interactive Psychotherapy/office feedback        Interactive office feedback session with Mr. Dinesh Denson and his wife. I reviewed the results of the recent Neuropsychological Evaluation  including the observed areas of neurocognitive strengths and weaknesses. Education was provided regarding my diagnostic impressions, and treatment plan/options were discussed. I also answered numerous questions related to the clinical findings, including the various methods to improve cognition and mood. CBT, psychoeducation, and supportive psychotherapy techniques were utilized. Prior to seeing the patient I reviewed the records, including the previously completed report, the records in [facility], and any updated visits from other providers since I saw the patient last.       Diagnoses:       1. Vascular Dementia, mild  2. Borderline Intellectual Functioning  3. Major Depressive Disorder, moderate        4. Anxiety about health       The patient will follow up with the referring provider, and reported being very pleased with the services provided. [facility] [address] psychotherapy 30 Minutes      This note was created using voice recognition software. Despite editing, there may be syntax errors. Israel Murillo is a 54 y.o. male who was evaluated by an audio-video encounter for concerns as above. Patient identification was verified prior to start of the visit. Pursuant to the emergency declaration under the [facility] waiver authority and the [employer] General Act, this Virtual Visit was conducted, with patient's (and/or legal guardian's) consent, to reduce the patient's risk of exposure to COVID-19 and provide necessary medical care. Services were provided through a synchronous discussion virtually to substitute for in-person clinic visit. I was at home. The patient was at home.     This note will not be viewable in [employer] for the following reason(s). This is a Psychotherapy Note.  ([address], [facility] Providers Only)

## 2021-03-30 NOTE — PROGRESS NOTES
HISTORY OF PRESENT ILLNESS  Jef Hurt is a 54 y.o. male. Hypertension  The history is provided by the patient. This is a chronic problem. The problem occurs daily. The problem has been gradually worsening. Associated symptoms include shortness of breath. Pertinent negatives include no chest pain. Shortness of Breath  The history is provided by the patient. This is a chronic problem. The problem occurs intermittently. The problem has been gradually improving. Pertinent negatives include no fever, no ear pain, no neck pain, no cough, no sputum production, no hemoptysis, no wheezing, no PND, no orthopnea, no chest pain, no vomiting, no rash, no leg swelling and no claudication. Review of Systems   Constitutional: Negative for chills, diaphoresis, fever and weight loss. HENT: Negative for ear pain and hearing loss. Eyes: Negative for blurred vision. Respiratory: Positive for shortness of breath. Negative for cough, hemoptysis, sputum production, wheezing and stridor. Cardiovascular: Negative for chest pain, palpitations, orthopnea, claudication, leg swelling and PND. Gastrointestinal: Negative for heartburn, nausea and vomiting. Musculoskeletal: Negative for myalgias and neck pain. Skin: Negative for rash. Neurological: Negative for dizziness, tingling, tremors, focal weakness, loss of consciousness and weakness. Psychiatric/Behavioral: Negative for depression and suicidal ideas.      Family History   Problem Relation Age of Onset   Ardyth Moritz Arthritis-osteo Mother     Gout Mother     Diabetes Mother     Hypertension Mother     Diabetes Father     Diabetes Brother     Diabetes Maternal Grandmother     Other Son         homicide       Past Medical History:   Diagnosis Date    Arthritis     Chest pain     CHF (congestive heart failure) ([address]     CVA (cerebral vascular accident) ([address] 2011    r side stroke    CVA (cerebral vascular accident) ([address] 09/05/2018    [facility]  Depression     Heart [facility] 2010    chest pains    Hypercholesterolemia     Hypertension     Loss of hearing     right ear, patient states it comes and goes    Polyuria     Shortness of breath 2011    Type 2 diabetes mellitus without complication, with long-term current use of insulin ([facility] 07/23/2013    Type 2 diabetes mellitus, with long-term current use of insulin [facility] 7/23/2013       Past Surgical History:   Procedure Laterality Date    HX HEART CATHETERIZATION         Social History     Tobacco Use    Smoking status: Never Smoker    Smokeless tobacco: Never Used   Substance Use Topics    Alcohol use: Yes     Frequency: Monthly or less     Drinks per session: 1 or 2     Binge frequency: Never     Comment: occassionally       Allergies   Allergen Reactions    Lisinopril Cough       Outpatient Medications Marked as Taking for the 3/30/21 encounter (Office Visit) with Cecilia Bradley MD   Medication Sig Dispense Refill    losartan (COZAAR) 100 mg tablet Take 1 Tab by mouth daily. 90 Tab 2    traZODone (DESYREL) 100 mg tablet Take 1-2 Tabs by mouth nightly as needed for Sleep. 60 Tab 5    escitalopram oxalate (LEXAPRO) 10 mg tablet Take 1 Tab by mouth daily. 30 Tab 5    atorvastatin (LIPITOR) 80 mg tablet Take 1 Tab by mouth daily. 90 Tab 2    gabapentin (NEURONTIN) 300 mg capsule TAKE TWO CAPSULES BY MOUTH EVERY EVENING 60 Cap 5    hydroCHLOROthiazide (HYDRODIURIL) 12.5 mg tablet TAKE ONE TABLET BY MOUTH DAILY 30 Tab 5    aspirin 81 mg chewable tablet Take 1 Tab by mouth daily. 90 Tab 1    ticagrelor (BRILINTA) 90 mg tablet Take 1 Tab by mouth two (2) times a day. 60 Tab 1    metoprolol tartrate (LOPRESSOR) 25 mg tablet Take 1 Tab by mouth two (2) times a day. 60 Tab 2    glimepiride (AMARYL) 2 mg tablet TAKE 1 TABLET BY MOUTH EVERY DAY 90 Tab 4    insulin glargine (Lantus Solostar U-100 Insulin) 100 unit/mL (3 mL) inpn 40 Units by SubCUTAneous route nightly.  12 Pen 4    furosemide (LASIX) 40 mg tablet Take 1 Tab by mouth daily. 30 Tab 4    SITagliptin-metFORMIN (Janumet XR) 50-1,000 mg TM24 Take 1 Tab by mouth daily. 90 Tab 5    loratadine (CLARITIN) 10 mg tablet TAKE ONE TABLET BY MOUTH EVERY DAY 30 Tab 5    multivitamin (ONE A DAY) tablet Take 1 Tab by Mouth Once a Day. Similar alternatives are ok.  thiamine HCL (B-1) 100 mg tablet Take 1 Tab by mouth daily. 90 Tab 3        Visit Vitals  BP (!) 150/94 (BP 1 Location: Left arm, BP Patient Position: Sitting, BP Cuff Size: Adult)   Pulse 87   Temp 97.2 °F (36.2 °C) (Temporal)   Ht 5' 5\" (1.651 m)   Wt 81.5 kg (179 lb 9.6 oz)   SpO2 97%   BMI 29.89 kg/m²       Physical Exam   Constitutional: He is oriented to person, place, and time. He appears well-developed and well-nourished. No distress. HENT:   Head: Atraumatic. Mouth/Throat: No oropharyngeal exudate. Eyes: Conjunctivae are normal. No scleral icterus. Neck: Normal range of motion. Neck supple. No JVD present. No tracheal deviation present. No thyromegaly present. Cardiovascular: Normal rate and regular rhythm. Exam reveals no gallop. No murmur heard. Pulmonary/Chest: Effort normal and breath sounds normal. No stridor. He has no wheezes. He has no rales. Abdominal: Soft. There is no abdominal tenderness. There is no rebound and no guarding. Musculoskeletal: Normal range of motion. General: No tenderness or edema. Neurological: He is alert and oriented to person, place, and time. He exhibits normal muscle tone. Skin: Skin is warm. He is not diaphoretic. Psychiatric: He has a normal mood and affect. His behavior is normal.     9/2018 - 14 day event - NSR  Echo 09/2018:  NORMAL LEFT VENTRICULAR CAVITY SIZE AND SYSTOLIC FUNCTION WITH AN EJECTION FRACTION   VISUALLY ESTIMATED AT 60%. NORMAL DIASTOLIC FUNCTION. MILD LEFT VENTRICULAR HYPERTROPHY PRESENT. TRACE MITRAL REGURGITATION. MILDLY SCLEROTIC TRILEAFLET AORTIC VALVE.    INSUFFICIENT TRICUSPID REGURGITANT JET TO ESTIMATE PULMONARY ARTERY PRESSURE. NEGATIVE BUBBLE STUDY ON PRIOR ECHO REPORT 4/09/2012. NO SIGNIFICANT CHANGES FROM PRIOR ECHO REPORT ON 4/09/2012. Lexiscan 2018-  THIS MYOCARDIAL PERFUSION STUDY IS ABNORMAL   THIS IS A MEDIUM RISK STUDY   ABNORMAL NUCLEAR SCAN WITH  A SMALL AREA OF NON PERFUSION ON BOTH THE STRESS AND REST IMAGES   IN THE INFERIOR APICAL WALL CONSISTENT WITH EITHER PRIOR INFERIOR APICAL INFARCTION OR HIGH GRADE   OCCLUSION TO THE DISTAL RCA        INFERIOR APICAL AKINESIS WITH AN EJECTION FRACTION  ESTIMATED AT   50%  11/05/20   CARDIAC PROCEDURE 11/05/2020 11/5/2020    Narrative Critical single vessel coronary artery disease with preserved LV function. S/p ptca/stent to mid LCX. Continue DAPT and intense risk factor modification. Signed by: Chrissie Mccauley MD     ASSESSMENT and PLAN    ICD-10-CM ICD-9-CM    1. Dyslipidemia  E78.5 272.4 LIPID PANEL      HEPATIC FUNCTION PANEL   2. Essential hypertension  I10 401.9    3. History of CVA (cerebrovascular accident)  Z86.73 V12.54    4. Type 2 diabetes mellitus without complication, with long-term current use of insulin (HCC)  E11.9 250.00     Z79.4 V58.67    5. Status post insertion of drug eluting coronary artery stent  Z95.5 V45.82    6. Coronary artery disease of native artery of native heart with stable angina pectoris ([facility]  I25.118 414.01      413.9      Orders Placed This Encounter    LIPID PANEL     Standing Status:   Future     Standing Expiration Date:   3/31/2022    HEPATIC FUNCTION PANEL     Standing Status:   Future     Standing Expiration Date:   3/31/2022    losartan (COZAAR) 100 mg tablet     Sig: Take 1 Tab by mouth daily. Dispense:  90 Tab     Refill:  2     Follow-up and Dispositions    · Return in about 6 months (around 9/30/2021).        current treatment plan is effective, no change in therapy  reviewed diet, exercise and weight control    cardiovascular risk and specific lipid/LDL goals reviewed  use of aspirin to prevent MI and TIA's discussed. Patient seen for follow-up. Has prior abnormal nuclear stress test.    Complained of worsening exertional dyspnea while cutting grass. Has occasional chest tightness. Recent cardiac cath- s/p PCI to mid LCX. Symptoms significantly better. Continue DAPT. Still it was 127. Lipitor was increased to 80 mg. We will repeat lipid panel prior to next appointment. Reports loss of his 59-year-old son back in January and he is currently under tremendous stress at this time. We will add increase losartan to 100 mg daily for optimal blood pressure control.

## 2021-04-14 ENCOUNTER — HOSPITAL ENCOUNTER (OUTPATIENT)
Dept: LAB | Age: 56
Discharge: HOME | End: 2021-04-14
Payer: COMMERCIAL

## 2021-04-14 DIAGNOSIS — E78.5 DYSLIPIDEMIA: ICD-10-CM

## 2021-04-14 LAB
ALBUMIN SERPL-MCNC: 3.3 G/DL (ref 3.4–5)
ALBUMIN/GLOB SERPL: 1 {RATIO} (ref 0.8–1.7)
ALP SERPL-CCNC: 88 U/L (ref 45–117)
ALT SERPL-CCNC: 17 U/L (ref 16–61)
AST SERPL-CCNC: 10 U/L (ref 10–38)
BILIRUB DIRECT SERPL-MCNC: <0.1 MG/DL (ref 0–0.2)
BILIRUB SERPL-MCNC: 0.3 MG/DL (ref 0.2–1)
CHOLEST SERPL-MCNC: 198 MG/DL
GLOBULIN SER CALC-MCNC: 3.2 G/DL (ref 2–4)
HDLC SERPL-MCNC: 33 MG/DL (ref 40–60)
HDLC SERPL: 6 {RATIO} (ref 0–5)
LDLC SERPL CALC-MCNC: 121.8 MG/DL (ref 0–100)
LIPID PROFILE,FLP: ABNORMAL
PROT SERPL-MCNC: 6.5 G/DL (ref 6.4–8.2)
TRIGL SERPL-MCNC: 216 MG/DL (ref ?–150)
VLDLC SERPL CALC-MCNC: 43.2 MG/DL

## 2021-04-14 PROCEDURE — 36415 COLL VENOUS BLD VENIPUNCTURE: CPT

## 2021-04-14 PROCEDURE — 80076 HEPATIC FUNCTION PANEL: CPT

## 2021-04-14 PROCEDURE — 80061 LIPID PANEL: CPT

## 2021-04-30 ENCOUNTER — VIRTUAL VISIT (OUTPATIENT)
Dept: FAMILY MEDICINE CLINIC | Age: 56
End: 2021-04-30
Payer: COMMERCIAL

## 2021-04-30 DIAGNOSIS — R05.3 CHRONIC COUGH: ICD-10-CM

## 2021-04-30 DIAGNOSIS — Z87.891 HISTORY OF TOBACCO USE: ICD-10-CM

## 2021-04-30 DIAGNOSIS — F32.A DEPRESSION, UNSPECIFIED DEPRESSION TYPE: Primary | ICD-10-CM

## 2021-04-30 PROCEDURE — 99214 OFFICE O/P EST MOD 30 MIN: CPT | Performed by: FAMILY MEDICINE

## 2021-04-30 RX ORDER — ESCITALOPRAM OXALATE 20 MG/1
20 TABLET ORAL DAILY
Qty: 30 TAB | Refills: 5 | Status: SHIPPED | OUTPATIENT
Start: 2021-04-30 | End: 2022-09-06

## 2021-04-30 NOTE — PROGRESS NOTES
Hugo Johansen is a 54 y.o. male who was seen by synchronous (real-time) audio-video technology on 4/30/2021 for Follow-up (6 week) and Depression (PHQ   9)        Assessment & Plan:   Diagnoses and all orders for this visit:    1. Depression, unspecified depression type  -    INCREASE escitalopram oxalate (LEXAPRO) to 20 mg tablet; Take 1 Tab by mouth daily. 2. Chronic cough  -     REFERRAL TO PULMONARY DISEASE    3. History of tobacco use  -     REFERRAL TO PULMONARY DISEASE      The complexity of medical decision making for this visit is moderate   Follow-up and Dispositions    · Return in about 4 weeks (around 5/28/2021) for depression, medication change(s). 712  Subjective:   Patient contacted via doxy. me. Pt reports that he is doing \"so so\" since since our last appt. He has good days and bad days. Pt has been taking lexapro. His appetite is still not good; his partner reports that Sven Trevino has lost a lot of wt. \"  He is still not taking meds regularly. He and his partner had a long talk about this recently and she tried to help him realize that not taking his meds is detrimental to his health. Pt still has a persistent cough. The cough has been present for about 2 months now. He is not taking anything for the cough other than otc cough drops and hard candy. It is similar to the cough he had last summer. That cough did resolve but has returned. The cough may even wake him up from his sleep. Pt does have hx of tobacco use; he smoked for over 30 years. Prior to Admission medications    Medication Sig Start Date End Date Taking? Authorizing Provider   escitalopram oxalate (LEXAPRO) 20 mg tablet Take 1 Tab by mouth daily. 4/30/21  Yes Jarrod Burgos MD   losartan (COZAAR) 100 mg tablet Take 1 Tab by mouth daily. 3/30/21  Yes Colleen Garcia MD   traZODone (DESYREL) 100 mg tablet Take 1-2 Tabs by mouth nightly as needed for Sleep.  3/1/21  Yes Jarrod Burgos MD   atorvastatin (LIPITOR) 80 mg tablet Take 1 Tab by mouth daily. 2/8/21  Yes Rossi Nath NP   gabapentin (NEURONTIN) 300 mg capsule TAKE TWO CAPSULES BY MOUTH EVERY EVENING 12/17/20  Yes Carlita Marion MD   hydroCHLOROthiazide (HYDRODIURIL) 12.5 mg tablet TAKE ONE TABLET BY MOUTH DAILY 12/16/20  Yes Jennifer Dunbar NP   aspirin 81 mg chewable tablet Take 1 Tab by mouth daily. 11/7/20  Yes Jennifer Dunbar NP   ticagrelor (BRILINTA) 90 mg tablet Take 1 Tab by mouth two (2) times a day. 11/6/20  Yes Jennifer Dunbar NP   metoprolol tartrate (LOPRESSOR) 25 mg tablet Take 1 Tab by mouth two (2) times a day. 10/30/20  Yes Maik Lucero MD   glimepiride (AMARYL) 2 mg tablet TAKE 1 TABLET BY MOUTH EVERY DAY 9/22/20  Yes Pauly Diaz MD   insulin glargine (Lantus Solostar U-100 Insulin) 100 unit/mL (3 mL) inpn 40 Units by SubCUTAneous route nightly. 9/21/20  Yes Pauly Diaz MD   furosemide (LASIX) 40 mg tablet Take 1 Tab by mouth daily. 9/21/20  Yes Pauly Diaz MD   SITagliptin-metFORMIN (Janumet XR) 50-1,000 mg TM24 Take 1 Tab by mouth daily. 6/2/20  Yes Pauly Diaz MD   loratadine (CLARITIN) 10 mg tablet TAKE ONE TABLET BY MOUTH EVERY DAY 4/18/20  Yes Pauly Diaz MD   multivitamin (ONE A DAY) tablet Take 1 Tab by Mouth Once a Day. Similar alternatives are ok. 9/7/18  Yes Provider, Historical   thiamine HCL (B-1) 100 mg tablet Take 1 Tab by mouth daily.  9/20/18  Yes Pauly Diaz MD   fluticasone propionate (FLONASE) 50 mcg/actuation nasal spray INHALE 2 SPRAYS into both nostrils EVERY DAY 9/19/19   Pauly Diaz MD     Patient Active Problem List   Diagnosis Code    Essential hypertension I10    History of CVA (cerebrovascular accident) Z80.78    History of MI (myocardial infarction) I25.2    Type 2 diabetes mellitus, with long-term current use of insulin ([facility] E11.9, Z79.4    Type 2 diabetes mellitus with diabetic neuropathy ([facility] E11.40    CHF (congestive heart failure) ([facility] I50.9  Atherosclerosis of coronary artery I25.10    Acute right-sided low back pain without sciatica M54.5    Right sided abdominal pain R10.9    Stable angina pectoris (HCC) I20.8    CAD (coronary artery disease) I25.10    Depression F32.9     Current Outpatient Medications   Medication Sig Dispense Refill    escitalopram oxalate (LEXAPRO) 20 mg tablet Take 1 Tab by mouth daily. 30 Tab 5    losartan (COZAAR) 100 mg tablet Take 1 Tab by mouth daily. 90 Tab 2    traZODone (DESYREL) 100 mg tablet Take 1-2 Tabs by mouth nightly as needed for Sleep. 60 Tab 5    atorvastatin (LIPITOR) 80 mg tablet Take 1 Tab by mouth daily. 90 Tab 2    gabapentin (NEURONTIN) 300 mg capsule TAKE TWO CAPSULES BY MOUTH EVERY EVENING 60 Cap 5    hydroCHLOROthiazide (HYDRODIURIL) 12.5 mg tablet TAKE ONE TABLET BY MOUTH DAILY 30 Tab 5    aspirin 81 mg chewable tablet Take 1 Tab by mouth daily. 90 Tab 1    ticagrelor (BRILINTA) 90 mg tablet Take 1 Tab by mouth two (2) times a day. 60 Tab 1    metoprolol tartrate (LOPRESSOR) 25 mg tablet Take 1 Tab by mouth two (2) times a day. 60 Tab 2    glimepiride (AMARYL) 2 mg tablet TAKE 1 TABLET BY MOUTH EVERY DAY 90 Tab 4    insulin glargine (Lantus Solostar U-100 Insulin) 100 unit/mL (3 mL) inpn 40 Units by SubCUTAneous route nightly. 12 Pen 4    furosemide (LASIX) 40 mg tablet Take 1 Tab by mouth daily. 30 Tab 4    SITagliptin-metFORMIN (Janumet XR) 50-1,000 mg TM24 Take 1 Tab by mouth daily. 90 Tab 5    loratadine (CLARITIN) 10 mg tablet TAKE ONE TABLET BY MOUTH EVERY DAY 30 Tab 5    multivitamin (ONE A DAY) tablet Take 1 Tab by Mouth Once a Day. Similar alternatives are ok.  thiamine HCL (B-1) 100 mg tablet Take 1 Tab by mouth daily.  90 Tab 3    fluticasone propionate (FLONASE) 50 mcg/actuation nasal spray INHALE 2 SPRAYS into both nostrils EVERY DAY 16 g 12     Allergies   Allergen Reactions    Lisinopril Cough     Past Medical History:   Diagnosis Date    Arthritis     Chest pain     [facility]estive [facility]) ([facility]     CVA (cerebral vascular accident) ([facility] 2011    r side stroke    CVA (cerebral vascular accident) ([facility] [address] [facility] 09/05/2018    [facility]    chest pains    Hypercholesterolemia     Hypertension     Loss of hearing     right ear, patient states it comes and goes    Polyuria     Shortness of breath 2011    Type 2 diabetes mellitus without complication, with long-term current use of insulin ([facility] 07/23/2013    Type 2 diabetes mellitus, with long-term current use of insulin ([facility] 7/23/2013     Past Surgical History:   Procedure Laterality Date    HX HEART CATHETERIZATION       Family History   Problem Relation Age of Onset    Arthritis-osteo Mother     Gout Mother     Diabetes Mother     Hypertension Mother     Diabetes Father     Diabetes Brother     Diabetes Maternal Grandmother     Other Son         homicide     Social History     Tobacco Use    Smoking status: Never Smoker    Smokeless tobacco: Never Used   Substance Use Topics    Alcohol use: Yes     Frequency: Monthly or less     Drinks per session: 1 or 2     Binge frequency: Never     Comment: occassionally       Review of Systems   Constitutional: Negative. HENT: Negative. Respiratory: Positive for cough. Negative for hemoptysis, sputum production, shortness of breath and wheezing. Cardiovascular: Negative. Psychiatric/Behavioral: Positive for depression. Negative for suicidal ideas. All other systems reviewed and are negative. Objective:   No flowsheet data found.    General: alert, cooperative, no distress   Mental  status: normal mood, behavior, speech, dress, motor activity, and thought processes, able to follow commands   HENT: NCAT   Neck: no visualized mass   Resp: no respiratory distress   Neuro: no gross deficits   Skin: no discoloration or lesions of concern on visible areas   Psychiatric: normal affect, consistent with stated mood, no evidence of hallucinations     Additional exam findings: none      We discussed the expected course, resolution and complications of the diagnosis(es) in detail. Medication risks, benefits, costs, interactions, and alternatives were discussed as indicated. I advised him to contact the office if his condition worsens, changes or fails to improve as anticipated. He expressed understanding with the diagnosis(es) and plan. Jessica Triana, was evaluated through a synchronous (real-time) audio-video encounter. The patient (or guardian if applicable) is aware that this is a billable service. Verbal consent to proceed has been obtained within the past 12 months. The visit was conducted pursuant to the emergency declaration under the [address] [place] [address] authority and the [email or web address] General Act. Patient identification was verified, and a caregiver was present when appropriate. The patient was located in a state where the provider was credentialed to provide care.     Orlando Chen MD

## 2021-04-30 NOTE — PROGRESS NOTES
Irvin Navarro presents today for   Chief Complaint   Patient presents with    Follow-up     6 week    Depression     PHQ   Jammie Bowden preferred language for health care discussion is english/other. Is someone accompanying this pt? no    Is the patient using any DME equipment during [address]? no    Depression Screening:  3 most recent PHQ Screens 4/30/2021   Little interest or pleasure in doing things Several days   Feeling down, depressed, irritable, or hopeless More than half the days   Total Score PHQ 2 3   Trouble falling or staying asleep, or sleeping too much More than half the days   Feeling tired or having little energy More than half the days   Poor appetite, weight loss, or overeating More than half the days   Feeling bad about yourself - or that you are a failure or have let yourself or your family down Not at all   Trouble concentrating on things such as school, work, reading, or watching TV Not at all   Moving or speaking so slowly that other people could have noticed; or the opposite being so fidgety that others notice Not at all   Thoughts of being better off dead, or hurting yourself in some way Not at all   PHQ 9 Score 9   How difficult have these problems made it for you to do your work, take care of your home and get along with others Somewhat difficult       Learning Assessment:  Learning Assessment 1/5/2021   PRIMARY LEARNER Patient   HIGHEST LEVEL OF EDUCATION - PRIMARY LEARNER  DID NOT GRADUATE HIGH SCHOOL   BARRIERS PRIMARY LEARNER NONE   CO-LEARNER CAREGIVER No   PRIMARY LANGUAGE ENGLISH   LEARNER PREFERENCE PRIMARY DEMONSTRATION     -   ANSWERED BY Patient   RELATIONSHIP SELF       Abuse Screening:  Abuse Screening Questionnaire 3/1/2021   Do you ever feel afraid of your partner? N   Are you in a relationship with someone who physically or mentally threatens you? N   Is it safe for you to go home? Y       Generalized Anxiety  No flowsheet data found.       Health Maintenance Due Topic Date Due    COVID-19 Vaccine (1) Never done    Shingrix Vaccine Age 50> (1 of 2) Never done    Foot Exam Q1  02/13/2018    Eye Exam Retinal or Dilated  09/22/2019    Colorectal Cancer Screening Combo  08/23/2020    MICROALBUMIN Q1  01/15/2021    A1C test (Diabetic or Prediabetic)  04/15/2021   . Health Maintenance reviewed and discussed and ordered per Provider. Coordination of Care:  1. Have you been to the ER, urgent care clinic since your last visit? Hospitalized since your last visit? no    2. Have you seen or consulted any other health care providers outside of the [address] [place] [address] since your last visit? Include any pap smears or colon screening. no      Advance Directive:  1. Do you have an advance directive in place?  Patient Reply:yes

## 2021-05-13 DIAGNOSIS — Z86.73 PERSONAL HISTORY OF TRANSIENT ISCHEMIC ATTACK (TIA), AND CEREBRAL INFARCTION WITHOUT RESIDUAL DEFICITS: ICD-10-CM

## 2021-05-13 DIAGNOSIS — I10 ESSENTIAL (PRIMARY) HYPERTENSION: ICD-10-CM

## 2021-05-13 RX ORDER — LOSARTAN POTASSIUM 50 MG/1
TABLET ORAL
Qty: 30 TAB | Refills: 5 | Status: SHIPPED | OUTPATIENT
Start: 2021-05-13 | End: 2021-09-30

## 2021-05-13 RX ORDER — HYDROCHLOROTHIAZIDE 12.5 MG/1
TABLET ORAL
Qty: 30 TAB | Refills: 5 | Status: SHIPPED | OUTPATIENT
Start: 2021-05-13 | End: 2021-09-30

## 2021-05-13 RX ORDER — GUAIFENESIN 100 MG/5ML
LIQUID (ML) ORAL
Qty: 60 TAB | Refills: 0 | Status: SHIPPED | OUTPATIENT
Start: 2021-05-13 | End: 2021-07-23 | Stop reason: SDUPTHER

## 2021-06-08 ENCOUNTER — TELEPHONE (OUTPATIENT)
Dept: FAMILY MEDICINE CLINIC | Age: 56
End: 2021-06-08

## 2021-06-08 RX ORDER — SITAGLIPTIN AND METFORMIN HYDROCHLORIDE 50; 1000 MG/1; MG/1
TABLET, FILM COATED, EXTENDED RELEASE ORAL
Qty: 180 TABLET | Refills: 5 | Status: SHIPPED | OUTPATIENT
Start: 2021-06-08 | End: 2022-03-15 | Stop reason: ALTCHOICE

## 2021-06-09 ENCOUNTER — OFFICE VISIT (OUTPATIENT)
Dept: FAMILY MEDICINE CLINIC | Age: 56
End: 2021-06-09
Payer: COMMERCIAL

## 2021-06-09 VITALS
WEIGHT: 192 LBS | RESPIRATION RATE: 16 BRPM | DIASTOLIC BLOOD PRESSURE: 98 MMHG | TEMPERATURE: 98.4 F | BODY MASS INDEX: 31.99 KG/M2 | OXYGEN SATURATION: 97 % | SYSTOLIC BLOOD PRESSURE: 178 MMHG | HEART RATE: 86 BPM | HEIGHT: 65 IN

## 2021-06-09 DIAGNOSIS — E11.8 TYPE 2 DIABETES MELLITUS WITH COMPLICATION, WITH LONG-TERM CURRENT USE OF INSULIN (HCC): ICD-10-CM

## 2021-06-09 DIAGNOSIS — F32.A DEPRESSION, UNSPECIFIED DEPRESSION TYPE: Primary | ICD-10-CM

## 2021-06-09 DIAGNOSIS — Z79.4 TYPE 2 DIABETES MELLITUS WITH COMPLICATION, WITH LONG-TERM CURRENT USE OF INSULIN (HCC): ICD-10-CM

## 2021-06-09 DIAGNOSIS — I10 ESSENTIAL HYPERTENSION: ICD-10-CM

## 2021-06-09 DIAGNOSIS — Z20.2 EXPOSURE TO SEXUALLY TRANSMITTED DISEASE (STD): ICD-10-CM

## 2021-06-09 DIAGNOSIS — Z63.4 GRIEF AT LOSS OF CHILD: ICD-10-CM

## 2021-06-09 DIAGNOSIS — E78.2 MIXED HYPERLIPIDEMIA: ICD-10-CM

## 2021-06-09 DIAGNOSIS — F43.21 GRIEF AT LOSS OF CHILD: ICD-10-CM

## 2021-06-09 PROCEDURE — 99214 OFFICE O/P EST MOD 30 MIN: CPT | Performed by: FAMILY MEDICINE

## 2021-06-09 RX ORDER — LANCETS 30 GAUGE
EACH MISCELLANEOUS
COMMUNITY
Start: 2021-05-13 | End: 2022-08-17 | Stop reason: ALTCHOICE

## 2021-06-09 SDOH — SOCIAL STABILITY - SOCIAL INSECURITY: DISSAPEARANCE AND DEATH OF FAMILY MEMBER: Z63.4

## 2021-06-09 NOTE — PROGRESS NOTES
Chief Complaint   Patient presents with    Depression    Medication Evaluation         HPI    Kari Chu is a 54 y.o. male presenting today for 4 weeks  follow up of depression. His lexapro was increased to 20mg at his last appt. Pt's partner feels that he has gone from \"0 out of 10 to 4 out of 10\" compared to how his mood was before. He is eating and drinking now without her having to \"harrass him\" to do so. He is also taking his medication without prompting. Patient does need medication refills today. New concerns today: pt's partner was treated for chlamydia recently. She has asked for him to get tested also. Review of Systems   Constitutional: Negative. HENT: Negative. Respiratory: Negative. Cardiovascular: Negative. All other systems reviewed and are negative. Physical Exam  Vitals and nursing note reviewed. Constitutional:       General: He is not in acute distress. Appearance: Normal appearance. HENT:      Head: Normocephalic and atraumatic. Right Ear: External ear normal.      Left Ear: External ear normal.      Nose: Nose normal.   Eyes:      Extraocular Movements: Extraocular movements intact. Conjunctiva/sclera: Conjunctivae normal.   Cardiovascular:      Rate and Rhythm: Normal rate and regular rhythm. Heart sounds: No murmur heard. No friction rub. No gallop. Pulmonary:      Effort: Pulmonary effort is normal.      Breath sounds: Normal breath sounds. No wheezing, rhonchi or rales. Musculoskeletal:         General: Normal range of motion. Cervical back: Normal range of motion. No rigidity. Skin:     General: Skin is warm and dry. Neurological:      Mental Status: He is alert and oriented to person, place, and time. Coordination: Coordination normal.   Psychiatric:         Mood and Affect: Mood normal.         Behavior: Behavior normal.         Thought Content:  Thought content normal.         Judgment: Judgment normal.         Diagnoses and all orders for this visit:    1. Depression, unspecified depression type  2. Grief at loss of child  Improving slowly. 3. Essential hypertension  -     METABOLIC PANEL, COMPREHENSIVE; Future  -     LIPID PANEL; Future  Patient reminded that he has to take his blood pressure medicine in the morning. Patient reminded to always take his blood pressure medication prior to coming in for appointments. 4. Exposure to sexually transmitted disease (STD)  -     CT/NG/T.VAGINALIS AMPLIFICATION; Future  -     CHLAMYDIA/NEISSERIA AMPLIFICATION; Future  -     HEPATITIS PANEL, ACUTE; Future  -     HIV 1/2 AG/AB, 4TH GENERATION,W RFLX CONFIRM; Future    5. Type 2 diabetes mellitus with complication, with long-term current use of insulin (HCC)  -     METABOLIC PANEL, COMPREHENSIVE; Future  -     LIPID PANEL; Future  -     HEMOGLOBIN A1C WITH EAG; Future  -     MICROALBUMIN, UR, RAND W/ MICROALB/CREAT RATIO; Future  Patient would like a continuous glucose monitor. Patient's partner advised to check with their insurance to find out which one will be covered for him by their plan. Once we have that information, we can put in an order. 6. Mixed hyperlipidemia  -     METABOLIC PANEL, COMPREHENSIVE; Future  -     LIPID PANEL; Future      Follow-up and Dispositions    · Return in about 2 months (around 8/9/2021) for diabetes, high blood pressure, high cholesterol, lab review.

## 2021-06-09 NOTE — PROGRESS NOTES
Sundeepamy Hayde presents today for   Chief Complaint   Patient presents with    Depression    Medication Evaluation       Virtual/telephone visit    Depression Screening:  3 most recent PHQ Screens 6/9/2021   Little interest or pleasure in doing things Several days   Feeling down, depressed, irritable, or hopeless Several days   Total Score PHQ 2 2   Trouble falling or staying asleep, or sleeping too much -   Feeling tired or having little energy -   Poor appetite, weight loss, or overeating -   Feeling bad about yourself - or that you are a failure or have let yourself or your family down -   Trouble concentrating on things such as school, work, reading, or watching TV -   Moving or speaking so slowly that other people could have noticed; or the opposite being so fidgety that others notice -   Thoughts of being better off dead, or hurting yourself in some way -   PHQ 9 Score -   How difficult have these problems made it for you to do your work, take care of your home and get along with others -       Learning Assessment:  Learning Assessment 1/5/2021   PRIMARY LEARNER Patient   HIGHEST LEVEL OF EDUCATION - PRIMARY LEARNER  DID NOT GRADUATE HIGH SCHOOL   BARRIERS PRIMARY LEARNER NONE   CO-LEARNER CAREGIVER No   PRIMARY LANGUAGE ENGLISH   LEARNER PREFERENCE PRIMARY DEMONSTRATION     -   ANSWERED BY Patient   RELATIONSHIP SELF       Travel Screening:   Travel Screening     Question   Response    In the last month, have you been in contact with someone who was confirmed or suspected to have Coronavirus / COVID-19? No / Unsure    Have you had a COVID-19 viral test in the last 14 days? No    Do you have any of the following new or worsening symptoms? None of these    Have you traveled internationally or domestically in the last month? No      Travel History   Travel since 05/09/21     No documented travel since 05/09/21          Health Maintenance reviewed and discussed and ordered per Provider.     [facility] Maintenance Due   Topic Date Due    COVID-19 Vaccine (1) Never done    Shingrix Vaccine Age 50> (1 of 2) Never done    Foot Exam Q1  02/13/2018    Eye Exam Retinal or Dilated  09/22/2019    Colorectal Cancer Screening Combo  08/23/2020    MICROALBUMIN Q1  01/15/2021    A1C test (Diabetic or Prediabetic)  04/15/2021   . Coordination of Care:  1. Have you been to the ER, urgent care clinic since your last visit? Hospitalized since your last visit? No    2. Have you seen or consulted any other health care providers outside of the [address] [place] [address] since your last visit? Include any pap smears or colon screening.  No

## 2021-06-18 ENCOUNTER — HOSPITAL ENCOUNTER (OUTPATIENT)
Dept: LAB | Age: 56
Discharge: HOME | End: 2021-06-18

## 2021-06-18 LAB — SENTARA SPECIMEN COL,SENBCF: NORMAL

## 2021-06-18 PROCEDURE — 99001 SPECIMEN HANDLING PT-LAB: CPT

## 2021-06-21 LAB
ALBUMIN SERPL-MCNC: 3.9 G/DL (ref 3.8–4.9)
ALBUMIN/GLOB SERPL: 1.5 {RATIO} (ref 1.2–2.2)
ALP SERPL-CCNC: 71 IU/L (ref 48–121)
ALT SERPL-CCNC: 10 IU/L (ref 0–44)
AST SERPL-CCNC: 13 IU/L (ref 0–40)
BILIRUB SERPL-MCNC: 0.3 MG/DL (ref 0–1.2)
BUN SERPL-MCNC: 9 MG/DL (ref 6–24)
BUN/CREAT SERPL: 9 (ref 9–20)
CALCIUM SERPL-MCNC: 8.9 MG/DL (ref 8.7–10.2)
CHLORIDE SERPL-SCNC: 103 MMOL/L (ref 96–106)
CHOLEST SERPL-MCNC: 171 MG/DL (ref 100–199)
CO2 SERPL-SCNC: 22 MMOL/L (ref 20–29)
CREAT SERPL-MCNC: 0.97 MG/DL (ref 0.76–1.27)
EST. AVERAGE GLUCOSE BLD GHB EST-MCNC: 249 MG/DL
GLOBULIN SER CALC-MCNC: 2.6 G/DL (ref 1.5–4.5)
GLUCOSE SERPL-MCNC: 336 MG/DL (ref 65–99)
HAV IGM SERPL QL IA: NEGATIVE
HBA1C MFR BLD: 10.3 % (ref 4.8–5.6)
HBV CORE IGM SERPL QL IA: NEGATIVE
HBV SURFACE AG SERPL QL IA: NEGATIVE
HCV AB S/CO SERPL IA: <0.1 S/CO RATIO (ref 0–0.9)
HDLC SERPL-MCNC: 39 MG/DL
HIV 1+2 AB+HIV1 P24 AG SERPL QL IA: NON REACTIVE
IMP & REVIEW OF LAB RESULTS: NORMAL
LDLC SERPL CALC-MCNC: 112 MG/DL (ref 0–99)
POTASSIUM SERPL-SCNC: 4.3 MMOL/L (ref 3.5–5.2)
PROT SERPL-MCNC: 6.5 G/DL (ref 6–8.5)
SODIUM SERPL-SCNC: 141 MMOL/L (ref 134–144)
SPECIMEN STATUS REPORT, ROLRST: NORMAL
TRIGL SERPL-MCNC: 108 MG/DL (ref 0–149)
VLDLC SERPL CALC-MCNC: 20 MG/DL (ref 5–40)

## 2021-07-23 DIAGNOSIS — I10 ESSENTIAL (PRIMARY) HYPERTENSION: ICD-10-CM

## 2021-07-23 DIAGNOSIS — Z86.73 PERSONAL HISTORY OF TRANSIENT ISCHEMIC ATTACK (TIA), AND CEREBRAL INFARCTION WITHOUT RESIDUAL DEFICITS: ICD-10-CM

## 2021-07-23 RX ORDER — GUAIFENESIN 100 MG/5ML
162 LIQUID (ML) ORAL DAILY
Qty: 180 TABLET | Refills: 3 | Status: SHIPPED | OUTPATIENT
Start: 2021-07-23 | End: 2022-05-10

## 2021-07-27 DIAGNOSIS — G63 POLYNEUROPATHY ASSOCIATED WITH UNDERLYING DISEASE (HCC): ICD-10-CM

## 2021-07-27 NOTE — TELEPHONE ENCOUNTER
Received faxed refill request from [id number] [place] [address] seen 6/9/21, next appt 8/16/21. Labs done 6/19/21.     Requested Prescriptions     Pending Prescriptions Disp Refills    gabapentin (NEURONTIN) 300 mg capsule 60 Capsule 5     Sig: TAKE TWO CAPSULES BY MOUTH EVERY EVENING

## 2021-07-29 RX ORDER — GABAPENTIN 300 MG/1
CAPSULE ORAL
Qty: 60 CAPSULE | Refills: 5 | Status: SHIPPED | OUTPATIENT
Start: 2021-07-29

## 2021-09-10 ENCOUNTER — HOSPITAL ENCOUNTER (OUTPATIENT)
Dept: LAB | Age: 56
Discharge: HOME | End: 2021-09-10

## 2021-09-10 DIAGNOSIS — I25.118 CORONARY ARTERY DISEASE OF NATIVE HEART WITH STABLE ANGINA PECTORIS, UNSPECIFIED VESSEL OR LESION TYPE (HCC): ICD-10-CM

## 2021-09-10 DIAGNOSIS — E11.40 TYPE 2 DIABETES MELLITUS WITH DIABETIC NEUROPATHY, WITHOUT LONG-TERM CURRENT USE OF INSULIN (HCC): ICD-10-CM

## 2021-09-10 DIAGNOSIS — Z20.2 EXPOSURE TO SEXUALLY TRANSMITTED DISEASE (STD): ICD-10-CM

## 2021-09-10 LAB — SENTARA SPECIMEN COL,SENBCF: NORMAL

## 2021-09-10 PROCEDURE — 99001 SPECIMEN HANDLING PT-LAB: CPT

## 2021-09-14 LAB
A-G RATIO,AGRAT: 1.1 RATIO (ref 1.1–2.6)
ALBUMIN SERPL-MCNC: 3.4 G/DL (ref 3.5–5)
ALP SERPL-CCNC: 79 U/L (ref 25–115)
ALT SERPL-CCNC: 9 U/L (ref 5–40)
ANION GAP SERPL CALC-SCNC: 12 MMOL/L (ref 3–15)
AST SERPL W P-5'-P-CCNC: 6 U/L (ref 10–37)
AVG GLU, 10930: 318 MG/DL (ref 91–123)
BILIRUB SERPL-MCNC: 0.3 MG/DL (ref 0.2–1.2)
BUN SERPL-MCNC: 13 MG/DL (ref 6–22)
CALCIUM SERPL-MCNC: 9.6 MG/DL (ref 8.4–10.5)
CHLORIDE SERPL-SCNC: 96 MMOL/L (ref 98–110)
CHOLEST SERPL-MCNC: 198 MG/DL (ref 110–200)
CO2 SERPL-SCNC: 26 MMOL/L (ref 20–32)
CREAT SERPL-MCNC: 1.2 MG/DL (ref 0.5–1.2)
CREATININE, URINE: 55 MG/DL
GFRAA, 66117: >60
GFRNA, 66118: >60
GLOBULIN,GLOB: 3.2 G/DL (ref 2–4)
GLUCOSE SERPL-MCNC: 478 MG/DL (ref 70–99)
HBA1C MFR BLD HPLC: 12.7 % (ref 4.8–5.6)
HCV AB SER IA-ACNC: NORMAL
HDLC SERPL-MCNC: 29 MG/DL
HDLC SERPL-MCNC: 6.8 MG/DL (ref 0–5)
HEP A AB,IGM, 006734: NORMAL
HEP B CORE AB,IGM, 016881: NORMAL
HEP B SURFACE AG SCRN, 006510: NORMAL
HIV -1/0/2 AG/AB WITH REFLEX, 13463: NON REACTIVE
HIV 1 & 2 AB SER-IMP: NORMAL
LDL/HDL RATIO,LDHD: 4.2
LDLC SERPL CALC-MCNC: 121 MG/DL (ref 50–99)
MICROALB/CREAT RATIO, 140286: 276.4 (ref 0–30)
MICROALBUMIN,URINE RANDOM 140054: 152 MG/L (ref 0.1–17)
NON-HDL CHOLESTEROL, 011976: 169 MG/DL
POTASSIUM SERPL-SCNC: 4.4 MMOL/L (ref 3.5–5.5)
PROT SERPL-MCNC: 6.6 G/DL (ref 6.4–8.3)
SODIUM SERPL-SCNC: 134 MMOL/L (ref 133–145)
TRIGL SERPL-MCNC: 238 MG/DL (ref 40–149)
VLDLC SERPL CALC-MCNC: 48 MG/DL (ref 8–30)

## 2021-09-28 ENCOUNTER — OFFICE VISIT (OUTPATIENT)
Dept: CARDIOLOGY CLINIC | Age: 56
End: 2021-09-28
Payer: COMMERCIAL

## 2021-09-28 VITALS
WEIGHT: 180 LBS | OXYGEN SATURATION: 97 % | HEART RATE: 74 BPM | DIASTOLIC BLOOD PRESSURE: 100 MMHG | HEIGHT: 65 IN | BODY MASS INDEX: 29.99 KG/M2 | SYSTOLIC BLOOD PRESSURE: 166 MMHG

## 2021-09-28 DIAGNOSIS — I10 ESSENTIAL (PRIMARY) HYPERTENSION: ICD-10-CM

## 2021-09-28 DIAGNOSIS — E78.5 DYSLIPIDEMIA: ICD-10-CM

## 2021-09-28 DIAGNOSIS — Z79.4 TYPE 2 DIABETES MELLITUS WITHOUT COMPLICATION, WITH LONG-TERM CURRENT USE OF INSULIN (HCC): ICD-10-CM

## 2021-09-28 DIAGNOSIS — Z95.5 STATUS POST INSERTION OF DRUG ELUTING CORONARY ARTERY STENT: ICD-10-CM

## 2021-09-28 DIAGNOSIS — E11.9 TYPE 2 DIABETES MELLITUS WITHOUT COMPLICATION, WITH LONG-TERM CURRENT USE OF INSULIN (HCC): ICD-10-CM

## 2021-09-28 DIAGNOSIS — Z86.73 PERSONAL HISTORY OF TRANSIENT ISCHEMIC ATTACK (TIA), AND CEREBRAL INFARCTION WITHOUT RESIDUAL DEFICITS: Primary | ICD-10-CM

## 2021-09-28 DIAGNOSIS — I25.118 CORONARY ARTERY DISEASE OF NATIVE ARTERY OF NATIVE HEART WITH STABLE ANGINA PECTORIS (HCC): ICD-10-CM

## 2021-09-28 PROCEDURE — 99214 OFFICE O/P EST MOD 30 MIN: CPT | Performed by: INTERNAL MEDICINE

## 2021-09-28 NOTE — PROGRESS NOTES
1. Have you been to the ER, urgent care clinic since your last visit? Hospitalized since your last visit? No    2. Have you seen or consulted any other health care providers outside of the [address] [place] [address] since your last visit? Include any pap smears or colon screening.  No

## 2021-09-28 NOTE — PROGRESS NOTES
HISTORY OF PRESENT ILLNESS  Haroon Guthrie is a 64 y.o. male. Hypertension  The history is provided by the patient. This is a chronic problem. The problem occurs daily. The problem has been gradually worsening. Associated symptoms include shortness of breath. Shortness of Breath  The history is provided by the patient. This is a chronic problem. The problem occurs intermittently. The problem has not changed since onset. Pertinent negatives include no fever, no ear pain, no neck pain, no cough, no sputum production, no hemoptysis, no wheezing, no PND, no orthopnea, no vomiting, no rash, no leg swelling and no claudication. Review of Systems   Constitutional: Negative for chills, diaphoresis, fever and weight loss. HENT: Negative for ear pain and hearing loss. Eyes: Negative for blurred vision. Respiratory: Positive for shortness of breath. Negative for cough, hemoptysis, sputum production, wheezing and stridor. Cardiovascular: Negative for palpitations, orthopnea, claudication, leg swelling and PND. Gastrointestinal: Negative for heartburn, nausea and vomiting. Musculoskeletal: Negative for myalgias and neck pain. Skin: Negative for rash. Neurological: Negative for dizziness, tingling, tremors, focal weakness, loss of consciousness and weakness. Psychiatric/Behavioral: Negative for depression and suicidal ideas.      Family History   Problem Relation Age of Onset   [employer] Arthritis-osteo Mother     Gout Mother     Diabetes Mother     Hypertension Mother     Diabetes Father     Diabetes Brother     Diabetes Maternal Grandmother     Other Son         homicide       Past Medical History:   Diagnosis Date    Arthritis     Chest pain     CHF (congestive heart failure) ([address]     CVA (cerebral vascular accident) ([address] 2011    r side stroke    CVA (cerebral vascular accident) ([address] 09/05/2018    ad[facility] at [address] [facility]) 2010    chest pains    Hypercholesterolemia     Hypertension     Loss of hearing     right ear, patient states it comes and goes    Polyuria     Shortness of breath 2011    Type 2 diabetes mellitus without complication, with long-term current use of insulin ([address] 07/23/2013    Type 2 diabetes mellitus, with long-term current use of insulin ([facility] [address] [facility] 7/23/2013       Past Surgical History:   Procedure Laterality Date    HX HEART CATHETERIZATION         Social History     Tobacco Use    Smoking status: Never Smoker    Smokeless tobacco: Never Used   Substance Use Topics    Alcohol use: Yes     Comment: occassionally       Allergies   Allergen Reactions    Lisinopril Cough       Outpatient Medications Marked as Taking for the 9/28/21 encounter (Office Visit) with Aaron Antonio MD   Medication Sig Dispense Refill    gabapentin (NEURONTIN) 300 mg capsule TAKE TWO CAPSULES BY MOUTH EVERY EVENING 60 Capsule 5    aspirin 81 mg chewable tablet Take 2 Tablets by mouth daily. 180 Tablet 3    Unifine Pentips 31 gauge x 5/16\" ndle USE TO INJECT insulin EVERY DAY AS DIRECTED      Janumet XR 50-1,000 mg TM24 TAKE TWO TABLETS BY MOUTH EVERY  Tablet 5    hydroCHLOROthiazide (HYDRODIURIL) 12.5 mg tablet TAKE ONE TABLET BY MOUTH EVERY DAY 30 Tab 5    escitalopram oxalate (LEXAPRO) 20 mg tablet Take 1 Tab by mouth daily. 30 Tab 5    traZODone (DESYREL) 100 mg tablet Take 1-2 Tabs by mouth nightly as needed for Sleep. 60 Tab 5    atorvastatin (LIPITOR) 80 mg tablet Take 1 Tab by mouth daily. 90 Tab 2    ticagrelor (BRILINTA) 90 mg tablet Take 1 Tab by mouth two (2) times a day. 60 Tab 1    metoprolol tartrate (LOPRESSOR) 25 mg tablet Take 1 Tab by mouth two (2) times a day. 60 Tab 2    glimepiride (AMARYL) 2 mg tablet TAKE 1 TABLET BY MOUTH EVERY DAY 90 Tab 4    insulin glargine (Lantus Solostar U-100 Insulin) 100 unit/mL (3 mL) inpn 40 Units by SubCUTAneous route nightly.  12 Pen 4    furosemide (LASIX) 40 mg tablet Take 1 Tab by mouth daily. 30 Tab 4    loratadine (CLARITIN) 10 mg tablet TAKE ONE TABLET BY MOUTH EVERY DAY 30 Tab 5    multivitamin (ONE A DAY) tablet Take 1 Tab by Mouth Once a Day. Similar alternatives are ok.  thiamine HCL (B-1) 100 mg tablet Take 1 Tab by mouth daily. 90 Tab 3        Visit Vitals  BP (!) 166/100 (BP 1 Location: Left upper arm, BP Patient Position: Sitting, BP Cuff Size: Adult)   Pulse 74   Ht 5' 5\" (1.651 m)   Wt 81.6 kg (180 lb)   SpO2 97%   BMI 29.95 kg/m²       Physical Exam  Constitutional:       General: He is not in acute distress. Appearance: He is well-developed. He is not diaphoretic. HENT:      Head: Atraumatic. Mouth/Throat:      Pharynx: No oropharyngeal exudate. Eyes:      General: No scleral icterus. Conjunctiva/sclera: Conjunctivae normal.   Neck:      Thyroid: No thyromegaly. Vascular: No JVD. Trachea: No tracheal deviation. Cardiovascular:      Rate and Rhythm: Normal rate and regular rhythm. Heart sounds: No murmur heard. No gallop. Pulmonary:      Effort: Pulmonary effort is normal.      Breath sounds: Normal breath sounds. No stridor. No wheezing or rales. Abdominal:      Palpations: Abdomen is soft. Tenderness: There is no abdominal tenderness. There is no guarding or rebound. Musculoskeletal:         General: No tenderness. Normal range of motion. Cervical back: Normal range of motion and neck supple. Skin:     General: Skin is warm. Neurological:      Mental Status: He is alert and oriented to person, place, and time. Motor: No abnormal muscle tone. Psychiatric:         Behavior: Behavior normal.       9/2018 - 14 day event - NSR  Echo 09/2018:  NORMAL LEFT VENTRICULAR CAVITY SIZE AND SYSTOLIC FUNCTION WITH AN EJECTION FRACTION   VISUALLY ESTIMATED AT 60%. NORMAL DIASTOLIC FUNCTION. MILD LEFT VENTRICULAR HYPERTROPHY PRESENT. TRACE MITRAL REGURGITATION. MILDLY SCLEROTIC TRILEAFLET AORTIC VALVE. INSUFFICIENT TRICUSPID REGURGITANT JET TO ESTIMATE PULMONARY ARTERY PRESSURE. NEGATIVE BUBBLE STUDY ON PRIOR ECHO REPORT 4/09/2012. NO SIGNIFICANT CHANGES FROM PRIOR ECHO REPORT ON 4/09/2012. Lexiscan 2018-  THIS MYOCARDIAL PERFUSION STUDY IS ABNORMAL   THIS IS A MEDIUM RISK STUDY   ABNORMAL NUCLEAR SCAN WITH  A SMALL AREA OF NON PERFUSION ON BOTH THE STRESS AND REST IMAGES   IN THE INFERIOR APICAL WALL CONSISTENT WITH EITHER PRIOR INFERIOR APICAL INFARCTION OR HIGH GRADE   OCCLUSION TO THE DISTAL RCA        INFERIOR APICAL AKINESIS WITH AN EJECTION FRACTION  ESTIMATED AT   50%  11/05/20   CARDIAC PROCEDURE 11/05/2020 11/5/2020    Narrative Critical single vessel coronary artery disease with preserved LV function. S/p ptca/stent to mid LCX. Continue DAPT and intense risk factor modification. Signed by: Augie Dior MD     ASSESSMENT and PLAN    ICD-10-CM ICD-9-CM    1. Personal history of transient ischemic attack (TIA), and cerebral infarction without residual deficits  Z86.73 V12.54    2. Essential (primary) hypertension  I10 401.9    3. Dyslipidemia  E78.5 272.4    4. Type 2 diabetes mellitus without complication, with long-term current use of insulin (HCC)  E11.9 250.00     Z79.4 V58.67    5. Status post insertion of drug eluting coronary artery stent  Z95.5 V45.82    6. Coronary artery disease of native artery of native heart with stable angina pectoris ([facility]  I25.118 414.01      413.9      No orders of the defined types were placed in this encounter. Follow-up and Dispositions    · Return in about 2 weeks (around 10/12/2021). current treatment plan is effective, no change in therapy  reviewed diet, exercise and weight control    cardiovascular risk and specific lipid/LDL goals reviewed  use of aspirin to prevent MI and TIA's discussed. Patient seen for follow-up. Has prior abnormal nuclear stress test.    Complained of worsening exertional dyspnea while cutting grass.   Has occasional chest tightness. Recent cardiac cath- s/p PCI to mid LCX. Repeat lipid panel revealed LDL greater than one hundred twenty. Target less than seventy. Blood pressure uncontrolled at this time. Patient admits not taking his medication as prescribed. Takes it every 1 to 2 days. Discussed at length regarding need for medication compliance. Will return to clinic in 2 weeks to reassess medications and blood pressure. All questions answered.

## 2021-09-30 RX ORDER — HYDROCHLOROTHIAZIDE 12.5 MG/1
TABLET ORAL
Qty: 30 TABLET | Refills: 5 | Status: SHIPPED | OUTPATIENT
Start: 2021-09-30 | End: 2022-05-31 | Stop reason: SDUPTHER

## 2021-09-30 RX ORDER — LOSARTAN POTASSIUM 50 MG/1
TABLET ORAL
Qty: 30 TABLET | Refills: 5 | Status: SHIPPED | OUTPATIENT
Start: 2021-09-30 | End: 2022-02-09 | Stop reason: ALTCHOICE

## 2021-10-12 ENCOUNTER — OFFICE VISIT (OUTPATIENT)
Dept: CARDIOLOGY CLINIC | Age: 56
End: 2021-10-12
Payer: COMMERCIAL

## 2021-10-12 VITALS
BODY MASS INDEX: 30.66 KG/M2 | SYSTOLIC BLOOD PRESSURE: 127 MMHG | WEIGHT: 184 LBS | HEIGHT: 65 IN | HEART RATE: 84 BPM | OXYGEN SATURATION: 96 % | DIASTOLIC BLOOD PRESSURE: 68 MMHG

## 2021-10-12 DIAGNOSIS — E11.9 TYPE 2 DIABETES MELLITUS WITHOUT COMPLICATION, WITH LONG-TERM CURRENT USE OF INSULIN (HCC): ICD-10-CM

## 2021-10-12 DIAGNOSIS — I10 ESSENTIAL (PRIMARY) HYPERTENSION: Primary | ICD-10-CM

## 2021-10-12 DIAGNOSIS — E78.5 DYSLIPIDEMIA: ICD-10-CM

## 2021-10-12 DIAGNOSIS — Z86.73 HISTORY OF CVA (CEREBROVASCULAR ACCIDENT): ICD-10-CM

## 2021-10-12 DIAGNOSIS — Z95.5 STATUS POST INSERTION OF DRUG ELUTING CORONARY ARTERY STENT: ICD-10-CM

## 2021-10-12 DIAGNOSIS — I25.118 CORONARY ARTERY DISEASE OF NATIVE ARTERY OF NATIVE HEART WITH STABLE ANGINA PECTORIS (HCC): ICD-10-CM

## 2021-10-12 DIAGNOSIS — Z79.4 TYPE 2 DIABETES MELLITUS WITHOUT COMPLICATION, WITH LONG-TERM CURRENT USE OF INSULIN (HCC): ICD-10-CM

## 2021-10-12 PROCEDURE — 99214 OFFICE O/P EST MOD 30 MIN: CPT | Performed by: INTERNAL MEDICINE

## 2021-10-12 NOTE — PROGRESS NOTES
1. Have you been to the ER, urgent care clinic since your last visit? Hospitalized since your last visit? No    2. Have you seen or consulted any other health care providers outside of the [address] [place] [address] since your last visit? Include any pap smears or colon screening. No     3. Do you need any refills today?   Yes

## 2021-10-12 NOTE — PROGRESS NOTES
HISTORY OF PRESENT ILLNESS  Philomena Olsen is a 64 y.o. male. Hypertension  The history is provided by the patient. This is a chronic problem. The problem occurs daily. The problem has been gradually improving. Associated symptoms include shortness of breath. Shortness of Breath  The history is provided by the patient. This is a chronic problem. The problem occurs intermittently. The problem has not changed since onset. Pertinent negatives include no fever, no ear pain, no neck pain, no cough, no sputum production, no hemoptysis, no wheezing, no PND, no orthopnea, no vomiting, no rash, no leg swelling and no claudication. Review of Systems   Constitutional: Negative for chills, diaphoresis, fever and weight loss. HENT: Negative for ear pain and hearing loss. Eyes: Negative for blurred vision. Respiratory: Positive for shortness of breath. Negative for cough, hemoptysis, sputum production, wheezing and stridor. Cardiovascular: Negative for palpitations, orthopnea, claudication, leg swelling and PND. Gastrointestinal: Negative for heartburn, nausea and vomiting. Musculoskeletal: Negative for myalgias and neck pain. Skin: Negative for rash. Neurological: Negative for dizziness, tingling, tremors, focal weakness, loss of consciousness and weakness. Psychiatric/Behavioral: Negative for depression and suicidal ideas.      Family History   Problem Relation Age of Onset   [employer] Arthritis-osteo Mother     Gout Mother     Diabetes Mother     Hypertension Mother     Diabetes Father     Diabetes Brother     Diabetes Maternal Grandmother     Other Son         homicide       Past Medical History:   Diagnosis Date    Arthritis     Chest pain     CHF (congestive heart failure) ([address]     CVA (cerebral vascular accident) ([address] 2011    r side stroke    CVA (cerebral vascular accident) ([address] 09/05/2018    ad[facility] at [address] [facility]) 2010    chest pains    Hypercholesterolemia     Hypertension     Loss of hearing     right ear, patient states it comes and goes    Polyuria     Shortness of breath 2011    Type 2 diabetes mellitus without complication, with long-term current use of insulin ([facility] 07/23/2013    Type 2 diabetes mellitus, with long-term current use of insulin ([facility] 7/23/2013       Past Surgical History:   Procedure Laterality Date    HX HEART CATHETERIZATION         Social History     Tobacco Use    Smoking status: Never Smoker    Smokeless tobacco: Never Used   Substance Use Topics    Alcohol use: Yes     Comment: occassionally       Allergies   Allergen Reactions    Lisinopril Cough       Outpatient Medications Marked as Taking for the 10/12/21 encounter (Office Visit) with Debra Nam MD   Medication Sig Dispense Refill    ticagrelor (BRILINTA) 90 mg tablet Take 1 Tablet by mouth two (2) times a day. 180 Tablet 2    hydroCHLOROthiazide (HYDRODIURIL) 12.5 mg tablet TAKE ONE TABLET BY MOUTH EVERY DAY 30 Tablet 5    losartan (COZAAR) 50 mg tablet TAKE ONE TABLET BY MOUTH EVERY DAY 30 Tablet 5    furosemide (LASIX) 40 mg tablet TAKE ONE TABLET BY MOUTH EVERY DAY 30 Tablet 0    Lantus Solostar U-100 Insulin 100 unit/mL (3 mL) inpn 40 Units by SubCUTAneous route nightly. 36 mL 1    glimepiride (AMARYL) 2 mg tablet TAKE 1 TABLET BY MOUTH EVERY DAY 30 Tablet 0    gabapentin (NEURONTIN) 300 mg capsule TAKE TWO CAPSULES BY MOUTH EVERY EVENING 60 Capsule 5    aspirin 81 mg chewable tablet Take 2 Tablets by mouth daily. 180 Tablet 3    Unifine Pentips 31 gauge x 5/16\" ndle USE TO INJECT insulin EVERY DAY AS DIRECTED      Janumet XR 50-1,000 mg TM24 TAKE TWO TABLETS BY MOUTH EVERY  Tablet 5    escitalopram oxalate (LEXAPRO) 20 mg tablet Take 1 Tab by mouth daily. 30 Tab 5    traZODone (DESYREL) 100 mg tablet Take 1-2 Tabs by mouth nightly as needed for Sleep.  60 Tab 5    atorvastatin (LIPITOR) 80 mg tablet Take 1 Tab by mouth daily. 90 Tab 2    metoprolol tartrate (LOPRESSOR) 25 mg tablet Take 1 Tab by mouth two (2) times a day. 60 Tab 2    multivitamin (ONE A DAY) tablet Take 1 Tab by Mouth Once a Day. Similar alternatives are ok.  thiamine HCL (B-1) 100 mg tablet Take 1 Tab by mouth daily. 90 Tab 3        Visit Vitals  /68 (BP 1 Location: Left upper arm, BP Patient Position: Sitting, BP Cuff Size: Adult)   Pulse 84   Ht 5' 5\" (1.651 m)   Wt 83.5 kg (184 lb)   SpO2 96%   BMI 30.62 kg/m²       Physical Exam  Constitutional:       General: He is not in acute distress. Appearance: He is well-developed. He is not diaphoretic. HENT:      Head: Atraumatic. Mouth/Throat:      Pharynx: No oropharyngeal exudate. Eyes:      General: No scleral icterus. Conjunctiva/sclera: Conjunctivae normal.   Neck:      Thyroid: No thyromegaly. Vascular: No JVD. Trachea: No tracheal deviation. Cardiovascular:      Rate and Rhythm: Normal rate and regular rhythm. Heart sounds: No murmur heard. No gallop. Pulmonary:      Effort: Pulmonary effort is normal.      Breath sounds: Normal breath sounds. No stridor. No wheezing or rales. Abdominal:      Palpations: Abdomen is soft. Tenderness: There is no abdominal tenderness. There is no guarding or rebound. Musculoskeletal:         General: No tenderness. Normal range of motion. Cervical back: Normal range of motion and neck supple. Skin:     General: Skin is warm. Neurological:      Mental Status: He is alert and oriented to person, place, and time. Motor: No abnormal muscle tone. Psychiatric:         Behavior: Behavior normal.       9/2018 - 14 day event - NSR  Echo 09/2018:  NORMAL LEFT VENTRICULAR CAVITY SIZE AND SYSTOLIC FUNCTION WITH AN EJECTION FRACTION   VISUALLY ESTIMATED AT 60%. NORMAL DIASTOLIC FUNCTION. MILD LEFT VENTRICULAR HYPERTROPHY PRESENT. TRACE MITRAL REGURGITATION. MILDLY SCLEROTIC TRILEAFLET AORTIC VALVE. INSUFFICIENT TRICUSPID REGURGITANT JET TO ESTIMATE PULMONARY ARTERY PRESSURE. NEGATIVE BUBBLE STUDY ON PRIOR ECHO REPORT 4/09/2012. NO SIGNIFICANT CHANGES FROM PRIOR ECHO REPORT ON 4/09/2012. Lexiscan 2018-  THIS MYOCARDIAL PERFUSION STUDY IS ABNORMAL   THIS IS A MEDIUM RISK STUDY   ABNORMAL NUCLEAR SCAN WITH  A SMALL AREA OF NON PERFUSION ON BOTH THE STRESS AND REST IMAGES   IN THE INFERIOR APICAL WALL CONSISTENT WITH EITHER PRIOR INFERIOR APICAL INFARCTION OR HIGH GRADE   OCCLUSION TO THE DISTAL RCA        INFERIOR APICAL AKINESIS WITH AN EJECTION FRACTION  ESTIMATED AT   50%  11/05/20   CARDIAC PROCEDURE 11/05/2020 11/5/2020    Narrative Critical single vessel coronary artery disease with preserved LV function. S/p ptca/stent to mid LCX. Continue DAPT and intense risk factor modification. Signed by: Roslyn Merirtt MD     ASSESSMENT and PLAN    ICD-10-CM ICD-9-CM    1. Essential (primary) hypertension  I10 401.9    2. Dyslipidemia  E78.5 272.4 LIPID PANEL      HEPATIC FUNCTION PANEL   3. Type 2 diabetes mellitus without complication, with long-term current use of insulin (HCC)  E11.9 250.00     Z79.4 V58.67    4. Status post insertion of drug eluting coronary artery stent  Z95.5 V45.82    5. Coronary artery disease of native artery of native heart with stable angina pectoris ([facility]  I25.118 414.01      413.9    6. History of CVA (cerebrovascular accident)  Z86.73 V12.54      Orders Placed This Encounter    LIPID PANEL     Standing Status:   Future     Standing Expiration Date:   10/13/2022    HEPATIC FUNCTION PANEL     Standing Status:   Future     Standing Expiration Date:   10/13/2022    ticagrelor (BRILINTA) 90 mg tablet     Sig: Take 1 Tablet by mouth two (2) times a day.      Dispense:  180 Tablet     Refill:  2       current treatment plan is effective, no change in therapy  reviewed diet, exercise and weight control    cardiovascular risk and specific lipid/LDL goals reviewed  use of aspirin to prevent MI and TIA's discussed. Patient seen for follow-up. Has prior abnormal nuclear stress test.    Complained of worsening exertional dyspnea while cutting grass. Has occasional chest tightness. Recent cardiac cath- s/p PCI to mid LCX. Repeat lipid panel revealed . Target <70  Blood pressure well controlled on current meds-- reports taking meds daily as prescribed  Recommend salt restriction and weight reduction.   Will repeat lipid and liver panel in 3 months

## 2021-11-10 ENCOUNTER — OFFICE VISIT (OUTPATIENT)
Dept: FAMILY MEDICINE CLINIC | Age: 56
End: 2021-11-10
Payer: COMMERCIAL

## 2021-11-10 VITALS
HEIGHT: 65 IN | DIASTOLIC BLOOD PRESSURE: 87 MMHG | WEIGHT: 194 LBS | TEMPERATURE: 98.2 F | HEART RATE: 75 BPM | BODY MASS INDEX: 32.32 KG/M2 | RESPIRATION RATE: 16 BRPM | OXYGEN SATURATION: 99 % | SYSTOLIC BLOOD PRESSURE: 137 MMHG

## 2021-11-10 DIAGNOSIS — F43.21 GRIEF AT LOSS OF CHILD: ICD-10-CM

## 2021-11-10 DIAGNOSIS — Z23 NEEDS FLU SHOT: ICD-10-CM

## 2021-11-10 DIAGNOSIS — I10 ESSENTIAL HYPERTENSION: ICD-10-CM

## 2021-11-10 DIAGNOSIS — F51.04 PSYCHOPHYSIOLOGICAL INSOMNIA: ICD-10-CM

## 2021-11-10 DIAGNOSIS — Z63.4 GRIEF AT LOSS OF CHILD: ICD-10-CM

## 2021-11-10 DIAGNOSIS — E11.40 TYPE 2 DIABETES MELLITUS WITH DIABETIC NEUROPATHY, WITHOUT LONG-TERM CURRENT USE OF INSULIN (HCC): Primary | ICD-10-CM

## 2021-11-10 DIAGNOSIS — E78.2 MIXED HYPERLIPIDEMIA: ICD-10-CM

## 2021-11-10 DIAGNOSIS — F32.A DEPRESSION, UNSPECIFIED DEPRESSION TYPE: ICD-10-CM

## 2021-11-10 DIAGNOSIS — R26.9 GAIT DISORDER: ICD-10-CM

## 2021-11-10 PROCEDURE — 99214 OFFICE O/P EST MOD 30 MIN: CPT | Performed by: FAMILY MEDICINE

## 2021-11-10 PROCEDURE — 90686 IIV4 VACC NO PRSV 0.5 ML IM: CPT | Performed by: FAMILY MEDICINE

## 2021-11-10 RX ORDER — TRAZODONE HYDROCHLORIDE 100 MG/1
100-200 TABLET ORAL
Qty: 60 TABLET | Refills: 5 | Status: SHIPPED | OUTPATIENT
Start: 2021-11-10 | End: 2022-09-06

## 2021-11-10 SDOH — SOCIAL STABILITY - SOCIAL INSECURITY: DISSAPEARANCE AND DEATH OF FAMILY MEMBER: Z63.4

## 2021-11-10 NOTE — PATIENT INSTRUCTIONS
Vaccine Information Statement    Influenza (Flu) Vaccine (Inactivated or Recombinant): What You Need to Know    Many vaccine information statements are available in Yoruba and other languages. See www.immunize.org/vis. Hojas de información sobre vacunas están disponibles en español y en muchos otros idiomas. Visite www.immunize.org/vis. 1. Why get vaccinated? Influenza vaccine can prevent influenza (flu). Flu is a contagious disease that spreads around the [facility] every year, usually between October and May. Anyone can get the flu, but it is more dangerous for some people. Infants and young children, people 72 years and older, pregnant people, and people with certain health conditions or a weakened immune system are at greatest risk of flu complications. Pneumonia, bronchitis, sinus infections, and ear infections are examples of flu-related complications. If you have a medical condition, such as heart disease, cancer, or diabetes, flu can make it worse. Flu can cause fever and chills, sore throat, muscle aches, fatigue, cough, headache, and runny or stuffy nose. Some people may have vomiting and diarrhea, though this is more common in children than adults. In an average year, thousands of people in the [facility] die from flu, and many more are hospitalized. Flu vaccine prevents millions of illnesses and flu-related visits to the doctor each year. 2. Influenza vaccines     CDC recommends everyone 6 months and older get vaccinated every flu season. Children 6 months through 6years of age may need 2 doses during a single flu season. Everyone else needs only 1 dose each flu season. It takes about 2 weeks for protection to develop after vaccination. There are many flu viruses, and they are always changing. Each year a new flu vaccine is made to protect against the influenza viruses believed to be likely to cause disease in the upcoming flu season.  Even when the vaccine doesnt exactly match these viruses, it may still provide some protection. Influenza vaccine does not cause flu. Influenza vaccine may be given at the same time as other vaccines. 3. Talk with your health care provider    Tell your vaccination provider if the person getting the vaccine:   Has had an allergic reaction after a previous dose of influenza vaccine, or has any severe, life-threatening allergies    Has ever had Guillain-Barré Syndrome (also called GBS)    In some cases, your health care provider may decide to postpone influenza vaccination until a future visit. Influenza vaccine can be administered at any time during pregnancy. People who are or will be pregnant during influenza season should receive inactivated influenza vaccine. People with minor illnesses, such as a cold, may be vaccinated. People who are moderately or severely ill should usually wait until they recover before getting influenza vaccine. Your health care provider can give you more information. 4. Risks of a vaccine reaction     Soreness, redness, and swelling where the shot is given, fever, muscle aches, and headache can happen after influenza vaccination.  There may be a very small increased risk of Guillain-Barré Syndrome (GBS) after inactivated influenza vaccine (the flu shot). hospitals children who get the flu shot along with pneumococcal vaccine (PCV13) and/or DTaP vaccine at the same time might be slightly more likely to have a seizure caused by fever. Tell your health care provider if a child who is getting flu vaccine has ever had a seizure. People sometimes faint after medical procedures, including vaccination. Tell your provider if you feel dizzy or have vision changes or ringing in the ears. As with any medicine, there is a very remote chance of a vaccine causing a severe allergic reaction, other serious injury, or death. 5. What if there is a serious problem?     An allergic reaction could occur after the vaccinated person leaves the clinic. If you see signs of a severe allergic reaction (hives, swelling of the face and throat, difficulty breathing, a fast heartbeat, dizziness, or weakness), call 9-1-1 and get the person to the nearest hospital.    For other signs that concern you, call your health care provider. Adverse reactions should be reported to the Vaccine Adverse Event Reporting System (VAERS). Your health care provider will usually file this report, or you can do it yourself. Visit the VAERS website at www.vaers. [facility].gov or call 7-344.109.6864. VAERS is only for reporting reactions, and VAERS staff members do not give medical advice. 6. The National Vaccine Injury Compensation Program    The [facility] Vaccine Injury Compensation Program (VICP) is a federal program that was created to compensate people who may have been injured by certain vaccines. Claims regarding alleged injury or death due to vaccination have a time limit for filing, which may be as short as two years. Visit the VICP website at www.[facility].gov/vaccinecompensation or call 2-534.707.6522 to learn about the program and about filing a claim. 7. How can I learn more?  Ask your health care provider.  Call your local or state health department.  Visit the website of the Food and Drug Administration (FDA) for vaccine package inserts and additional information at www.fda.gov/vaccines-blood-biologics/vaccines.  Contact the Centers for Disease Control and Prevention (CDC):  - Call 3-764.298.6586 (1-800-CDC-INFO) or  - Visit CDCs influenza website at www.cdc.gov/flu. Vaccine Information Statement   Inactivated Influenza Vaccine   8/6/2021  42 HEATHER Freeman 110KN-12   Department of Health and Human Services  Centers for Disease Control and Prevention    Office Use Only

## 2021-11-10 NOTE — PROGRESS NOTES
Chief Complaint   Patient presents with    Medication Refill         HPI    Doreen Abdi Sr. is a 64 y.o. male presenting today for follow up of dm, htn, hld. Pt and his partner were not able to find out what cgm would be covered. Pt saw cards Oct 2021. Pt advised to work on wt reduction and lowering his ldl. He was sched for 3 mo f/u. Patient had labs on 9/9/21. Labs reviewed in detail with patient     Patient does need medication refills today. New concerns today: Pt would like to have a flu shot today. Pt's partner reports that he is not steady on his feet. He has had falls. Review of Systems   Constitutional: Negative. HENT: Negative. Respiratory: Negative. Cardiovascular: Negative. Psychiatric/Behavioral: Positive for depression. All other systems reviewed and are negative. Physical Exam  Vitals and nursing note reviewed. Constitutional:       General: He is not in acute distress. Appearance: Normal appearance. HENT:      Head: Normocephalic and atraumatic. Right Ear: External ear normal.      Left Ear: External ear normal.      Nose: Nose normal.   Eyes:      Extraocular Movements: Extraocular movements intact. Conjunctiva/sclera: Conjunctivae normal.   Cardiovascular:      Rate and Rhythm: Normal rate and regular rhythm. Heart sounds: No murmur heard. No friction rub. No gallop. Pulmonary:      Effort: Pulmonary effort is normal.      Breath sounds: Normal breath sounds. No wheezing, rhonchi or rales. Musculoskeletal:         General: Normal range of motion. Cervical back: Normal range of motion. No rigidity. Skin:     General: Skin is warm and dry. Neurological:      Mental Status: He is alert and oriented to person, place, and time. Coordination: Coordination normal.   Psychiatric:         Mood and Affect: Mood normal.         Behavior: Behavior normal.         Thought Content:  Thought content normal. Judgment: Judgment normal.         Diagnoses and all orders for this visit:    1. Type 2 diabetes mellitus with diabetic neuropathy, without long-term current use of insulin (HCC)  -     METABOLIC PANEL, COMPREHENSIVE; Future  -     LIPID PANEL; Future  -     HEMOGLOBIN A1C WITH EAG; Future  -     MICROALBUMIN, UR, RAND W/ MICROALB/CREAT RATIO; Future  Unable to make progress in controlling blood sugars due to depression and lack of motivation to improve. Stressed importance of working on this to maintain his long term health. Will have pt's partner sched an appt with Dr. Mesha Rosenthal (pharmD) today at check out. 2. Depression, unspecified depression type  Pt admits to being depressed. He seems open to therapy at this point. Will work on finding an appropriate counseling arrangement for him    3. Psychophysiological insomnia  -     traZODone (DESYREL) 100 mg tablet; Take 1-2 Tablets by mouth nightly as needed for Sleep. 4. Grief at loss of child  As above, pt is open to the idea of counseling now. Will work on getting contact info for grief counselors to pass along to his partner, Ms. Benitez Ilya. 5. Needs flu shot  -     INFLUENZA VIRUS VAC QUAD,SPLIT,PRESV FREE SYRINGE IM    6. Gait disorder  -     REFERRAL TO PHYSICAL THERAPY    7. Essential hypertension  -     METABOLIC PANEL, COMPREHENSIVE; Future  -     LIPID PANEL; Future  Stable, cont pres tx plan. 8. Mixed hyperlipidemia  -     METABOLIC PANEL, COMPREHENSIVE; Future  -     LIPID PANEL; Future      Follow-up and Dispositions    · Return in about 3 months (around 2/10/2022) for diabetes, high blood pressure, high cholesterol, depression, insomnia, lab review.

## 2021-11-10 NOTE — PROGRESS NOTES
Kimi Gutierrez presents today for   Chief Complaint   Patient presents with    Medication Refill       Is someone accompanying this pt? yes    Is the patient using any DME equipment during OV? no    Depression Screening:  3 most recent PHQ Screens 6/9/2021   Little interest or pleasure in doing things Several days   Feeling down, depressed, irritable, or hopeless Several days   Total Score PHQ 2 2   Trouble falling or staying asleep, or sleeping too much -   Feeling tired or having little energy -   Poor appetite, weight loss, or overeating -   Feeling bad about yourself - or that you are a failure or have let yourself or your family down -   Trouble concentrating on things such as school, work, reading, or watching TV -   Moving or speaking so slowly that other people could have noticed; or the opposite being so fidgety that others notice -   Thoughts of being better off dead, or hurting yourself in some way -   PHQ 9 Score -   How difficult have these problems made it for you to do your work, take care of your home and get along with others -       Learning Assessment:  Learning Assessment 1/5/2021   PRIMARY LEARNER Patient   HIGHEST LEVEL OF EDUCATION - PRIMARY LEARNER  DID NOT GRADUATE HIGH SCHOOL   BARRIERS PRIMARY LEARNER NONE   CO-LEARNER CAREGIVER No   PRIMARY LANGUAGE ENGLISH   LEARNER PREFERENCE PRIMARY DEMONSTRATION     -   ANSWERED BY Patient   RELATIONSHIP SELF       Abuse Screening:  Abuse Screening Questionnaire 6/9/2021   Do you ever feel afraid of your partner? N   Are you in a relationship with someone who physically or mentally threatens you? N   Is it safe for you to go home? Y       Fall Risk  Fall Risk Assessment, last 12 mths 11/9/2020   Able to walk? Yes   Fall in past 12 months? No       OPIOID RISK TOOL  No flowsheet data found. Coordination of Care:  1. Have you been to the ER, urgent care clinic since your last visit? no  Hospitalized since your last visit? no    2.  Have you seen or consulted any other health care providers outside of the [address] [place] [address] since your last visit? no Include any pap smears or colon screening. No    After obtaining consent, and per orders of Dr. Dannielle Hurd, injection of Influenza given by Karyn Hurley. Patient instructed to remain in clinic for 20 minutes afterwards, and to report any adverse reaction to me immediately.

## 2021-11-18 ENCOUNTER — HOSPITAL ENCOUNTER (OUTPATIENT)
Dept: PHYSICAL THERAPY | Age: 56
Discharge: HOME | End: 2021-11-18
Attending: FAMILY MEDICINE
Payer: COMMERCIAL

## 2021-11-18 DIAGNOSIS — R26.9 GAIT DISORDER: ICD-10-CM

## 2021-11-18 PROCEDURE — 97162 PT EVAL MOD COMPLEX 30 MIN: CPT

## 2021-11-18 NOTE — PROGRESS NOTES
[facility] - [place] [address] [address]  [phone] fax  Plan of Care/ Statement of Necessity for Physical Therapy Services     Patient name: Priscilla Rodriguez Start of Care: 2021   Referral source: Bi Centeno MD : 1965    Medical Diagnosis: Gait disorder [R26.9]  Payor: [facility] MEDICAID / Plan: [employer] [place] / Product Type: Managed Care Medicaid /  Onset Date:11/10/21    Treatment Diagnosis: Abnormality of Gait; Generalized Weakness   Prior Hospitalization: see medical history Provider#: [id number]   Medications: Verified on Patient summary List    Comorbidities: CHF; DM Type II; hx MI; hx CVA x 2 with right hemiparesis   Prior Level of Function: Not working; lives in [place] with fiancee; functionally independent with modifications; sedentary lifestyle    The Plan of Care and following information is based on the information from the initial evaluation. Assessment/ key information: Pt is a 64 y.o. male who presents with c/o B hip joint pain stemming from OA, B LE weakness, and impaired standing balance, that began two years ago after second CVA, but has progressively worsened in the past 6 months. Functional deficits include: difficulty with sit to stand transfers, pain with standing/amb > 15-20 minutes, inability to perform yard work or outdoor activities; and is now more sedentary. Upon exam, Pt exhibited decreased functional strength and endurance with sit to stand test; impaired B hip strength; and impaired static and dynamic standing balance, requiring CGA to prevent LOB. Pt would benefit from skilled PT in aquatic setting, transitioning to land based therapy as appropriate, to address above deficits to improve Pt's function and ability to return to more active lifestyle with less pain.     Evaluation Complexity History HIGH Complexity :3+ comorbidities / personal factors will impact the outcome/ POC ; Examination MEDIUM Complexity : 3 Standardized tests and measures addressing body structure, function, activity limitation and / or participation in recreation  ;Presentation MEDIUM Complexity : Evolving with changing characteristics  ; Clinical Decision Making MEDIUM Complexity : FOTO score of 26-74  Overall Complexity Rating: MEDIUM  Problem List: pain affecting function, decrease ROM, decrease strength, impaired gait/ balance, decrease ADL/ functional abilitiies, decrease activity tolerance, decrease flexibility/ joint mobility and decrease transfer abilities   Treatment Plan may include any combination of the following: Therapeutic exercise, Therapeutic activities, Neuromuscular re-education, Physical agent/modality, Gait/balance training, Manual therapy, Aquatic therapy, Patient education, Functional mobility training and Stair training  Patient / Family readiness to learn indicated by: asking questions and interest  Persons(s) to be included in education: patient (P) and family support person (FSP);list fiancee  Barriers to Learning/Limitations: None  Patient Goal (s): To build strength and aid in helping manage my pain or condition.   Patient Self Reported Health Status: poor  Rehabilitation Potential: good    Short Term Goals: To be accomplished in 1 weeks:  Goal: Pt to be compliant with initial HEP to improve LE strength for ease of transfers. Status at last note/certification: Established and reviewed with Pt  Goal: Pt to initiate aquatics program without increased pain to aid in achievement of all LTGs. Status at last note/certification: N/A  Long Term Goals: To be accomplished in 5 weeks:  Goal: Pt to increase B hip strength to 4+/5 grossly to increase standing/amb tolerance to at least 45 minutes.   Status at last note/certification: hip flex/abd/ext 4-/5 B; 15-20 minute tolerance  Goal: Pt to perform 15 sit to stands in 30 seconds without deviations or fatigue to show improved functional LE strength and endurance. Status at last note/certification: 8 sit to stands with increased UE support on knees and progressive fatigue  Goal: Pt to perform B SLS x 15 seconds to show improved single limb stability for improved balance with gait in home, community. Status at last note/certification: right SLS 2 seconds, left SLS 4 seconds  Goal: Pt to report < 4/10 pain at worst to increase ease with ADLs. Status at last note/certification: 1/35 pain at worst  Goal: Pt to report FOTO score of 60 pts to show improved function and quality of life. Status at last note/certification: FOTO 54 pts    Frequency / Duration: Patient to be seen 2 times per week for 5 weeks. Patient/ Caregiver education and instruction: Diagnosis, prognosis, exercises   [x]  Plan of care has been reviewed with MELISSA Ji, PT 11/18/2021 1:26 PM  _____________________________________________________________________  I certify that the above Therapy Services are being furnished while the patient is under my care. I agree with the treatment plan and certify that this therapy is necessary.     [de-identified] Signature:____________Date:_________TIME:________     Maverick Munoz MD  ** Signature, Date and Time must be completed for valid certification **    Please sign and return to [facility] - [address]  [phone] fax

## 2021-11-18 NOTE — PROGRESS NOTES
PT DAILY TREATMENT NOTE     Patient Name: Allyn Padilla.  Date:2021  : 1965  [x]  Patient  Verified  Payor: [facility] MEDICAID / Plan: [address] / Product Type: Managed Care Medicaid /    In time:11:15  Out time:11:50  Total Treatment Time (min): 35  Visit #: 1 of 10    Medicare/BCBS Only   Total Timed Codes (min):  10 1:1 Treatment Time:  35       Treatment Area: Gait disorder [R26.9]    SUBJECTIVE  Pain Level (0-10 scale): 4/10  Any medication changes, allergies to medications, adverse drug reactions, diagnosis change, or new procedure performed?: [x] No    [] Yes (see summary sheet for update)  Subjective functional status/changes:   [] No changes reported  See paper initial evaluation note. OBJECTIVE    25 min [x]Eval                  []Re-Eval       10 min Therapeutic Activity:  []  See flow sheet : diagnosis; prognosis; HEP given and reviewed with Pt; benefits and rules of aquatics program reviewed with Pt   Rationale: increase ROM and increase strength  to improve the patients ability to increase ease of transfers, standing/amb activities           With   [] TE   [x] TA   [] neuro   [] other: Patient Education: [x] Review HEP    [] Progressed/Changed HEP based on:   [] positioning   [] body mechanics   [] transfers   [] heat/ice application    [] other:      Other Objective/Functional Measures: FOTO 54 pts     Pain Level (0-10 scale) post treatment: 4/10    ASSESSMENT/Changes in Function:     Patient will continue to benefit from skilled PT services to address functional mobility deficits, address ROM deficits, address strength deficits, analyze and address soft tissue restrictions, analyze and cue movement patterns, analyze and modify body mechanics/ergonomics, assess and modify postural abnormalities, address imbalance/dizziness, and instruct in home and community integration to attain remaining goals.      [x]  See Plan of Care  []  See progress note/recertification  [] See Discharge Summary         Progress towards goals / Updated goals:  See plan of care.     PLAN  []  Upgrade activities as tolerated     [x]  Continue plan of care  []  Update interventions per flow sheet       []  Discharge due to:_  []  Other:_      Jeannette Ji, PT 11/18/2021  1:26 PM    Future Appointments   Date Time Provider Amalia Silver   12/6/2021  1:00 PM Violeta Pacheco PHARMD [facility] BS AMB   2/8/2022  9:00 AM Mary Ellen López MD [facility] BS AMB   2/9/2022 11:30 AM Deepthi Crowley MD [facility] BS AMB

## 2021-11-23 ENCOUNTER — HOSPITAL ENCOUNTER (OUTPATIENT)
Dept: PHYSICAL THERAPY | Age: 56
Discharge: HOME | End: 2021-11-23
Attending: FAMILY MEDICINE
Payer: COMMERCIAL

## 2021-11-23 PROCEDURE — 97113 AQUATIC THERAPY/EXERCISES: CPT

## 2021-11-30 ENCOUNTER — HOSPITAL ENCOUNTER (OUTPATIENT)
Dept: PHYSICAL THERAPY | Age: 56
Discharge: HOME | End: 2021-11-30
Attending: FAMILY MEDICINE
Payer: COMMERCIAL

## 2021-11-30 PROCEDURE — 97113 AQUATIC THERAPY/EXERCISES: CPT

## 2021-12-02 ENCOUNTER — APPOINTMENT (OUTPATIENT)
Dept: PHYSICAL THERAPY | Age: 56
End: 2021-12-02
Attending: FAMILY MEDICINE
Payer: COMMERCIAL

## 2021-12-07 ENCOUNTER — HOSPITAL ENCOUNTER (OUTPATIENT)
Dept: PHYSICAL THERAPY | Age: 56
Discharge: HOME | End: 2021-12-07
Attending: FAMILY MEDICINE
Payer: COMMERCIAL

## 2021-12-07 PROCEDURE — 97113 AQUATIC THERAPY/EXERCISES: CPT

## 2021-12-09 ENCOUNTER — HOSPITAL ENCOUNTER (OUTPATIENT)
Dept: PHYSICAL THERAPY | Age: 56
Discharge: HOME | End: 2021-12-09
Attending: FAMILY MEDICINE
Payer: COMMERCIAL

## 2021-12-09 PROCEDURE — 97113 AQUATIC THERAPY/EXERCISES: CPT

## 2021-12-14 ENCOUNTER — APPOINTMENT (OUTPATIENT)
Dept: PHYSICAL THERAPY | Age: 56
End: 2021-12-14
Attending: FAMILY MEDICINE
Payer: COMMERCIAL

## 2021-12-16 ENCOUNTER — HOSPITAL ENCOUNTER (OUTPATIENT)
Dept: PHYSICAL THERAPY | Age: 56
Discharge: HOME | End: 2021-12-16
Attending: FAMILY MEDICINE
Payer: COMMERCIAL

## 2021-12-16 PROCEDURE — 97113 AQUATIC THERAPY/EXERCISES: CPT

## 2021-12-16 NOTE — PROGRESS NOTES
[facility] - [facility], [address] [place] [address]  [phone] fax    Discharge Summary    Patient name: Gordo Glynn Start of Care: 2021   Referral source: Shelia Thomas MD : 1965   Medical/Treatment Diagnosis: Other abnormalities of gait and mobility [R26.89]  [facility] Plan: [address] / Product Type: Managed Care Medicaid /  Onset Date:11/10/21                  Prior Hospitalization: see medical history Provider#: [id number]   Medications: Verified on Patient Summary List    Comorbidities: CHF; DM Type II; hx MI; hx CVA x 2 with right hemiparesis  Prior Level of Function:Not working; lives in [place] home with fiancee; functionally independent with modifications; sedentary lifestyle    Visits from Start of Care: 6    Missed Visits: 1    Reporting Period : 2021 to 2021   Goal: Pt to be compliant with initial HEP to improve LE strength for ease of transfers. Status at last note/certification: Established and reviewed with Pt  Status at discharge: met  Goal: Pt to initiate aquatics program without increased pain to aid in achievement of all LTGs. Status at last note/certification: N/A  Status at discharge met  Long Term Goals: To be accomplished in 5 weeks:  Goal: Pt to increase B hip strength to 4+/5 grossly to increase standing/amb tolerance to at least 45 minutes. Status at last note/certification: hip flex/abd/ext 4-/5 B; 15-20 minute tolerance  Status at discharge: met 5/5 grossly, 30 minute walking tolerance (2021)  Goal: Pt to perform 15 sit to stands in 30 seconds without deviations or fatigue to show improved functional LE strength and endurance.   Status at last note/certification: 8 sit to stands with increased UE support on knees and progressive fatigue  Status at discharge: not met 10 x in 30 seconds  Progressing (2021)  Goal: Pt to perform B SLS x 15 seconds to show improved single limb stability for improved balance with gait in home, community. Status at last note/certification: right SLS 2 seconds, left SLS 4 seconds  Status at discharge: not met 2-3 seconds bilaterally (12/16/2021)  Goal: Pt to report < 4/10 pain at worst to increase ease with ADLs. Status at last note/certification: 0/49 pain at worst  Status at discharge: met- Pt reports pain has resolved    Goal: Pt to report FOTO score of 60 pts to show improved function and quality of life. Status at last note/certification: FOTO 54 pts   Status at discharge; Met FOTO 60      Assessment/ Summary of Care: Patient has attended 6 sessions of skilled PT including the evaluation. He reports an 80% improvement in function and resolution of pain. Strength gains and pain reduction have enabled him to walk up to 30 minutes, which he states is \"longer than he has ever walked\". SLS continues be limited to 2-3 second intervals, although pt states that he is more stable with ambulation. Will discharge at this time to post rehab program to further progress toward personal goals. RECOMMENDATIONS:  [x]Discontinue therapy: [x]Patient has reached or is progressing toward set goals      []Patient is non-compliant or has abdicated      []Due to lack of appreciable progress towards set goals    Gauri Arroyo, PT 12/16/2021 2:38 PM    NOTE TO PHYSICIAN:  Please complete the following and fax to: [facility] at [place] at [phone]  . Retain this original for your records. If you are unable to process this request in   24 hours, please contact our office.      [] I have read the above report and request that my patient continue therapy with the following changes/special instructions:  [] I have read the above report and request that my patient be discharged from therapy    Physician's Signature:____________Date:_________TIME:________     Emilie Marcial MD  ** Signature, Date and Time must be completed for valid certification **

## 2021-12-21 ENCOUNTER — APPOINTMENT (OUTPATIENT)
Dept: PHYSICAL THERAPY | Age: 56
End: 2021-12-21
Attending: FAMILY MEDICINE
Payer: COMMERCIAL

## 2021-12-23 ENCOUNTER — APPOINTMENT (OUTPATIENT)
Dept: PHYSICAL THERAPY | Age: 56
End: 2021-12-23
Attending: FAMILY MEDICINE
Payer: COMMERCIAL

## 2021-12-28 ENCOUNTER — APPOINTMENT (OUTPATIENT)
Dept: PHYSICAL THERAPY | Age: 56
End: 2021-12-28
Attending: FAMILY MEDICINE
Payer: COMMERCIAL

## 2021-12-29 DIAGNOSIS — E11.9 TYPE 2 DIABETES MELLITUS WITHOUT COMPLICATION, WITHOUT LONG-TERM CURRENT USE OF INSULIN (HCC): ICD-10-CM

## 2021-12-29 NOTE — TELEPHONE ENCOUNTER
Received faxed refill request from [address] [place] [address] seen 11/10/21. Next appt 2/9/22 with lab orders in place. Appt on 1/3/22 with Leigh Sotelo PharmD. Requested Prescriptions     Pending Prescriptions Disp Refills    glimepiride (AMARYL) 2 mg tablet 30 Tablet 0     Sig: Take 1 Tablet by mouth daily.

## 2021-12-30 ENCOUNTER — APPOINTMENT (OUTPATIENT)
Dept: PHYSICAL THERAPY | Age: 56
End: 2021-12-30
Attending: FAMILY MEDICINE
Payer: COMMERCIAL

## 2021-12-30 RX ORDER — GLIMEPIRIDE 2 MG/1
2 TABLET ORAL DAILY
Qty: 30 TABLET | Refills: 5 | Status: SHIPPED | OUTPATIENT
Start: 2021-12-30 | End: 2022-03-15 | Stop reason: ALTCHOICE

## 2022-02-08 ENCOUNTER — OFFICE VISIT (OUTPATIENT)
Dept: CARDIOLOGY CLINIC | Age: 57
End: 2022-02-08
Payer: COMMERCIAL

## 2022-02-08 VITALS
DIASTOLIC BLOOD PRESSURE: 70 MMHG | WEIGHT: 195 LBS | SYSTOLIC BLOOD PRESSURE: 139 MMHG | BODY MASS INDEX: 32.49 KG/M2 | HEART RATE: 86 BPM | HEIGHT: 65 IN

## 2022-02-08 DIAGNOSIS — I10 ESSENTIAL (PRIMARY) HYPERTENSION: ICD-10-CM

## 2022-02-08 DIAGNOSIS — E11.9 TYPE 2 DIABETES MELLITUS WITHOUT COMPLICATION, WITH LONG-TERM CURRENT USE OF INSULIN (HCC): ICD-10-CM

## 2022-02-08 DIAGNOSIS — Z95.5 STATUS POST INSERTION OF DRUG ELUTING CORONARY ARTERY STENT: ICD-10-CM

## 2022-02-08 DIAGNOSIS — I25.118 CORONARY ARTERY DISEASE OF NATIVE ARTERY OF NATIVE HEART WITH STABLE ANGINA PECTORIS (HCC): Primary | ICD-10-CM

## 2022-02-08 DIAGNOSIS — Z79.4 TYPE 2 DIABETES MELLITUS WITHOUT COMPLICATION, WITH LONG-TERM CURRENT USE OF INSULIN (HCC): ICD-10-CM

## 2022-02-08 DIAGNOSIS — E78.5 DYSLIPIDEMIA: ICD-10-CM

## 2022-02-08 PROCEDURE — 93000 ELECTROCARDIOGRAM COMPLETE: CPT | Performed by: INTERNAL MEDICINE

## 2022-02-08 PROCEDURE — 99214 OFFICE O/P EST MOD 30 MIN: CPT | Performed by: INTERNAL MEDICINE

## 2022-02-08 NOTE — PROGRESS NOTES
1. Have you been to the ER, urgent care clinic since your last visit? Hospitalized since your last visit?     no    2. Have you seen or consulted any other health care providers outside of the [address] [place] [address] since your last visit? Include any pap smears or colon screening. No     3. Since your last visit, have you had any of the following symptoms? shortness of breath.

## 2022-02-08 NOTE — PROGRESS NOTES
HISTORY OF PRESENT ILLNESS  Mark Peoples is a 64 y.o. male. Hypertension  The history is provided by the patient. This is a chronic problem. The problem occurs daily. The problem has been gradually improving. Shortness of Breath  The history is provided by the patient. This is a chronic problem. The problem occurs intermittently. The problem has not changed since onset. Pertinent negatives include no fever, no ear pain, no neck pain, no cough, no sputum production, no hemoptysis, no wheezing, no PND, no orthopnea, no vomiting, no rash, no leg swelling and no claudication. Review of Systems   Constitutional: Negative for chills, diaphoresis, fever and weight loss. HENT: Negative for ear pain and hearing loss. Eyes: Negative for blurred vision. Respiratory: Negative for cough, hemoptysis, sputum production, wheezing and stridor. Cardiovascular: Negative for palpitations, orthopnea, claudication, leg swelling and PND. Gastrointestinal: Negative for heartburn, nausea and vomiting. Musculoskeletal: Negative for myalgias and neck pain. Skin: Negative for rash. Neurological: Negative for dizziness, tingling, tremors, focal weakness, loss of consciousness and weakness. Psychiatric/Behavioral: Negative for depression and suicidal ideas.      Family History   Problem Relation Age of Onset    OSTEOARTHRITIS Mother     Gout Mother     Diabetes Mother     Hypertension Mother     Diabetes Father     Diabetes Brother     Diabetes Maternal Grandmother     Other Son         homicide       Past Medical History:   Diagnosis Date    Arthritis     Chest pain     CHF (congestive heart failure) ([address]     CVA (cerebral vascular accident) ([address] 2011    r side stroke    CVA (cerebral vascular accident) ([address] 09/05/2018    admitted at [address] [place] [address]) 2010    chest pains    Hypercholesterolemia     Hypertension     Loss of hearing     right ear, patient states it comes and goes    Polyuria     Shortness of breath 2011    Type 2 diabetes mellitus without complication, with long-term current use of insulin ([facility] 07/23/2013    Type 2 diabetes mellitus, with long-term current use of insulin ([facility] 7/23/2013       Past Surgical History:   Procedure Laterality Date    HX HEART CATHETERIZATION         Social History     Tobacco Use    Smoking status: Never Smoker    Smokeless tobacco: Never Used   Substance Use Topics    Alcohol use: Yes     Comment: occassionally       Allergies   Allergen Reactions    Lisinopril Cough       Outpatient Medications Marked as Taking for the 2/8/22 encounter (Office Visit) with Juanita Muñiz MD   Medication Sig Dispense Refill    glimepiride (AMARYL) 2 mg tablet Take 1 Tablet by mouth daily. 30 Tablet 5    traZODone (DESYREL) 100 mg tablet Take 1-2 Tablets by mouth nightly as needed for Sleep. 60 Tablet 5    furosemide (LASIX) 40 mg tablet TAKE ONE TABLET BY MOUTH EVERY DAY 30 Tablet 1    ticagrelor (BRILINTA) 90 mg tablet Take 1 Tablet by mouth two (2) times a day. 180 Tablet 2    hydroCHLOROthiazide (HYDRODIURIL) 12.5 mg tablet TAKE ONE TABLET BY MOUTH EVERY DAY 30 Tablet 5    losartan (COZAAR) 50 mg tablet TAKE ONE TABLET BY MOUTH EVERY DAY 30 Tablet 5    Lantus Solostar U-100 Insulin 100 unit/mL (3 mL) inpn 40 Units by SubCUTAneous route nightly. 36 mL 1    gabapentin (NEURONTIN) 300 mg capsule TAKE TWO CAPSULES BY MOUTH EVERY EVENING 60 Capsule 5    aspirin 81 mg chewable tablet Take 2 Tablets by mouth daily. 180 Tablet 3    Unifine Pentips 31 gauge x 5/16\" ndle USE TO INJECT insulin EVERY DAY AS DIRECTED      Janumet XR 50-1,000 mg TM24 TAKE TWO TABLETS BY MOUTH EVERY  Tablet 5    escitalopram oxalate (LEXAPRO) 20 mg tablet Take 1 Tab by mouth daily. 30 Tab 5    atorvastatin (LIPITOR) 80 mg tablet Take 1 Tab by mouth daily.  90 Tab 2    metoprolol tartrate (LOPRESSOR) 25 mg tablet Take 1 Tab by mouth two (2) times a day. 60 Tab 2    multivitamin (ONE A DAY) tablet Take 1 Tab by Mouth Once a Day. Similar alternatives are ok. Visit Vitals  /70   Pulse 86   Ht 5' 5\" (1.651 m)   Wt 88.5 kg (195 lb)   BMI 32.45 kg/m²       Physical Exam  Constitutional:       General: He is not in acute distress. Appearance: He is well-developed. He is not diaphoretic. HENT:      Head: Atraumatic. Mouth/Throat:      Pharynx: No oropharyngeal exudate. Eyes:      General: No scleral icterus. Conjunctiva/sclera: Conjunctivae normal.   Neck:      Thyroid: No thyromegaly. Vascular: No JVD. Trachea: No tracheal deviation. Cardiovascular:      Rate and Rhythm: Normal rate and regular rhythm. Heart sounds: No murmur heard. No gallop. Pulmonary:      Effort: Pulmonary effort is normal.      Breath sounds: Normal breath sounds. No stridor. No wheezing or rales. Abdominal:      Palpations: Abdomen is soft. Tenderness: There is no abdominal tenderness. There is no guarding or rebound. Musculoskeletal:         General: No tenderness. Normal range of motion. Cervical back: Normal range of motion and neck supple. Skin:     General: Skin is warm. Neurological:      Mental Status: He is alert and oriented to person, place, and time. Motor: No abnormal muscle tone. Psychiatric:         Behavior: Behavior normal.       9/2018 - 14 day event - NSR  Echo 09/2018:  NORMAL LEFT VENTRICULAR CAVITY SIZE AND SYSTOLIC FUNCTION WITH AN EJECTION FRACTION   VISUALLY ESTIMATED AT 60%. NORMAL DIASTOLIC FUNCTION. MILD LEFT VENTRICULAR HYPERTROPHY PRESENT. TRACE MITRAL REGURGITATION. MILDLY SCLEROTIC TRILEAFLET AORTIC VALVE. INSUFFICIENT TRICUSPID REGURGITANT JET TO ESTIMATE PULMONARY ARTERY PRESSURE. NEGATIVE BUBBLE STUDY ON PRIOR ECHO REPORT 4/09/2012. NO SIGNIFICANT CHANGES FROM PRIOR ECHO REPORT ON 4/09/2012.   Lexiscan 2018-  THIS MYOCARDIAL PERFUSION STUDY IS ABNORMAL   THIS IS A MEDIUM RISK STUDY   ABNORMAL NUCLEAR SCAN WITH  A SMALL AREA OF NON PERFUSION ON BOTH THE STRESS AND REST IMAGES   IN THE INFERIOR APICAL WALL CONSISTENT WITH EITHER PRIOR INFERIOR APICAL INFARCTION OR HIGH GRADE   OCCLUSION TO THE DISTAL RCA        INFERIOR APICAL AKINESIS WITH AN EJECTION FRACTION  ESTIMATED AT   50%  11/05/20   CARDIAC PROCEDURE 11/05/2020 11/5/2020    Narrative Critical single vessel coronary artery disease with preserved LV function. S/p ptca/stent to mid LCX. Continue DAPT and intense risk factor modification. Signed by: Rosio Schofield MD     ASSESSMENT and PLAN    ICD-10-CM ICD-9-CM    1. Coronary artery disease of native artery of native heart with stable angina pectoris (HCC)  I25.118 414.01 AMB POC EKG ROUTINE W/ 12 LEADS, INTER & REP     413.9    2. Essential (primary) hypertension  I10 401.9    3. Dyslipidemia  E78.5 272.4 LIPID PANEL      HEPATIC FUNCTION PANEL   4. Type 2 diabetes mellitus without complication, with long-term current use of insulin (HCC)  E11.9 250.00     Z79.4 V58.67    5. Status post insertion of drug eluting coronary artery stent  Z95.5 V45.82      Orders Placed This Encounter    LIPID PANEL     Standing Status:   Future     Standing Expiration Date:   2/9/2023    HEPATIC FUNCTION PANEL     Standing Status:   Future     Standing Expiration Date:   2/9/2023    AMB POC EKG ROUTINE W/ 12 LEADS, INTER & REP     Order Specific Question:   Reason for Exam:     Answer:   cad     Follow-up and Dispositions    · Return in about 6 months (around 8/8/2022). current treatment plan is effective, no change in therapy  reviewed diet, exercise and weight control    cardiovascular risk and specific lipid/LDL goals reviewed  use of aspirin to prevent MI and TIA's discussed. Patient seen for follow-up. Has prior abnormal nuclear stress test.    Complained of worsening exertional dyspnea while cutting grass. Has occasional chest tightness.     Recent cardiac cath- s/p PCI to mid LCX.  last lipid panel revealed . Target <70  Blood pressure well controlled on current meds-- reports taking meds daily as prescribed  Will repeat lipid panel and add PCSK9 as needed. F/u in 6 months.

## 2022-02-09 ENCOUNTER — OFFICE VISIT (OUTPATIENT)
Dept: FAMILY MEDICINE CLINIC | Age: 57
End: 2022-02-09
Payer: COMMERCIAL

## 2022-02-09 ENCOUNTER — TELEPHONE (OUTPATIENT)
Dept: PHYSICAL THERAPY | Age: 57
End: 2022-02-09

## 2022-02-09 VITALS
SYSTOLIC BLOOD PRESSURE: 168 MMHG | TEMPERATURE: 99.3 F | BODY MASS INDEX: 33.05 KG/M2 | WEIGHT: 198.6 LBS | DIASTOLIC BLOOD PRESSURE: 100 MMHG | RESPIRATION RATE: 16 BRPM

## 2022-02-09 DIAGNOSIS — R05.3 CHRONIC COUGH: ICD-10-CM

## 2022-02-09 DIAGNOSIS — E11.40 TYPE 2 DIABETES MELLITUS WITH DIABETIC NEUROPATHY, WITHOUT LONG-TERM CURRENT USE OF INSULIN (HCC): Primary | ICD-10-CM

## 2022-02-09 DIAGNOSIS — I10 ESSENTIAL HYPERTENSION: ICD-10-CM

## 2022-02-09 DIAGNOSIS — R26.9 GAIT DISORDER: ICD-10-CM

## 2022-02-09 DIAGNOSIS — E78.2 MIXED HYPERLIPIDEMIA: ICD-10-CM

## 2022-02-09 DIAGNOSIS — F32.A DEPRESSION, UNSPECIFIED DEPRESSION TYPE: ICD-10-CM

## 2022-02-09 DIAGNOSIS — R26.89 BALANCE DISORDER: ICD-10-CM

## 2022-02-09 PROCEDURE — 99214 OFFICE O/P EST MOD 30 MIN: CPT | Performed by: FAMILY MEDICINE

## 2022-02-09 NOTE — PROGRESS NOTES
Primo Montalvo presents today for   Chief Complaint   Patient presents with    Diabetes    Depression    Hypertension       Madelaine France Sr. preferred language for health care discussion is english/other. Is someone accompanying this pt? Yes spouse     Is the patient using any DME equipment during [address]? No     Depression Screening:  3 most recent PHQ Screens 2/8/2022   Little interest or pleasure in doing things Not at all   Feeling down, depressed, irritable, or hopeless Not at all   Total Score PHQ 2 0   Trouble falling or staying asleep, or sleeping too much -   Feeling tired or having little energy -   Poor appetite, weight loss, or overeating -   Feeling bad about yourself - or that you are a failure or have let yourself or your family down -   Trouble concentrating on things such as school, work, reading, or watching TV -   Moving or speaking so slowly that other people could have noticed; or the opposite being so fidgety that others notice -   Thoughts of being better off dead, or hurting yourself in some way -   PHQ 9 Score -   How difficult have these problems made it for you to do your work, take care of your home and get along with others -       Learning Assessment:  Learning Assessment 1/5/2021   PRIMARY LEARNER Patient   HIGHEST LEVEL OF EDUCATION - PRIMARY LEARNER  DID NOT GRADUATE HIGH SCHOOL   BARRIERS PRIMARY LEARNER NONE   CO-LEARNER CAREGIVER No   PRIMARY LANGUAGE ENGLISH   LEARNER PREFERENCE PRIMARY DEMONSTRATION     -   ANSWERED BY Patient   RELATIONSHIP SELF       Abuse Screening:  Abuse Screening Questionnaire 6/9/2021   Do you ever feel afraid of your partner? N   Are you in a relationship with someone who physically or mentally threatens you? N   Is it safe for you to go home? Y       Generalized Anxiety  No flowsheet data found.       Health Maintenance Due   Topic Date Due    Shingrix Vaccine Age 49> (1 of 2) Never done    Foot Exam Q1  02/13/2018    Colorectal Cancer Screening Combo  08/23/2020    A1C test (Diabetic or Prediabetic)  12/09/2021   . Health Maintenance reviewed and discussed and ordered per Provider. VACCINES DUE   SCREENINGS DUE       Erwin Choi. is updated on all HM    1. \"Have you been to the ER, urgent care clinic since your last visit? Hospitalized since your last visit? \" No    2. \"Have you seen or consulted any other health care providers outside of the [address] [place] [address] since your last visit? \" No     3. For patients aged 39-70: Has the patient had a colonoscopy / FIT/ Cologuard?  No

## 2022-02-09 NOTE — PROGRESS NOTES
Chief Complaint   Patient presents with    Diabetes    Depression    Hypertension         HPI    Maverick Sellers Sr. is a 64 y.o. male presenting today for 3 months  follow up of dm, htn, depression. Pt did not get connected with Dr. Roro Olivera due to family issues and bad weather. home bs readings are variable. His readings are sometimes as low as 115 but he will sneak and eat candy. His fiancee will ask to check his sugars when she thinks he has been sneaking candy; it has been as high as 300s after eating candy. Pt did go to aquatic PT. Pt did improve with PT. He feels there is still room for improvement. He feels a little unstable when he initially gets out of a chair. Pt [facility]    Pt did not get a call about the pulm referral.  He does still have a cough. Labs not yet completed. Patient does not need medication refills today. New concerns today: none      Review of Systems   Constitutional: Negative. HENT: Negative. Respiratory: Negative. Cardiovascular: Negative. All other systems reviewed and are negative. Physical Exam  Vitals and nursing note reviewed. Constitutional:       General: He is not in acute distress. Appearance: Normal appearance. HENT:      Head: Normocephalic and atraumatic. Right Ear: External ear normal.      Left Ear: External ear normal.      Nose: Nose normal.   Eyes:      Extraocular Movements: Extraocular movements intact. Conjunctiva/sclera: Conjunctivae normal.   Cardiovascular:      Rate and Rhythm: Normal rate and regular rhythm. Heart sounds: No murmur heard. No friction rub. No gallop. Pulmonary:      Effort: Pulmonary effort is normal.      Breath sounds: Normal breath sounds. No wheezing, rhonchi or rales. Musculoskeletal:         General: Normal range of motion. Cervical back: Normal range of motion. No rigidity. Skin:     General: Skin is warm and dry.    Neurological:      Mental Status: He is alert and oriented to person, place, and time. Coordination: Coordination normal.   Psychiatric:         Mood and Affect: Mood normal.         Behavior: Behavior normal.         Thought Content: Thought content normal.         Judgment: Judgment normal.         Diagnoses and all orders for this visit:    1. Type 2 diabetes mellitus with diabetic neuropathy, without long-term current use of insulin ([facility]  Await labs. Will attempt again to connect patient with Dr. Carole Werner. Need for dietary compliance emphasized. 2. Essential hypertension  Elevated today. Recommend home blood pressure monitoring. We will follow-up as indicated  Await labs    3. Mixed hyperlipidemia  Await labs    4. Depression, unspecified depression type  Stable. 5. Gait disorder  -     REFERRAL TO PHYSICAL THERAPY    6. Balance disorder  -     REFERRAL TO PHYSICAL THERAPY    7. Chronic cough  We will follow-up on the referral to pulmonology. Follow-up and Dispositions    · Return in about 3 months (around 5/9/2022) for diabetes, high cholesterol, high blood pressure, lab review.   Routing History

## 2022-02-11 ENCOUNTER — HOSPITAL ENCOUNTER (OUTPATIENT)
Dept: LAB | Age: 57
Discharge: HOME | End: 2022-02-11
Payer: COMMERCIAL

## 2022-02-11 DIAGNOSIS — E78.2 MIXED HYPERLIPIDEMIA: ICD-10-CM

## 2022-02-11 DIAGNOSIS — E11.40 TYPE 2 DIABETES MELLITUS WITH DIABETIC NEUROPATHY, WITHOUT LONG-TERM CURRENT USE OF INSULIN (HCC): ICD-10-CM

## 2022-02-11 DIAGNOSIS — E78.5 DYSLIPIDEMIA: ICD-10-CM

## 2022-02-11 DIAGNOSIS — I10 ESSENTIAL HYPERTENSION: ICD-10-CM

## 2022-02-11 LAB
ALBUMIN SERPL-MCNC: 3.1 G/DL (ref 3.4–5)
ALBUMIN SERPL-MCNC: 3.1 G/DL (ref 3.4–5)
ALBUMIN/GLOB SERPL: 0.9 {RATIO} (ref 0.8–1.7)
ALBUMIN/GLOB SERPL: 0.9 {RATIO} (ref 0.8–1.7)
ALP SERPL-CCNC: 71 U/L (ref 45–117)
ALP SERPL-CCNC: 72 U/L (ref 45–117)
ALT SERPL-CCNC: 15 U/L (ref 16–61)
ALT SERPL-CCNC: 15 U/L (ref 16–61)
ANION GAP SERPL CALC-SCNC: 4 MMOL/L (ref 3–18)
AST SERPL-CCNC: 10 U/L (ref 10–38)
AST SERPL-CCNC: 9 U/L (ref 10–38)
BILIRUB DIRECT SERPL-MCNC: 0.1 MG/DL (ref 0–0.2)
BILIRUB SERPL-MCNC: 0.4 MG/DL (ref 0.2–1)
BILIRUB SERPL-MCNC: 0.4 MG/DL (ref 0.2–1)
BUN SERPL-MCNC: 8 MG/DL (ref 7–18)
BUN/CREAT SERPL: 9 (ref 12–20)
CALCIUM SERPL-MCNC: 9.1 MG/DL (ref 8.5–10.1)
CHLORIDE SERPL-SCNC: 104 MMOL/L (ref 100–111)
CHOLEST SERPL-MCNC: 134 MG/DL
CHOLEST SERPL-MCNC: 135 MG/DL
CO2 SERPL-SCNC: 30 MMOL/L (ref 21–32)
CREAT SERPL-MCNC: 0.87 MG/DL (ref 0.6–1.3)
CREAT UR-MCNC: 90 MG/DL (ref 30–125)
EST. AVERAGE GLUCOSE BLD GHB EST-MCNC: 232 MG/DL
GLOBULIN SER CALC-MCNC: 3.5 G/DL (ref 2–4)
GLOBULIN SER CALC-MCNC: 3.5 G/DL (ref 2–4)
GLUCOSE SERPL-MCNC: 267 MG/DL (ref 74–99)
HBA1C MFR BLD: 9.7 % (ref 4.2–5.6)
HDLC SERPL-MCNC: 38 MG/DL (ref 40–60)
HDLC SERPL-MCNC: 39 MG/DL (ref 40–60)
HDLC SERPL: 3.4 {RATIO} (ref 0–5)
HDLC SERPL: 3.6 {RATIO} (ref 0–5)
LDLC SERPL CALC-MCNC: 76 MG/DL (ref 0–100)
LDLC SERPL CALC-MCNC: 78.2 MG/DL (ref 0–100)
LIPID PROFILE,FLP: ABNORMAL
LIPID PROFILE,FLP: ABNORMAL
MICROALBUMIN UR-MCNC: 63.7 MG/DL (ref 0–3)
MICROALBUMIN/CREAT UR-RTO: 708 MG/G (ref 0–30)
POTASSIUM SERPL-SCNC: 3.8 MMOL/L (ref 3.5–5.5)
PROT SERPL-MCNC: 6.6 G/DL (ref 6.4–8.2)
PROT SERPL-MCNC: 6.6 G/DL (ref 6.4–8.2)
SODIUM SERPL-SCNC: 138 MMOL/L (ref 136–145)
TRIGL SERPL-MCNC: 94 MG/DL (ref ?–150)
TRIGL SERPL-MCNC: 95 MG/DL (ref ?–150)
VLDLC SERPL CALC-MCNC: 18.8 MG/DL
VLDLC SERPL CALC-MCNC: 19 MG/DL

## 2022-02-11 PROCEDURE — 80053 COMPREHEN METABOLIC PANEL: CPT

## 2022-02-11 PROCEDURE — 80076 HEPATIC FUNCTION PANEL: CPT

## 2022-02-11 PROCEDURE — 36415 COLL VENOUS BLD VENIPUNCTURE: CPT

## 2022-02-11 PROCEDURE — 83036 HEMOGLOBIN GLYCOSYLATED A1C: CPT

## 2022-02-11 PROCEDURE — 82043 UR ALBUMIN QUANTITATIVE: CPT

## 2022-02-11 PROCEDURE — 80061 LIPID PANEL: CPT

## 2022-02-11 RX ORDER — METOPROLOL TARTRATE 25 MG/1
TABLET, FILM COATED ORAL
Qty: 60 TABLET | Refills: 0 | Status: SHIPPED | OUTPATIENT
Start: 2022-02-11 | End: 2022-05-31

## 2022-02-15 ENCOUNTER — OFFICE VISIT (OUTPATIENT)
Dept: FAMILY MEDICINE CLINIC | Age: 57
End: 2022-02-15

## 2022-02-15 DIAGNOSIS — Z79.4 TYPE 2 DIABETES MELLITUS WITH HYPERGLYCEMIA, WITH LONG-TERM CURRENT USE OF INSULIN (HCC): Primary | ICD-10-CM

## 2022-02-15 DIAGNOSIS — E11.65 TYPE 2 DIABETES MELLITUS WITH HYPERGLYCEMIA, WITH LONG-TERM CURRENT USE OF INSULIN (HCC): Primary | ICD-10-CM

## 2022-02-15 NOTE — PATIENT INSTRUCTIONS
Your Visit Summary:     Plan:  - Continue current medications  · If morning blood sugar is consistently in the 200s after 1 week, increase Lantus to 44 units once daily   - Check blood sugars twice daily  - Work on cutting back sweets to 3 nights per week (only eat 1 sweet treat for the day)  - Follow up in 1 month           Call me with any questions or concerns  Edwar You [phone]    Check and document your blood sugar first thing in the morning (fasting), 1-2 hours after a meal, and/or before bedtime. Bring your meter/log to all future visits. Your blood sugar goals:  - Fasting (first thing in the morning)  blood sugar: 80 - 130   - 1 to 2 hours after a meal: less than 180     When you experience symptoms of low blood sugar (example: less than 70):  - Confirm low reading by checking your blood sugar.   - Then treat with 15 grams of carbohydrates (one-half cup of juice or regular soda, or 4-5 glucose tablets). - Wait 15 minutes to recheck blood sugar.   - Then eat a protein containing meal/snack to prevent another low blood sugar episode. (example: peanut butter + crackers)    Nutrition:  - When reviewing a nutrition label, focus on the serving size, total calories, fat (and type of fats), total carbohydrates, sugar (and amount of added sugar), amount of fiber (good for your digestive), and amount of protein. Refer to your nutrition label guide for more information.  - For a meal : max 30 - 45 grams of carbohydrates  - For a snack: max 15 grams of carbohydrates  - Reduce amount of saturated and trans fat. Consider more unsaturated fat options as they are better for your heart health.    - Have at least 1 serving of lean fat protein with each meal.    - Increase fiber intake slowly to prevent constipation.   - Substitute fruit juices for the whole fruit    Low carb snack ideas (15 grams total carb or less):     String cheese or babybel with 6 crackers   4 peanut butter crackers   3 cups of popcorn   1 cup raw vegetables with hummus or ranch dip (just need to watch how much dip you use)   Nuts   2 rice cakes   Celery with peanut butter or cream cheese   String cheese with 1 serving of fruit   Greek yogurt (look at label to make sure < 15 gram carb)   Plain greek yogurt with fresh berries added   Nature valley protein bar   Whisps parmesan cheese crisps   Hard boiled egg   Cottage cheese   Tuna salad lettuce roll-ups   Deli meat roll-ups with slice of cheese   Sugar Free Jello   Glucerna shake (16 grams)    Glucerna hunger smart shake (16 grams)   Ensure protein max shake   Fruit (1 serving/15 grams)   1/2 banana, montez, or grapefruit    1/3 melon (small cantaloupe)   1 slice or 1 cup of honeydew melon   1 slice or 1 and 1/4 cups of watermelon    1 small apple, peach, orange or pear   2 small tangerines   1 cup of raspberries   3/4 cup of blackberries, blueberries or pineapples   1/2 cup of fruit juice, pears, applesauce, or mangos   17 small grapes   12 cherries    Be careful with the glucerna products as they differ in the total carbs depending on the product (some are intended as meal replacements not snacks). Make sure you look at the total carbs on the label as products can differ. Physical Activity:  - Aim for 30 minutes of consistent, moderately intensive, physical activity a day for 5 days or an average of 150 minutes per week. - Start slow, increase as tolerated. For example: Walk every day, working up to 30 minutes of brisk walking, 5 days a week--or split the 30 minutes into two 15-minute or three 10-minute walks. - If you sit for a long time, get up and move/stretch every 90 minutes. Other recommendations:  - Schedule an annual eye exam.  - Check your feet daily for any signs of open wounds, cuts, or sores.     - Given your risk factors, the following vaccines are recommended: annual flu shot, age-based pneumococcal vaccines (Pneumovax, Prevnar 13).    In addition to taking your medications as directed, improving your blood sugar involves modifying your nutrition and maximizing the amount of physical activity.

## 2022-02-17 ENCOUNTER — HOSPITAL ENCOUNTER (OUTPATIENT)
Dept: PHYSICAL THERAPY | Age: 57
Discharge: HOME | End: 2022-02-17
Attending: FAMILY MEDICINE
Payer: COMMERCIAL

## 2022-02-17 PROCEDURE — 97162 PT EVAL MOD COMPLEX 30 MIN: CPT

## 2022-02-17 NOTE — PROGRESS NOTES
PT DAILY TREATMENT NOTE     Patient Name: Nadiya Redmond.  Date:2022  : 1965  [x]  Patient  Verified  Payor: Samantha Escalera / Plan: [address] / Product Type: Managed Care Medicaid /    In time:9:05  Out time:9:45  Total Treatment Time (min): 40  Visit #: 1 of 10    Medicare/BCBS Only   Total Timed Codes (min):   1:1 Treatment Time:         Treatment Area: Unsteadiness on feet [R26.81]    SUBJECTIVE  Pain Level (0-10 scale): 0/10  Any medication changes, allergies to medications, adverse drug reactions, diagnosis change, or new procedure performed?: [x] No    [] Yes (see summary sheet for update)  Subjective functional status/changes:   [] No changes reported    Chief Complaint: B hip and side pain, unstable balance  History/Mechanism of Injury: Pt has hx of CHF, hx MI ~ per Pt reports and two CVAs ~,  - unsure of years due to he and fiancee not being able to recall exact dates. Pt notes recently increased pain in B hips and side and instability with gait in the past year. Current Symptoms/Deficits: Intermittent pain in B hips and sides; limited standing/amb tolerance to no more than 1 hr at a time; able to perform household chores and yard work. Decreased endurance. Furniture/wall walks, stumbles with gait. Pain-  Current: 0/10     Worst: 6-7/10   Best: 0/10  Previous Treatment/Compliance: aquatic therapy  Gait/Mobility Devices: slowed dahlia, cautious  PMHx/Surgical Hx: see above  Work Hx: Disabled  Living Situation: 1-story apartment in [facility]; 1 step to front door; lives with fiancee; Hobbies: None per Pt report  Pt Goals: \"To help minimize my pain. \"    OBJECTIVE    30 min [x]Eval                  []Re-Eval     10 min Therapeutic Activity:  []  See flow sheet : Patient education on therapy assessment, prognosis, expectations for therapy sessions, patient goals, and HEP.    Rationale: to improve the patients ability to adhere to HEP and therapy sessions for increased compliance when working toward therapy goals. With   [] TE   [x] TA   [] neuro   [] other: Patient Education: [x] Review HEP    [] Progressed/Changed HEP based on:   [] positioning   [] body mechanics   [] transfers   [] heat/ice application    [] other:      Other Objective/Functional Measures: FOTO 53 pts    Observation: slouched posture in sitting  Palpation: TTP at B L/S paraspinals    Tone:    LE Strength:   Right (/5) Left (/5)   Hip     Flexion 3+ 4-             Abduction 4- 4             Adduction 3+ 3+             Extension 3+ 3+             ER 5 5             IR 4 4   Knee   Extension 5 5              Flexion 4- 4-   Ankle   Dorsiflexion 5 5               PF 5 (reps) 4 (reps)               Inversion 5 5               Eversion 5 5     Gait: slowed dahlia, wider MIKEY    Functional Squat: able to squat only nursing home    Stair Negotiation: reciprocal pattern with B UE support    Reflexes/Sensation: intact to light touch    Balance/ Equilibrium:         Sitting Balance: Static:  [x] Good    [] Fair    [] Poor     Dynamic:   [x] Good    [] Fair    [] Poor        Standing Balance: Static:   [] Good    [x] Fair    [] Poor     Dynamic:   [] Good    [x] Fair    [] Poor        Protective Extension:  [] Present    [x] Delayed    [] Absent        Single Leg Stance:         Eyes Open  Eyes Closed   L 4 sec L 1 sec   R 5 sec R 2 sec     Optional Tests:       Dynamic Gait Index (24pt scale):        Functional Gait Assessment (30pt scale): 27/30       [place] Balance Scale (56pt scale):      Behavior: [x] Cooperative    [] Impulsive    [] Agitated    [] Perseverative    [] Confused   Oriented x:    Cognition: [] One Step Commands   [x] Multiple Commands   [] Displays Neglect [] R  [] L    -  Pain Level (0-10 scale) post treatment: 0/10    ASSESSMENT/Changes in Function: See POC    Patient will continue to benefit from skilled PT services to modify and progress therapeutic interventions, address functional mobility deficits, address ROM deficits, address strength deficits, analyze and address soft tissue restrictions, analyze and cue movement patterns, analyze and modify body mechanics/ergonomics, assess and modify postural abnormalities, address imbalance/dizziness and instruct in home and community integration to attain remaining goals.      [x]  See Plan of Care  []  See progress note/recertification  []  See Discharge Summary         Progress towards goals / Updated goals:  See POC    PLAN  [x]  Upgrade activities as tolerated     []  Continue plan of care  [x]  Update interventions per flow sheet       []  Discharge due to:_  []  Other:_      Sherrell Ji, PT 2/17/2022  9:03 AM    Future Appointments   Date Time Provider Amalia Bolanosi   3/15/2022  1:00 PM Daren Coronado PHARMD [facility] BS AMB   3/17/2022  2:30 PM Tarun Muir MD [facility] AMB   5/9/2022 10:45 AM Dereck Barton MD [facility] BS AMB   8/9/2022  9:15 AM Shanti Liriano MD [facility] BS AMB

## 2022-02-17 NOTE — PROGRESS NOTES
[facility] - [facility], [address] [place] [address]  [phone] fax  Plan of Care/ Statement of Necessity for Physical Therapy Services     Patient name: Jesus Douglass Start of Care: 2022   Referral source: Mathew Marie MD : 1965    Medical Diagnosis: Unsteadiness on feet [R26.81]  Payor: [facility]  / Plan: [address] / Product Type: Managed Care Medicaid /  Onset Date:22    Treatment Diagnosis: B LE Weakness; Gait Abnormality   Prior Hospitalization: see medical history Provider#: [id number]   Medications: Verified on Patient summary List    Comorbidities: Arthritis; Depression; DM Type II; Heart disease; HTN; hx CVA x 2; hx MI   Prior Level of Function: On disability; lives in [facility] with fiancee'; functionally independent with pain    The Plan of Care and following information is based on the information from the initial evaluation. Assessment/ key information: Pt is a 64 y.o. male who presents with c/o B LE weakness and impaired standing balance that has progressively worsened over the past year. Pt attended a stent of skilled therapy in aquatic setting with good results but continues to report furniture/wall walking, decreased endurance with standing/amb activities, and apprehension of falling, especially if walking and carrying objects. Upon exam, Pt exhibited impaired B hip/knee strength; poor core stability; poor single limb stability; and impaired standing dynamic balance with NBOS activities. Pt would benefit from skilled PT to address above deficits to improve Pt's function and community activity involvement without increased fall risk.     Evaluation Complexity History MEDIUM  Complexity : 1-2 comorbidities / personal factors will impact the outcome/ POC ; Examination MEDIUM Complexity : 3 Standardized tests and measures addressing body structure, function, activity limitation and / or participation in recreation  ;Presentation MEDIUM Complexity : Evolving with changing characteristics  ; Clinical Decision Making MEDIUM Complexity : FOTO score of 26-74  Overall Complexity Rating: MEDIUM  Problem List: pain affecting function, decrease strength, impaired gait/ balance, decrease ADL/ functional abilitiies, decrease activity tolerance, decrease flexibility/ joint mobility and decrease transfer abilities   Treatment Plan may include any combination of the following: Therapeutic exercise, Therapeutic activities, Neuromuscular re-education, Physical agent/modality, Gait/balance training, Manual therapy, Aquatic therapy, Patient education, Functional mobility training and Stair training  Patient / Family readiness to learn indicated by: asking questions and interest  Persons(s) to be included in education: patient (P) and family support person (FSP);list fiancee'  Barriers to Learning/Limitations: None  Patient Goal (s): To help minimize my pain.   Patient Self Reported Health Status: good  Rehabilitation Potential: fair    Short Term Goals: To be accomplished in 1 weeks:  Goal: Pt to be compliant with initial HEP to improve LE strength for improved endurance with standing/amb activities. Status at last note/certification: Established and reviewed with Pt  Long Term Goals: To be accomplished in 5 weeks:  Goal: Pt to increase B hip/knee strength to 4+/5 grossly to increase standing/amb tolerance for ease of performing cooking, household chores. Status at last note/certification: hip flex 3+/5 right, 4-/5 left; hip ABD 4-/5 right, 4/5 left; hip ext 3+/5 B; hip IR 4/5 B; knee flex 4-/5 B  Goal: Pt to perform 20 single leg heel raises bilaterally without deviations, fatigue for improved ankle stability and increased safety with gait.   Status at last note/certification: 5 repetitions right; 4 repetitions left  Goal: Pt to increase B SLS x 15 seconds without deviations to reduce furniture/wall walking with gait and increase safety with community level amb. Status at last note/certification: 5 seconds right; 4 seconds left  Goal: Pt to report < 4/10 pain at worst to increase ease with ADLs. Status at last note/certification: 9/56 pain at worst  Goal: Pt to report FOTO score of 55 pts to show improved function and quality of life. Status at last note/certification: FOTO 53 pts     Frequency / Duration: Patient to be seen 2 times per week for 5 weeks. Patient/ Caregiver education and instruction: Diagnosis, prognosis, exercises   [x]  Plan of care has been reviewed with MELISSA Ji, PT 2/17/2022 9:04 AM  _____________________________________________________________________  I certify that the above Therapy Services are being furnished while the patient is under my care. I agree with the treatment plan and certify that this therapy is necessary.     [address] [place] [address] Signature:____________Date:_________TIME:________     Salima Prieto MD  ** Signature, Date and Time must be completed for valid certification **    Please sign and return to [facility] - [address]  [phone] fax

## 2022-02-18 NOTE — PROGRESS NOTES
Pharmacy Progress Note - Diabetes Management    Assessment / Plan:   Diabetes Management:  - Per ADA guidelines, Pt's A1c is not at goal of < 7%. - Current SMBG(s)/CGM trend is variable, as he is not checking blood sugars every day   - During the visit today reviewed with patient: self-monitoring blood glucose fasting/post-prandial goals and/or technique, importance of blood glucose control in avoidance of diabetic complications or further progression if already present, signs/symptoms/management of hypoglycemia, impact of exercise on glucose control, and diet  - Although patient's diet has improved over the past few months, he is still drinking a lot of sodas and eating a lot of sweets which is continuing to spike his blood sugars. - Will continue current medications for now  - Patient to check blood sugars twice daily and bring log to follow up visit   - Will plan to assess control and make further recommendations from there     Nutrition/Lifestyle Modifications:  - Carb counting/meal planning not discussed in detail this visit. However, provided maue with resource. - Patient to focus solely on decreasing sodas and sweets in his diet for now. Will start slow with cutting sweets back to 3 nights per week. - He is not ready to cut out sodas yet, so will work on weaning down sweets and then move to sodas in the coming weeks-months          S/O: Mr. Denise Newman. is a 64 y.o. male, referred by Dr. Padmini Collins MD was seen today for diabetes management visit. Patient's last A1c was 9.7% in February 2022. Patient seen today with his fiancee. Brief History: Patient states that he has had diabetes for over 10 years. He states that A1c has steadily increased over this timeframe, then after his son passed last year, he was feeling depressed and did not really care about anything and he was not taking his medications regularly.  He has started to get back on track since finding out his A1c had increased to over 12%. His fiancee has been helping him adjust his diet, but he still struggles with sodas and sweets. He presents today for an initial visit to develop a plan for improved management moving forward. Current anti-hyperglycemic regimen include(s):    - Lantus 40 units once daily  - Janumet XR  mg, 2 tablets daily  - Glimepiride 2 mg once daily     Patient reports adherence to his medication regimen. ROS:  Today, Pt endorses:  - Symptoms of Hyperglycemia: excessive thirst, polyuria and blurry vision  - Symptoms of Hypoglycemia: none    Self Monitoring Blood Glucose (SMBG) or CGM:  - Brought in home glucometer/blood glucose log/CGM reader today:  no  - Conducts/scans: Varies, not checking every day    - Can be anywhere from the low 100 range to up to 300    Nutrition/Lifestyle Modifications:  - Overall, denis states that the patient does well with limiting carbs with his meals. He is more of a meat and vegetables person and does not eat a lot of rice, potatoes, pasta or bread  - He does, however, have a big sweet tooth and drinks sodas all throughout the day and consumes a lot fo sweets and candy     The ASCVD Risk score (Tomasz Pollard, et al., 2013) failed to calculate for the following reasons: The patient has a prior MI or stroke diagnosis     Vitals:   Wt Readings from Last 3 Encounters:   02/09/22 198 lb 9.6 oz (90.1 kg)   02/08/22 195 lb (88.5 kg)   11/10/21 194 lb (88 kg)     BP Readings from Last 3 Encounters:   02/09/22 (!) 168/100   02/08/22 139/70   11/10/21 137/87     Pulse Readings from Last 3 Encounters:   02/08/22 86   11/10/21 75   10/12/21 84       Past Medical History:   Diagnosis Date    Arthritis     Chest pain     CHF (congestive heart failure) (HCC)     CVA [facility]cerebral vascular accident) ([facility] 2011    r side stroke    CVA (cerebral vascular accident) ([facility] 09/05/2018    admitted at [address] [place] [address]) 2010    chest pains    Hypercholesterolemia     Hypertension     Loss of hearing     right ear, patient states it comes and goes    Polyuria     Shortness of breath 2011    Type 2 diabetes mellitus without complication, with long-term current use of insulin ([facility] 07/23/2013    Type 2 diabetes mellitus, with long-term current use of insulin ([facility]) 7/23/2013     Allergies   Allergen Reactions    Lisinopril Cough       Current Outpatient Medications   Medication Sig    metoprolol tartrate (LOPRESSOR) 25 mg tablet Take 1 tablet by mouth twice daily    glimepiride (AMARYL) 2 mg tablet Take 1 Tablet by mouth daily.  traZODone (DESYREL) 100 mg tablet Take 1-2 Tablets by mouth nightly as needed for Sleep.  furosemide (LASIX) 40 mg tablet TAKE ONE TABLET BY MOUTH EVERY DAY    ticagrelor (BRILINTA) 90 mg tablet Take 1 Tablet by mouth two (2) times a day.  hydroCHLOROthiazide (HYDRODIURIL) 12.5 mg tablet TAKE ONE TABLET BY MOUTH EVERY DAY    Lantus Solostar U-100 Insulin 100 unit/mL (3 mL) inpn 40 Units by SubCUTAneous route nightly.  gabapentin (NEURONTIN) 300 mg capsule TAKE TWO CAPSULES BY MOUTH EVERY EVENING    aspirin 81 mg chewable tablet Take 2 Tablets by mouth daily.  Unifine Pentips 31 gauge x 5/16\" ndle USE TO INJECT insulin EVERY DAY AS DIRECTED    Janumet XR 50-1,000 mg TM24 TAKE TWO TABLETS BY MOUTH EVERY DAY    escitalopram oxalate (LEXAPRO) 20 mg tablet Take 1 Tab by mouth daily.  losartan (COZAAR) 100 mg tablet Take 1 Tab by mouth daily.  atorvastatin (LIPITOR) 80 mg tablet Take 1 Tab by mouth daily.  multivitamin (ONE A DAY) tablet Take 1 Tab by Mouth Once a Day. Similar alternatives are ok. No current facility-administered medications for this visit.        Lab Results   Component Value Date/Time    Sodium 138 02/11/2022 07:14 AM    Potassium 3.8 02/11/2022 07:14 AM    Chloride 104 02/11/2022 07:14 AM    CO2 30 02/11/2022 07:14 AM    Anion gap 4 02/11/2022 07:14 AM    Glucose 267 (H) 02/11/2022 07:14 AM    BUN 8 02/11/2022 07:14 AM    Creatinine 0.87 02/11/2022 07:14 AM    BUN/Creatinine ratio 9 (L) 02/11/2022 07:14 AM    GFR est AA >60 02/11/2022 07:14 AM    GFR est non-AA >60 02/11/2022 07:14 AM    Calcium 9.1 02/11/2022 07:14 AM    Bilirubin, total 0.4 02/11/2022 07:14 AM    Alk. phosphatase 71 02/11/2022 07:14 AM    Protein, total 6.6 02/11/2022 07:14 AM    Albumin 3.1 (L) 02/11/2022 07:14 AM    Globulin 3.5 02/11/2022 07:14 AM    A-G Ratio 0.9 02/11/2022 07:14 AM    ALT (SGPT) 15 (L) 02/11/2022 07:14 AM       Lab Results   Component Value Date/Time    Cholesterol, total 134 02/11/2022 07:14 AM    HDL Cholesterol 39 (L) 02/11/2022 07:14 AM    LDL, calculated 76 02/11/2022 07:14 AM    VLDL, calculated 19 02/11/2022 07:14 AM    Triglyceride 95 02/11/2022 07:14 AM    CHOL/HDL Ratio 3.4 02/11/2022 07:14 AM       Lab Results   Component Value Date/Time    WBC 8.6 11/02/2020 08:03 AM    HGB 13.7 11/02/2020 08:03 AM    HCT 40.5 11/02/2020 08:03 AM    PLATELET 637 01/61/8088 08:03 AM    MCV 88.6 11/02/2020 08:03 AM       Lab Results   Component Value Date/Time    Microalbumin/Creat ratio (mg/g creat) 708 (H) 02/11/2022 07:14 AM    Microalbumin,urine random 63.70 (H) 02/11/2022 07:14 AM       HbA1c:  Lab Results   Component Value Date/Time    Hemoglobin A1c 9.7 (H) 02/11/2022 07:14 AM    Hemoglobin A1c (POC) 9.9% 05/24/2019 11:25 AM     No components found for: 2     Last Point of Care HGB A1C  Hemoglobin A1c (POC)   Date Value Ref Range Status   05/24/2019 9.9% % Final        CrCl cannot be calculated (Unknown ideal weight. ). Medication reconciliation was completed during the visit. There are no discontinued medications. Patient verbalized understanding of the information presented and all of the patients questions were answered. AVS was handed to the patient. Patient advised to call the office with any additional questions or concerns. Thank you,  Candelaria Wilde.  Wei De La Fuente, BCPS          For Pharmacy [address] in place:  Yes   Recommendation Provided To: Patient/Caregiver: 0 via In person   Time Spent (min): 60

## 2022-03-03 NOTE — PROGRESS NOTES
Receive patient as referral from provider for diabetic teaching for new medication, Whit Jenkins. NN evaluated the patients knowledge of diabetes self-management and preferred learning method. Patient reported that he never attended formal diabetes education classes and preferred discussion with demonstration. NN discussed the benefits of attending formal diabetes education classes with patient who reports interest in attending free classes at [facility]. NN provided brief overview of diabetes and possible complications of uncontrolled diabetes. Reviewed patients most recent  A1c value, ADA (American Diabetes Association) recommended fasting and post prandial blood glucose ranges, self- blood sugar monitoring, signs and symptoms of hyper and hypoglycemia and information about new medication, Basaglar. NN also discussed with patient the benefits of adhering to diabetes diet and engaging in regular exercise. Patient stated he had a stroke and heart attack in the past and wants to start making lifestyle changes to improve his health. Report that he consumes a 12-can pack of soda within 2-3 days and plans to cut back. NN discussed with patient the indications for taking Basaglar and demonstrated how to prepare and self-administer same medication. Discussed the benefits of rotating injection sites and reinforced the benefits of self - blood sugar monitoring. Patient was able to verbalize and return demonstration of how to self-administer Basaglar. Patient was given booklet to record his blood sugar readings and refuse handout for Basaglar. Patient agree to start checking his blood sugar at least 2 times a day. Patient was given the opportunity to ask questions. Patient verbalized understanding all information discussed and is aware when to seek medical attention from PCP. Contact information was provided for future reference or further questions.  Gave patient contact information to call and schedule appointment to attend outpatient diabetes education classes at [facility]. Next follow-up appointment is schedule with Dr. Fernando Salinas for 6/22/17 at 0930. Azathioprine Counseling:  I discussed with the patient the risks of azathioprine including but not limited to myelosuppression, immunosuppression, hepatotoxicity, lymphoma, and infections.  The patient understands that monitoring is required including baseline LFTs, Creatinine, possible TPMP genotyping and weekly CBCs for the first month and then every 2 weeks thereafter.  The patient verbalized understanding of the proper use and possible adverse effects of azathioprine.  All of the patient's questions and concerns were addressed.

## 2022-03-04 ENCOUNTER — TELEPHONE (OUTPATIENT)
Dept: PHYSICAL THERAPY | Age: 57
End: 2022-03-04

## 2022-03-04 ENCOUNTER — APPOINTMENT (OUTPATIENT)
Dept: PHYSICAL THERAPY | Age: 57
End: 2022-03-04
Attending: FAMILY MEDICINE
Payer: MEDICAID

## 2022-03-08 ENCOUNTER — HOSPITAL ENCOUNTER (OUTPATIENT)
Dept: PHYSICAL THERAPY | Age: 57
Discharge: HOME | End: 2022-03-08
Attending: FAMILY MEDICINE
Payer: MEDICAID

## 2022-03-08 PROCEDURE — 97110 THERAPEUTIC EXERCISES: CPT

## 2022-03-08 PROCEDURE — 97112 NEUROMUSCULAR REEDUCATION: CPT

## 2022-03-08 PROCEDURE — 97530 THERAPEUTIC ACTIVITIES: CPT

## 2022-03-08 RX ORDER — BLOOD SUGAR DIAGNOSTIC
STRIP MISCELLANEOUS
Qty: 100 STRIP | Refills: 11 | Status: SHIPPED | OUTPATIENT
Start: 2022-03-08

## 2022-03-08 NOTE — PROGRESS NOTES
PT DAILY TREATMENT NOTE     Patient Name: Axel Miller  Date:3/8/2022  : 1965  [x]  Patient  Verified  Payor: [address] [employer] / Plan: [address] / Product Type: Managed Care Medicaid /    In time:3:00  Out time:3:45  Total Treatment Time (min): 45  Visit #: 2 of 10    Medicare/BCBS Only   Total Timed Codes (min):   1:1 Treatment Time:         Treatment Area: Unsteadiness on feet [R26.81]    SUBJECTIVE  Pain Level (0-10 scale): 0  Any medication changes, allergies to medications, adverse drug reactions, diagnosis change, or new procedure performed?: [x] No    [] Yes (see summary sheet for update)  Subjective functional status/changes:   [] No changes reported  I feel OK    OBJECTIVE    15 min Therapeutic Exercise:  [] See flow sheet :   Rationale: increase ROM and increase strength to improve the patients ability to perform daily tasks, increase endurance    12 min Therapeutic Activity:  []  See flow sheet :   Rationale: increase strength and improve coordination  to improve the patients ability to improve LE strength to perofrm household tasks     18 min Neuromuscular Re-education:  []  See flow sheet :   Rationale: increase strength, improve coordination, improve balance and increase proprioception  to improve the patients ability to improve stability and reduce wall/furniture walking          With   [] TE   [] TA   [] neuro   [] other: Patient Education: [x] Review HEP    [] Progressed/Changed HEP based on:   [] positioning   [] body mechanics   [] transfers   [] heat/ice application    [] other:      Other Objective/Functional Measures: initiated ex program     Pain Level (0-10 scale) post treatment: 0     ASSESSMENT/Changes in Function: pt seen for first session after evaluation for LE weakness and imbalance. Pt able to tolerate ex's well with mild pain reported in hips, but still able to complete all reps as indicated.   Right LE fatigue noted during TM after about 3 minutes, slower advancement compared to the left due to fatigue. Will continue with PT to further address strength and stamina. Patient will continue to benefit from skilled PT services to modify and progress therapeutic interventions, address functional mobility deficits, address ROM deficits, address strength deficits, analyze and address soft tissue restrictions, analyze and cue movement patterns, analyze and modify body mechanics/ergonomics, assess and modify postural abnormalities, address imbalance/dizziness and instruct in home and community integration to attain remaining goals. []  See Plan of Care  []  See progress note/recertification  []  See Discharge Summary         Progress towards goals / Updated goals:  Short Term Goals: To be accomplished in 1 weeks:  Goal: Pt to be compliant with initial HEP to improve LE strength for improved endurance with standing/amb activities. Status at last note/certification: Established and reviewed with Pt  Long Term Goals: To be accomplished in 5 weeks:  Goal: Pt to increase B hip/knee strength to 4+/5 grossly to increase standing/amb tolerance for ease of performing cooking, household chores. Status at last note/certification: hip flex 3+/5 right, 4-/5 left; hip ABD 4-/5 right, 4/5 left; hip ext 3+/5 B; hip IR 4/5 B; knee flex 4-/5 B  Goal: Pt to perform 20 single leg heel raises bilaterally without deviations, fatigue for improved ankle stability and increased safety with gait. Status at last note/certification: 5 repetitions right; 4 repetitions left  Goal: Pt to increase B SLS x 15 seconds without deviations to reduce furniture/wall walking with gait and increase safety with community level amb. Status at last note/certification: 5 seconds right; 4 seconds left  Goal: Pt to report < 4/10 pain at worst to increase ease with ADLs.   Status at last note/certification: 6/25 pain at worst  Goal: Pt to report FOTO score of 55 pts to show improved function and quality of life.   Status at last note/certification: FOTO 53 pts     PLAN  []  Upgrade activities as tolerated     []  Continue plan of care  []  Update interventions per flow sheet       []  Discharge due to:_  []  Other:_      Ernst Renteria, MELISSA 3/8/2022  3:21 PM    Future Appointments   Date Time Provider Amalia Silver   3/10/2022  3:00 PM [facility]   3/15/2022 11:15 AM Sharifa Ji, [facility]   3/15/2022  1:00 PM Jolene Johnson PHARMD [facility] BS AMB   3/17/2022 11:15 AM Sharifa Ji, [facility]   3/17/2022  2:30 PM Kit Post MD hospitals BS AMB   3/22/2022 11:15 AM [facility]   3/24/2022  9:00 AM [facility]   3/29/2022  9:00 AM Sharifa Ji, [facility]   3/31/2022  9:00 AM Sharifa Ji, [facility]   5/9/2022 10:45 AM Paris Florian MD [facility] BS AMB   8/9/2022  9:15 AM Juanita Muñiz MD [facility] BS AMB

## 2022-03-10 ENCOUNTER — HOSPITAL ENCOUNTER (OUTPATIENT)
Dept: PHYSICAL THERAPY | Age: 57
Discharge: HOME | End: 2022-03-10
Attending: FAMILY MEDICINE
Payer: MEDICAID

## 2022-03-10 PROCEDURE — 97112 NEUROMUSCULAR REEDUCATION: CPT

## 2022-03-10 PROCEDURE — 97530 THERAPEUTIC ACTIVITIES: CPT

## 2022-03-10 PROCEDURE — 97110 THERAPEUTIC EXERCISES: CPT

## 2022-03-10 NOTE — PROGRESS NOTES
PT DAILY TREATMENT NOTE     Patient Name: Andre Edouard.  Date:3/10/2022  : 1965  [x]  Patient  Verified  Payor: Samantha 22 / Plan: [address] / Product Type: Managed Care Medicaid /    In time: 3:00  Out time: 3:40  Total Treatment Time (min): 40  Visit #: 3 of 10    Medicare/BCBS Only   Total Timed Codes (min):   1:1 Treatment Time:         Treatment Area: Unsteadiness on feet [R26.81]    SUBJECTIVE  Pain Level (0-10 scale): 6  Any medication changes, allergies to medications, adverse drug reactions, diagnosis change, or new procedure performed?: [x] No    [] Yes (see summary sheet for update)  Subjective functional status/changes:   [] No changes reported  Pt states feeling more in pain today possibly due to the weather. OBJECTIVE    12 min Therapeutic Exercise:  [] See flow sheet :   Rationale: increase ROM and increase strength to improve the patients ability to perform household activities and chores. 8 min Therapeutic Activity:  []? See flow sheet :   Rationale: increase strength and improve coordination  to improve the patients ability to perform functional tasks. 20 min Neuromuscular Re-education:  []  See flow sheet :   Rationale: increase strength, improve coordination, improve balance and increase proprioception  to improve the patients ability to ambulate safely at home and in community. With   [] TE   [] TA   [] neuro   [] other: Patient Education: [x] Review HEP    [] Progressed/Changed HEP based on:   [] positioning   [] body mechanics   [] transfers   [] heat/ice application    [] other:      Other Objective/Functional Measures: Added HS stretch and tandem walking     Pain Level (0-10 scale) post treatment: 6    ASSESSMENT/Changes in Function: Pt seen today for BLE weakness and ait abnormality and to address strength and stability.  Therapist provided verbal cues during standing hip extension to correct for upright posture and to avoid compensation. He demonstrated ability to self correct with cues. Pt continues to be challenged with SLS requiring frequent toe touch response to correct for LOB. He described increased tightness in hamstrings, eased slightly after completion of standing hamstring stretch. Will continue with skilled PT to increase BLE strength and stability in order to improve endurance during standing and ambulatory activities. Patient will continue to benefit from skilled PT services to modify and progress therapeutic interventions, address functional mobility deficits, address ROM deficits, address strength deficits, analyze and address soft tissue restrictions, analyze and cue movement patterns, analyze and modify body mechanics/ergonomics, assess and modify postural abnormalities, address imbalance/dizziness and instruct in home and community integration to attain remaining goals. []  See Plan of Care  []  See progress note/recertification  []  See Discharge Summary         Progress towards goals / Updated goals:  Short Term Goals: To be accomplished in 1 weeks:  Goal: Pt to be compliant with initial HEP to improve LE strength for improved endurance with standing/amb activities. Status at last note/certification: Established and reviewed with Pt  Long Term Goals: To be accomplished in 5 weeks:  Goal: Pt to increase B hip/knee strength to 4+/5 grossly to increase standing/amb tolerance for ease of performing cooking, household chores. Status at last note/certification: hip flex 3+/5 right, 4-/5 left; hip ABD 4-/5 right, 4/5 left; hip ext 3+/5 B; hip IR 4/5 B; knee flex 4-/5 B  Goal: Pt to perform 20 single leg heel raises bilaterally without deviations, fatigue for improved ankle stability and increased safety with gait.   Status at last note/certification: 5 repetitions right; 4 repetitions left  Goal: Pt to increase B SLS x 15 seconds without deviations to reduce furniture/wall walking with gait and increase safety with community level amb. Status at last note/certification: 5 seconds right; 4 seconds left  Goal: Pt to report < 4/10 pain at worst to increase ease with ADLs. Status at last note/certification: 8/10 pain at worst  Goal: Pt to report FOTO score of 55 pts to show improved function and quality of life.   Status at last note/certification: BCDB 34 UWV        PLAN  [x]  Upgrade activities as tolerated     [x]  Continue plan of care  []  Update interventions per flow sheet       []  Discharge due to:_  []  Other:_      Gaby Camacho, PTA 3/10/2022  3:01 PM    Future Appointments   Date Time Provider Amalia Silver   3/15/2022 11:15 AM Nnamdi Ji, PT [facility]   3/15/2022  1:00 PM Adair Wilson [facility] AMB   3/17/2022 11:15 [facility]   3/17/2022  2:30 PM Sia Wilkerson MD [facility] BS AMB   3/22/2022 11:15 AM [facility]   3/24/2022  9[facility]   3/29/2022  9:00 AM Nnamdi Ji, [facility]   3/31/2022  9:00 AM Nnamdi Ji, [facility]   5/9/2022 10:45 AM Hina Hou MD [facility] BS AMB   8/9/2022  9:15 AM Skinny Vidales MD [facility] BS AMB

## 2022-03-15 ENCOUNTER — HOSPITAL ENCOUNTER (OUTPATIENT)
Dept: PHYSICAL THERAPY | Age: 57
Discharge: HOME | End: 2022-03-15
Attending: FAMILY MEDICINE
Payer: MEDICAID

## 2022-03-15 ENCOUNTER — OFFICE VISIT (OUTPATIENT)
Dept: FAMILY MEDICINE CLINIC | Age: 57
End: 2022-03-15

## 2022-03-15 DIAGNOSIS — E11.40 TYPE 2 DIABETES MELLITUS WITH DIABETIC NEUROPATHY, WITHOUT LONG-TERM CURRENT USE OF INSULIN (HCC): ICD-10-CM

## 2022-03-15 DIAGNOSIS — Z79.4 TYPE 2 DIABETES MELLITUS WITH HYPERGLYCEMIA, WITH LONG-TERM CURRENT USE OF INSULIN (HCC): Primary | ICD-10-CM

## 2022-03-15 DIAGNOSIS — E11.65 TYPE 2 DIABETES MELLITUS WITH HYPERGLYCEMIA, WITH LONG-TERM CURRENT USE OF INSULIN (HCC): Primary | ICD-10-CM

## 2022-03-15 PROCEDURE — 97530 THERAPEUTIC ACTIVITIES: CPT

## 2022-03-15 PROCEDURE — 97112 NEUROMUSCULAR REEDUCATION: CPT

## 2022-03-15 PROCEDURE — 97110 THERAPEUTIC EXERCISES: CPT

## 2022-03-15 RX ORDER — DULAGLUTIDE 0.75 MG/.5ML
0.75 INJECTION, SOLUTION SUBCUTANEOUS
Qty: 4 PEN | Refills: 1 | Status: SHIPPED | OUTPATIENT
Start: 2022-03-15 | End: 2022-03-15

## 2022-03-15 RX ORDER — METFORMIN HYDROCHLORIDE 500 MG/1
1000 TABLET, EXTENDED RELEASE ORAL 2 TIMES DAILY WITH MEALS
Qty: 360 TABLET | Refills: 0 | Status: SHIPPED | OUTPATIENT
Start: 2022-03-15 | End: 2022-05-20

## 2022-03-15 RX ORDER — METFORMIN HYDROCHLORIDE 500 MG/1
1000 TABLET, EXTENDED RELEASE ORAL 2 TIMES DAILY WITH MEALS
Qty: 360 TABLET | Refills: 0 | Status: SHIPPED | OUTPATIENT
Start: 2022-03-15 | End: 2022-03-15

## 2022-03-15 RX ORDER — DULAGLUTIDE 0.75 MG/.5ML
0.75 INJECTION, SOLUTION SUBCUTANEOUS
Qty: 4 PEN | Refills: 1 | Status: SHIPPED | OUTPATIENT
Start: 2022-03-15 | End: 2022-09-06 | Stop reason: DRUGHIGH

## 2022-03-15 RX ORDER — INSULIN GLARGINE 100 [IU]/ML
46 INJECTION, SOLUTION SUBCUTANEOUS
Qty: 15 PEN | Refills: 0 | Status: SHIPPED | OUTPATIENT
Start: 2022-03-15 | End: 2022-05-20

## 2022-03-15 NOTE — PROGRESS NOTES
Pharmacy Progress Note - Diabetes Management    Assessment / Plan:   Diabetes Management:  - Per ADA guidelines, Pt's A1c is not at goal of < 7%. - Current SMBG(s)/CGM trend is high above goals per review of log today   - Unfortunately, patient has not made a good effort to improve his diet or take his insulin consistently since the last visit   - Long discussion with patient today regarding the importance of diabetes control to prevent further complications. He expressed understanding.   - Given his readings, discussed that we will need to make an adjustment to his medications today   - Adding Trulicity 6.27 mg once weekly. Trained patient/fiance on use of device and potential side effects.   - Will d/c Janumet, and continue with metformin ER 1000 mg twice daily   - Increase Lantus to 46 units once daily   - Continue to check blood sugars twice daily   - Stressed importance of limiting sweets and sodas in his diet   - Patient/fiance are potentially interested in getting patient an insulin pump. Instructed them to discuss this with Dr. Vance Collins at their follow up visit to get an Endocrinologist referral for evaluation. Patient will need to show adherence and ability to be actively engaged in his care in order to be a candidate.   - Follow up in 4-6 weeks          S/O: Mr. Denise Newman. is a 64 y.o. male, referred by Dr. Padmini Collins MD was seen today for diabetes management visit. Patient's last A1c was 9.7% in February 2022. Patient seen today with his fiancee. Current anti-hyperglycemic regimen include(s):    - Lantus 40 units once daily  - Janumet XR  mg, 2 tablets daily  - Glimepiride 2 mg once daily     Patient reports adherence to his medication regimen.      ROS:  Today, Pt endorses:  - Symptoms of Hyperglycemia: excessive thirst, polyuria and blurry vision  - Symptoms of Hypoglycemia: none    Self Monitoring Blood Glucose (SMBG) or CGM:  - Brought in home glucometer/blood glucose log/CGM reader today:  yes  - Conducts/scans: Twice daily  - Patient went about a week without taking his insulin and checking his blood sugars         Nutrition/Lifestyle Modifications:  - No significant changes     Last visit:  - Overall, denis states that the patient does well with limiting carbs with his meals. He is more of a meat and vegetables person and does not eat a lot of rice, potatoes, pasta or bread  - He does, however, have a big sweet tooth and drinks sodas all throughout the day and consumes a lot fo sweets and candy     The ASCVD Risk score (Sujey Koo, et al., 2013) failed to calculate for the following reasons: The patient has a prior MI or stroke diagnosis   - Atorvastatin 80 mg    Vitals:   Wt Readings from Last 3 Encounters:   02/09/22 198 lb 9.6 oz (90.1 kg)   02/08/22 195 lb (88.5 kg)   11/10/21 194 lb (88 kg)     BP Readings from Last 3 Encounters:   02/09/22 (!) 168/100   02/08/22 139/70   11/10/21 137/87     Pulse Readings from Last 3 Encounters:   02/08/22 86   11/10/21 75   10/12/21 84       Past Medical History:   Diagnosis Date    Arthritis     Chest pain     [facility]     CVA (cerebral vascular accident) ([facility] 2011    r side stroke    CVA (cerebral vascular accident) ([facility] 09/05/2018    admitted at [address] [place] [address] [facility]) 2010    chest pains    Hypercholesterolemia     Hypertension     Loss of hearing     right ear, patient states it comes and goes    Polyuria     Shortness of breath 2011    Type 2 diabetes mellitus without complication, with long-term current use of insulin ([facility] 07/23/2013    Type 2 diabetes mellitus, with long-term current use of insulin ([facility]) 7/23/2013     Allergies   Allergen Reactions    Lisinopril Cough       Current Outpatient Medications   Medication Sig    OneTouch Verio test strips strip test BLOOD SUGAR TWICE DAILY AS DIRECTED    metoprolol tartrate (LOPRESSOR) 25 mg tablet Take 1 tablet by mouth twice daily    glimepiride (AMARYL) 2 mg tablet Take 1 Tablet by mouth daily.  traZODone (DESYREL) 100 mg tablet Take 1-2 Tablets by mouth nightly as needed for Sleep.  furosemide (LASIX) 40 mg tablet TAKE ONE TABLET BY MOUTH EVERY DAY    ticagrelor (BRILINTA) 90 mg tablet Take 1 Tablet by mouth two (2) times a day.  hydroCHLOROthiazide (HYDRODIURIL) 12.5 mg tablet TAKE ONE TABLET BY MOUTH EVERY DAY    Lantus Solostar U-100 Insulin 100 unit/mL (3 mL) inpn 40 Units by SubCUTAneous route nightly.  gabapentin (NEURONTIN) 300 mg capsule TAKE TWO CAPSULES BY MOUTH EVERY EVENING    aspirin 81 mg chewable tablet Take 2 Tablets by mouth daily.  Unifine Pentips 31 gauge x 5/16\" ndle USE TO INJECT insulin EVERY DAY AS DIRECTED    Janumet XR 50-1,000 mg TM24 TAKE TWO TABLETS BY MOUTH EVERY DAY    escitalopram oxalate (LEXAPRO) 20 mg tablet Take 1 Tab by mouth daily.  losartan (COZAAR) 100 mg tablet Take 1 Tab by mouth daily.  atorvastatin (LIPITOR) 80 mg tablet Take 1 Tab by mouth daily.  multivitamin (ONE A DAY) tablet Take 1 Tab by Mouth Once a Day. Similar alternatives are ok. No current facility-administered medications for this visit. Lab Results   Component Value Date/Time    Sodium 138 02/11/2022 07:14 AM    Potassium 3.8 02/11/2022 07:14 AM    Chloride 104 02/11/2022 07:14 AM    CO2 30 02/11/2022 07:14 AM    Anion gap 4 02/11/2022 07:14 AM    Glucose 267 (H) 02/11/2022 07:14 AM    BUN 8 02/11/2022 07:14 AM    Creatinine 0.87 02/11/2022 07:14 AM    BUN/Creatinine ratio 9 (L) 02/11/2022 07:14 AM    GFR est AA >60 02/11/2022 07:14 AM    GFR est non-AA >60 02/11/2022 07:14 AM    Calcium 9.1 02/11/2022 07:14 AM    Bilirubin, total 0.4 02/11/2022 07:14 AM    Alk.  phosphatase 71 02/11/2022 07:14 AM    Protein, total 6.6 02/11/2022 07:14 AM    Albumin 3.1 (L) 02/11/2022 07:14 AM    Globulin 3.5 02/11/2022 07:14 AM    A-G Ratio 0.9 02/11/2022 07:14 AM    ALT (SGPT) 15 (L) 02/11/2022 07:14 AM       Lab Results   Component Value Date/Time    Cholesterol, total 134 02/11/2022 07:14 AM    HDL Cholesterol 39 (L) 02/11/2022 07:14 AM    LDL, calculated 76 02/11/2022 07:14 AM    VLDL, calculated 19 02/11/2022 07:14 AM    Triglyceride 95 02/11/2022 07:14 AM    CHOL/HDL Ratio 3.4 02/11/2022 07:14 AM       Lab Results   Component Value Date/Time    WBC 8.6 11/02/2020 08:03 AM    HGB 13.7 11/02/2020 08:03 AM    HCT 40.5 11/02/2020 08:03 AM    PLATELET 644 61/40/6263 08:03 AM    MCV 88.6 11/02/2020 08:03 AM       Lab Results   Component Value Date/Time    Microalbumin/Creat ratio (mg/g creat) 708 (H) 02/11/2022 07:14 AM    Microalbumin,urine random 63.70 (H) 02/11/2022 07:14 AM       HbA1c:  Lab Results   Component Value Date/Time    Hemoglobin A1c 9.7 (H) 02/11/2022 07:14 AM    Hemoglobin A1c (POC) 9.9% 05/24/2019 11:25 AM     No components found for: 2     Last Point of Care HGB A1C  Hemoglobin A1c (POC)   Date Value Ref Range Status   05/24/2019 9.9% % Final        CrCl cannot be calculated (Unknown ideal weight. ). Medication reconciliation was completed during the visit. There are no discontinued medications. Patient verbalized understanding of the information presented and all of the patients questions were answered. AVS was handed to the patient. Patient advised to call the office with any additional questions or concerns. Thank you,  Teena Simeon. ESTUARDO Townsend          For Pharmacy Admin Tracking Only     CPA in place:  Yes   Recommendation Provided To: Patient/Caregiver: 4 via In person   Intervention Detail: Discontinued Rx: 1, reason: Therapy De-escalation, Dose Adjustment: 1, reason: Therapy Optimization and New Rx: 2, reason: Needs Additional Therapy   Gap Closed?: No   Intervention Accepted By: Patient/Caregiver: 4   Time Spent (min): 60

## 2022-03-15 NOTE — PROGRESS NOTES
PT DAILY TREATMENT NOTE     Patient Name: Denise Newman.  Date:3/15/2022  : 1965  [x]  Patient  Verified  Payor: Samantha 22 / Plan: [address] / Product Type: Managed Care Medicaid /    In time: 11:15  Out time: 12:00  Total Treatment Time (min): 45  Visit #: 4 of 10    Medicare/BCBS Only   Total Timed Codes (min):   1:1 Treatment Time:         Treatment Area: Unsteadiness on feet [R26.81]    SUBJECTIVE  Pain Level (0-10 scale): 4/10  Any medication changes, allergies to medications, adverse drug reactions, diagnosis change, or new procedure performed?: [x] No    [] Yes (see summary sheet for update)  Subjective functional status/changes:   [] No changes reported  \"I have a little pain in the hips. \"     OBJECTIVE    15 min Therapeutic Exercise:  [] See flow sheet :   Rationale: increase ROM and increase strength to improve the patients ability to perform household activities and chores. 10 min Therapeutic Activity:  []? See flow sheet :   Rationale: increase strength and improve coordination  to improve the patients ability to perform functional tasks. 20 min Neuromuscular Re-education:  []  See flow sheet :   Rationale: increase strength, improve coordination, improve balance and increase proprioception  to improve the patients ability to ambulate safely at home and in community. With   [] TE   [] TA   [] neuro   [] other: Patient Education: [x] Review HEP    [] Progressed/Changed HEP based on:   [] positioning   [] body mechanics   [] transfers   [] heat/ice application    [] other:      Other Objective/Functional Measures: Continued with exercises per flow sheet. Pain Level (0-10 scale) post treatment: 3/10    ASSESSMENT/Changes in Function: Challenged Pt to reduce UE support for exercises in parallel bars with either use of 1 UE or no UE support to increase stability and static/dynamic standing balance.   Pt able to perform SLS with minimal intermittent foot touches today. Pt able to report slight reduction in pain post session. Patient will continue to benefit from skilled PT services to modify and progress therapeutic interventions, address functional mobility deficits, address ROM deficits, address strength deficits, analyze and address soft tissue restrictions, analyze and cue movement patterns, analyze and modify body mechanics/ergonomics, assess and modify postural abnormalities, address imbalance/dizziness and instruct in home and community integration to attain remaining goals. []  See Plan of Care  []  See progress note/recertification  []  See Discharge Summary         Progress towards goals / Updated goals:  Short Term Goals: To be accomplished in 1 weeks:  Goal: Pt to be compliant with initial HEP to improve LE strength for improved endurance with standing/amb activities. Status at last note/certification: Established and reviewed with Pt  Current: met - Pt reports compliance (3/15/22)  Long Term Goals: To be accomplished in 5 weeks:  Goal: Pt to increase B hip/knee strength to 4+/5 grossly to increase standing/amb tolerance for ease of performing cooking, household chores. Status at last note/certification: hip flex 3+/5 right, 4-/5 left; hip ABD 4-/5 right, 4/5 left; hip ext 3+/5 B; hip IR 4/5 B; knee flex 4-/5 B  Current:  Goal: Pt to perform 20 single leg heel raises bilaterally without deviations, fatigue for improved ankle stability and increased safety with gait. Status at last note/certification: 5 repetitions right; 4 repetitions left  Current:  Goal: Pt to increase B SLS x 15 seconds without deviations to reduce furniture/wall walking with gait and increase safety with community level amb. Status at last note/certification: 5 seconds right; 4 seconds left  Current:  Goal: Pt to report < 4/10 pain at worst to increase ease with ADLs.   Status at last note/certification: 8/10 pain at worst  Current:  Goal: Pt to report FOTO score of 55 pts to show improved function and quality of life.   Status at last note/certification: BMCU 54 MOIRA   Current: reassess at MD note (3/15/22)       PLAN  [x]  Upgrade activities as tolerated     [x]  Continue plan of care  []  Update interventions per flow sheet       []  Discharge due to:_  []  Other:_      Parrish Ji, PT 3/15/2022  3:01 PM    Future Appointments   Date Time Provider Amalia Silver   3/15/2022  1:00 PM Latrice [facility] BS AMB   3/17/2022 11:15 AM Parrish Ji, PT [facility]   3/17/2022  2:30 PM Bradford [facility] BS AMB   3/22/2022 11:15 AM Delcie  [facility]   3/24/2022  9:00 AM Delcie  [facility]   3/29/2022  9:00 AM Parrish Ji, PT [facility]   3/31/2022  9:00 AM Parrish Ji, PT [facility]   5/9/2022 10:45 AM Karri Medina MD [facility] BS AMB   8/9/2022  9:15 AM Alex Irene MD [facility] BS AMB

## 2022-03-15 NOTE — Clinical Note
Hello! Blood sugars high above goals, but adherence and diet still a concern. Adding Trulicity and metformin, d/c Janumet and increased Lantus today. He may be interested in an insulin pump, but I discussed that he will have to prove  adherence and be engaged in his care in order to be a good candidate for it. He says he will work on this. They will discuss it with you at their follow up in May to see if an Endo referral is appropriate. Thanks!   Seble Drew

## 2022-03-15 NOTE — PATIENT INSTRUCTIONS
Your Visit Summary:     Plan:  - Stop Janumet and glimepiride   - Increase Lantus to 46 units once daily  - Start metformin ER 1000 mg twice daily  - Start Truliciity 5.74 mg once weekly   - Continue to check blood sugars twice daily and work on decreasing sweets and sodas in your diet         Call me with any questions or concerns  Ron Nelson [phone]    Check and document your blood sugar first thing in the morning (fasting), 1-2 hours after a meal, and/or before bedtime. Bring your meter/log to all future visits. Your blood sugar goals:  - Fasting (first thing in the morning)  blood sugar: 80 - 130   - 1 to 2 hours after a meal: less than 180     When you experience symptoms of low blood sugar (example: less than 70):  - Confirm low reading by checking your blood sugar.   - Then treat with 15 grams of carbohydrates (one-half cup of juice or regular soda, or 4-5 glucose tablets). - Wait 15 minutes to recheck blood sugar.   - Then eat a protein containing meal/snack to prevent another low blood sugar episode. (example: peanut butter + crackers)    Nutrition:  - When reviewing a nutrition label, focus on the serving size, total calories, fat (and type of fats), total carbohydrates, sugar (and amount of added sugar), amount of fiber (good for your digestive), and amount of protein. Refer to your nutrition label guide for more information.  - For a meal : max 30 - 45 grams of carbohydrates  - For a snack: max 15 grams of carbohydrates  - Reduce amount of saturated and trans fat. Consider more unsaturated fat options as they are better for your heart health.    - Have at least 1 serving of lean fat protein with each meal.    - Increase fiber intake slowly to prevent constipation.   - Substitute fruit juices for the whole fruit    Low carb snack ideas (15 grams total carb or less):     String cheese or babybel with 6 crackers   4 peanut butter crackers   3 cups of popcorn   1 cup raw vegetables with hummus or ranch dip (just need to watch how much dip you use)   Nuts   2 rice cakes   Celery with peanut butter or cream cheese   String cheese with 1 serving of fruit   Greek yogurt (look at label to make sure < 15 gram carb)   Plain greek yogurt with fresh berries added   Nature valley protein bar   Whisps parmesan cheese crisps   Hard boiled egg   Cottage cheese   Tuna salad lettuce roll-ups   Deli meat roll-ups with slice of cheese   Sugar Free Jello   Glucerna shake (16 grams)    Glucerna hunger smart shake (16 grams)   Ensure protein max shake   Fruit (1 serving/15 grams)   1/2 banana, montez, or grapefruit    1/3 melon (small cantaloupe)   1 slice or 1 cup of honeydew melon   1 slice or 1 and 1/4 cups of watermelon    1 small apple, peach, orange or pear   2 small tangerines   1 cup of raspberries   3/4 cup of blackberries, blueberries or pineapples   1/2 cup of fruit juice, pears, applesauce, or mangos   17 small grapes   12 cherries    Be careful with the glucerna products as they differ in the total carbs depending on the product (some are intended as meal replacements not snacks). Make sure you look at the total carbs on the label as products can differ. Physical Activity:  - Aim for 30 minutes of consistent, moderately intensive, physical activity a day for 5 days or an average of 150 minutes per week. - Start slow, increase as tolerated. For example: Walk every day, working up to 30 minutes of brisk walking, 5 days a week--or split the 30 minutes into two 15-minute or three 10-minute walks. - If you sit for a long time, get up and move/stretch every 90 minutes. Other recommendations:  - Schedule an annual eye exam.  - Check your feet daily for any signs of open wounds, cuts, or sores. - Given your risk factors, the following vaccines are recommended: annual flu shot, age-based pneumococcal vaccines (Pneumovax, Prevnar 13).     In addition to taking your medications as directed, improving your blood sugar involves modifying your nutrition and maximizing the amount of physical activity.

## 2022-03-16 ENCOUNTER — TELEPHONE (OUTPATIENT)
Dept: FAMILY MEDICINE CLINIC | Age: 57
End: 2022-03-16

## 2022-03-17 ENCOUNTER — TELEPHONE (OUTPATIENT)
Dept: PHYSICAL THERAPY | Age: 57
End: 2022-03-17

## 2022-03-17 ENCOUNTER — APPOINTMENT (OUTPATIENT)
Dept: PHYSICAL THERAPY | Age: 57
End: 2022-03-17
Attending: FAMILY MEDICINE
Payer: MEDICAID

## 2022-03-17 ENCOUNTER — OFFICE VISIT (OUTPATIENT)
Dept: PULMONOLOGY | Age: 57
End: 2022-03-17
Payer: COMMERCIAL

## 2022-03-17 VITALS — DIASTOLIC BLOOD PRESSURE: 86 MMHG | SYSTOLIC BLOOD PRESSURE: 152 MMHG

## 2022-03-17 DIAGNOSIS — R06.2 WHEEZING: ICD-10-CM

## 2022-03-17 DIAGNOSIS — R06.02 SHORTNESS OF BREATH: ICD-10-CM

## 2022-03-17 PROCEDURE — 94060 EVALUATION OF WHEEZING: CPT | Performed by: INTERNAL MEDICINE

## 2022-03-17 PROCEDURE — 94729 DIFFUSING CAPACITY: CPT | Performed by: INTERNAL MEDICINE

## 2022-03-17 PROCEDURE — 99204 OFFICE O/P NEW MOD 45 MIN: CPT | Performed by: INTERNAL MEDICINE

## 2022-03-17 PROCEDURE — 94727 GAS DIL/WSHOT DETER LNG VOL: CPT | Performed by: INTERNAL MEDICINE

## 2022-03-17 RX ORDER — ALBUTEROL SULFATE 90 UG/1
2 AEROSOL, METERED RESPIRATORY (INHALATION)
Qty: 18 G | Refills: 5 | Status: SHIPPED | OUTPATIENT
Start: 2022-03-17

## 2022-03-17 NOTE — PROGRESS NOTES
Maikel Murphy presents today for   Chief Complaint   Patient presents with    New Patient    Shortness of Breath       Is someone accompanying this pt? no    Is the patient using any DME equipment during OV? no    -DME Company n/a    Depression Screening:  3 most recent PHQ Screens 3/17/2022   Little interest or pleasure in doing things Not at all   Feeling down, depressed, irritable, or hopeless Not at all   Total Score PHQ 2 0   Trouble falling or staying asleep, or sleeping too much -   Feeling tired or having little energy -   Poor appetite, weight loss, or overeating -   Feeling bad about yourself - or that you are a failure or have let yourself or your family down -   Trouble concentrating on things such as school, work, reading, or watching TV -   Moving or speaking so slowly that other people could have noticed; or the opposite being so fidgety that others notice -   Thoughts of being better off dead, or hurting yourself in some way -   PHQ 9 Score -   How difficult have these problems made it for you to do your work, take care of your home and get along with others -       Learning Assessment:  Learning Assessment 1/5/2021   PRIMARY LEARNER Patient   HIGHEST LEVEL OF EDUCATION - PRIMARY LEARNER  DID NOT GRADUATE HIGH SCHOOL   BARRIERS PRIMARY LEARNER NONE   CO-LEARNER CAREGIVER No   PRIMARY LANGUAGE ENGLISH   LEARNER PREFERENCE PRIMARY DEMONSTRATION     -   ANSWERED BY Patient   RELATIONSHIP SELF       Abuse Screening:  Abuse Screening Questionnaire 6/9/2021   Do you ever feel afraid of your partner? N   Are you in a relationship with someone who physically or mentally threatens you? N   Is it safe for you to go home? Y       Fall Risk  Fall Risk Assessment, last 12 mths 11/9/2020   Able to walk? Yes   Fall in past 12 months? No         Coordination of Care:  1. Have you been to the ER, urgent care clinic since your last visit? Hospitalized since your last visit? No    2.  Have you seen or consulted any other health care providers outside of the [address] [place] [address] since your last visit? Include any pap smears or colon screening.  no

## 2022-03-17 NOTE — LETTER
3/17/2022    Patient: Meagan Zavala. YOB: 1965   Date of Visit: 3/17/2022     Hi So MD  [id number]  [phone]  Via In Basket    Dear Hi So MD,      Thank you for referring Mr. Gena Lew to [address] [place] [address] for evaluation. My notes for this consultation are attached. If you have questions, please do not hesitate to call me. I look forward to following your patient along with you.       Sincerely,    Kaela Rivas MD

## 2022-03-17 NOTE — PROGRESS NOTES
HISTORY OF PRESENT ILLNESS  Mellie Snellen. is a 64 y.o. male referred for shortness of breath. Pt with remote smoking history with chief complaint of shortness of breath with onset 3 months prior to this visit. SOB is progressive, now occurs with walking 50 yards on a flat surface and less than 1 flight of stairs. Associated symptoms include chronic dry cough and wheezing. +pedal edema for the past 12 months. PMH includes CAD and diastolic dysfunction, S/p coronary stent in 2021, CVA. Pt quit smoking in the 1980's. He used to work as a  and  and did not use a respirator at work. Review of Systems   Constitutional: Negative for chills, diaphoresis, fever, malaise/fatigue and weight loss. HENT: Negative for congestion, ear discharge, ear pain, hearing loss, nosebleeds, sinus pain, sore throat and tinnitus. Eyes: Negative for blurred vision, double vision, photophobia, pain, discharge and redness. Respiratory: Positive for cough, shortness of breath and wheezing. Negative for hemoptysis, sputum production and stridor. Cardiovascular: Negative for chest pain, palpitations, orthopnea, claudication and PND. Leg swelling: intermittent. Gastrointestinal: Negative for abdominal pain, blood in stool, constipation, diarrhea, heartburn, melena, nausea and vomiting. Genitourinary: Negative for dysuria, flank pain, frequency, hematuria and urgency. Musculoskeletal: Negative for back pain, falls, joint pain, myalgias and neck pain. Skin: Negative for itching and rash. Neurological: Negative for dizziness, tingling, tremors, sensory change, speech change, focal weakness, seizures, loss of consciousness, weakness and headaches. Endo/Heme/Allergies: Negative for environmental allergies and polydipsia. Does not bruise/bleed easily. Psychiatric/Behavioral: Negative for depression, hallucinations, memory loss, substance abuse and suicidal ideas.  The patient is not nervous/anxious and does not have insomnia. Past Medical History:   Diagnosis Date    Arthritis     Chest pain     [facility]     CVA (cerebral vascular accident) ([facility] 2011    r side stroke    [facility]re[facility]/05/2018    admitted at [address] [facility]) 2010    chest pains    Hypercholesterolemia     Hypertension     Loss of hearing     right ear, patient states it comes and goes    Polyuria     Shortness of breath 2011    Type 2 diabetes mellitus without complication, with long-term current use of insulin ([facility] 07/23/2013    Type 2 diabetes mellitus, with long-term current use of insulin ([facility] 7/23/2013     Past Surgical History:   Procedure Laterality Date    HX HEART CATHETERIZATION       Current Outpatient Medications on File Prior to Visit   Medication Sig Dispense Refill    insulin glargine (Lantus Solostar U-100 Insulin) 100 unit/mL (3 mL) inpn 46 Units by SubCUTAneous route nightly. 15 Pen 0    dulaglutide (Trulicity) 1.85 YJ/7.7 mL sub-q pen 0.5 mL by SubCUTAneous route every seven (7) days. 4 Pen 1    metFORMIN ER (GLUCOPHAGE XR) 500 mg tablet Take 2 Tablets by mouth two (2) times daily (with meals). 360 Tablet 0    OneTouch Verio test strips strip test BLOOD SUGAR TWICE DAILY AS DIRECTED 100 Strip 11    metoprolol tartrate (LOPRESSOR) 25 mg tablet Take 1 tablet by mouth twice daily 60 Tablet 0    traZODone (DESYREL) 100 mg tablet Take 1-2 Tablets by mouth nightly as needed for Sleep. 60 Tablet 5    furosemide (LASIX) 40 mg tablet TAKE ONE TABLET BY MOUTH EVERY DAY 30 Tablet 1    ticagrelor (BRILINTA) 90 mg tablet Take 1 Tablet by mouth two (2) times a day.  180 Tablet 2    hydroCHLOROthiazide (HYDRODIURIL) 12.5 mg tablet TAKE ONE TABLET BY MOUTH EVERY DAY 30 Tablet 5    gabapentin (NEURONTIN) 300 mg capsule TAKE TWO CAPSULES BY MOUTH EVERY EVENING 60 Capsule 5    aspirin 81 mg chewable tablet Take 2 Tablets by mouth daily. 180 Tablet 3    Unifine Pentips 31 gauge x 5/16\" ndle USE TO INJECT insulin EVERY DAY AS DIRECTED      escitalopram oxalate (LEXAPRO) 20 mg tablet Take 1 Tab by mouth daily. 30 Tab 5    losartan (COZAAR) 100 mg tablet Take 1 Tab by mouth daily. 90 Tab 2    atorvastatin (LIPITOR) 80 mg tablet Take 1 Tab by mouth daily. 90 Tab 2    multivitamin (ONE A DAY) tablet Take 1 Tab by Mouth Once a Day. Similar alternatives are ok. No current facility-administered medications on file prior to visit. Allergies   Allergen Reactions    Lisinopril Cough     Family History   Problem Relation Age of Onset    OSTEOARTHRITIS Mother     Gout Mother     Diabetes Mother     Hypertension Mother     Diabetes Father     Diabetes Brother     Diabetes Maternal Grandmother     Other Son         homicide     Social History     Socioeconomic History    Marital status: LEGALLY      Spouse name: Not on file    Number of children: Not on file    Years of education: Not on file    Highest education level: Not on file   Occupational History    Occupation: disabled     Comment: prior ; disabled after CVA   Tobacco Use    Smoking status: Former Smoker     Packs/day: 0.25     Quit date:      Years since quittin.2    Smokeless tobacco: Never Used   Vaping Use    Vaping Use: Never used   Substance and Sexual Activity    Alcohol use: Yes     Comment: occassionally    Drug use: Not Currently     Types: Prescription, OTC, Marijuana    Sexual activity: Yes   Other Topics Concern    Not on file   Social History Narrative    Former /magalis and .      Social Determinants of Health     Financial Resource Strain:     Difficulty of Paying Living Expenses: Not on file   Food Insecurity:     Worried About Running Out of Food in the Last Year: Not on file    Remberto of Food in the Last Year: Not on file   Transportation Needs:     Lack of Transportation (Medical): Not on file    Lack of Transportation (Non-Medical): Not on file   Physical Activity:     Days of Exercise per Week: Not on file    Minutes of Exercise per Session: Not on file   Stress:     Feeling of Stress : Not on file   Social Connections:     Frequency of Communication with Friends and Family: Not on file    Frequency of Social Gatherings with Friends and Family: Not on file    Attends Voodoo Services: Not on file    Active Member of [address] [place] [address] or Organizations: Not on file    Attends Club or Organization Meetings: Not on file    Marital Status: Not on file   Intimate Partner Violence:     Fear of Current or Ex-Partner: Not on file    Emotionally Abused: Not on file    Physically Abused: Not on file    Sexually Abused: Not on file   Housing Stability:     Unable to Pay for Housing in the Last Year: Not on file    Number of [place] in the Last Year: Not on file    Unstable Housing in the Last Year: Not on file     Blood pressure (!) 152/86. Physical Exam  Constitutional:       General: He is not in acute distress. Appearance: Normal appearance. He is not ill-appearing, toxic-appearing or diaphoretic. HENT:      Head: Normocephalic and atraumatic. Right Ear: External ear normal.      Left Ear: External ear normal.      Nose:      Comments: Deferred      Mouth/Throat:      Comments: Deferred   Eyes:      General: No scleral icterus. Right eye: No discharge. Left eye: No discharge. Conjunctiva/sclera: Conjunctivae normal.      Pupils: Pupils are equal, round, and reactive to light. Neck:      Vascular: No carotid bruit. Cardiovascular:      Rate and Rhythm: Normal rate and regular rhythm. Pulses: Normal pulses. Heart sounds: Normal heart sounds. No murmur heard. No gallop. Pulmonary:      Effort: Pulmonary effort is normal. No respiratory distress. Breath sounds: No stridor. No wheezing, rhonchi or rales. Comments: Distant breath sounds  Chest:      Chest wall: No tenderness. Abdominal:      General: There is no distension. Palpations: Abdomen is soft. There is no mass. Musculoskeletal:         General: No swelling, tenderness, deformity or signs of injury. Cervical back: No rigidity or tenderness. Right lower leg: No edema. Left lower leg: No edema. Lymphadenopathy:      Cervical: No cervical adenopathy. Skin:     General: Skin is warm and dry. Coloration: Skin is not jaundiced or pale. Findings: No bruising, erythema, lesion or rash. Neurological:      General: No focal deficit present. Mental Status: He is alert and oriented to person, place, and time. Coordination: Coordination normal.   Psychiatric:         Mood and Affect: Mood normal.         Behavior: Behavior normal.         Thought Content: Thought content normal.         Judgment: Judgment normal.       PFT: poor effort and reproducibility, best results reported, reduced DLCO    ASSESSMENT and PLAN  Encounter Diagnoses   Name Primary?  Shortness of breath     Wheezing      Shortness of breath and wheezing, possibly due to asthma. Differential includes CHF with \"cardiac asthma\", ?pneumoconiosis. Start ZHEN Albuterol and monitor response. Chest PA L ordered. Will call pt with results. Consider Echo or even CPET next visit to check EF and R sided pressures if above nondiagnostic. RTC 3 months.

## 2022-03-19 PROBLEM — E11.40 TYPE 2 DIABETES MELLITUS WITH DIABETIC NEUROPATHY (HCC): Status: ACTIVE | Noted: 2019-04-25

## 2022-03-19 PROBLEM — I50.9 CHF (CONGESTIVE HEART FAILURE) (HCC): Status: ACTIVE | Noted: 2019-06-18

## 2022-03-19 PROBLEM — I20.89 STABLE ANGINA PECTORIS: Status: ACTIVE | Noted: 2020-10-30

## 2022-03-19 PROBLEM — I20.8 STABLE ANGINA PECTORIS: Status: ACTIVE | Noted: 2020-10-30

## 2022-03-19 PROBLEM — I25.10 CAD (CORONARY ARTERY DISEASE): Status: ACTIVE | Noted: 2020-11-05

## 2022-03-19 PROBLEM — R10.9 RIGHT SIDED ABDOMINAL PAIN: Status: ACTIVE | Noted: 2020-01-15

## 2022-03-20 PROBLEM — M54.50 ACUTE RIGHT-SIDED LOW BACK PAIN WITHOUT SCIATICA: Status: ACTIVE | Noted: 2020-01-15

## 2022-03-20 PROBLEM — F32.A DEPRESSION: Status: ACTIVE | Noted: 2021-03-01

## 2022-03-22 ENCOUNTER — HOSPITAL ENCOUNTER (OUTPATIENT)
Dept: PHYSICAL THERAPY | Age: 57
Discharge: HOME | End: 2022-03-22
Attending: FAMILY MEDICINE
Payer: MEDICAID

## 2022-03-22 PROCEDURE — 97112 NEUROMUSCULAR REEDUCATION: CPT

## 2022-03-22 PROCEDURE — 97110 THERAPEUTIC EXERCISES: CPT

## 2022-03-22 PROCEDURE — 97530 THERAPEUTIC ACTIVITIES: CPT

## 2022-03-22 NOTE — PROGRESS NOTES
PT DAILY TREATMENT NOTE     Patient Name: Sandra Ann.  Date:3/22/2022  : 1965  [x]  Patient  Verified  Payor: [address] / Plan: [address] / Product Type: Managed Care Medicaid /    In time:11:25  Out time:12:05  Total Treatment Time (min): 40  Visit #: 1 of 10    Medicare/BCBS Only   Total Timed Codes (min):   1:1 Treatment Time:         Treatment Area: Unsteadiness on feet [R26.81]    SUBJECTIVE  Pain Level (0-10 scale): 5  Any medication changes, allergies to medications, adverse drug reactions, diagnosis change, or new procedure performed?: [x] No    [] Yes (see summary sheet for update)  Subjective functional status/changes:   [] No changes reported  I can walk through a store now without using a cart    OBJECTIVE      10 min Therapeutic Exercise:  [] See flow sheet :   Rationale: increase ROM and increase strength to improve the patients ability to stand/walk for longer durations    10 min Therapeutic Activity:  []  See flow sheet : reassess   Rationale: improve coordination  to improve the patients ability to more easily perform daily tasks, walking tolerance     20 min Neuromuscular Re-education:  []  See flow sheet :   Rationale: increase strength and improve coordination  to improve the patients ability to increase stability and improve ease of gait, daily tasks          With   [] TE   [] TA   [] neuro   [] other: Patient Education: [x] Review HEP    [] Progressed/Changed HEP based on:   [] positioning   [] body mechanics   [] transfers   [] heat/ice application    [] other:      Other Objective/Functional Measures:   Gains: improved walking tolerance 15-20 minutes allowing him to walk through store without cart for 10 minutes  Deficits: still has weakness in LE's that limits walking tolerance. Pt Goal: to increase walking tolerance to at least 30 minutes and to start riding bike again.      Pain Level (0-10 scale) post treatment: 5    ASSESSMENT/Changes in Function: Patient has attended 5 sessions of skilled PT, including the evaluation for Unsteadiness on feet [R26.81]. Since [facility] patient has demonstrated increased LE strength, including HS and hip flexion, still has deficits with abduction. Functionally, he reports ability to walk through the store for 10 minutes without use of cart for support, previously unable to perform. At most is able to walk up to 15-20 minutes. Pain level has diminished to 5/10 a worst.   Dynamic and static balance continues to be impaired slightly, although no LOB or falls reported. Will continue to benefit from PT to address LE stregth, balance and to progress toward personal goals including increased walking tolerance to 30 minutes and to resume bike riding. Patient will continue to benefit from skilled PT services to modify and progress therapeutic interventions, address functional mobility deficits, address ROM deficits, address strength deficits, analyze and address soft tissue restrictions, analyze and cue movement patterns, analyze and modify body mechanics/ergonomics, assess and modify postural abnormalities, address imbalance/dizziness and instruct in home and community integration to attain remaining goals. []  See Plan of Care  []  See progress note/recertification  []  See Discharge Summary         Progress towards goals / Updated goals:  Goal: Pt to be compliant with initial HEP to improve LE strength for improved endurance with standing/amb activities. Status at last note/certification: Established and reviewed with Pt  Current: met - Pt reports compliance (3/15/22)  Long Term Goals: To be accomplished in 5 weeks:  Goal: Pt to increase B hip/knee strength to 4+/5 grossly to increase standing/amb tolerance for ease of performing cooking, household chores.   Status at last note/certification: hip flex 3+/5 right, 4-/5 left; hip ABD 4-/5 right, 4/5 left; hip ext 3+/5 B; hip IR 4/5 B; knee flex 4-/5 B  Current: progression: knee extension right 5/5, left 4+/5; flexion bilateral 4+/5; hip IR bilaterally 4/5; hip flexion right 4/5, left 4-/5; hip abduction right 4/5, left 4/5  Goal: Pt to perform 20 single leg heel raises bilaterally without deviations, fatigue for improved ankle stability and increased safety with gait. Status at last note/certification: 5 repetitions right; 4 repetitions left  Current: progressin x bilaterally with UE support , with significant instability and intermittent knee flexion  Goal: Pt to increase B SLS x 15 seconds without deviations to reduce furniture/wall walking with gait and increase safety with community level amb. Status at last note/certification: 5 seconds right; 4 seconds left  Current: progressing left 2 seconds, right 10 seconds  Goal: Pt to report < 4/10 pain at worst to increase ease with ADLs. Status at last note/certification: 8/10 pain at worst  Current: progressing 5/10  Goal: Pt to report FOTO score of 55 pts to show improved function and quality of life.   Status at last note/certification: LAURA 42 XEB   Current: Goal Met  FOTO 55      PLAN  [x]  Upgrade activities as tolerated     []  Continue plan of care  []  Update interventions per flow sheet       []  Discharge due to:_  []  Other:_      Gaby Camacho, PTA 3/22/2022  11:25 AM    Future Appointments   Date Time Provider Amalia Silver   3/24/2022  9:00 AM [place] [facility]   3/29/2022  9:00 AM Nnamdi Ji, [facility]   3/31/2022  9:00 AM Nnamdi Ji, [facility]   2022 11:00 AM Adair Wilson PHARMD [facility] BS AMB   2022 10:45 AM Hina Hou MD NSF[facility] BS AMB   2022  9:15 AM Skinny Vidales MD CAP BS AMB

## 2022-03-22 NOTE — PROGRESS NOTES
[facility] - [facility], [address] [place] [address]  [phone] fax    Progress Note  Patient name: Marquez Garrett Start of Care: 2022   Referral source: Violet Hamilton MD : 1965   Medical/Treatment Diagnosis: Unsteadiness on feet [R26.81]  Payor: [place] [id number] / Plan: [address] / Product Type: Managed Care Medicaid /  Onset Date:2022     Prior Hospitalization: see medical history Provider#: [id number]   Medications: Verified on Patient Summary List    Comorbidities:  Arthritis; Depression; DM Type II; Heart disease; HTN; hx CVA x 2; hx MI  Prior Level of Function:On disability; lives in [facility] with denis'; functionally independent with pain    Visits from Start of Care: 5    Missed Visits: 2    Established Goals:          Excellent Good         Limited           None  [] Increased ROM   []  []  []  []  [] Increased Strength  []  []  [x]  []  [] Increased Mobility  []  []  []  []   [x] Decreased Pain   []  []  [x]  []  [] Decreased Swelling  []  []  []  []    Short Term Goals: To be accomplished in 1 week:  Goal: Pt to be compliant with initial HEP to improve LE strength for improved endurance with standing/amb activities. Status at last note/certification: Established and reviewed with Pt  Current: met - Pt reports compliance (3/15/22)  Long Term Goals: To be accomplished in 5 weeks:  Goal: Pt to increase B hip/knee strength to 4+/5 grossly to increase standing/amb tolerance for ease of performing cooking, household chores.   Status at last note/certification: hip flex 3+/5 right, 4-/5 left; hip ABD 4-/5 right, 4/5 left; hip ext 3+/5 B; hip IR 4/5 B; knee flex 4-/5 B  Current: progressing - knee extension right 5/5, left 4+/5; flexion bilateral 4+/5; hip IR bilaterally 4/5; hip flexion right 4/5, left 4-/5; hip abduction right 4/5, left 4/5 (3/22/22)  Goal: Pt to perform 20 single leg heel raises bilaterally without deviations, fatigue for improved ankle stability and increased safety with gait. Status at last note/certification: 5 repetitions right; 4 repetitions left  Current: progressing - 12 x bilaterally with UE support , with significant instability and intermittent knee flexion (3/22/22)  Goal: Pt to increase B SLS x 15 seconds without deviations to reduce furniture/wall walking with gait and increase safety with community level amb. Status at last note/certification: 5 seconds right; 4 seconds left  Current: progressing - left 2 seconds, right 10 seconds (3/22/22)  Goal: Pt to report < 4/10 pain at worst to increase ease with ADLs. Status at last note/certification: 3/34 pain at worst  Current: progressing - 5/10 pain at worst (3/22/22)  Goal: Pt to report FOTO score of 55 pts to show improved function and quality of life. Status at last note/certification: FOTO 53 pts   Current: met - FOTO 55 pts (3/22/22)     Key Functional Changes: Patient has attended 5 sessions of skilled PT, including the evaluation for Unsteadiness on feet [R26.81]. Since [facility], patient has demonstrated increased LE strength, including HS and hip flexion, still has deficits with abduction. He reports 65-70% improvements. Functionally, he reports ability to walk through the store for 10 minutes without use of cart for support, previously unable to perform. At most is able to walk up to 15-20 minutes. Pain level has diminished to 5/10 a worst.   Dynamic and static balance continues to be impaired slightly, although no LOB or falls reported. Will continue to benefit from PT to address LE stregth, balance and to progress toward personal goals including increased walking tolerance to 30 minutes and to resume bike riding. Updated Goals: to be achieved in 4 weeks:  Goal: Pt to increase B hip/knee strength to 4+/5 grossly to increase standing/amb tolerance for ease of performing cooking, household chores.   Status at last note/certification: knee extension right 5/5, left 4+/5; flexion bilateral 4+/5; hip IR bilaterally 4/5; hip flexion right 4/5, left 4-/5; hip abduction right 4/5, left 4/5 (3/22/22)  Current:   Goal: Pt to perform 20 single leg heel raises bilaterally without deviations, fatigue for improved ankle stability and increased safety with gait. Status at last note/certification: 12 x bilaterally with UE support , with significant instability and intermittent knee flexion (3/22/22)  Current:   Goal: Pt to increase B SLS x 15 seconds without deviations to reduce furniture/wall walking with gait and increase safety with community level amb. Status at last note/certification: left 2 seconds, right 10 seconds (3/22/22)  Current:   Goal: Pt to report < 4/10 pain at worst to increase ease with ADLs. Status at last note/certification: 2/42 pain at worst (3/22/22)  Current: progressing 5/10       ASSESSMENT/RECOMMENDATIONS:  [x]Continue therapy per initial plan/protocol at a frequency of  2 x per week for 4 weeks  []Continue therapy with the following recommended changes:_____________________      _____________________________________________________________________  []Discontinue therapy progressing towards or have reached established goals  []Discontinue therapy due to lack of appreciable progress towards goals  []Discontinue therapy due to lack of attendance or compliance  []Await Physician's recommendations/decisions regarding therapy  []Other:________________________________________________________________    Thank you for this referral.   Yadira Alberts, PTA 3/22/2022 12:29 PM  NOTE TO PHYSICIAN:  [address]   FAX TO [facility]: [phone]  If you are unable to process this request in 24 hours please contact our office: [phone]  []  I have read the above report and request that my patient continue as recommended.   []  I have read the above report and request that my patient continue therapy with the following changes/special instructions:________________________________________  []I have read the above report and request that my patient be discharged from therapy.     Physician's Signature:____________Date:_________TIME:________     Paris Florian MD  ** Signature, Date and Time must be completed for valid certification **

## 2022-03-24 ENCOUNTER — HOSPITAL ENCOUNTER (OUTPATIENT)
Dept: PHYSICAL THERAPY | Age: 57
Discharge: HOME | End: 2022-03-24
Attending: FAMILY MEDICINE
Payer: MEDICAID

## 2022-03-24 PROCEDURE — 97530 THERAPEUTIC ACTIVITIES: CPT

## 2022-03-24 PROCEDURE — 97112 NEUROMUSCULAR REEDUCATION: CPT

## 2022-03-24 PROCEDURE — 97110 THERAPEUTIC EXERCISES: CPT

## 2022-03-24 NOTE — PROGRESS NOTES
PT DAILY TREATMENT NOTE     Patient Name: Yordy Turner.  Date:3/24/2022  : 1965  [x]  Patient  Verified  Payor: Samantha 22 / Plan: [address] / Product Type: Managed Care Medicaid /    In time:9:00  Out time:9:45  Total Treatment Time (min): 45  Visit #: 2 of 10    Medicare/BCBS Only   Total Timed Codes (min):   1:1 Treatment Time:         Treatment Area: Unsteadiness on feet [R26.81]    SUBJECTIVE  Pain Level (0-10 scale): 5  Any medication changes, allergies to medications, adverse drug reactions, diagnosis change, or new procedure performed?: [x] No    [] Yes (see summary sheet for update)  Subjective functional status/changes:   [] No changes reported  I'm OK  Nothing really new going on    OBJECTIVE      10 min Therapeutic Exercise:  [] See flow sheet :   Rationale: increase ROM and increase strength to improve the patients ability to perform daily tasks, household chores    10 min Therapeutic Activity:  []  See flow sheet :   Rationale: increase strength and improve coordination  to improve the patients ability to improve stability for transfers, and gait     25 min Neuromuscular Re-education:  []  See flow sheet :   Rationale: increase strength, improve coordination and improve balance  to improve the patients ability to improve safety with gait in home and community       With   [] TE   [] TA   [] neuro   [] other: Patient Education: [x] Review HEP    [] Progressed/Changed HEP based on:   [] positioning   [] body mechanics   [] transfers   [] heat/ice application    [] other:      Other Objective/Functional Measures:   March with springs at tower bar  SL HR 10x with UE support    Pain Level (0-10 scale) post treatment:5    ASSESSMENT/Changes in Function: treatment session emphasized hip strength and stability for gait and to improve ease of daily tasks.   Challenged with progression of march with springs (UE extension) able to maintain balance, but with significant sway. Cues provided to slow pace with this activity and to focus on mor controlled movements. Patient will continue to benefit from skilled PT services to modify and progress therapeutic interventions, address functional mobility deficits, address ROM deficits, address strength deficits, analyze and address soft tissue restrictions, analyze and cue movement patterns, analyze and modify body mechanics/ergonomics, assess and modify postural abnormalities, address imbalance/dizziness and instruct in home and community integration to attain remaining goals. []  See Plan of Care  []  See progress note/recertification  []  See Discharge Summary         Progress towards goals / Updated goals:  Goal: Pt to increase B hip/knee strength to 4+/5 grossly to increase standing/amb tolerance for ease of performing cooking, household chores. Status at last note/certification: knee extension right 5/5, left 4+/5; flexion bilateral 4+/5; hip IR bilaterally 4/5; hip flexion right 4/5, left 4-/5; hip abduction right 4/5, left 4/5 (3/22/22)  Current:   Goal: Pt to perform 20 single leg heel raises bilaterally without deviations, fatigue for improved ankle stability and increased safety with gait. Status at last note/certification: 12 x bilaterally with UE support , with significant instability and intermittent knee flexion (3/22/22)  Current:   Goal: Pt to increase B SLS x 15 seconds without deviations to reduce furniture/wall walking with gait and increase safety with community level amb. Status at last note/certification: left 2 seconds, right 10 seconds (3/22/22)  Current:   Goal: Pt to report < 4/10 pain at worst to increase ease with ADLs.   Status at last note/certification: 5/10 pain at worst (3/22/22)  Current: progressing 5/10    PLAN  [x]  Upgrade activities as tolerated     []  Continue plan of care  []  Update interventions per flow sheet       []  Discharge due to:_  []  Other:_      Ha Rajput PTA 3/24/2022  9:03 AM    Future Appointments   Date Time Provider Amalia Huynhisti   3/29/2022  9:00 AM Natalie Ji, PT [facility]   3/31/2022  9:00 AM Natalie Ji, PT [facility]   4/19/2022 11:00 AM Trina Alvarez PHARMD [facility] BS AMB   5/9/2022 10:45 AM Sherry Dinero MD [facility] BS AMB   8/9/2022  9:15 AM Sixto Lazo MD [facility] BS AMB

## 2022-03-29 ENCOUNTER — HOSPITAL ENCOUNTER (OUTPATIENT)
Dept: PHYSICAL THERAPY | Age: 57
Discharge: HOME | End: 2022-03-29
Attending: FAMILY MEDICINE
Payer: MEDICAID

## 2022-03-29 PROCEDURE — 97530 THERAPEUTIC ACTIVITIES: CPT

## 2022-03-29 PROCEDURE — 97112 NEUROMUSCULAR REEDUCATION: CPT

## 2022-03-29 PROCEDURE — 97110 THERAPEUTIC EXERCISES: CPT

## 2022-03-29 NOTE — PROGRESS NOTES
PT DAILY TREATMENT NOTE     Patient Name: Sommer Sotelo.  Date:3/29/2022  : 1965  [x]  Patient  Verified  Payor: Samantha 22 / Plan: [address] / Product Type: Managed Care Medicaid /    In time:9:03  Out time:9:50  Total Treatment Time (min): 52  Visit #: 3 of 10    Medicare/BCBS Only   Total Timed Codes (min):   1:1 Treatment Time:         Treatment Area: Unsteadiness on feet [R26.81]    SUBJECTIVE  Pain Level (0-10 scale): 5/10  Any medication changes, allergies to medications, adverse drug reactions, diagnosis change, or new procedure performed?: [x] No    [] Yes (see summary sheet for update)  Subjective functional status/changes:   [] No changes reported  \"I'm okay, feel about the same. \"    OBJECTIVE    12 min Therapeutic Exercise:  [] See flow sheet :   Rationale: increase ROM and increase strength to improve the patients ability to perform daily tasks, household chores    10 min Therapeutic Activity:  []  See flow sheet :   Rationale: increase strength and improve coordination  to improve the patients ability to improve stability for transfers, and gait     25 min Neuromuscular Re-education:  []  See flow sheet :   Rationale: increase strength, improve coordination and improve balance  to improve the patients ability to improve safety with gait in home and community       With   [] TE   [] TA   [] neuro   [] other: Patient Education: [x] Review HEP    [] Progressed/Changed HEP based on:   [] positioning   [] body mechanics   [] transfers   [] heat/ice application    [] other:      Other Objective/Functional Measures: Performed exercises per flow sheet. Pain Level (0-10 scale) post treatment: 5/10    ASSESSMENT/Changes in Function: Pt exhibited improved standing balance with static balance activities, with reduced UE support, especially during tandem stance.   Pt still ambulates with slower dahlia, WBOS, and continues to note pain in B hips that stays around 5/10, but notes improvements in LE strength and stability with standing activities. Will continue to progress per Pt tolerance to further improve stability and safety with gait. Patient will continue to benefit from skilled PT services to modify and progress therapeutic interventions, address functional mobility deficits, address ROM deficits, address strength deficits, analyze and address soft tissue restrictions, analyze and cue movement patterns, analyze and modify body mechanics/ergonomics, assess and modify postural abnormalities, address imbalance/dizziness and instruct in home and community integration to attain remaining goals. []  See Plan of Care  []  See progress note/recertification  []  See Discharge Summary         Progress towards goals / Updated goals:  Goal: Pt to increase B hip/knee strength to 4+/5 grossly to increase standing/amb tolerance for ease of performing cooking, household chores. Status at last note/certification: knee extension right 5/5, left 4+/5; flexion bilateral 4+/5; hip IR bilaterally 4/5; hip flexion right 4/5, left 4-/5; hip abduction right 4/5, left 4/5 (3/22/22)  Current:   Goal: Pt to perform 20 single leg heel raises bilaterally without deviations, fatigue for improved ankle stability and increased safety with gait. Status at last note/certification: 12 x bilaterally with UE support , with significant instability and intermittent knee flexion (3/22/22)  Current:   Goal: Pt to increase B SLS x 15 seconds without deviations to reduce furniture/wall walking with gait and increase safety with community level amb. Status at last note/certification: left 2 seconds, right 10 seconds (3/22/22)  Current:   Goal: Pt to report < 4/10 pain at worst to increase ease with ADLs.   Status at last note/certification: 5/10 pain at worst (3/22/22)  Current: progressing - still 5/10 pain at worst (3/29/22)    PLAN  [x]  Upgrade activities as tolerated     []  Continue plan of care  []  Update interventions per flow sheet       []  Discharge due to:_  []  Other:_      Jose Ji, PT 3/29/2022  9:03 AM    Future Appointments   Date Time Provider Amalia Silver   3/31/2022  9:00 AM Jose Ji, PT [facility]   4/19/2022 11:00 AM Mayi Cox PHARMD [facility] BS AMB   5/9/2022 10:45 AM Paula Agee MD [facility] BS AMB   8/9/2022  9:15 AM Elver Kang MD [facility] BS AMB

## 2022-03-31 ENCOUNTER — TELEPHONE (OUTPATIENT)
Dept: FAMILY MEDICINE CLINIC | Age: 57
End: 2022-03-31

## 2022-03-31 ENCOUNTER — HOSPITAL ENCOUNTER (OUTPATIENT)
Dept: PHYSICAL THERAPY | Age: 57
Discharge: HOME | End: 2022-03-31
Attending: FAMILY MEDICINE
Payer: MEDICAID

## 2022-03-31 PROCEDURE — 97112 NEUROMUSCULAR REEDUCATION: CPT

## 2022-03-31 PROCEDURE — 97530 THERAPEUTIC ACTIVITIES: CPT

## 2022-03-31 PROCEDURE — 97110 THERAPEUTIC EXERCISES: CPT

## 2022-03-31 NOTE — TELEPHONE ENCOUNTER
Ms. Duncan Sahu requesting a call back from nurse in reference to this patient. Advising trulicity is making patient very sick and vomiting. Also advising his ankles are very swollen.

## 2022-03-31 NOTE — PROGRESS NOTES
PT DAILY TREATMENT NOTE     Patient Name: Britt Santos.  Date:3/31/2022  : 1965  [x]  Patient  Verified  Payor: Samantha 22 / Plan: [address] / Product Type: Managed Care Medicaid /    In time:9:03  Out time:9:47  Total Treatment Time (min): 44  Visit #: 4 of 10    Medicare/BCBS Only   Total Timed Codes (min):   1:1 Treatment Time:         Treatment Area: Unsteadiness on feet [R26.81]    SUBJECTIVE  Pain Level (0-10 scale): 5/10  Any medication changes, allergies to medications, adverse drug reactions, diagnosis change, or new procedure performed?: [x] No    [] Yes (see summary sheet for update)  Subjective functional status/changes:   [] No changes reported  \"I'm okay, feel about the same. \"    OBJECTIVE    12 min Therapeutic Exercise:  [] See flow sheet :   Rationale: increase ROM and increase strength to improve the patients ability to perform daily tasks, household chores    10 min Therapeutic Activity:  []  See flow sheet :   Rationale: increase strength and improve coordination  to improve the patients ability to improve stability for transfers, and gait     22 min Neuromuscular Re-education:  []  See flow sheet :   Rationale: increase strength, improve coordination and improve balance  to improve the patients ability to improve safety with gait in home and community       With   [] TE   [] TA   [] neuro   [] other: Patient Education: [x] Review HEP    [] Progressed/Changed HEP based on:   [] positioning   [] body mechanics   [] transfers   [] heat/ice application    [] other:      Other Objective/Functional Measures: Added Bosu squats, Bosu step up/out +. Pain Level (0-10 scale) post treatment: 5/10    ASSESSMENT/Changes in Function: Pt's program progressed today to focus on LE stability, dynamic standing balance. Pt challenged with progressions but reported no increase in hip pain. Pt required 1 UE support on parallel bars for balance activities.   Will continue to progress per Pt tolerance to improve LE strength, stability, and standing balance for safety with gait. Patient will continue to benefit from skilled PT services to modify and progress therapeutic interventions, address functional mobility deficits, address ROM deficits, address strength deficits, analyze and address soft tissue restrictions, analyze and cue movement patterns, analyze and modify body mechanics/ergonomics, assess and modify postural abnormalities, address imbalance/dizziness and instruct in home and community integration to attain remaining goals. []  See Plan of Care  []  See progress note/recertification  []  See Discharge Summary         Progress towards goals / Updated goals:  Goal: Pt to increase B hip/knee strength to 4+/5 grossly to increase standing/amb tolerance for ease of performing cooking, household chores. Status at last note/certification: knee extension right 5/5, left 4+/5; flexion bilateral 4+/5; hip IR bilaterally 4/5; hip flexion right 4/5, left 4-/5; hip abduction right 4/5, left 4/5 (3/22/22)  Current:   Goal: Pt to perform 20 single leg heel raises bilaterally without deviations, fatigue for improved ankle stability and increased safety with gait. Status at last note/certification: 12 x bilaterally with UE support , with significant instability and intermittent knee flexion (3/22/22)  Current:   Goal: Pt to increase B SLS x 15 seconds without deviations to reduce furniture/wall walking with gait and increase safety with community level amb. Status at last note/certification: left 2 seconds, right 10 seconds (3/22/22)  Current:   Goal: Pt to report < 4/10 pain at worst to increase ease with ADLs.   Status at last note/certification: 5/10 pain at worst (3/22/22)  Current: progressing - still 5/10 pain at worst (3/29/22)    PLAN  [x]  Upgrade activities as tolerated     []  Continue plan of care  []  Update interventions per flow sheet       []  Discharge due to:_  []  Other:_      Venkata Ji, PT 3/31/2022  9:03 AM    Future Appointments   Date Time Provider Amalia Silver   4/19/2022 11:00 AM Mindy Tyson, [facility] BS AMB   5/9/2022 10:45 AM Michi Sterling MD [facility] BS AMB   8/9/2022  9:15 AM Lexy Perez MD [facility] BS AMB

## 2022-03-31 NOTE — TELEPHONE ENCOUNTER
Returned call. Symptoms of GI upset have now resolved. Will have patient decrease metformin to 500 mg twice daily for now; reiterated importance of taking medication with food. May consider titrating medication back up in the future if he tolerates it okay. Continue Lantus and Trulicity (for now) as prescribed. Plan to touch base via telephone in 1 week to see how patient is doing with this. Discussed that ankle swelling likely unrelated to his diabetes medications, and more so related to his ongoing ankle pain. Advised to continue following with physical therapy for assessment. Thank you,  Anne May. ESTUARDO Banks                          For Pharmacy Admin Tracking Only     CPA in place:  Yes   Recommendation Provided To: Patient/Caregiver: 1 via Telephone   Intervention Detail: Dose Adjustment: 1, reason: Therapy De-escalation   Gap Closed?: No   Intervention Accepted By: Patient/Caregiver: 1   Time Spent (min): 30

## 2022-04-14 ENCOUNTER — HOSPITAL ENCOUNTER (OUTPATIENT)
Dept: PHYSICAL THERAPY | Age: 57
Discharge: HOME | End: 2022-04-14
Attending: FAMILY MEDICINE
Payer: MEDICAID

## 2022-04-14 ENCOUNTER — TELEPHONE (OUTPATIENT)
Dept: INTERNAL MEDICINE CLINIC | Age: 57
End: 2022-04-14

## 2022-04-14 PROCEDURE — 97112 NEUROMUSCULAR REEDUCATION: CPT

## 2022-04-14 PROCEDURE — 97110 THERAPEUTIC EXERCISES: CPT

## 2022-04-14 PROCEDURE — 97530 THERAPEUTIC ACTIVITIES: CPT

## 2022-04-14 NOTE — PROGRESS NOTES
PT DAILY TREATMENT NOTE     Patient Name: Jc Dixon.  VPJX:  : 1965  [x]  Patient  Verified  Payor: [facility] MEDICAID / Plan: [address] [place] [address] / Product Type: Managed Care Medicaid /    In time:3:45  Out time: 4:20  Total Treatment Time (min): 35  Visit #: 5 of 10    Medicare/BCBS Only   Total Timed Codes (min):   1:1 Treatment Time:         Treatment Area: Unsteadiness on feet [R26.81]    SUBJECTIVE  Pain Level (0-10 scale): 0  Any medication changes, allergies to medications, adverse drug reactions, diagnosis change, or new procedure performed?: [x] No    [] Yes (see summary sheet for update)  Subjective functional status/changes:   [] No changes reported  No pain, but my feet are swollen      OBJECTIVE      15 min Therapeutic Exercise:  [] See flow sheet :   Rationale: increase ROM and increase strength to improve the patients ability to perform household chores    10 min Therapeutic Activity:  []  See flow sheet :   Rationale: increase strength  to improve the patients ability to improve ease of stairs, gait stability and transfers     10 min Neuromuscular Re-education:  []  See flow sheet :   Rationale: increase strength, improve coordination and improve balance  to improve the patients ability to sfety with gait and mobility in home and community          With   [] TE   [] TA   [] neuro   [] other: Patient Education: [x] Review HEP    [] Progressed/Changed HEP based on:   [] positioning   [] body mechanics   [] transfers   [] heat/ice application    [] other:      Other Objective/Functional Measures: ex's per card     Pain Level (0-10 scale) post treatment: 0    ASSESSMENT/Changes in Function:  Today's program focused on staitc and dynamic balance activities. SL HR remains challenging with frequent LOB episodes that he was able to recover form using stepping reactions.   Pt described fatigue at ascend of session due to swelling in feet    Patient will continue to benefit from skilled PT services to modify and progress therapeutic interventions, address functional mobility deficits, address ROM deficits, address strength deficits, analyze and address soft tissue restrictions, analyze and cue movement patterns, analyze and modify body mechanics/ergonomics, assess and modify postural abnormalities, address imbalance/dizziness and instruct in home and community integration to attain remaining goals. []  See Plan of Care  []  See progress note/recertification  []  See Discharge Summary         Progress towards goals / Updated goals:  Goal: Pt to increase B hip/knee strength to 4+/5 grossly to increase standing/amb tolerance for ease of performing cooking, household chores. Status at last note/certification: knee extension right 5/5, left 4+/5; flexion bilateral 4+/5; hip IR bilaterally 4/5; hip flexion right 4/5, left 4-/5; hip abduction right 4/5, left 4/5 (3/22/22)  Current:   Goal: Pt to perform 20 single leg heel raises bilaterally without deviations, fatigue for improved ankle stability and increased safety with gait. Status at last note/certification: 12 x bilaterally with UE support , with significant instability and intermittent knee flexion (3/22/22)  Current:   Goal: Pt to increase B SLS x 15 seconds without deviations to reduce furniture/wall walking with gait and increase safety with community level amb. Status at last note/certification: left 2 seconds, right 10 seconds (3/22/22)  Current:   Goal: Pt to report < 4/10 pain at worst to increase ease with ADLs.   Status at last note/certification: 5/10 pain at worst (3/22/22)  Current: progressing - still 5/10 pain at worst (3/29/22)    PLAN  [x]  Upgrade activities as tolerated     []  Continue plan of care  []  Update interventions per flow sheet       []  Discharge due to:_  []  Other:_      Salomon Rick, PTA 4/14/2022  3:47 PM    Future Appointments   Date Time Provider Amalia Silver   4/19/2022 11:00 AM Ignacio Kuhn Nathalie Morales, PHARMD [facility] BS AMB   5/9/2022 10:45 AM Carolann Mendieta MD [facility] BS AMB   8/9/2022  9:15 AM Dominick Vann MD [facility] BS AMB

## 2022-04-15 NOTE — TELEPHONE ENCOUNTER
Spoke with patient's fiance to make sure patient was tolerating the metformin and Trulicity better. She states that he has not been throwing up and is using the bathroom less. They are okay with continuing the medications for now and we will reassess the plan at upcoming visit next week. Thank you,  Pau Hernandez. ESTUARDO Freeman              For Pharmacy Admin Tracking Only     CPA in place:  Yes   Recommendation Provided To: Patient/Caregiver: 0 via Telephone   Intervention Accepted By: Patient/Caregiver: 0   Time Spent (min): 20

## 2022-04-19 ENCOUNTER — OFFICE VISIT (OUTPATIENT)
Dept: FAMILY MEDICINE CLINIC | Age: 57
End: 2022-04-19

## 2022-04-19 DIAGNOSIS — E11.65 TYPE 2 DIABETES MELLITUS WITH HYPERGLYCEMIA, WITH LONG-TERM CURRENT USE OF INSULIN (HCC): Primary | ICD-10-CM

## 2022-04-19 DIAGNOSIS — Z79.4 TYPE 2 DIABETES MELLITUS WITH HYPERGLYCEMIA, WITH LONG-TERM CURRENT USE OF INSULIN (HCC): Primary | ICD-10-CM

## 2022-04-19 NOTE — PROGRESS NOTES
Pharmacy Progress Note - Diabetes Management    Assessment / Plan:   Diabetes Management:  - Per ADA guidelines, Pt's A1c is not at goal of < 7%. - Current SMBG(s)/CGM trend is unable to be assessed since patient has not been monitoring his blood sugars consistently   - Patient and fiancee have noticed that his portion sizes of meals are smaller and he is not snacking on sweets as much as he used to  - Symptoms of GI upset are improving on the lower dose of metformin, and patient is okay with continuing with medication for now   - Continue Trulicity 0.32 mg once weekly, metformin 500 mg twice daily and Lantus 46 units once daily   - Patient/fiance are still potentially interested in getting patient an insulin pump. Instructed them to discuss this with Dr. Ada Mejia at their follow up visit to get an Endocrinologist referral for evaluation. Patient will need to show adherence and ability to be actively engaged in his care in order to be a candidate.   - Plan to touch base with patient via telephone after visit with PCP in a few weeks to discuss plan moving forward          S/O: Mr. Lola Sandifer. is a 64 y.o. male, referred by Dr. Aries Zarate MD was seen today for diabetes management visit. Patient's last A1c was 9.7% in February 2022. Patient seen today with his fidanyele. Current anti-hyperglycemic regimen include(s):    - Lantus 46 units once daily  - Metformin 500 mg twice daily   - Trulicity 3.18 mg once weekly     Patient reports adherence to his medication regimen.      ROS:  Today, Pt endorses:  - Symptoms of Hyperglycemia: excessive thirst, polyuria and blurry vision  - Symptoms of Hypoglycemia: none    Self Monitoring Blood Glucose (SMBG) or CGM:  - Brought in home glucometer/blood glucose log/CGM reader today:  no  - Conducts/scans: Patient has been unwilling to monitor blood sugars at home     Nutrition/Lifestyle Modifications:  - Appetite is decreasing per patient and fiancee  - He is also not eating as many sweets as he used to     Last visit:  - Overall, denis states that the patient does well with limiting carbs with his meals. He is more of a meat and vegetables person and does not eat a lot of rice, potatoes, pasta or bread  - He does, however, have a big sweet tooth and drinks sodas all throughout the day and consumes a lot fo sweets and candy     The ASCVD Risk score ([address] et al., 2013) failed to calculate for the following reasons: The patient has a prior MI or stroke diagnosis   - Atorvastatin 80 mg    Vitals: Wt Readings from Last 3 Encounters:   02/09/22 198 lb 9.6 oz (90.1 kg)   02/08/22 195 lb (88.5 kg)   11/10/21 194 lb (88 kg)     BP Readings from Last 3 Encounters:   03/17/22 (!) 152/86   02/09/22 (!) 168/100   02/08/22 139/70     Pulse Readings from Last 3 Encounters:   02/08/22 86   11/10/21 75   10/12/21 84       Past Medical History:   Diagnosis Date    Arthritis     Chest pain     [facility]est[facility] failure[facility]     CVA (cerebral vascular accident) [facility] 2011    r side stroke    CVA (cerebral vascular accident) ([facility] 09/05/2018    admitted at [address] [place] [address]) 2010    chest pains    Hypercholesterolemia     Hypertension     Loss of hearing     right ear, patient states it comes and goes    Polyuria     Shortness of breath 2011    Type 2 diabetes mellitus without complication, with long-term current use of insulin ([facility] 07/23/2013    Type 2 diabetes mellitus, with long-term current use of insulin ([facility]) 7/23/2013     Allergies   Allergen Reactions    Lisinopril Cough       Current Outpatient Medications   Medication Sig    albuterol (PROVENTIL HFA, VENTOLIN HFA, PROAIR HFA) 90 mcg/actuation inhaler Take 2 Puffs by inhalation every four (4) hours as needed for Wheezing or Shortness of Breath.  insulin glargine (Lantus Solostar U-100 Insulin) 100 unit/mL (3 mL) inpn 46 Units by SubCUTAneous route nightly.     dulaglutide (Trulicity) 7.92 YW/6.2 mL sub-q pen 0.5 mL by SubCUTAneous route every seven (7) days.  metFORMIN ER (GLUCOPHAGE XR) 500 mg tablet Take 2 Tablets by mouth two (2) times daily (with meals). (Patient taking differently: Take 500 mg by mouth two (2) times daily (with meals). )    OneTouch Verio test strips strip test BLOOD SUGAR TWICE DAILY AS DIRECTED    metoprolol tartrate (LOPRESSOR) 25 mg tablet Take 1 tablet by mouth twice daily    traZODone (DESYREL) 100 mg tablet Take 1-2 Tablets by mouth nightly as needed for Sleep.  furosemide (LASIX) 40 mg tablet TAKE ONE TABLET BY MOUTH EVERY DAY    ticagrelor (BRILINTA) 90 mg tablet Take 1 Tablet by mouth two (2) times a day.  hydroCHLOROthiazide (HYDRODIURIL) 12.5 mg tablet TAKE ONE TABLET BY MOUTH EVERY DAY    gabapentin (NEURONTIN) 300 mg capsule TAKE TWO CAPSULES BY MOUTH EVERY EVENING    aspirin 81 mg chewable tablet Take 2 Tablets by mouth daily.  Unifine Pentips 31 gauge x 5/16\" ndle USE TO INJECT insulin EVERY DAY AS DIRECTED    escitalopram oxalate (LEXAPRO) 20 mg tablet Take 1 Tab by mouth daily.  losartan (COZAAR) 100 mg tablet Take 1 Tab by mouth daily.  atorvastatin (LIPITOR) 80 mg tablet Take 1 Tab by mouth daily.  multivitamin (ONE A DAY) tablet Take 1 Tab by Mouth Once a Day. Similar alternatives are ok. No current facility-administered medications for this visit. Lab Results   Component Value Date/Time    Sodium 138 02/11/2022 07:14 AM    Potassium 3.8 02/11/2022 07:14 AM    Chloride 104 02/11/2022 07:14 AM    CO2 30 02/11/2022 07:14 AM    Anion gap 4 02/11/2022 07:14 AM    Glucose 267 (H) 02/11/2022 07:14 AM    BUN 8 02/11/2022 07:14 AM    Creatinine 0.87 02/11/2022 07:14 AM    BUN/Creatinine ratio 9 (L) 02/11/2022 07:14 AM    GFR est AA >60 02/11/2022 07:14 AM    GFR est non-AA >60 02/11/2022 07:14 AM    Calcium 9.1 02/11/2022 07:14 AM    Bilirubin, total 0.4 02/11/2022 07:14 AM    Alk.  phosphatase 71 02/11/2022 07:14 AM    Protein, total 6.6 02/11/2022 07:14 AM    Albumin 3.1 (L) 02/11/2022 07:14 AM    Globulin 3.5 02/11/2022 07:14 AM    A-G Ratio 0.9 02/11/2022 07:14 AM    ALT (SGPT) 15 (L) 02/11/2022 07:14 AM       Lab Results   Component Value Date/Time    Cholesterol, total 134 02/11/2022 07:14 AM    HDL Cholesterol 39 (L) 02/11/2022 07:14 AM    LDL, calculated 76 02/11/2022 07:14 AM    VLDL, calculated 19 02/11/2022 07:14 AM    Triglyceride 95 02/11/2022 07:14 AM    CHOL/HDL Ratio 3.4 02/11/2022 07:14 AM       Lab Results   Component Value Date/Time    WBC 8.6 11/02/2020 08:03 AM    HGB 13.7 11/02/2020 08:03 AM    HCT 40.5 11/02/2020 08:03 AM    PLATELET 504 37/48/0205 08:03 AM    MCV 88.6 11/02/2020 08:03 AM       Lab Results   Component Value Date/Time    Microalbumin/Creat ratio (mg/g creat) 708 (H) 02/11/2022 07:14 AM    Microalbumin,urine random 63.70 (H) 02/11/2022 07:14 AM       HbA1c:  Lab Results   Component Value Date/Time    Hemoglobin A1c 9.7 (H) 02/11/2022 07:14 AM    Hemoglobin A1c (POC) 9.9% 05/24/2019 11:25 AM     No components found for: 2     Last Point of Care HGB A1C  Hemoglobin A1c (POC)   Date Value Ref Range Status   05/24/2019 9.9% % Final        CrCl cannot be calculated (Unknown ideal weight. ). Medication reconciliation was completed during the visit. There are no discontinued medications. Patient verbalized understanding of the information presented and all of the patients questions were answered. AVS was handed to the patient. Patient advised to call the office with any additional questions or concerns. Thank you,  Donna Rand. ESTUARDO John          For Pharmacy Admin Tracking Only     CPA in place:  Yes   Recommendation Provided To: Patient/Caregiver: 0 via In person   Intervention Accepted By: Patient/Caregiver: 0   Time Spent (min): 45

## 2022-04-19 NOTE — PATIENT INSTRUCTIONS
Your Visit Summary:     Plan:  - Continue Trulicity 8.17 mg once weekly  - Continue Lantus 46 units once daily  - Continue metformin  mg twice daily; decrease to once daily if you continue to have too much stomach upset   - Will touch base with you after your visit with Dr. Chloe Uribe in a few weeks         Call me with any questions or concerns  Eliana Leigh [phone]    Check and document your blood sugar first thing in the morning (fasting), 1-2 hours after a meal, and/or before bedtime. Bring your meter/log to all future visits. Your blood sugar goals:  - Fasting (first thing in the morning)  blood sugar: 80 - 130   - 1 to 2 hours after a meal: less than 180     When you experience symptoms of low blood sugar (example: less than 70):  - Confirm low reading by checking your blood sugar.   - Then treat with 15 grams of carbohydrates (one-half cup of juice or regular soda, or 4-5 glucose tablets). - Wait 15 minutes to recheck blood sugar.   - Then eat a protein containing meal/snack to prevent another low blood sugar episode. (example: peanut butter + crackers)    Nutrition:  - When reviewing a nutrition label, focus on the serving size, total calories, fat (and type of fats), total carbohydrates, sugar (and amount of added sugar), amount of fiber (good for your digestive), and amount of protein. Refer to your nutrition label guide for more information.  - For a meal : max 45 grams of carbohydrates  - For a snack: max 15 grams of carbohydrates  - Reduce amount of saturated and trans fat. Consider more unsaturated fat options as they are better for your heart health.    - Have at least 1 serving of lean fat protein with each meal.    - Increase fiber intake slowly to prevent constipation.   - Substitute fruit juices for the whole fruit    Low carb snack ideas (15 grams total carb or less):     String cheese or babybel with 6 crackers   4 peanut butter crackers   3 cups of popcorn   1 cup raw vegetables with hummus or ranch dip (just need to watch how much dip you use)   Nuts   2 rice cakes   Celery with peanut butter or cream cheese   String cheese with 1 serving of fruit   Greek yogurt (look at label to make sure < 15 gram carb)   Plain greek yogurt with fresh berries added   Nature valley protein bar   Whisps parmesan cheese crisps   Hard boiled egg   Cottage cheese   Tuna salad lettuce roll-ups   Deli meat roll-ups with slice of cheese   Sugar Free Jello   Glucerna shake (16 grams)    Glucerna hunger smart shake (16 grams)   Ensure protein max shake   Fruit (1 serving/15 grams)   1/2 banana, montez, or grapefruit    1/3 melon (small cantaloupe)   1 slice or 1 cup of honeydew melon   1 slice or 1 and 1/4 cups of watermelon    1 small apple, peach, orange or pear   2 small tangerines   1 cup of raspberries   3/4 cup of blackberries, blueberries or pineapples   1/2 cup of fruit juice, pears, applesauce, or mangos   17 small grapes   12 cherries    Be careful with the glucerna products as they differ in the total carbs depending on the product (some are intended as meal replacements not snacks). Make sure you look at the total carbs on the label as products can differ. Physical Activity:  - Aim for 30 minutes of consistent, moderately intensive, physical activity a day for 5 days or an average of 150 minutes per week. - Start slow, increase as tolerated. For example: Walk every day, working up to 30 minutes of brisk walking, 5 days a week--or split the 30 minutes into two 15-minute or three 10-minute walks. - If you sit for a long time, get up and move/stretch every 90 minutes. Other recommendations:  - Schedule an annual eye exam.  - Check your feet daily for any signs of open wounds, cuts, or sores. - Given your risk factors, the following vaccines are recommended: annual flu shot, age-based pneumococcal vaccines (Pneumovax, Prevnar 13).     In addition to taking your medications as directed, improving your blood sugar involves modifying your nutrition and maximizing the amount of physical activity.

## 2022-04-19 NOTE — Clinical Note
Hello! Met with this patient and his fiancee for follow up. He has been unwilling to monitor blood sugars at home, so it is unclear where his current control is. He is doing better with taking medications consistently and is tolerating the metformin and Trulicity a little better as well. He is still interested in an insulin pump and will discuss a possible Endocrinology referral for this with you at his visit on 5/9. Thanks!   Tom Bravo

## 2022-04-21 ENCOUNTER — APPOINTMENT (OUTPATIENT)
Dept: PHYSICAL THERAPY | Age: 57
End: 2022-04-21
Attending: FAMILY MEDICINE
Payer: MEDICAID

## 2022-04-27 ENCOUNTER — HOSPITAL ENCOUNTER (OUTPATIENT)
Dept: PHYSICAL THERAPY | Age: 57
Discharge: HOME | End: 2022-04-27
Attending: FAMILY MEDICINE
Payer: MEDICAID

## 2022-04-27 PROCEDURE — 97112 NEUROMUSCULAR REEDUCATION: CPT

## 2022-04-27 PROCEDURE — 97530 THERAPEUTIC ACTIVITIES: CPT

## 2022-04-27 PROCEDURE — 97110 THERAPEUTIC EXERCISES: CPT

## 2022-04-27 NOTE — PROGRESS NOTES
PT DAILY TREATMENT NOTE     Patient Name: Dana Wilson.  Date:2022  : 1965  [x]  Patient  Verified  Payor: [place]ICA[place] / Plan: 1500 / Product Type: Medicaid /    In GCCS:8065 PM Out time: 0765 PM  Total Treatment Time (min): 42  Visit #: 6 of 10    Medicare/BCBS Only   Total Timed Codes (min):   1:1 Treatment Time:         Treatment Area: Unsteadiness on feet [R26.81]    SUBJECTIVE  Pain Level (0-10 scale): 0  Any medication changes, allergies to medications, adverse drug reactions, diagnosis change, or new procedure performed?: [x] No    [] Yes (see summary sheet for update)  Subjective functional status/changes:   [] No changes reported  \"I'm okay\"    OBJECTIVE    10 min Therapeutic Exercise:  [x] See flow sheet :   Rationale: increase ROM and increase strength to improve the patients ability to perform household chores    12 min Therapeutic Activity:  [x]  See flow sheet : Goals, PN assess, Pt's progress   Rationale: increase strength  to improve the patients ability to improve ease of stairs, gait stability and transfers     20 min Neuromuscular Re-education:  [x]  See flow sheet :   Rationale: increase strength, improve coordination and improve balance  to improve the patients ability to sfety with gait and mobility in home and community          With   [x] TE   [x] TA   [x] neuro   [] other: Patient Education: [] Review HEP    [] Progressed/Changed HEP based on:   [] positioning   [x] body mechanics   [] transfers   [] heat/ice application    [x] other: patient's progress; continuing with PT program for 1x/week for 4 more visits to improve calf strength, balance and independence with updated and comprehensive HEP     Other Objective/Functional Measures:   Pain at worst: 7 (legs); on average: 4  Subjective % improvement: 60%  Functional improvements: Patient reports improvements in walking tolerance (up to 1 mile); improvements in balance (no recent falls, \"haven't fell since I've been coming to therapy\")  Functional limitations: Patient reports limitation with walking- \"on and off\"  FOTO: goal met 3/22/22 (55 pts)  SLS (seconds): Right: 8,5 Left: 2,4  MMT: knee extension right 5/5, left 4+/5; flexion bilateral 4+/5; hip IR 4+/5 bilat; hip ER 4+/5 bilat; hip flexion right 5/5, left 4+/5; hip abduction right 4+/5, left 4+/5; hip extension: Right: 5/5, left: 4/5    Heel raises: 15 repetitions bilaterally with use of rail for steadying and with decreased range/push off noted. Pain Level (0-10 scale) post treatment: 5 \"sore\"    Assessment / Recommendations:  Pt is a 64y.o. year old male who has been receiving skilled OP PT services at [facility] with Unsteadiness on feet [R26.81]. Physical therapy has consisted of therapeutic exercises for increased strength, improved ROM; neuromuscular re-education for improved motor control and balance; therapeutic activities for education with HEP, ease with transfers. Pt has been seen for initial evaluation and 9 visits, making fair progress toward set goals. Patient continues to present with decreased heel raise push off and impaired balance. Pt will continue to benefit from skilled OP PT 1-2 x per week for 4 weeks in order to address remaining and above mentioned impairments to maximize patient's functional status and independence. Patient will continue to benefit from skilled PT services to modify and progress therapeutic interventions, address functional mobility deficits, address ROM deficits, address strength deficits, analyze and address soft tissue restrictions, analyze and cue movement patterns, analyze and modify body mechanics/ergonomics, assess and modify postural abnormalities, address imbalance/dizziness and instruct in home and community integration to attain remaining goals.      []  See Plan of Care  [x]  See progress note/recertification  []  See Discharge Summary         Progress towards goals / Updated goals:  Goal: Pt to increase B hip/knee strength to 4+/5 grossly to increase standing/amb tolerance for ease of performing cooking, household chores. Status at last note/certification: knee extension right 5/5, left 4+/5; flexion bilateral 4+/5; hip IR bilaterally 4/5; hip flexion right 4/5, left 4-/5; hip abduction right 4/5, left 4/5 (3/22/22)  Current: MMT: knee extension right 5/5, left 4+/5; flexion bilateral 4+/5; hip IR 4+/5 bilat; hip ER 4+/5 bilat; hip flexion right 5/5, left 4+/5; hip abduction right 4+/5, left 4+/5; hip extension: Right: 5/5, left: 4/5 (4/27/22) not met with left hip extension- progressing   Goal: Pt to perform 20 single leg heel raises bilaterally without deviations, fatigue for improved ankle stability and increased safety with gait. Status at last note/certification: 12 x bilaterally with UE support , with significant instability and intermittent knee flexion (3/22/22)  Current: Heel raises: 15 repetitions bilaterally with use of rail for steadying and with decreased range/push off noted. (4/27/22) progressing, not met   Goal: Pt to increase B SLS x 15 seconds without deviations to reduce furniture/wall walking with gait and increase safety with community level amb. Status at last note/certification: left 2 seconds, right 10 seconds (3/22/22)  Current: SLS (seconds): Right: 8,5 Left: 2,4 (4/27/22) not met   Goal: Pt to report < 4/10 pain at worst to increase ease with ADLs.   Status at last note/certification: 5/10 pain at worst (3/22/22)  Current: progressing - still 5/10 pain at worst (3/29/22) Pain at worst: 7 (legs); on average: 4 (4/27/22) not met     PLAN  [x]  Upgrade activities as tolerated     [x]  Continue plan of care  []  Update interventions per flow sheet       []  Discharge due to:_  []  Other:_      Manju Fee, PT 4/27/2022  6:10 PM     Future Appointments   Date Time Provider Amalia Silver   5/9/2022 10:45 AM Elizabeth Officer, MD [facility] AMB   8/9/2022  9:15 AM Cristian Barth MD [facility] AMB

## 2022-04-27 NOTE — PROGRESS NOTES
[facility] - [facility], [address] [place] [address]  [phone] fax    Progress Note  Patient name: Davian Welch Start of Care: 22   Referral source: Marty Gonzalez MD : 1965   Medical/Treatment Diagnosis: Unsteadiness on feet [R26.81]  Payor: [address] [place] [address] / Plan: [employer] / Product Type: Medicaid /  Onset Date:22     Prior Hospitalization: see medical history Provider#: [id number]   Medications: Verified on Patient Summary List    Comorbidities:  Arthritis; Depression; DM Type II; Heart disease; HTN; hx CVA x 2; hx MI  Prior Level of Function:On disability; lives in [facility] with denis'; functionally independent with pain    Visits from Start of Care: 10    Missed Visits: 3    Progress towards goals / Updated goals:  Goal: Pt to increase B hip/knee strength to 4+/5 grossly to increase standing/amb tolerance for ease of performing cooking, household chores. Status at last note/certification: knee extension right 5/5, left 4+/5; flexion bilateral 4+/5; hip IR bilaterally 4/5; hip flexion right 4/5, left 4-/5; hip abduction right 4/5, left 4/5 (3/22/22)  Current: MMT: knee extension right 5/5, left 4+/5; flexion bilateral 4+/5; hip IR 4+/5 bilat; hip ER 4+/5 bilat; hip flexion right 5/5, left 4+/5; hip abduction right 4+/5, left 4+/5; hip extension: Right: 5/5, left: 4/5 (22) not met with left hip extension- progressing   Goal: Pt to perform 20 single leg heel raises bilaterally without deviations, fatigue for improved ankle stability and increased safety with gait. Status at last note/certification: 12 x bilaterally with UE support , with significant instability and intermittent knee flexion (3/22/22)  Current: Heel raises: 15 repetitions bilaterally with use of rail for steadying and with decreased range/push off noted.  (22) progressing, not met   Goal: Pt to increase B SLS x 15 seconds without deviations to reduce furniture/wall walking with gait and increase safety with community level amb. Status at last note/certification: left 2 seconds, right 10 seconds (3/22/22)  Current: SLS (seconds): Right: 8,5 Left: 2,4 (4/27/22) not met   Goal: Pt to report < 4/10 pain at worst to increase ease with ADLs. Status at last note/certification: 5/10 pain at worst (3/22/22)  Current: progressing - still 5/10 pain at worst (3/29/22) Pain at worst: 7 (legs); on average: 4 (4/27/22) not met     Key Functional Changes:   Pt is a 64y.o. year old male who has been receiving skilled OP PT services at [facility] with Unsteadiness on feet [R26.81]. Physical therapy has consisted of therapeutic exercises for increased strength, improved ROM; neuromuscular re-education for improved motor control and balance; therapeutic activities for education with HEP, ease with transfers. Pt has been seen for initial evaluation and 9 visits, making fair progress toward set goals. Patient continues to present with decreased heel raise push off and impaired balance. Other Objective/Functional Measures:   Pain at worst: 7 (legs); on average: 4  Subjective % improvement: 60%  Functional improvements: Patient reports improvements in walking tolerance (up to 1 mile); improvements in balance (no recent falls, \"haven't fell since I've been coming to therapy\")  Functional limitations: Patient reports limitation with walking- \"on and off\"  FOTO: goal met 3/22/22 (55 pts)  SLS (seconds): Right: 8,5 Left: 2,4  MMT: knee extension right 5/5, left 4+/5; flexion bilateral 4+/5; hip IR 4+/5 bilat; hip ER 4+/5 bilat; hip flexion right 5/5, left 4+/5; hip abduction right 4+/5, left 4+/5; hip extension: Right: 5/5, left: 4/5  Heel raises: 15 repetitions bilaterally with use of rail for steadying and with decreased range/push off noted.    Pt will continue to benefit from skilled OP PT 1-2 x per week for 4 weeks in order to address remaining and above mentioned impairments to maximize patient's functional status and independence. Updated Goals: to be achieved in 4 weeks:  Continue with above unmet goals   Goal: Pt to increase left hip extension strength to 4+/5 to increase standing/amb tolerance for ease of performing cooking, household chores. Status at last note/certification: MMT hip extension: Right: 5/5, left: 4/5  Goal: Pt to perform 20 single leg heel raises bilaterally without deviations, fatigue for improved ankle stability and increased safety with gait. Status at last note/certification: Heel raises: 15 repetitions bilaterally with use of rail for steadying and with decreased range/push off noted  Goal: Pt to increase B SLS x 15 seconds without deviations to reduce furniture/wall walking with gait and increase safety with community level amb. Status at last note/certification: SLS (seconds): Right: 8,5 Left: 2,4   Goal: Pt to report < 4/10 pain at worst to increase ease with ADLs.   Status at last note/certification:  Pain at worst: 7 (legs); on average: 4   Goal: Patient will be independent  with comprehensive updated HEP to maintain functional gains made in skilled OP PT  Status at last certification/assessment: new goal- will plan to update HEP next visit      ASSESSMENT/RECOMMENDATIONS:  [x]Continue therapy per initial plan/protocol at a frequency of  1-2 x per week for 4 weeks  []Continue therapy with the following recommended changes:_____________________      _____________________________________________________________________  []Discontinue therapy progressing towards or have reached established goals  []Discontinue therapy due to lack of appreciable progress towards goals  []Discontinue therapy due to lack of attendance or compliance  []Await Physician's recommendations/decisions regarding therapy  []Other:________________________________________________________________    Thank you for this referral.   Justus Godoy, PT 4/27/2022 6:16 PM   NOTE TO PHYSICIAN:  PLEASE COMPLETE THE ORDERS BELOW AND   FAX TO [facility]: [phone]  If you are unable to process this request in 24 hours please contact our office: [phone]  []  I have read the above report and request that my patient continue as recommended. []  I have read the above report and request that my patient continue therapy with the following changes/special instructions:________________________________________  []I have read the above report and request that my patient be discharged from therapy.     Physician's Signature:____________Date:_________TIME:________     Chaz Miranda MD  ** Signature, Date and Time must be completed for valid certification **

## 2022-05-09 ENCOUNTER — OFFICE VISIT (OUTPATIENT)
Dept: FAMILY MEDICINE CLINIC | Age: 57
End: 2022-05-09
Payer: MEDICAID

## 2022-05-09 VITALS
HEART RATE: 89 BPM | OXYGEN SATURATION: 98 % | BODY MASS INDEX: 32.42 KG/M2 | SYSTOLIC BLOOD PRESSURE: 152 MMHG | DIASTOLIC BLOOD PRESSURE: 92 MMHG | RESPIRATION RATE: 16 BRPM | WEIGHT: 194.8 LBS

## 2022-05-09 DIAGNOSIS — I10 ESSENTIAL (PRIMARY) HYPERTENSION: ICD-10-CM

## 2022-05-09 DIAGNOSIS — I10 ESSENTIAL HYPERTENSION: ICD-10-CM

## 2022-05-09 DIAGNOSIS — E11.65 TYPE 2 DIABETES MELLITUS WITH HYPERGLYCEMIA, WITH LONG-TERM CURRENT USE OF INSULIN (HCC): ICD-10-CM

## 2022-05-09 DIAGNOSIS — Z86.73 PERSONAL HISTORY OF TRANSIENT ISCHEMIC ATTACK (TIA), AND CEREBRAL INFARCTION WITHOUT RESIDUAL DEFICITS: ICD-10-CM

## 2022-05-09 DIAGNOSIS — Z79.4 TYPE 2 DIABETES MELLITUS WITH HYPERGLYCEMIA, WITH LONG-TERM CURRENT USE OF INSULIN (HCC): ICD-10-CM

## 2022-05-09 DIAGNOSIS — E78.2 MIXED HYPERLIPIDEMIA: ICD-10-CM

## 2022-05-09 DIAGNOSIS — E11.40 TYPE 2 DIABETES MELLITUS WITH DIABETIC NEUROPATHY, WITHOUT LONG-TERM CURRENT USE OF INSULIN (HCC): Primary | ICD-10-CM

## 2022-05-09 LAB — HBA1C MFR BLD HPLC: 9.6 %

## 2022-05-09 PROCEDURE — 99214 OFFICE O/P EST MOD 30 MIN: CPT | Performed by: FAMILY MEDICINE

## 2022-05-09 PROCEDURE — 3046F HEMOGLOBIN A1C LEVEL >9.0%: CPT | Performed by: FAMILY MEDICINE

## 2022-05-09 PROCEDURE — 83036 HEMOGLOBIN GLYCOSYLATED A1C: CPT | Performed by: FAMILY MEDICINE

## 2022-05-09 NOTE — PROGRESS NOTES
Aldon Loss presents today for   Chief Complaint   Patient presents with    Diabetes    Cholesterol Problem    Hypertension    Labs       Aldon Loss preferred language for health care discussion is english/other. Is someone accompanying this pt? No     Is the patient using any DME equipment during OV? No     Depression Screening:  3 most recent PHQ Screens 3/17/2022   Little interest or pleasure in doing things Not at all   Feeling down, depressed, irritable, or hopeless Not at all   Total Score PHQ 2 0   Trouble falling or staying asleep, or sleeping too much -   Feeling tired or having little energy -   Poor appetite, weight loss, or overeating -   Feeling bad about yourself - or that you are a failure or have let yourself or your family down -   Trouble concentrating on things such as school, work, reading, or watching TV -   Moving or speaking so slowly that other people could have noticed; or the opposite being so fidgety that others notice -   Thoughts of being better off dead, or hurting yourself in some way -   PHQ 9 Score -   How difficult have these problems made it for you to do your work, take care of your home and get along with others -       Learning Assessment:  Learning Assessment 1/5/2021   PRIMARY LEARNER Patient   HIGHEST LEVEL OF EDUCATION - PRIMARY LEARNER  DID NOT GRADUATE HIGH SCHOOL   BARRIERS PRIMARY LEARNER NONE   CO-LEARNER CAREGIVER No   PRIMARY LANGUAGE ENGLISH   LEARNER PREFERENCE PRIMARY DEMONSTRATION     -   ANSWERED BY Patient   RELATIONSHIP SELF       Abuse Screening:  Abuse Screening Questionnaire 6/9/2021   Do you ever feel afraid of your partner? N   Are you in a relationship with someone who physically or mentally threatens you? N   Is it safe for you to go home? Y       Generalized Anxiety  No flowsheet data found.       Health Maintenance Due   Topic Date Due    Shingrix Vaccine Age 49> (1 of 2) Never done    Foot Exam Q1  02/13/2018    Pneumococcal 0-64 years (2 - PCV) 06/07/2018    Colorectal Cancer Screening Combo  08/23/2020    A1C test (Diabetic or Prediabetic)  05/11/2022   . Health Maintenance reviewed and discussed and ordered per Provider. VACCINES DUE   SCREENINGS DUE       Ludmila Catherine. is updated on all HM    1. \"Have you been to the ER, urgent care clinic since your last visit? Hospitalized since your last visit? \" No    2. \"Have you seen or consulted any other health care providers outside of the [address] [place] [address] since your last visit? \" No     3. For patients aged 39-70: Has the patient had a colonoscopy / FIT/ Cologuard?  No

## 2022-05-09 NOTE — PROGRESS NOTES
Chief Complaint   Patient presents with    Diabetes    Cholesterol Problem    Hypertension    Labs         HPI    Timblin Destiny. is a 64 y.o. male presenting today for 3 months  follow up of dm, hld, htn. Pt is interested in seeing endo for consideration of an insulin pump as discussed with Dr. Carolin Rodarte. Pt did complete PT. He reports good improvement. Pt has been seeing Dr. Carolin Rodarte for help with dm. Patient had labs on 2/11/22. Labs reviewed in detail with patient       Patient does not need medication refills today. New concerns today: none      Review of Systems   Constitutional: Negative. HENT: Negative. Respiratory: Negative. Cardiovascular: Negative. All other systems reviewed and are negative. Physical Exam  Vitals and nursing note reviewed. Constitutional:       General: He is not in acute distress. Appearance: Normal appearance. HENT:      Head: Normocephalic and atraumatic. Right Ear: External ear normal.      Left Ear: External ear normal.      Nose: Nose normal.   Eyes:      Extraocular Movements: Extraocular movements intact. Conjunctiva/sclera: Conjunctivae normal.   Cardiovascular:      Rate and Rhythm: Normal rate and regular rhythm. Heart sounds: No murmur heard. No friction rub. No gallop. Pulmonary:      Effort: Pulmonary effort is normal.      Breath sounds: Normal breath sounds. No wheezing, rhonchi or rales. Musculoskeletal:         General: Normal range of motion. Cervical back: Normal range of motion. No rigidity. Skin:     General: Skin is warm and dry. Neurological:      Mental Status: He is alert and oriented to person, place, and time. Coordination: Coordination normal.   Psychiatric:         Mood and Affect: Mood normal.         Behavior: Behavior normal.         Thought Content:  Thought content normal.         Judgment: Judgment normal.       Recent Results (from the past 12 hour(s))   AMB POC HEMOGLOBIN A1C    Collection Time: 05/09/22 12:53 PM   Result Value Ref Range    Hemoglobin A1c (POC) 9.6 %       Diagnoses and all orders for this visit:    1. Type 2 diabetes mellitus with diabetic neuropathy, without long-term current use of insulin (HCC)  -     AMB POC HEMOGLOBIN A1C  -     REFERRAL TO ENDOCRINOLOGY    2. Type 2 diabetes mellitus with hyperglycemia, with long-term current use of insulin (HCC)  -     AMB POC HEMOGLOBIN A1C  -     REFERRAL TO ENDOCRINOLOGY    3. Essential hypertension  Not at goal today. Patient did not take his medication prior to his appointment today. Patient will return for nurse visit 1 day in the next 1 to 2 weeks. Patient instructed to take medication prior to coming for his appointment. 4. Mixed hyperlipidemia  Stable, cont pres tx plan. Follow-up and Dispositions    · Return in about 3 months (around 8/9/2022) for diabetes, high blood pressure, high cholesterol.

## 2022-05-10 RX ORDER — GUAIFENESIN 100 MG/5ML
162 LIQUID (ML) ORAL DAILY
Qty: 180 TABLET | Refills: 3 | Status: SHIPPED | OUTPATIENT
Start: 2022-05-10

## 2022-05-18 ENCOUNTER — HOSPITAL ENCOUNTER (OUTPATIENT)
Dept: PHYSICAL THERAPY | Age: 57
Discharge: HOME | End: 2022-05-18
Attending: FAMILY MEDICINE
Payer: MEDICAID

## 2022-05-18 PROCEDURE — 97110 THERAPEUTIC EXERCISES: CPT

## 2022-05-18 PROCEDURE — 97112 NEUROMUSCULAR REEDUCATION: CPT

## 2022-05-18 PROCEDURE — 97530 THERAPEUTIC ACTIVITIES: CPT

## 2022-05-18 NOTE — PROGRESS NOTES
PT DAILY TREATMENT NOTE     Patient Name: Ramo Keith.  Date:2022  : 1965  [x]  Patient  Verified  Payor: [facility] MEDICAID / Plan: [address] / Product Type: Managed Care Medicaid /    In time:3:47  Out time: 4:26  Total Treatment Time (min): 39  Visit #: 1 of 8    Medicare/BCBS Only   Total Timed Codes (min):   1:1 Treatment Time:         Treatment Area: Unsteadiness on feet [R26.81]    SUBJECTIVE  Pain Level (0-10 scale): 0  Any medication changes, allergies to medications, adverse drug reactions, diagnosis change, or new procedure performed?: [x] No    [] Yes (see summary sheet for update)  Subjective functional status/changes:   [] No changes reported  Pt states feeling well with no major pain today. OBJECTIVE    9 min Therapeutic Exercise:  [] See flow sheet :   Rationale: increase ROM and increase strength to improve the patients ability to perform ADL's. 10 min Therapeutic Activity:  []  See flow sheet :   Rationale: increase ROM, increase strength, improve coordination and improve balance  to improve the patients ability to ambulate safely. 20 min Neuromuscular Re-education:  []  See flow sheet :   Rationale: increase ROM, increase strength, improve coordination and improve balance  to improve the patients ability to perform household chores. With   [] TE   [] TA   [] neuro   [] other: Patient Education: [x] Review HEP    [] Progressed/Changed HEP based on:   [] positioning   [] body mechanics   [] transfers   [] heat/ice application    [] other:      Other Objective/Functional Measures: Exercises performed per card. Pain Level (0-10 scale) post treatment: 4    ASSESSMENT/Changes in Function: Pt seen today for BLE weakness and gait abnormality to address LE strength and stability. He is slowly progressing towards Goal #2 with 20 S. L. heel raises completed with small ROM of 0.5-1\" clearance from floor.  Increased stability demonstrated by 11 second SLS bilaterally towards Goal #3. No major improvements towards Goal #1 with hip extension strength and #4 with subjective reports of pain at worst. Will continue with skilled PT to improve LE strength and stability to increase safety with ambulation in community. Patient will continue to benefit from skilled PT services to modify and progress therapeutic interventions, address functional mobility deficits, address ROM deficits, address strength deficits, analyze and address soft tissue restrictions, analyze and cue movement patterns, analyze and modify body mechanics/ergonomics, assess and modify postural abnormalities, address imbalance/dizziness and instruct in home and community integration to attain remaining goals. []  See Plan of Care  []  See progress note/recertification  []  See Discharge Summary         Progress towards goals / Updated goals:  Goal: Pt to increase left hip extension strength to 4+/5 to increase standing/amb tolerance for ease of performing cooking, household chores. Status at last note/certification: MMT hip extension: Right: 5/5, left: 4/5   Current: Not met-  Right: 4+/5, left: 4/5 (22)  Goal: Pt to perform 20 single leg heel raises bilaterally without deviations, fatigue for improved ankle stability and increased safety with gait. Status at last note/certification: Heel raises: 15 repetitions bilaterally with use of rail for steadying and with decreased range/push off noted  Current: Progressinx bilaterally with 0.5-1\" clearance from floor (22)  Goal: Pt to increase B SLS x 15 seconds without deviations to reduce furniture/wall walking with gait and increase safety with community level amb. Status at last note/certification: SLS (seconds): Right: 8,5 Left: 2,4   Current: Progressin seconds bilterally (22)   Goal: Pt to report < 4/10 pain at worst to increase ease with ADLs.   Status at last note/certification:  Pain at worst: 7 (legs); on average: 4   Current: 7/10 at worst (5/18/22)  Goal: Patient will be independent  with comprehensive updated HEP to maintain functional gains made in skilled OP PT  Status at last certification/assessment: new goal- will plan to update HEP next visit          PLAN  [x]  Upgrade activities as tolerated     [x]  Continue plan of care  []  Update interventions per flow sheet       []  Discharge due to:_  []  Other:_      Faye Apley, PTA 5/18/2022  3:51 PM    Future Appointments   Date Time Provider Amalia Silver   5/20/2022  2:15 PM Hoa Ramirez, PT [facility]   5/23/2022  1:30 PM Hoa Ramirez, PT [facility]   5/27/2022 12:00 PM Chitra Wilcox, PT [facility]   8/9/2022  9:45 AM Kalyan Maria MD [facility] BS AMB   8/16/2022 11:00 AM Melvin Alvarenga MD [facility] AMB

## 2022-05-20 ENCOUNTER — HOSPITAL ENCOUNTER (OUTPATIENT)
Dept: PHYSICAL THERAPY | Age: 57
Discharge: HOME | End: 2022-05-20
Attending: FAMILY MEDICINE
Payer: MEDICAID

## 2022-05-20 PROCEDURE — 97530 THERAPEUTIC ACTIVITIES: CPT

## 2022-05-20 PROCEDURE — 97112 NEUROMUSCULAR REEDUCATION: CPT

## 2022-05-20 PROCEDURE — 97110 THERAPEUTIC EXERCISES: CPT

## 2022-05-20 NOTE — PROGRESS NOTES
PT DAILY TREATMENT NOTE     Patient Name: Dana Wilson.  Date:2022  : 1965  [x]  Patient  Verified  Payor: [facility] MEDICAID / Plan: [address] / Product Type: Managed Care Medicaid /    In time:1417 pm  Out time: 1457 pm  Total Treatment Time (min): 40  Visit #: 2 of 8    Medicare/BCBS Only   Total Timed Codes (min):  40 1:1 Treatment Time:         Treatment Area: Unsteadiness on feet [R26.81]    SUBJECTIVE  Pain Level (0-10 scale): 0  Any medication changes, allergies to medications, adverse drug reactions, diagnosis change, or new procedure performed?: [x] No    [] Yes (see summary sheet for update)  Subjective functional status/changes:   [] No changes reported  \"I have my good days and my bad days\"    OBJECTIVE    10 min Therapeutic Exercise:  [x] See flow sheet :   Rationale: increase ROM and increase strength to improve the patients ability to perform ADL's. 10 min Therapeutic Activity:  [x]  See flow sheet :   Rationale: increase ROM, increase strength, improve coordination and improve balance  to improve the patients ability to ambulate safely. 20 min Neuromuscular Re-education:  [x]  See flow sheet :   Rationale: increase ROM, increase strength, improve coordination and improve balance  to improve the patients ability to perform household chores. With   [x] TE   [x] TA   [x] neuro   [] other: Patient Education: [x] Review HEP    [] Progressed/Changed HEP based on:   [] positioning   [] body mechanics   [] transfers   [] heat/ice application    [x] other: updated HEP + GTB     Other Objective/Functional Measures: Added: reformer LE series and Single leg squat   Progressed therex as per flow sheet      Pain Level (0-10 scale) post treatment: 0    ASSESSMENT/Changes in Function: Patient able to perform progressed and added therex with no reported increased pain post PT session.  Skilled verbal cues provided and skilled tactile cues provided at knees to avoid knee hyperextension at reformer and for improved control with push and return. Pt was instructed in updated HEP. Pt was given hand out detailing exercises, Pt verbalized understanding. Patient will continue to benefit from skilled PT services to modify and progress therapeutic interventions, address functional mobility deficits, address ROM deficits, address strength deficits, analyze and address soft tissue restrictions, analyze and cue movement patterns, analyze and modify body mechanics/ergonomics, assess and modify postural abnormalities, address imbalance/dizziness and instruct in home and community integration to attain remaining goals. []  See Plan of Care  []  See progress note/recertification  []  See Discharge Summary         Progress towards goals / Updated goals:  Goal: Pt to increase left hip extension strength to 4+/5 to increase standing/amb tolerance for ease of performing cooking, household chores. Status at last note/certification: MMT hip extension: Right: 5/5, left: 4/5   Current: Not met-  Right: 4+/5, left: 4/5 (22)  Goal: Pt to perform 20 single leg heel raises bilaterally without deviations, fatigue for improved ankle stability and increased safety with gait. Status at last note/certification: Heel raises: 15 repetitions bilaterally with use of rail for steadying and with decreased range/push off noted  Current: Progressinx bilaterally with 0.5-1\" clearance from floor (22)  Goal: Pt to increase B SLS x 15 seconds without deviations to reduce furniture/wall walking with gait and increase safety with community level amb. Status at last note/certification: SLS (seconds): Right: 8,5 Left: 2,4   Current: Progressin seconds bilterally (22)   Goal: Pt to report < 4/10 pain at worst to increase ease with ADLs.   Status at last note/certification:  Pain at worst: 7 (legs); on average: 4   Current: 7/10 at worst (22)  Goal: Patient will be independent  with comprehensive updated HEP to maintain functional gains made in skilled OP PT  Status at last certification/assessment: new goal- will plan to update HEP next visit    Current: provided with updated exercises for lateral stepping, monster walks and steamboats (5/20/22)      PLAN  [x]  Upgrade activities as tolerated     [x]  Continue plan of care  []  Update interventions per flow sheet       []  Discharge due to:_  []  Other:_      Blu Estes, PT 5/20/2022  3:36 PM     Future Appointments   Date Time Provider Amalia Silver   5/23/2022  1:30 PM Hoa Ramirez, PT [facility]   5/27/2022 12:00 PM Chitra Wilcox, PT [facility]   8/9/2022  9:45 AM Kalyan Maria MD [facility] BS AMB   8/16/2022 11:00 AM Melvin Alvarenga MD [facility] AMB

## 2022-05-21 DIAGNOSIS — I10 ESSENTIAL (PRIMARY) HYPERTENSION: Primary | ICD-10-CM

## 2022-05-23 ENCOUNTER — HOSPITAL ENCOUNTER (OUTPATIENT)
Dept: PHYSICAL THERAPY | Age: 57
Discharge: HOME | End: 2022-05-23
Attending: FAMILY MEDICINE
Payer: MEDICAID

## 2022-05-23 PROCEDURE — 97110 THERAPEUTIC EXERCISES: CPT

## 2022-05-23 PROCEDURE — 97530 THERAPEUTIC ACTIVITIES: CPT

## 2022-05-23 PROCEDURE — 97112 NEUROMUSCULAR REEDUCATION: CPT

## 2022-05-23 NOTE — PROGRESS NOTES
PT DAILY TREATMENT NOTE     Patient Name: Maverick Colmenares.  Date:2022  : 1965  [x]  Patient  Verified  Payor: [facility] MEDICAID / Plan: [address] [place] [address] / Product Type: Managed Care Medicaid /    In time:1331 pm  Out time: 1412 pm  Total Treatment Time (min): 41  Visit #: 3 of 8    Medicare/BCBS Only   Total Timed Codes (min):  41 1:1 Treatment Time:         Treatment Area: Unsteadiness on feet [R26.81]    SUBJECTIVE  Pain Level (0-10 scale): 0  Any medication changes, allergies to medications, adverse drug reactions, diagnosis change, or new procedure performed?: [x] No    [] Yes (see summary sheet for update)  Subjective functional status/changes:   [] No changes reported  Patient denies pain today    OBJECTIVE    20 min Therapeutic Exercise:  [x] See flow sheet :   Rationale: increase ROM and increase strength to improve the patients ability to perform ADL's. 10 min Therapeutic Activity:  [x]  See flow sheet :   Rationale: increase ROM, increase strength, improve coordination and improve balance  to improve the patients ability to ambulate safely. 11 min Neuromuscular Re-education:  [x]  See flow sheet :   Rationale: increase ROM, increase strength, improve coordination and improve balance  to improve the patients ability to perform household chores. With   [x] TE   [x] TA   [x] neuro   [] other: Patient Education: [x] Review HEP    [] Progressed/Changed HEP based on:   [] positioning   [] body mechanics   [] transfers   [] heat/ice application    [] other:      Other Objective/Functional Measures:   Pain at worst in the last week: 6-7/10 (both legs); on average: 5/10  Subjective % improvement: 70%  Functional improvements: Pt reports walking and moving around have improved   Functional limitations: \"I know I won't be at 100%\" patient reports he still has some difficulty walking and moving around even though it has improved.     SLS (seconds): Right: 12 Left: 11 seconds  Hip extension MMT: Left: 4+/5 Right: 4+/5  Heel raises: Able to perform 20 repetitions bilat with ~0.5 inch clearance from floor     Pain Level (0-10 scale) post treatment: 0    Assessment / Recommendations:  Pt is a 64y.o. year old male who has been receiving skilled OP PT services at [facility] with Unsteadiness on feet [R26.81]. Physical therapy has consisted of therapeutic exercises for increased strength, improved ROM; neuromuscular re-education for improved motor control and balance; therapeutic activities for education with HEP, ease with transfers. Pt has been seen for initial evaluation and 12 visits, making good progress toward set goals. Pt continues to reports pain at worst 6-10 in bilat LEs and limitations with ambulation tolerance. He continues to present with impaired balance- though improved since last assessment. Noted improvements in hip extension MMT. Pt will continue to benefit from skilled OP PT 1 x per week for 4 visits for education and independence with comprehensive HEP in preparation for discharge and in order to address remaining and above mentioned impairments to maximize patient's functional status and independence; pt verbalized agreement and understanding. Patient will continue to benefit from skilled PT services to modify and progress therapeutic interventions, address functional mobility deficits, address ROM deficits, address strength deficits, analyze and address soft tissue restrictions, analyze and cue movement patterns, analyze and modify body mechanics/ergonomics, assess and modify postural abnormalities, address imbalance/dizziness and instruct in home and community integration to attain remaining goals.      []  See Plan of Care  [x]  See progress note/recertification  []  See Discharge Summary         Progress towards goals / Updated goals:  Goal: Pt to increase left hip extension strength to 4+/5 to increase standing/amb tolerance for ease of performing cooking, household chores. Status at last note/certification: MMT hip extension: Right: 5/5, left: 4/5   Current: Not met-  Right: 4+/5, left: 4/5 (22) Hip extension MMT: Left: 4+/5 Right: 4+/5 (22) met   Goal: Pt to perform 20 single leg heel raises bilaterally without deviations, fatigue for improved ankle stability and increased safety with gait. Status at last note/certification: Heel raises: 15 repetitions bilaterally with use of rail for steadying and with decreased range/push off noted  Current: Progressinx bilaterally with 0.5-1\" clearance from floor (22) Heel raises: Able to perform 20 repetitions bilat with ~0.5 inch clearance from floor (22) progressing   Goal: Pt to increase B SLS x 15 seconds without deviations to reduce furniture/wall walking with gait and increase safety with community level amb. Status at last note/certification: SLS (seconds): Right: 8,5 Left: 2,4   Current: Progressin seconds bilterally (22) SLS (seconds): Right: 12 Left: 11 seconds (22) progressing- not met   Goal: Pt to report < 4/10 pain at worst to increase ease with ADLs.   Status at last note/certification:  Pain at worst: 7 (legs); on average: 4   Current: 7/10 at worst (22) Pain at worst in the last week: 6-7/10 (both legs); on average: 5/10 (22) no change, not met   Goal: Patient will be independent  with comprehensive updated HEP to maintain functional gains made in skilled OP PT  Status at last certification/assessment: new goal- will plan to update HEP next visit    Current: provided with updated exercises for lateral stepping, monster walks and steamboats (22) will plan to update closer to D/c (22)      PLAN  [x]  Upgrade activities as tolerated     [x]  Continue plan of care  [x]  Update interventions per flow sheet       []  Discharge due to:_  []  Other:_      Bryant Beck, PT 2022  2:14 PM     Future Appointments   Date Time Provider Amalia Silver   5/27/2022 12:00 PM Purcell [place]   8/9/2022  9:45 AM Barrera Miller MD [facility] BS AMB   8/16/2022 11:00 AM Sina Holley MD [facility] BS AMB

## 2022-05-23 NOTE — PROGRESS NOTES
[facility] - [facility], [address] [place] [address]  [phone] fax    Progress Note  Patient name: Olga Hess Start of Care: 22   Referral source: Grover Paige MD : 1965   Medical/Treatment Diagnosis: Unsteadiness on feet [R26.81]  Payor: [facility] MEDICAID / Plan: [address] [place] [address] / Product Type: Managed Care Medicaid /  Onset Date:22     Prior Hospitalization: see medical history Provider#: [id number]   Medications: Verified on Patient Summary List    Comorbidities:  Arthritis; Depression; DM Type II; Heart disease; HTN; hx CVA x 2; hx MI  Prior Level of Function:On disability; lives in [facility] with denis'; functionally independent with pain    Visits from Start of Care: 12    Missed Visits: 4    Progress towards goals / Updated goals:  Goal: Pt to increase left hip extension strength to 4+/5 to increase standing/amb tolerance for ease of performing cooking, household chores. Status at last note/certification: MMT hip extension: Right: 5/5, left: 4/5   Current: Not met-  Right: 4+/5, left: 4/5 (22) Hip extension MMT: Left: 4+/5 Right: 4+/5 (22) met   Goal: Pt to perform 20 single leg heel raises bilaterally without deviations, fatigue for improved ankle stability and increased safety with gait. Status at last note/certification: Heel raises: 15 repetitions bilaterally with use of rail for steadying and with decreased range/push off noted  Current: Progressinx bilaterally with 0.5-1\" clearance from floor (22) Heel raises: Able to perform 20 repetitions bilat with ~0.5 inch clearance from floor (22) progressing   Goal: Pt to increase B SLS x 15 seconds without deviations to reduce furniture/wall walking with gait and increase safety with community level amb.   Status at last note/certification: SLS (seconds): Right: 8,5 Left: 2,4   Current: Progressin seconds bilterally (22) SLS (seconds): Right: 12 Left: 11 seconds (5/23/22) progressing- not met   Goal: Pt to report < 4/10 pain at worst to increase ease with ADLs. Status at last note/certification:  Pain at worst: 7 (legs); on average: 4   Current: 7/10 at worst (5/18/22) Pain at worst in the last week: 6-7/10 (both legs); on average: 5/10 (5/23/22) no change, not met   Goal: Patient will be independent  with comprehensive updated HEP to maintain functional gains made in skilled OP PT  Status at last certification/assessment: new goal- will plan to update HEP next visit    Current: provided with updated exercises for lateral stepping, monster walks and steamboats (5/20/22) will plan to update closer to D/c (5/23/22)    Key Functional Changes:   Pt is a 64y.o. year old male who has been receiving skilled OP PT services at [facility] with Unsteadiness on feet [R26.81]. Physical therapy has consisted of therapeutic exercises for increased strength, improved ROM; neuromuscular re-education for improved motor control and balance; therapeutic activities for education with HEP, ease with transfers. Pt has been seen for initial evaluation and 12 visits, making good progress toward set goals. Pt continues to reports pain at worst 6-10 in bilat LEs and limitations with ambulation tolerance. He continues to present with impaired balance- though improved since last assessment. Noted improvements in hip extension MMT. Pain at worst in the last week: 6-7/10 (both legs); on average: 5/10  Subjective % improvement: 70%  Functional improvements: Pt reports walking and moving around have improved   Functional limitations: \"I know I won't be at 100%\" patient reports he still has some difficulty walking and moving around even though it has improved.     SLS (seconds): Right: 12 Left: 11 seconds  Hip extension MMT: Left: 4+/5 Right: 4+/5  Heel raises: Able to perform 20 repetitions bilat with ~0.5 inch clearance from floor   Pt will continue to benefit from skilled OP PT 1 x per week for 4 visits for education and independence with comprehensive HEP in preparation for discharge and in order to address remaining and above mentioned impairments to maximize patient's functional status and independence; pt verbalized agreement and understanding. Updated Goals: to be achieved in 4 treatments:  Continue with above unmet goals   Goal: Pt to perform 20 single leg heel raises bilaterally without deviations, fatigue for improved ankle stability and increased safety with gait. Status at last note/certification:  Heel raises: Able to perform 20 repetitions bilat with ~0.5 inch clearance from floor  Goal: Pt to increase B SLS x 15 seconds without deviations to reduce furniture/wall walking with gait and increase safety with community level amb. Status at last note/certification: SLS (seconds): Right: 12 Left: 11 seconds   Goal: Pt to report < 4/10 pain at worst to increase ease with ADLs.   Status at last note/certification:  Pain at worst in the last week: 6-7/10 (both legs); on average: 5/10   Goal: Patient will be independent  with comprehensive updated HEP to maintain functional gains made in skilled OP PT  Status at last certification/assessment: provided with updated exercises for lateral stepping, monster walks and steamboats (5/20/22) will plan to update closer to D/c (5/23/22)     ASSESSMENT/RECOMMENDATIONS:  [x]Continue therapy per initial plan/protocol at a frequency of  1 x per week for 4 visits  []Continue therapy with the following recommended changes:_____________________      _____________________________________________________________________  []Discontinue therapy progressing towards or have reached established goals  []Discontinue therapy due to lack of appreciable progress towards goals  []Discontinue therapy due to lack of attendance or compliance  []Await Physician's recommendations/decisions regarding therapy  []Other:________________________________________________________________    Thank you for this referral.   Gisela Orozco, PT 5/23/2022 2:15 PM    NOTE TO PHYSICIAN:  PLEASE COMPLETE THE ORDERS BELOW AND   FAX TO [facility]: [phone]  If you are unable to process this request in 24 hours please contact our office: [phone]  []  I have read the above report and request that my patient continue as recommended. []  I have read the above report and request that my patient continue therapy with the following changes/special instructions:________________________________________  []I have read the above report and request that my patient be discharged from therapy.     Physician's Signature:____________Date:_________TIME:________     Olvin Gayle MD  ** Signature, Date and Time must be completed for valid certification **

## 2022-05-27 ENCOUNTER — HOSPITAL ENCOUNTER (OUTPATIENT)
Dept: PHYSICAL THERAPY | Age: 57
Discharge: HOME | End: 2022-05-27
Attending: FAMILY MEDICINE
Payer: MEDICAID

## 2022-05-27 PROCEDURE — 97530 THERAPEUTIC ACTIVITIES: CPT

## 2022-05-27 PROCEDURE — 97112 NEUROMUSCULAR REEDUCATION: CPT

## 2022-05-27 NOTE — PROGRESS NOTES
PT DAILY TREATMENT NOTE     Patient Name: Lor Eller.  Date:2022  : 1965  [x]  Patient  Verified  Payor: [facility] MEDICAID / Plan: [address] / Product Type: Managed Care Medicaid /    In time: 12:04  Out time: 12:42  Total Treatment Time (min): 38  Visit #: 1 of 4    Treatment Area: Unsteadiness on feet [R26.81]    SUBJECTIVE  Pain Level (0-10 scale): 3  Any medication changes, allergies to medications, adverse drug reactions, diagnosis change, or new procedure performed?: [x] No    [] Yes (see summary sheet for update)  Subjective functional status/changes:   [] No changes reported  Pt reports his legs are achy today. OBJECTIVE    10 min Therapeutic Activity:  [x]  See flow sheet :    Rationale: increase ROM, increase strength, improve coordination and improve balance  to improve the patients activity tolerance     28 min Neuromuscular Re-education:  [x]  See flow sheet : balancing exercises core/glute stability exercises   Rationale: increase ROM, increase strength, improve coordination and improve balance  to improve the patients ability to perform household chores. With   [x] TE   [x] TA   [x] neuro   [] other: Patient Education: [x] Review HEP    [] Progressed/Changed HEP based on:   [] positioning   [] body mechanics   [] transfers   [] heat/ice application    [] other:      Other Objective/Functional Measures: Added/Progressed exercises per flow sheet. Pain Level (0-10 scale) post treatment: \"tight\"    Assessment / Recommendations: Reported no pain post session today, only tightness. Challenged with endurance with bandwalks (forward and lateral). Limited SLS stability with 1/2 stance on foam with march. Continue POC as tolerated to improve endurance and stability with daily tasks.      Patient will continue to benefit from skilled PT services to modify and progress therapeutic interventions, address functional mobility deficits, address ROM deficits, address strength deficits, analyze and address soft tissue restrictions, analyze and cue movement patterns, analyze and modify body mechanics/ergonomics, assess and modify postural abnormalities, address imbalance/dizziness and instruct in home and community integration to attain remaining goals. []  See Plan of Care  []  See progress note/recertification  []  See Discharge Summary         Progress towards goals / Updated goals:  Continue with above unmet goals   Goal: Pt to perform 20 single leg heel raises bilaterally without deviations, fatigue for improved ankle stability and increased safety with gait. Status at last note/certification:  Heel raises: Able to perform 20 repetitions bilat with ~0.5 inch clearance from floor  Goal: Pt to increase B SLS x 15 seconds without deviations to reduce furniture/wall walking with gait and increase safety with community level amb. Status at last note/certification: SLS (seconds): Right: 12 Left: 11 seconds   Limited SLS stability with 1/2 stance on foam with march 5/27/2022    Goal: Pt to report < 4/10 pain at worst to increase ease with ADLs.   Status at last note/certification:  Pain at worst in the last week: 6-7/10 (both legs); on average: 5/10   3/10 pre session today 5/27/2022  Goal: Patient will be independent  with comprehensive updated HEP to maintain functional gains made in skilled OP PT  Status at last certification/assessment: provided with updated exercises for lateral stepping, monster walks and steamboats (5/20/22) will plan to update closer to D/c (5/23/22)    PLAN  [x]  Upgrade activities as tolerated     [x]  Continue plan of care  [x]  Update interventions per flow sheet       []  Discharge due to:_  []  Other:_      Luis Montanez, PT 5/27/2022  12:14 PM     Future Appointments   Date Time Provider Amalia Silver   8/9/2022  9:45 AM MD JOVITA RamírezAM BS AMB   8/16/2022 11:00 AM Solomon Fatima MD [facility] BS AMB

## 2022-05-31 ENCOUNTER — TELEPHONE (OUTPATIENT)
Dept: INTERNAL MEDICINE CLINIC | Age: 57
End: 2022-05-31

## 2022-05-31 ENCOUNTER — DOCUMENTATION ONLY (OUTPATIENT)
Dept: PULMONOLOGY | Age: 57
End: 2022-05-31

## 2022-05-31 RX ORDER — HYDROCHLOROTHIAZIDE 12.5 MG/1
TABLET ORAL
Qty: 30 TABLET | Refills: 5 | OUTPATIENT
Start: 2022-05-31

## 2022-05-31 RX ORDER — METOPROLOL TARTRATE 25 MG/1
TABLET, FILM COATED ORAL
Qty: 60 TABLET | Refills: 3 | Status: SHIPPED | OUTPATIENT
Start: 2022-05-31 | End: 2022-10-12

## 2022-05-31 RX ORDER — LOSARTAN POTASSIUM 50 MG/1
TABLET ORAL
Qty: 30 TABLET | Refills: 5 | OUTPATIENT
Start: 2022-05-31

## 2022-05-31 RX ORDER — HYDROCHLOROTHIAZIDE 12.5 MG/1
12.5 TABLET ORAL DAILY
Qty: 30 TABLET | Refills: 5 | Status: SHIPPED | OUTPATIENT
Start: 2022-05-31

## 2022-05-31 RX ORDER — HYDROCHLOROTHIAZIDE 12.5 MG/1
12.5 TABLET ORAL DAILY
Qty: 30 TABLET | Refills: 5 | Status: SHIPPED | OUTPATIENT
Start: 2022-05-31 | End: 2022-05-31 | Stop reason: SDUPTHER

## 2022-05-31 RX ORDER — LOSARTAN POTASSIUM 100 MG/1
100 TABLET ORAL DAILY
Qty: 90 TABLET | Refills: 2 | Status: SHIPPED | OUTPATIENT
Start: 2022-05-31

## 2022-05-31 NOTE — TELEPHONE ENCOUNTER
Requested Prescriptions     Pending Prescriptions Disp Refills    metoprolol tartrate (LOPRESSOR) 25 mg tablet [Pharmacy Med Name: Metoprolol Tartrate 25 MG Oral Tablet] 60 Tablet 3     Sig: Take 1 tablet by mouth twice daily

## 2022-05-31 NOTE — TELEPHONE ENCOUNTER
Pharmacy Progress Note - Telephone Encounter    S/O: Mr. Ramo Keith. 64 y.o. male, referred by Dr. Maia Coffey MD, was contacted via an outbound telephone call to discuss diabetes management today. Verified patients identifiers (name & ) per HIPAA policy. Spoke with patient's fiancee who assists him with his care. - Notified patient and fiancee that PharmD will be transferring to a new position soon   - They did confirm that they are planning to see an Endocrinologist, but have not received a call yet  - Brandon Tyson states that patient is otherwise doing okay and is tolerating his medications much better     A/P:  - Continue current medications for now   - Instructed patient/fiancee to call PCP's office if they do not hear anything about the Endocrinologist referral within the next week   - Recommend to refer back to new PharmD for assistance if needed once hired   - Patient endorses understanding to the provided information. All questions answered at this time. There are no discontinued medications. No orders of the defined types were placed in this encounter. Thank you,  Michelle Hernandez. ESTUARDO Jacobo        For Pharmacy Admin Tracking Only     CPA in place:  Yes   Recommendation Provided To: Patient/Caregiver: 0 via Telephone   Intervention Accepted By: Patient/Caregiver: 0   Time Spent (min): 20

## 2022-06-17 ENCOUNTER — TELEPHONE (OUTPATIENT)
Dept: PHYSICAL THERAPY | Age: 57
End: 2022-06-17

## 2022-06-22 RX ORDER — ATORVASTATIN CALCIUM 80 MG/1
TABLET, FILM COATED ORAL
Qty: 90 TABLET | Refills: 0 | OUTPATIENT
Start: 2022-06-22

## 2022-06-22 RX ORDER — ATORVASTATIN CALCIUM 80 MG/1
80 TABLET, FILM COATED ORAL DAILY
Qty: 90 TABLET | Refills: 3 | Status: SHIPPED | OUTPATIENT
Start: 2022-06-22

## 2022-07-01 NOTE — PROGRESS NOTES
[facility] - [facility], [address] [place] [address]  [phone] fax    Discharge Summary    Patient name: Tayo Perea Start of Care: 22   Referral source: Luiz Warner MD : 1965   Medical/Treatment Diagnosis: Unsteadiness on feet [R26.81]  Payor: [facility] MEDICAID / Plan: [address] / Product Type: Managed Care Medicaid /  Onset Date:22     Prior Hospitalization: see medical history Provider#: [id number]   Medications: Verified on Patient Summary List    Comorbidities: Arthritis; Depression; DM Type II; Heart disease; HTN; hx CVA x 2; hx MI  Prior Level of Function:On disability; lives in [facility] with denis'; functionally independent with pain    Visits from Start of Care: 14    Missed Visits: 2    Reporting Period : 2022 to 2022    Goal: Pt to perform 20 single leg heel raises bilaterally without deviations, fatigue for improved ankle stability and increased safety with gait. Status at last note/certification:  Heel raises: Able to perform 20 repetitions bilat with ~0.5 inch clearance from floor  Current: not met - unable to reassess 2022  Goal: Pt to increase B SLS x 15 seconds without deviations to reduce furniture/wall walking with gait and increase safety with community level amb. Status at last note/certification: SLS (seconds): Right: 12 Left: 11 seconds   Current: not met - Limited SLS stability with 1/2 stance on foam with 2022    Goal: Pt to report < 4/10 pain at worst to increase ease with ADLs.   Status at last note/certification:  Pain at worst in the last week: 6-7/10 (both legs); on average: 5/10   Current: progressing - 6-7/10 pain at worst 2022  Goal: Patient will be independent  with comprehensive updated HEP to maintain functional gains made in skilled OP PT  Status at last certification/assessment: provided with updated exercises for lateral stepping, monster walks and steamboats (5/20/22) will plan to update closer to D/c (5/23/22)  Current: not met - unable to reassess 5/27/2022    Assessment/ Summary of Care: Patient has attended 14 sessions if skilled PT, including the evaluation for Unsteadiness on feet [R26.81]. At time of last assessment he reported 70% improvement and pain at worst 6-7/10. Pt continues to report pain at worst 6-10/10 in bilateral LE's and limitations with ambulation tolerance. He continues to present with impaired balance- though improved since last assessment. Noted improvements in hip extension MMT observed. Pt reports walking and moving around have improved, but reports \"I know I won't be at 100%. \" Patient reports he still has some difficulty walking and moving around even though it has improved and requested discharge at this time to continue with HEP independently. RECOMMENDATIONS:  [x]Discontinue therapy: [x]Patient has reached or is progressing toward set goals      []Patient is non-compliant or has abdicated      []Due to lack of appreciable progress towards set goals    Veronica Ji, PT 7/1/2022 10:20 AM    NOTE TO PHYSICIAN:  Please complete the following and fax to: [facility] at [place] at [phone]  . Retain this original for your records. If you are unable to process this request in   24 hours, please contact our office.      [] I have read the above report and request that my patient continue therapy with the following changes/special instructions:  [] I have read the above report and request that my patient be discharged from therapy    Physician's Signature:____________Date:_________TIME:________     Klarissa Glasgow MD  ** Signature, Date and Time must be completed for valid certification **

## 2022-07-12 NOTE — PROGRESS NOTES
Chief Complaint   Patient presents with    Hypertension    Gout     patient wife called stating that patient  having gout flare up in both feet. Assessment & Plan:     1. Type 2 diabetes mellitus with diabetic neuropathy, with long-term current use of insulin ([facility])  Assessment & Plan:  Pain likely due to neuropathy, not gout  Trial additional dose of Gabapentin, one capsule in the morning and continue two capsules at night  Follow up as scheduled with PCP  Orders:  -     AMB POC GLUCOSE BLOOD, BY GLUCOSE MONITORING DEVICE  2. Essential hypertension  Assessment & Plan:  BP elevated, will add amlodipine 5 mg to regimen  Advised to continue all other meds and follow up with PCP as scheduled  Orders:  -     amLODIPine (NORVASC) 5 mg tablet; Take 1 Tablet by mouth daily. Indications: high blood pressure, Normal, Disp-90 Tablet, R-0    Follow-up and Dispositions    · Return in 27 days (on 8/9/2022) for  blood pressure, diabetes, cholesterol with PCP. Subjective:     HPI    Foot Pain -  Onset: 3 weeks  Location: bilateral  Pain described as throbbing  Associated symptoms: None  Hurts when he walks, also wakes him up from his sleep  Currently taking Gabapentin 300 mg two capsules nightly  Was seeing podiatry at [facility] but they are no longer taking his insurance    Hypertension-  Symptoms: None  BP readings at home are 150-160s  Comorbid: obesity   Current treatment: HCTZ 12.5 mg, losartan 100 mg, metoprolol 25 mg  Has hx of stroke, heart attack, CHF per patient  Will be seeing Dr. Farrah Smalls since Dr. Gonzalez Never left, has an appointment on 08/16/2022  He did take his medications this morning      Review of Systems   Constitutional: Negative for chills, fever and malaise/fatigue. Eyes: Negative for blurred vision and double vision. Respiratory: Negative for shortness of breath. Cardiovascular: Negative for chest pain. Musculoskeletal: Positive for joint pain.    Neurological: Negative for tingling, speech change and focal weakness. Objective:   BP (!) 152/82   Pulse 75   Temp 98.1 °F (36.7 °C) (Oral)   Resp 17   Ht 5' 5\" (1.651 m)   Wt 187 lb (84.8 kg)   SpO2 97%   BMI 31.12 kg/m²      Physical Exam  Vitals and nursing note reviewed. Constitutional:       Appearance: Normal appearance. HENT:      Head: Normocephalic and atraumatic. Cardiovascular:      Rate and Rhythm: Normal rate and regular rhythm. Heart sounds: No murmur heard. No gallop. Pulmonary:      Effort: Pulmonary effort is normal. No respiratory distress. Breath sounds: No wheezing, rhonchi or rales. Musculoskeletal:         General: Normal range of motion. Skin:     General: Skin is warm and dry. Neurological:      General: No focal deficit present. Mental Status: He is alert and oriented to person, place, and time. Psychiatric:         Mood and Affect: Mood normal.         Thought Content:  Thought content normal.         Judgment: Judgment normal.        RADHA Cary

## 2022-07-13 ENCOUNTER — OFFICE VISIT (OUTPATIENT)
Dept: FAMILY MEDICINE CLINIC | Age: 57
End: 2022-07-13
Payer: MEDICAID

## 2022-07-13 VITALS
WEIGHT: 187 LBS | HEART RATE: 75 BPM | RESPIRATION RATE: 17 BRPM | BODY MASS INDEX: 31.16 KG/M2 | HEIGHT: 65 IN | DIASTOLIC BLOOD PRESSURE: 82 MMHG | TEMPERATURE: 98.1 F | SYSTOLIC BLOOD PRESSURE: 152 MMHG | OXYGEN SATURATION: 97 %

## 2022-07-13 DIAGNOSIS — I10 ESSENTIAL HYPERTENSION: ICD-10-CM

## 2022-07-13 DIAGNOSIS — E11.40 TYPE 2 DIABETES MELLITUS WITH DIABETIC NEUROPATHY, WITH LONG-TERM CURRENT USE OF INSULIN (HCC): Primary | ICD-10-CM

## 2022-07-13 DIAGNOSIS — Z79.4 TYPE 2 DIABETES MELLITUS WITH DIABETIC NEUROPATHY, WITH LONG-TERM CURRENT USE OF INSULIN (HCC): Primary | ICD-10-CM

## 2022-07-13 LAB — GLUCOSE POC: 217 MG/DL

## 2022-07-13 PROCEDURE — 99214 OFFICE O/P EST MOD 30 MIN: CPT | Performed by: NURSE PRACTITIONER

## 2022-07-13 PROCEDURE — 3046F HEMOGLOBIN A1C LEVEL >9.0%: CPT | Performed by: NURSE PRACTITIONER

## 2022-07-13 PROCEDURE — 82962 GLUCOSE BLOOD TEST: CPT | Performed by: NURSE PRACTITIONER

## 2022-07-13 RX ORDER — AMLODIPINE BESYLATE 5 MG/1
5 TABLET ORAL DAILY
Qty: 90 TABLET | Refills: 0 | Status: SHIPPED | OUTPATIENT
Start: 2022-07-13

## 2022-07-13 RX ORDER — COLCHICINE 0.6 MG/1
CAPSULE ORAL
Qty: 6 CAPSULE | Refills: 0 | Status: CANCELLED | OUTPATIENT
Start: 2022-07-13

## 2022-07-13 NOTE — ASSESSMENT & PLAN NOTE
BP elevated, will add amlodipine 5 mg to regimen  Advised to continue all other meds and follow up with PCP as scheduled

## 2022-07-13 NOTE — ASSESSMENT & PLAN NOTE
Pain likely due to neuropathy, not gout  Trial additional dose of Gabapentin, one capsule in the morning and continue two capsules at night  Follow up as scheduled with PCP

## 2022-07-13 NOTE — PROGRESS NOTES
Priscilla Rodriguez presents today for   Chief Complaint   Patient presents with    Hypertension    Gout     patient wife called stating that patient  having gout flare up in both feet. Is someone accompanying this pt? no    Is the patient using any DME equipment during [address]? no    Depression Screening:  3 most recent PHQ Screens 7/13/2022   Little interest or pleasure in doing things Not at all   Feeling down, depressed, irritable, or hopeless Not at all   Total Score PHQ 2 0   Trouble falling or staying asleep, or sleeping too much -   Feeling tired or having little energy -   Poor appetite, weight loss, or overeating -   Feeling bad about yourself - or that you are a failure or have let yourself or your family down -   Trouble concentrating on things such as school, work, reading, or watching TV -   Moving or speaking so slowly that other people could have noticed; or the opposite being so fidgety that others notice -   Thoughts of being better off dead, or hurting yourself in some way -   PHQ 9 Score -   How difficult have these problems made it for you to do your work, take care of your home and get along with others -       Learning Assessment:  Learning Assessment 1/5/2021   PRIMARY LEARNER Patient   HIGHEST LEVEL OF EDUCATION - PRIMARY LEARNER  DID NOT GRADUATE HIGH SCHOOL   BARRIERS PRIMARY LEARNER NONE   CO-LEARNER CAREGIVER No   PRIMARY LANGUAGE ENGLISH   LEARNER PREFERENCE PRIMARY DEMONSTRATION     -   ANSWERED BY Patient   RELATIONSHIP SELF       Fall Risk  Fall Risk Assessment, last 12 mths 11/9/2020   Able to walk? Yes   Fall in past 12 months?  No       ADL  ADL Assessment 2/9/2022   Feeding yourself No Help Needed   Getting from bed to chair No Help Needed   Getting dressed No Help Needed   Bathing or showering No Help Needed   Walk across the room (includes cane/walker) No Help Needed   Using the telphone No Help Needed   Taking your medications No Help Needed   Preparing meals No Help Needed Managing money (expenses/bills) No Help Needed   Moderately strenuous housework (laundry) No Help Needed   Shopping for personal items (toiletries/medicines) No Help Needed   Shopping for groceries No Help Needed   Driving No Help Needed   Climbing a flight of stairs No Help Needed   Getting to places beyond walking distances No Help Needed       Travel Screening:    Travel Screening     Question   Response    In the last 10 days, have you been in contact with someone who was confirmed or suspected to have Coronavirus/COVID-19? No / Unsure    Have you had a COVID-19 viral test in the last 10 days? No    Do you have any of the following new or worsening symptoms? Have you traveled internationally or domestically in the last month? No      Travel History   Travel since 06/13/22    No documented travel since 06/13/22         Health Maintenance reviewed and discussed and ordered per Provider. Health Maintenance Due   Topic Date Due    Shingrix Vaccine Age 49> (1 of 2) Never done    Foot Exam Q1  02/13/2018    Pneumococcal 0-64 years (2 - PCV) 06/07/2018    Colorectal Cancer Screening Combo  08/23/2020   . Coordination of Care:  1. Have you been to the ER, urgent care clinic since your last visit? Hospitalized since your last visit? no    2. Have you seen or consulted any other health care providers outside of the [address] [place] [address] since your last visit? Include any pap smears or colon screening.  no

## 2022-08-15 DIAGNOSIS — Z79.4 TYPE 2 DIABETES MELLITUS WITH DIABETIC NEUROPATHY, WITH LONG-TERM CURRENT USE OF INSULIN (HCC): Primary | ICD-10-CM

## 2022-08-15 DIAGNOSIS — E11.40 TYPE 2 DIABETES MELLITUS WITH DIABETIC NEUROPATHY, WITH LONG-TERM CURRENT USE OF INSULIN (HCC): Primary | ICD-10-CM

## 2022-08-15 NOTE — TELEPHONE ENCOUNTER
[facility] called and said that patient needs a different size for his pin needles. Patient needs a 32 gauge X4.

## 2022-08-16 ENCOUNTER — OFFICE VISIT (OUTPATIENT)
Dept: CARDIOLOGY CLINIC | Age: 57
End: 2022-08-16
Payer: COMMERCIAL

## 2022-08-16 VITALS
DIASTOLIC BLOOD PRESSURE: 78 MMHG | HEIGHT: 65 IN | SYSTOLIC BLOOD PRESSURE: 143 MMHG | OXYGEN SATURATION: 98 % | BODY MASS INDEX: 30.32 KG/M2 | WEIGHT: 182 LBS | HEART RATE: 84 BPM

## 2022-08-16 DIAGNOSIS — Z95.5 STATUS POST INSERTION OF DRUG ELUTING CORONARY ARTERY STENT: ICD-10-CM

## 2022-08-16 DIAGNOSIS — E78.5 DYSLIPIDEMIA: ICD-10-CM

## 2022-08-16 DIAGNOSIS — I50.32 CHRONIC DIASTOLIC CONGESTIVE HEART FAILURE (HCC): ICD-10-CM

## 2022-08-16 DIAGNOSIS — I10 ESSENTIAL (PRIMARY) HYPERTENSION: Primary | ICD-10-CM

## 2022-08-16 DIAGNOSIS — I25.118 CORONARY ARTERY DISEASE OF NATIVE ARTERY OF NATIVE HEART WITH STABLE ANGINA PECTORIS (HCC): ICD-10-CM

## 2022-08-16 PROCEDURE — 99214 OFFICE O/P EST MOD 30 MIN: CPT | Performed by: INTERNAL MEDICINE

## 2022-08-16 RX ORDER — FUROSEMIDE 40 MG/1
40 TABLET ORAL DAILY
Qty: 90 TABLET | Refills: 3 | Status: SHIPPED | OUTPATIENT
Start: 2022-08-16

## 2022-08-16 NOTE — PROGRESS NOTES
1. Have you been to the ER, urgent care clinic since your last visit? Hospitalized since your last visit?     no  2. Have you seen or consulted any other health care providers outside of the [address] [place] [address] since your last visit? Include any pap smears or colon screening.       No

## 2022-08-16 NOTE — PROGRESS NOTES
HISTORY OF PRESENT ILLNESS  Cheko Guthrie is a 62 y.o. male. Hypertension  The history is provided by the Patient. This is a chronic problem. The problem occurs daily. The problem has been gradually improving. Associated symptoms include shortness of breath. Shortness of Breath  The history is provided by the Patient. This is a chronic problem. The problem occurs intermittently. The problem has not changed since onset. Pertinent negatives include no fever, no ear pain, no neck pain, no cough, no sputum production, no hemoptysis, no wheezing, no PND, no orthopnea, no vomiting, no rash, no leg swelling and no claudication. Follow-up  Associated symptoms include shortness of breath. Review of Systems   Constitutional:  Negative for chills, diaphoresis, fever and weight loss. HENT:  Negative for ear pain and hearing loss. Eyes:  Negative for blurred vision. Respiratory:  Positive for shortness of breath. Negative for cough, hemoptysis, sputum production, wheezing and stridor. Cardiovascular:  Negative for palpitations, orthopnea, claudication, leg swelling and PND. Gastrointestinal:  Negative for heartburn, nausea and vomiting. Musculoskeletal:  Negative for myalgias and neck pain. Skin:  Negative for rash. Neurological:  Negative for dizziness, tingling, tremors, focal weakness, loss of consciousness and weakness. Psychiatric/Behavioral:  Negative for depression and suicidal ideas.     Family History   Problem Relation Age of Onset    OSTEOARTHRITIS Mother     Gout Mother     Diabetes Mother     Hypertension Mother     Diabetes Father     Diabetes Brother     Diabetes Maternal Grandmother     Other Son         homicide       Past Medical History:   Diagnosis Date    Arthritis     Chest pain     CHF (congestive heart failure) ([address]     CVA (cerebral vascular accident) ([address] 2011    r side stroke    CVA (cerebral vascular accident) ([address] 09/05/2018    ad[facility] at [facility]    Depression Heart attack ([facility] 2010    chest pains    Hypercholesterolemia     Hypertension     Loss of hearing     right ear, patient states it comes and goes    Polyuria     Shortness of breath     Type 2 diabetes mellitus without complication, with long-term current use of insulin ([facility] 2013    Type 2 diabetes mellitus, with long-term current use of insulin ([facility] 2013       Past Surgical History:   Procedure Laterality Date    HX HEART CATHETERIZATION         Social History     Tobacco Use    Smoking status: Former     Packs/day: 0.25     Types: Cigarettes     Quit date:      Years since quittin.6    Smokeless tobacco: Never   Substance Use Topics    Alcohol use: Yes     Comment: occassionally       Allergies   Allergen Reactions    Lisinopril Cough       Outpatient Medications Marked as Taking for the 22 encounter (Office Visit) with Jeaneth Hall MD   Medication Sig Dispense Refill    furosemide (LASIX) 40 mg tablet Take 1 Tablet by mouth in the morning. 90 Tablet 3    amLODIPine (NORVASC) 5 mg tablet Take 1 Tablet by mouth daily. Indications: high blood pressure 90 Tablet 0    atorvastatin (LIPITOR) 80 mg tablet Take 1 Tablet by mouth daily. 90 Tablet 3    metoprolol tartrate (LOPRESSOR) 25 mg tablet Take 1 tablet by mouth twice daily 60 Tablet 3    losartan (COZAAR) 100 mg tablet Take 1 Tablet by mouth daily. 90 Tablet 2    hydroCHLOROthiazide (HYDRODIURIL) 12.5 mg tablet Take 1 Tablet by mouth daily. 30 Tablet 5    metFORMIN ER (GLUCOPHAGE XR) 500 mg tablet Take 1 Tablet by mouth two (2) times daily (with meals). 180 Tablet 1    Lantus Solostar U-100 Insulin 100 unit/mL (3 mL) inpn INJECT 46 UNITS UNDER THE SKIN NIGHTLY 45 mL 1    aspirin 81 mg chewable tablet Take 2 Tablets by mouth daily. 180 Tablet 3    albuterol (PROVENTIL HFA, VENTOLIN HFA, PROAIR HFA) 90 mcg/actuation inhaler Take 2 Puffs by inhalation every four (4) hours as needed for Wheezing or Shortness of Breath.  18 g 5 dulaglutide (Trulicity) 6.92 QU/4.1 mL sub-q pen 0.5 mL by SubCUTAneous route every seven (7) days. 4 Pen 1    OneTouch Verio test strips strip test BLOOD SUGAR TWICE DAILY AS DIRECTED 100 Strip 11    traZODone (DESYREL) 100 mg tablet Take 1-2 Tablets by mouth nightly as needed for Sleep. 60 Tablet 5    ticagrelor (BRILINTA) 90 mg tablet Take 1 Tablet by mouth two (2) times a day. 180 Tablet 2    gabapentin (NEURONTIN) 300 mg capsule TAKE TWO CAPSULES BY MOUTH EVERY EVENING 60 Capsule 5    Unifine Pentips 31 gauge x 5/16\" ndle USE TO INJECT insulin EVERY DAY AS DIRECTED      escitalopram oxalate (LEXAPRO) 20 mg tablet Take 1 Tab by mouth daily. 30 Tab 5    multivitamin (ONE A DAY) tablet Take 1 Tab by Mouth Once a Day. Similar alternatives are ok. Visit Vitals  BP (!) 143/78   Pulse 84   Ht 5' 5\" (1.651 m)   Wt 82.6 kg (182 lb)   SpO2 98%   BMI 30.29 kg/m²       Physical Exam  Constitutional:       General: He is not in acute distress. Appearance: He is well-developed. He is not diaphoretic. HENT:      Head: Atraumatic. Mouth/Throat:      Pharynx: No oropharyngeal exudate. Eyes:      General: No scleral icterus. Conjunctiva/sclera: Conjunctivae normal.   Neck:      Thyroid: No thyromegaly. Vascular: No JVD. Trachea: No tracheal deviation. Cardiovascular:      Rate and Rhythm: Normal rate and regular rhythm. Heart sounds: No murmur heard. No gallop. Pulmonary:      Effort: Pulmonary effort is normal.      Breath sounds: Normal breath sounds. No stridor. No wheezing or rales. Abdominal:      Palpations: Abdomen is soft. Tenderness: There is no abdominal tenderness. There is no guarding or rebound. Musculoskeletal:         General: No tenderness. Normal range of motion. Cervical back: Normal range of motion and neck supple. Skin:     General: Skin is warm. Neurological:      Mental Status: He is alert and oriented to person, place, and time. Motor: No abnormal muscle tone. Psychiatric:         Behavior: Behavior normal.     9/2018 - 14 day event - NSR  Echo 09/2018:  NORMAL LEFT VENTRICULAR CAVITY SIZE AND SYSTOLIC FUNCTION WITH AN EJECTION FRACTION   VISUALLY ESTIMATED AT 60%. NORMAL DIASTOLIC FUNCTION. MILD LEFT VENTRICULAR HYPERTROPHY PRESENT. TRACE MITRAL REGURGITATION. MILDLY SCLEROTIC TRILEAFLET AORTIC VALVE. INSUFFICIENT TRICUSPID REGURGITANT JET TO ESTIMATE PULMONARY ARTERY PRESSURE. NEGATIVE BUBBLE STUDY ON PRIOR ECHO REPORT 4/09/2012. NO SIGNIFICANT CHANGES FROM PRIOR ECHO REPORT ON 4/09/2012. 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 S/p ptca/stent to mid LCX. Continue DAPT and intense risk factor modification. Signed by: Roslyn Merritt MD     ASSESSMENT and PLAN    ICD-10-CM ICD-9-CM    1. Essential (primary) hypertension  I10 401.9     Stable continue current treatment      2. Coronary artery disease of native artery of native heart with stable angina pectoris (HCC)  I25.118 414.01      413.9     Stable continue treatment monitor      3. Dyslipidemia  E78.5 272.4     Continue treatment lab with PCP      4. Status post insertion of drug eluting coronary artery stent  Z95.5 V45.82     Stable continue treatment      5.  Chronic diastolic congestive heart failure (HCC)  I50.32 428.32 furosemide (LASIX) 40 mg tablet     428.0         Orders Placed This Encounter    furosemide (LASIX) 40 mg tablet     Sig: Take 1 Tablet by mouth in the morning. Dispense:  90 Tablet     Refill:  3     Appointment required before further refills will be authorized. Follow-up and Dispositions    Return in about 6 months (around 2/16/2023). current treatment plan is effective, no change in therapy  reviewed diet, exercise and weight control    cardiovascular risk and specific lipid/LDL goals reviewed  use of aspirin to prevent MI and TIA's discussed. Patient seen for follow-up. Has prior abnormal nuclear stress test.    Complained of worsening exertional dyspnea while cutting grass. Has occasional chest tightness. Recent cardiac cath- s/p PCI to mid LCX.  last lipid panel revealed . Target <70  Blood pressure well controlled on current meds-- reports taking meds daily as prescribed  Will repeat lipid panel and add PCSK9 as needed. F/u in 6 months.  8/2022  Cardiac status stable. Recent lab reviewed. LDL in 70s. Continue maximum dose of atorvastatin.

## 2022-08-17 RX ORDER — BLOOD SUGAR DIAGNOSTIC
1 STRIP MISCELLANEOUS 3 TIMES DAILY
COMMUNITY
End: 2022-08-17 | Stop reason: ALTCHOICE

## 2022-08-18 RX ORDER — BLOOD SUGAR DIAGNOSTIC
STRIP MISCELLANEOUS
Qty: 100 PEN NEEDLE | Refills: 3 | Status: SHIPPED | OUTPATIENT
Start: 2022-08-18

## 2022-08-19 DIAGNOSIS — E11.65 TYPE 2 DIABETES MELLITUS WITH HYPERGLYCEMIA, WITH LONG-TERM CURRENT USE OF INSULIN (HCC): ICD-10-CM

## 2022-08-19 DIAGNOSIS — Z79.4 TYPE 2 DIABETES MELLITUS WITH HYPERGLYCEMIA, WITH LONG-TERM CURRENT USE OF INSULIN (HCC): ICD-10-CM

## 2022-08-19 RX ORDER — DULAGLUTIDE 0.75 MG/.5ML
INJECTION, SOLUTION SUBCUTANEOUS
Qty: 4 ML | Refills: 0 | OUTPATIENT
Start: 2022-08-19

## 2022-08-22 ENCOUNTER — TELEPHONE (OUTPATIENT)
Dept: FAMILY MEDICINE CLINIC | Age: 57
End: 2022-08-22

## 2022-09-06 ENCOUNTER — OFFICE VISIT (OUTPATIENT)
Dept: FAMILY MEDICINE CLINIC | Age: 57
End: 2022-09-06
Payer: COMMERCIAL

## 2022-09-06 VITALS
HEART RATE: 90 BPM | DIASTOLIC BLOOD PRESSURE: 82 MMHG | WEIGHT: 193.4 LBS | SYSTOLIC BLOOD PRESSURE: 138 MMHG | TEMPERATURE: 98.1 F | BODY MASS INDEX: 32.18 KG/M2

## 2022-09-06 DIAGNOSIS — E11.65 TYPE 2 DIABETES MELLITUS WITH HYPERGLYCEMIA, WITH LONG-TERM CURRENT USE OF INSULIN (HCC): ICD-10-CM

## 2022-09-06 DIAGNOSIS — Z79.4 TYPE 2 DIABETES MELLITUS WITH DIABETIC NEUROPATHY, WITH LONG-TERM CURRENT USE OF INSULIN (HCC): Primary | ICD-10-CM

## 2022-09-06 DIAGNOSIS — Z12.5 SCREENING FOR MALIGNANT NEOPLASM OF PROSTATE: ICD-10-CM

## 2022-09-06 DIAGNOSIS — E11.40 TYPE 2 DIABETES MELLITUS WITH DIABETIC NEUROPATHY, WITH LONG-TERM CURRENT USE OF INSULIN (HCC): Primary | ICD-10-CM

## 2022-09-06 DIAGNOSIS — I10 ESSENTIAL HYPERTENSION: ICD-10-CM

## 2022-09-06 DIAGNOSIS — E11.69 HYPERLIPIDEMIA ASSOCIATED WITH TYPE 2 DIABETES MELLITUS (HCC): ICD-10-CM

## 2022-09-06 DIAGNOSIS — E78.5 HYPERLIPIDEMIA ASSOCIATED WITH TYPE 2 DIABETES MELLITUS (HCC): ICD-10-CM

## 2022-09-06 DIAGNOSIS — Z79.4 TYPE 2 DIABETES MELLITUS WITH HYPERGLYCEMIA, WITH LONG-TERM CURRENT USE OF INSULIN (HCC): ICD-10-CM

## 2022-09-06 DIAGNOSIS — R53.83 FATIGUE, UNSPECIFIED TYPE: ICD-10-CM

## 2022-09-06 PROCEDURE — 3046F HEMOGLOBIN A1C LEVEL >9.0%: CPT | Performed by: FAMILY MEDICINE

## 2022-09-06 PROCEDURE — 99214 OFFICE O/P EST MOD 30 MIN: CPT | Performed by: FAMILY MEDICINE

## 2022-09-06 RX ORDER — DULAGLUTIDE 1.5 MG/.5ML
1.5 INJECTION, SOLUTION SUBCUTANEOUS
Qty: 4 EACH | Refills: 5 | Status: SHIPPED | OUTPATIENT
Start: 2022-09-06

## 2022-09-06 NOTE — PROGRESS NOTES
Chief Complaint   Patient presents with    Hypertension    Diabetes    Cholesterol Problem         HPI    Liam Figueroa Sr. is a 62 y.o. male presenting today for 2 months  follow up of dm, htn, hld. Pt's home sugars remain variable, ranging 200- 300s. Pt did stop drinking soda. Pt has not heard from endo to sched an appt. Patient does not need medication refills today. New concerns today: pt had been eating lena sausage and potted meat; his legs did swell and his bp was elevated. He has stopped eating this. His bp has improved and the swelling resolved since he stopped those foods and started lasix. Review of Systems   Constitutional: Negative. HENT: Negative. Respiratory: Negative. Cardiovascular: Negative. All other systems reviewed and are negative. Physical Exam  Vitals and nursing note reviewed. Constitutional:       General: He is not in acute distress. Appearance: Normal appearance. HENT:      Head: Normocephalic and atraumatic. Right Ear: External ear normal.      Left Ear: External ear normal.      Nose: Nose normal.   Eyes:      Extraocular Movements: Extraocular movements intact. Conjunctiva/sclera: Conjunctivae normal.   Cardiovascular:      Rate and Rhythm: Normal rate and regular rhythm. Heart sounds: No murmur heard. No friction rub. No gallop. Pulmonary:      Effort: Pulmonary effort is normal.      Breath sounds: Normal breath sounds. No wheezing, rhonchi or rales. Musculoskeletal:         General: Normal range of motion. Cervical back: Normal range of motion. No rigidity. Skin:     General: Skin is warm and dry. Neurological:      Mental Status: He is alert and oriented to person, place, and time. Coordination: Coordination normal.   Psychiatric:         Mood and Affect: Mood normal.         Behavior: Behavior normal.         Thought Content:  Thought content normal.         Judgment: Judgment normal. Diagnoses and all orders for this visit:    1. Type 2 diabetes mellitus with diabetic neuropathy, with long-term current use of insulin ([address]  2. Type 2 diabetes mellitus with hyperglycemia, with long-term current use of insulin (HCC)  Not at goal.    -     METABOLIC PANEL, COMPREHENSIVE; Future  -     LIPID PANEL; Future  -     HEMOGLOBIN A1C WITH EAG; Future  -     MICROALBUMIN, UR, RAND W/ MICROALB/CREAT RATIO; Future  -     PSA SCREENING (SCREENING); Future  -     dose change: dulaglutide (Trulicity) 1.5 WP/1.9 mL sub-q pen; 0.5 mL by SubCUTAneous route every seven (7) days.  -     TSH 3RD GENERATION; Future  -     VITAMIN D, 25 HYDROXY; Future  -     CBC WITH AUTOMATED DIFF; Future    3. Essential hypertension  -     METABOLIC PANEL, COMPREHENSIVE; Future  -     LIPID PANEL; Future  Stable, cont pres tx plan. Avoid potted meat and lena sausage. 4. Hyperlipidemia associated with type 2 diabetes mellitus (HCC)  -     METABOLIC PANEL, COMPREHENSIVE; Future  -     LIPID PANEL; Future    5. Fatigue, unspecified type  -     METABOLIC PANEL, COMPREHENSIVE; Future  -     TSH 3RD GENERATION; Future  -     VITAMIN D, 25 HYDROXY; Future  -     CBC WITH AUTOMATED DIFF; Future    6. Screening for malignant neoplasm of prostate  -     PSA SCREENING (SCREENING);  Future    Follow-up and Dispositions    Return in about 4 weeks (around 10/4/2022) for diabetes, high blood pressure, high cholesterol, lab review, medication change(s), chf.

## 2022-09-06 NOTE — PROGRESS NOTES
Alberto Levy presents today for   Chief Complaint   Patient presents with    Hypertension    Diabetes    Cholesterol Problem       Tiff Coreen Sr. preferred language for health care discussion is english/other. Is someone accompanying this pt? No     Is the patient using any DME equipment during [address]? no    Depression Screening:  3 most recent PHQ Screens 7/13/2022   Little interest or pleasure in doing things Not at all   Feeling down, depressed, irritable, or hopeless Not at all   Total Score PHQ 2 0   Trouble falling or staying asleep, or sleeping too much -   Feeling tired or having little energy -   Poor appetite, weight loss, or overeating -   Feeling bad about yourself - or that you are a failure or have let yourself or your family down -   Trouble concentrating on things such as school, work, reading, or watching TV -   Moving or speaking so slowly that other people could have noticed; or the opposite being so fidgety that others notice -   Thoughts of being better off dead, or hurting yourself in some way -   PHQ 9 Score -   How difficult have these problems made it for you to do your work, take care of your home and get along with others -       Learning Assessment:  Learning Assessment 1/5/2021   PRIMARY LEARNER Patient   HIGHEST LEVEL OF EDUCATION - PRIMARY LEARNER  DID NOT GRADUATE HIGH SCHOOL   BARRIERS PRIMARY LEARNER NONE   CO-LEARNER CAREGIVER No   PRIMARY LANGUAGE ENGLISH   LEARNER PREFERENCE PRIMARY DEMONSTRATION     -   ANSWERED BY Patient   RELATIONSHIP SELF       Abuse Screening:  Abuse Screening Questionnaire 6/9/2021   Do you ever feel afraid of your partner? N   Are you in a relationship with someone who physically or mentally threatens you? N   Is it safe for you to go home? Y       Generalized Anxiety  No flowsheet data found.       Health Maintenance Due   Topic Date Due    Shingrix Vaccine Age 49> (1 of 2) Never done    Foot Exam Q1  02/13/2018    Pneumococcal 0-64 years (2 - PCV) 06/07/2018    Colorectal Cancer Screening Combo  08/23/2020    COVID-19 Vaccine (4 - Booster for Pfizer series) 05/04/2022    A1C test (Diabetic or Prediabetic)  08/09/2022    Flu Vaccine (1) 09/01/2022   . Health Maintenance reviewed and discussed and ordered per Provider. VACCINES DUE   SCREENINGS DUE       Avery Funk. is updated on all HM    1. \"Have you been to the ER, urgent care clinic since your last visit? Hospitalized since your last visit? \" No    2. \"Have you seen or consulted any other health care providers outside of the [address] [place] [address] since your last visit? \" No     3. For patients aged 39-70: Has the patient had a colonoscopy / FIT/ Cologuard?  Yes - no Care Gap present

## 2022-10-03 ENCOUNTER — HOSPITAL ENCOUNTER (OUTPATIENT)
Dept: LAB | Age: 57
Discharge: HOME | End: 2022-10-03
Payer: COMMERCIAL

## 2022-10-03 DIAGNOSIS — R53.83 FATIGUE, UNSPECIFIED TYPE: ICD-10-CM

## 2022-10-03 DIAGNOSIS — E78.5 HYPERLIPIDEMIA ASSOCIATED WITH TYPE 2 DIABETES MELLITUS (HCC): ICD-10-CM

## 2022-10-03 DIAGNOSIS — E11.65 TYPE 2 DIABETES MELLITUS WITH HYPERGLYCEMIA, WITH LONG-TERM CURRENT USE OF INSULIN (HCC): ICD-10-CM

## 2022-10-03 DIAGNOSIS — I10 ESSENTIAL HYPERTENSION: ICD-10-CM

## 2022-10-03 DIAGNOSIS — Z79.4 TYPE 2 DIABETES MELLITUS WITH HYPERGLYCEMIA, WITH LONG-TERM CURRENT USE OF INSULIN (HCC): ICD-10-CM

## 2022-10-03 DIAGNOSIS — E11.40 TYPE 2 DIABETES MELLITUS WITH DIABETIC NEUROPATHY, WITH LONG-TERM CURRENT USE OF INSULIN (HCC): ICD-10-CM

## 2022-10-03 DIAGNOSIS — Z12.5 SCREENING FOR MALIGNANT NEOPLASM OF PROSTATE: ICD-10-CM

## 2022-10-03 DIAGNOSIS — Z79.4 TYPE 2 DIABETES MELLITUS WITH DIABETIC NEUROPATHY, WITH LONG-TERM CURRENT USE OF INSULIN (HCC): ICD-10-CM

## 2022-10-03 DIAGNOSIS — E11.69 HYPERLIPIDEMIA ASSOCIATED WITH TYPE 2 DIABETES MELLITUS (HCC): ICD-10-CM

## 2022-10-03 LAB
25(OH)D3 SERPL-MCNC: 29.7 NG/ML (ref 30–100)
ALBUMIN SERPL-MCNC: 3.3 G/DL (ref 3.4–5)
ALBUMIN/GLOB SERPL: 1 {RATIO} (ref 0.8–1.7)
ALP SERPL-CCNC: 87 U/L (ref 45–117)
ALT SERPL-CCNC: 16 U/L (ref 16–61)
ANION GAP SERPL CALC-SCNC: 5 MMOL/L (ref 3–18)
AST SERPL-CCNC: 10 U/L (ref 10–38)
BASOPHILS # BLD: 0 K/UL (ref 0–0.1)
BASOPHILS NFR BLD: 0 % (ref 0–2)
BILIRUB SERPL-MCNC: 0.4 MG/DL (ref 0.2–1)
BUN SERPL-MCNC: 14 MG/DL (ref 7–18)
BUN/CREAT SERPL: 11 (ref 12–20)
CALCIUM SERPL-MCNC: 9.4 MG/DL (ref 8.5–10.1)
CHLORIDE SERPL-SCNC: 98 MMOL/L (ref 100–111)
CHOLEST SERPL-MCNC: 144 MG/DL
CO2 SERPL-SCNC: 31 MMOL/L (ref 21–32)
CREAT SERPL-MCNC: 1.22 MG/DL (ref 0.6–1.3)
DIFFERENTIAL METHOD BLD: ABNORMAL
EOSINOPHIL # BLD: 0.1 K/UL (ref 0–0.4)
EOSINOPHIL NFR BLD: 2 % (ref 0–5)
ERYTHROCYTE [DISTWIDTH] IN BLOOD BY AUTOMATED COUNT: 11.8 % (ref 11.6–14.5)
EST. AVERAGE GLUCOSE BLD GHB EST-MCNC: 255 MG/DL
GLOBULIN SER CALC-MCNC: 3.3 G/DL (ref 2–4)
GLUCOSE SERPL-MCNC: 390 MG/DL (ref 74–99)
HBA1C MFR BLD: 10.5 % (ref 4.2–5.6)
HCT VFR BLD AUTO: 34.8 % (ref 36–48)
HDLC SERPL-MCNC: 34 MG/DL (ref 40–60)
HDLC SERPL: 4.2 {RATIO} (ref 0–5)
HGB BLD-MCNC: 11.6 G/DL (ref 13–16)
IMM GRANULOCYTES # BLD AUTO: 0.1 K/UL (ref 0–0.04)
IMM GRANULOCYTES NFR BLD AUTO: 1 % (ref 0–0.5)
LDLC SERPL CALC-MCNC: 78.6 MG/DL (ref 0–100)
LIPID PROFILE,FLP: ABNORMAL
LYMPHOCYTES # BLD: 2 K/UL (ref 0.9–3.6)
LYMPHOCYTES NFR BLD: 21 % (ref 21–52)
MCH RBC QN AUTO: 30.4 PG (ref 24–34)
MCHC RBC AUTO-ENTMCNC: 33.3 G/DL (ref 31–37)
MCV RBC AUTO: 91.1 FL (ref 78–100)
MONOCYTES # BLD: 0.5 K/UL (ref 0.05–1.2)
MONOCYTES NFR BLD: 5 % (ref 3–10)
NEUTS SEG # BLD: 6.6 K/UL (ref 1.8–8)
NEUTS SEG NFR BLD: 71 % (ref 40–73)
NRBC # BLD: 0 K/UL (ref 0–0.01)
NRBC BLD-RTO: 0 PER 100 WBC
PLATELET # BLD AUTO: 318 K/UL (ref 135–420)
PMV BLD AUTO: 11.6 FL (ref 9.2–11.8)
POTASSIUM SERPL-SCNC: 3.7 MMOL/L (ref 3.5–5.5)
PROT SERPL-MCNC: 6.6 G/DL (ref 6.4–8.2)
PSA SERPL-MCNC: 0.3 NG/ML (ref 0–4)
RBC # BLD AUTO: 3.82 M/UL (ref 4.35–5.65)
SODIUM SERPL-SCNC: 134 MMOL/L (ref 136–145)
TRIGL SERPL-MCNC: 157 MG/DL (ref ?–150)
TSH SERPL DL<=0.05 MIU/L-ACNC: 0.93 UIU/ML (ref 0.36–3.74)
VLDLC SERPL CALC-MCNC: 31.4 MG/DL
WBC # BLD AUTO: 9.3 K/UL (ref 4.6–13.2)

## 2022-10-03 PROCEDURE — 80061 LIPID PANEL: CPT

## 2022-10-03 PROCEDURE — 85025 COMPLETE CBC W/AUTO DIFF WBC: CPT

## 2022-10-03 PROCEDURE — 82306 VITAMIN D 25 HYDROXY: CPT

## 2022-10-03 PROCEDURE — 80053 COMPREHEN METABOLIC PANEL: CPT

## 2022-10-03 PROCEDURE — 83036 HEMOGLOBIN GLYCOSYLATED A1C: CPT

## 2022-10-03 PROCEDURE — 84443 ASSAY THYROID STIM HORMONE: CPT

## 2022-10-03 PROCEDURE — 36415 COLL VENOUS BLD VENIPUNCTURE: CPT

## 2022-10-03 PROCEDURE — 84153 ASSAY OF PSA TOTAL: CPT

## 2022-10-04 ENCOUNTER — TELEPHONE (OUTPATIENT)
Dept: CARDIOLOGY CLINIC | Age: 57
End: 2022-10-04

## 2022-10-04 ENCOUNTER — OFFICE VISIT (OUTPATIENT)
Dept: FAMILY MEDICINE CLINIC | Age: 57
End: 2022-10-04
Payer: COMMERCIAL

## 2022-10-04 VITALS
RESPIRATION RATE: 16 BRPM | SYSTOLIC BLOOD PRESSURE: 151 MMHG | HEART RATE: 73 BPM | DIASTOLIC BLOOD PRESSURE: 80 MMHG | BODY MASS INDEX: 31.78 KG/M2 | WEIGHT: 191 LBS | OXYGEN SATURATION: 96 %

## 2022-10-04 DIAGNOSIS — I10 ESSENTIAL HYPERTENSION: ICD-10-CM

## 2022-10-04 DIAGNOSIS — Z86.73 HISTORY OF CVA (CEREBROVASCULAR ACCIDENT): ICD-10-CM

## 2022-10-04 DIAGNOSIS — I50.32 CHRONIC DIASTOLIC CONGESTIVE HEART FAILURE (HCC): ICD-10-CM

## 2022-10-04 DIAGNOSIS — E11.40 TYPE 2 DIABETES MELLITUS WITH DIABETIC NEUROPATHY, WITH LONG-TERM CURRENT USE OF INSULIN (HCC): Primary | ICD-10-CM

## 2022-10-04 DIAGNOSIS — E11.69 HYPERLIPIDEMIA ASSOCIATED WITH TYPE 2 DIABETES MELLITUS (HCC): ICD-10-CM

## 2022-10-04 DIAGNOSIS — E78.5 HYPERLIPIDEMIA ASSOCIATED WITH TYPE 2 DIABETES MELLITUS (HCC): ICD-10-CM

## 2022-10-04 DIAGNOSIS — Z79.4 TYPE 2 DIABETES MELLITUS WITH DIABETIC NEUROPATHY, WITH LONG-TERM CURRENT USE OF INSULIN (HCC): Primary | ICD-10-CM

## 2022-10-04 PROCEDURE — 3046F HEMOGLOBIN A1C LEVEL >9.0%: CPT | Performed by: FAMILY MEDICINE

## 2022-10-04 PROCEDURE — 93000 ELECTROCARDIOGRAM COMPLETE: CPT | Performed by: FAMILY MEDICINE

## 2022-10-04 PROCEDURE — 99214 OFFICE O/P EST MOD 30 MIN: CPT | Performed by: FAMILY MEDICINE

## 2022-10-04 NOTE — TELEPHONE ENCOUNTER
Spoke with Nathalie Smith from Dr. Rose Page office regarding EKG per Dr. Balaji Warner. No significant change from before no other evaluation needed from cardiac standpoint. Potassium was normal in lab in September. He voices understanding and acceptance of this advice and will call back if any further questions or concerns.

## 2022-10-04 NOTE — PROGRESS NOTES
Chief Complaint   Patient presents with    Diabetes                Hypertension    Cholesterol Problem    Labs    Medication Evaluation    CHF         Diabetes    Hypertension     Cholesterol Problem    Labs    Medication Evaluation    CHF      Cherise Kirkland Sr. is a 62 y.o. male presenting today for 4 weeks follow up of dm, htn, hld, chf.  Trulicity was started at last appt; pt did start it and tolerate it. His fiancee has been in the hospital so his care has been a little off. His blood sugars did get lower but he's not sure what his readings were. Patient had labs on 10/3/22. Labs reviewed in detail with patient     Patient does not need medication refills today. New concerns today: none      Review of Systems   Constitutional: Negative. HENT: Negative. Respiratory: Negative. Cardiovascular: Negative. All other systems reviewed and are negative. Physical Exam  Vitals and nursing note reviewed. Constitutional:       General: He is not in acute distress. Appearance: Normal appearance. HENT:      Head: Normocephalic and atraumatic. Right Ear: External ear normal.      Left Ear: External ear normal.      Nose: Nose normal.   Eyes:      Extraocular Movements: Extraocular movements intact. Conjunctiva/sclera: Conjunctivae normal.   Cardiovascular:      Rate and Rhythm: Normal rate. Rhythm regularly irregular. Heart sounds: No murmur heard. No friction rub. No gallop. Pulmonary:      Effort: Pulmonary effort is normal.      Breath sounds: Normal breath sounds. No wheezing, rhonchi or rales. Musculoskeletal:         General: Normal range of motion. Cervical back: Normal range of motion. No rigidity. Skin:     General: Skin is warm and dry. Neurological:      Mental Status: He is alert and oriented to person, place, and time.       Coordination: Coordination normal.   Psychiatric:         Mood and Affect: Mood normal.         Behavior: Behavior normal. Thought Content: Thought content normal.         Judgment: Judgment normal.       Diagnoses and all orders for this visit:    1. Type 2 diabetes mellitus with diabetic neuropathy, with long-term current use of insulin (HCC)  Cont all meds. Cont bs checks. Work on diet    2. History of CVA (cerebrovascular accident)  Stable, cont pres tx plan. 3. Chronic diastolic congestive heart failure (HCC)  -     AMB POC EKG ROUTINE W/ 12 LEADS, INTER & REP    4. Essential hypertension  -     AMB POC EKG ROUTINE W/ 12 LEADS, INTER & REP    5. Hyperlipidemia associated with type 2 diabetes mellitus ([facility] [address]  Cont to work on diet    Follow-up and Dispositions    Return in about 6 weeks (around 11/15/2022) for diabetes.

## 2022-10-04 NOTE — PROGRESS NOTES
Kiara Olivas presents today for   Chief Complaint   Patient presents with    Diabetes                Hypertension    Cholesterol Problem    Labs    Medication Evaluation    CHF       Vitals:    10/04/22 1046   BP: (!) 151/80   Pulse: 73   Resp: 16   SpO2: 96%   Weight: 191 lb (86.6 kg)        Cherise Kirkland Sr. preferred language for health care discussion is english/other. Is someone accompanying this pt? no    Is the patient using any DME equipment during [address]? no    Depression Screening:  3 most recent PHQ Screens 7/13/2022   Little interest or pleasure in doing things Not at all   Feeling down, depressed, irritable, or hopeless Not at all   Total Score PHQ 2 0   Trouble falling or staying asleep, or sleeping too much -   Feeling tired or having little energy -   Poor appetite, weight loss, or overeating -   Feeling bad about yourself - or that you are a failure or have let yourself or your family down -   Trouble concentrating on things such as school, work, reading, or watching TV -   Moving or speaking so slowly that other people could have noticed; or the opposite being so fidgety that others notice -   Thoughts of being better off dead, or hurting yourself in some way -   PHQ 9 Score -   How difficult have these problems made it for you to do your work, take care of your home and get along with others -       Learning Assessment:  Learning Assessment 1/5/2021   PRIMARY LEARNER Patient   HIGHEST LEVEL OF EDUCATION - PRIMARY LEARNER  DID NOT GRADUATE HIGH SCHOOL   BARRIERS PRIMARY LEARNER NONE   CO-LEARNER CAREGIVER No   PRIMARY LANGUAGE ENGLISH   LEARNER PREFERENCE PRIMARY DEMONSTRATION     -   ANSWERED BY Patient   RELATIONSHIP SELF       Abuse Screening:  Abuse Screening Questionnaire 6/9/2021   Do you ever feel afraid of your partner? N   Are you in a relationship with someone who physically or mentally threatens you? N   Is it safe for you to go home?  Y       Generalized Anxiety  No flowsheet data found.      Health Maintenance Due   Topic Date Due    Shingrix Vaccine Age 49> (1 of 2) Never done    Foot Exam Q1  02/13/2018    Colorectal Cancer Screening Combo  08/23/2020    COVID-19 Vaccine (4 - Booster for Pfizer series) 05/04/2022    Flu Vaccine (1) 08/01/2022   . Health Maintenance reviewed and discussed and ordered per Provider. VACCINES DUE   SCREENINGS DUE       Starlene Shells. is updated on all HM    1. \"Have you been to the ER, urgent care clinic since your last visit? Hospitalized since your last visit? \" No    2. \"Have you seen or consulted any other health care providers outside of the [address] [place] [address] since your last visit? \" No     3. For patients aged 39-70: Has the patient had a colonoscopy / FIT/ Cologuard?  Yes - no Care Gap present

## 2022-10-06 ENCOUNTER — TELEPHONE (OUTPATIENT)
Dept: FAMILY MEDICINE CLINIC | Age: 57
End: 2022-10-06

## 2022-10-06 NOTE — TELEPHONE ENCOUNTER
Patient called the office that he was referred to for endocrinology does not take his insurance. He would like to know if you could find someone in this area to refer him to.  Please advise

## 2022-10-12 DIAGNOSIS — I10 ESSENTIAL (PRIMARY) HYPERTENSION: ICD-10-CM

## 2022-10-12 RX ORDER — METOPROLOL TARTRATE 25 MG/1
TABLET, FILM COATED ORAL
Qty: 180 TABLET | Refills: 1 | Status: SHIPPED | OUTPATIENT
Start: 2022-10-12

## 2022-10-31 ENCOUNTER — PATIENT OUTREACH (OUTPATIENT)
Dept: CASE MANAGEMENT | Age: 57
End: 2022-10-31

## 2022-10-31 NOTE — PROGRESS NOTES
Complex Case Management      Date/Time:  10/31/2022 1:26 PM    Method of communication with patient:phone    LPN care Coordinator (LPN CC) contacted the patient by telephone to perform Ambulatory Care Coordination. Verified name and  (PHI) with patient as identifiers. Provided introduction to self, and explanation of the LPN CC's role. Reviewed most recent clinic visit w/ patient who verbalized understanding. Patient given an opportunity to ask questions. Top Challenges reviewed with the Provider   N/a       The patient agrees to contact the PCP office or the LPN CC for questions related to their healthcare. Provided contact information for future reference. Disease Specific:   CHF- does not weigh daily  Heart Failure Education outreach Date/Time: 10/31/2022 1:28 PM    LPN Care Coordinator (LPN CC) contacted the patient by telephone to perform Ambulatory Care Coordination. Verified name and  with patient as identifiers. Provided introduction to self, and explanation of the LPN CC's role. ACM reviewed that a Health Healthy tips packet for the Holiday has been sent to [employer]. LPN CC reviewed CHF zones, daily weights, fluid restriction, the importance of low sodium diet, healthy tips packet with the patient. Instructed patient to call their PCP/cardiologist if they have a weight gain of 3 lbs in 2 days or 5 lbs in a week. Patient reminded that there is a physician on call 24 hours a day / 7 days a week should the patient have questions or concerns. The patient verbalized understanding. Home Health Active: No    DME Active: No    Barriers to care?  none    Advance Care Planning:   Does patient have an Advance Directive:  not on file     Medication(s):   Medication reconciliation was performed with patient, who verbalizes understanding of administration of home medications. There were no barriers to obtaining medications identified at this time.     Referral to Pharm D needed: no     Current Outpatient Medications   Medication Sig    ticagrelor (BRILINTA) 90 mg tablet Take 1 Tablet by mouth two (2) times a day. metFORMIN ER (GLUCOPHAGE XR) 500 mg tablet TAKE TWO TABLETS BY MOUTH TWICE DAILY WITH MEALS    metoprolol tartrate (LOPRESSOR) 25 mg tablet TAKE ONE TABLET BY MOUTH TWICE DAILY    dulaglutide (Trulicity) 1.5 YY/4.5 mL sub-q pen 0.5 mL by SubCUTAneous route every seven (7) days. Insulin Needles, Disposable, 32 gauge x 1/4\" ndle Use to inject insulin daily as directed    furosemide (LASIX) 40 mg tablet Take 1 Tablet by mouth in the morning. amLODIPine (NORVASC) 5 mg tablet Take 1 Tablet by mouth daily. Indications: high blood pressure    atorvastatin (LIPITOR) 80 mg tablet Take 1 Tablet by mouth daily. losartan (COZAAR) 100 mg tablet Take 1 Tablet by mouth daily. hydroCHLOROthiazide (HYDRODIURIL) 12.5 mg tablet Take 1 Tablet by mouth daily. Lantus Solostar U-100 Insulin 100 unit/mL (3 mL) inpn INJECT 46 UNITS UNDER THE SKIN NIGHTLY    aspirin 81 mg chewable tablet Take 2 Tablets by mouth daily. albuterol (PROVENTIL HFA, VENTOLIN HFA, PROAIR HFA) 90 mcg/actuation inhaler Take 2 Puffs by inhalation every four (4) hours as needed for Wheezing or Shortness of Breath. OneTouch Verio test strips strip test BLOOD SUGAR TWICE DAILY AS DIRECTED    gabapentin (NEURONTIN) 300 mg capsule TAKE TWO CAPSULES BY MOUTH EVERY EVENING     No current facility-administered medications for this visit. [facility] follow up appointment(s):   Future Appointments   Date Time Provider Amalia Silver   11/17/2022  9:30 AM Magaly Perez MD [facility] BS AMB   2/28/2023 10:30 AM Nancy Toledo MD [facility] AMB        Non-[facility] follow up appointment(s): endocrinology     Goals Addressed                   This Visit's Progress     Patient verbalizes understanding of self -management goals of living with Diabetes.         Monitor blood sugars everyday  Healthy diet       Reduce risk of CHF exacerbations and complications.         Daily weights- report weight gain of 3 lbs in a day or 5 lbs in a week  Monitor for SOB and swelling       Self-schedules and keeps appointments

## 2022-11-14 ENCOUNTER — PATIENT OUTREACH (OUTPATIENT)
Dept: CASE MANAGEMENT | Age: 57
End: 2022-11-14

## 2022-11-14 NOTE — PROGRESS NOTES
Complex Case Management      Date/Time:  2022 10:19 AM    Method of communication with patient:phone    LPN Care Coordinator (LPN CC) contacted the patient by telephone to perform Ambulatory Care Coordination. Verified name and  (PHI) with patient as identifiers. Provided introduction to self, and explanation of the LPN CC's role. Reviewed most recent clinic visit w/ patient who verbalized understanding. Patient given an opportunity to ask questions. Top Challenges reviewed with the Provider   N/a       The patient agrees to contact the PCP office or the LPN CC for questions related to their healthcare. Provided contact information for future reference. Disease Specific:   CHF    Home Health Active: No    DME Active: No    Barriers to care?  none         Current Outpatient Medications   Medication Sig    ticagrelor (BRILINTA) 90 mg tablet Take 1 Tablet by mouth two (2) times a day. metFORMIN ER (GLUCOPHAGE XR) 500 mg tablet TAKE TWO TABLETS BY MOUTH TWICE DAILY WITH MEALS    metoprolol tartrate (LOPRESSOR) 25 mg tablet TAKE ONE TABLET BY MOUTH TWICE DAILY    dulaglutide (Trulicity) 1.5 EM/3.0 mL sub-q pen 0.5 mL by SubCUTAneous route every seven (7) days. Insulin Needles, Disposable, 32 gauge x 1/4\" ndle Use to inject insulin daily as directed    furosemide (LASIX) 40 mg tablet Take 1 Tablet by mouth in the morning. amLODIPine (NORVASC) 5 mg tablet Take 1 Tablet by mouth daily. Indications: high blood pressure    atorvastatin (LIPITOR) 80 mg tablet Take 1 Tablet by mouth daily. losartan (COZAAR) 100 mg tablet Take 1 Tablet by mouth daily. hydroCHLOROthiazide (HYDRODIURIL) 12.5 mg tablet Take 1 Tablet by mouth daily. Lantus Solostar U-100 Insulin 100 unit/mL (3 mL) inpn INJECT 46 UNITS UNDER THE SKIN NIGHTLY    aspirin 81 mg chewable tablet Take 2 Tablets by mouth daily.     albuterol (PROVENTIL HFA, VENTOLIN HFA, PROAIR HFA) 90 mcg/actuation inhaler Take 2 Puffs by inhalation every four (4) hours as needed for Wheezing or Shortness of Breath. OneTouch Verio test strips strip test BLOOD SUGAR TWICE DAILY AS DIRECTED    gabapentin (NEURONTIN) 300 mg capsule TAKE TWO CAPSULES BY MOUTH EVERY EVENING     No current facility-administered medications for this visit. [facility] follow up appointment(s):   Future Appointments   Date Time Provider Amalia Bolanosi   11/17/2022  9:30 AM Maryann Dumas MD [facility] AMB   2/28/2023 10:30 AM Precious Azar MD [facility] AMB        Non-[facility] follow up appointment(s): n/a     Goals Addressed                   This Visit's Progress     Patient verbalizes understanding of self -management goals of living with Diabetes. On track     Monitor blood sugars everyday  Healthy diet       Reduce risk of CHF exacerbations and complications.    Not on track     Daily weights- report weight gain of 3 lbs in a day or 5 lbs in a week  Monitor for SOB and swelling       Self-schedules and keeps appointments    On track

## 2022-11-15 RX ORDER — ALBUTEROL SULFATE 90 UG/1
AEROSOL, METERED RESPIRATORY (INHALATION)
Qty: 90 G | Refills: 0 | Status: SHIPPED | OUTPATIENT
Start: 2022-11-15

## 2022-11-17 ENCOUNTER — OFFICE VISIT (OUTPATIENT)
Dept: FAMILY MEDICINE CLINIC | Age: 57
End: 2022-11-17
Payer: COMMERCIAL

## 2022-11-17 VITALS
BODY MASS INDEX: 30.59 KG/M2 | TEMPERATURE: 98.5 F | DIASTOLIC BLOOD PRESSURE: 80 MMHG | HEART RATE: 83 BPM | RESPIRATION RATE: 16 BRPM | OXYGEN SATURATION: 98 % | WEIGHT: 183.8 LBS | SYSTOLIC BLOOD PRESSURE: 130 MMHG

## 2022-11-17 DIAGNOSIS — Z23 NEEDS FLU SHOT: ICD-10-CM

## 2022-11-17 DIAGNOSIS — Z79.4 TYPE 2 DIABETES MELLITUS WITH HYPERGLYCEMIA, WITH LONG-TERM CURRENT USE OF INSULIN (HCC): Primary | ICD-10-CM

## 2022-11-17 DIAGNOSIS — E11.40 TYPE 2 DIABETES MELLITUS WITH DIABETIC NEUROPATHY, WITH LONG-TERM CURRENT USE OF INSULIN (HCC): ICD-10-CM

## 2022-11-17 DIAGNOSIS — E11.65 TYPE 2 DIABETES MELLITUS WITH HYPERGLYCEMIA, WITH LONG-TERM CURRENT USE OF INSULIN (HCC): Primary | ICD-10-CM

## 2022-11-17 DIAGNOSIS — Z79.4 TYPE 2 DIABETES MELLITUS WITH DIABETIC NEUROPATHY, WITH LONG-TERM CURRENT USE OF INSULIN (HCC): ICD-10-CM

## 2022-11-17 PROCEDURE — 3074F SYST BP LT 130 MM HG: CPT | Performed by: FAMILY MEDICINE

## 2022-11-17 PROCEDURE — 90686 IIV4 VACC NO PRSV 0.5 ML IM: CPT | Performed by: FAMILY MEDICINE

## 2022-11-17 PROCEDURE — 99214 OFFICE O/P EST MOD 30 MIN: CPT | Performed by: FAMILY MEDICINE

## 2022-11-17 PROCEDURE — 3046F HEMOGLOBIN A1C LEVEL >9.0%: CPT | Performed by: FAMILY MEDICINE

## 2022-11-17 PROCEDURE — 3078F DIAST BP <80 MM HG: CPT | Performed by: FAMILY MEDICINE

## 2022-11-17 RX ORDER — DULAGLUTIDE 3 MG/.5ML
3 INJECTION, SOLUTION SUBCUTANEOUS
Qty: 4 EACH | Refills: 5 | Status: SHIPPED | OUTPATIENT
Start: 2022-11-17

## 2022-11-17 RX ORDER — TRAZODONE HYDROCHLORIDE 100 MG/1
TABLET ORAL
COMMUNITY
Start: 2022-10-17 | End: 2022-11-27

## 2022-11-17 NOTE — PATIENT INSTRUCTIONS
Vaccine Information Statement    Influenza (Flu) Vaccine (Inactivated or Recombinant): What You Need to Know    Many vaccine information statements are available in [place] and other languages. See www.immunize.org/vis. Hojas de información sobre vacunas están disponibles en español y en muchos otros idiomas. Visite www.immunize.org/vis. 1. Why get vaccinated? Influenza vaccine can prevent influenza (flu). Flu is a contagious disease that spreads around the [facility] every year, usually between October and May. Anyone can get the flu, but it is more dangerous for some people. Infants and young children, people 72 years and older, pregnant people, and people with certain health conditions or a weakened immune system are at greatest risk of flu complications. Pneumonia, bronchitis, sinus infections, and ear infections are examples of flu-related complications. If you have a medical condition, such as heart disease, cancer, or diabetes, flu can make it worse. Flu can cause fever and chills, sore throat, muscle aches, fatigue, cough, headache, and runny or stuffy nose. Some people may have vomiting and diarrhea, though this is more common in children than adults. In an average year, thousands of people in the [facility] die from flu, and many more are hospitalized. Flu vaccine prevents millions of illnesses and flu-related visits to the doctor each year. 2. Influenza vaccines     CDC recommends everyone 6 months and older get vaccinated every flu season. Children 6 months through 6years of age may need 2 doses during a single flu season. Everyone else needs only 1 dose each flu season. It takes about 2 weeks for protection to develop after vaccination. There are many flu viruses, and they are always changing. Each year a new flu vaccine is made to protect against the influenza viruses believed to be likely to cause disease in the upcoming flu season.  Even when the vaccine doesnt exactly match these viruses, it may still provide some protection. Influenza vaccine does not cause flu. Influenza vaccine may be given at the same time as other vaccines. 3. Talk with your health care provider    Tell your vaccination provider if the person getting the vaccine:  Has had an allergic reaction after a previous dose of influenza vaccine, or has any severe, life-threatening allergies   Has ever had Guillain-Barré Syndrome (also called GBS)    In some cases, your health care provider may decide to postpone influenza vaccination until a future visit. Influenza vaccine can be administered at any time during pregnancy. People who are or will be pregnant during influenza season should receive inactivated influenza vaccine. People with minor illnesses, such as a cold, may be vaccinated. People who are moderately or severely ill should usually wait until they recover before getting influenza vaccine. Your health care provider can give you more information. 4. Risks of a vaccine reaction    Soreness, redness, and swelling where the shot is given, fever, muscle aches, and headache can happen after influenza vaccination. There may be a very small increased risk of Guillain-Barré Syndrome (GBS) after inactivated influenza vaccine (the flu shot). Woodrow Cuello children who get the flu shot along with pneumococcal vaccine (PCV13) and/or DTaP vaccine at the same time might be slightly more likely to have a seizure caused by fever. Tell your health care provider if a child who is getting flu vaccine has ever had a seizure. People sometimes faint after medical procedures, including vaccination. Tell your provider if you feel dizzy or have vision changes or ringing in the ears. As with any medicine, there is a very remote chance of a vaccine causing a severe allergic reaction, other serious injury, or death. 5. What if there is a serious problem?     An allergic reaction could occur after the vaccinated person leaves the clinic. If you see signs of a severe allergic reaction (hives, swelling of the face and throat, difficulty breathing, a fast heartbeat, dizziness, or weakness), call 9-1-1 and get the person to the nearest hospital.    For other signs that concern you, call your health care provider. Adverse reactions should be reported to the Vaccine Adverse Event Reporting System (VAERS). Your health care provider will usually file this report, or you can do it yourself. Visit the VAERS website at www.vaers. [facility].gov or call 8-672.270.4249. VAERS is only for reporting reactions, and VAERS staff members do not give medical advice. 6. The National Vaccine Injury Compensation Program    The [facility] Vaccine Injury Compensation Program (VICP) is a federal program that was created to compensate people who may have been injured by certain vaccines. Claims regarding alleged injury or death due to vaccination have a time limit for filing, which may be as short as two years. Visit the VICP website at www.[facility].gov/vaccinecompensation or call 1-722.313.2649 to learn about the program and about filing a claim. 7. How can I learn more? Ask your health care provider. Call your local or state health department. Visit the website of the Food and Drug Administration (FDA) for vaccine package inserts and additional information at www.fda.gov/vaccines-blood-biologics/vaccines. Contact the Centers for Disease Control and Prevention (CDC): Call 3-441.684.1321 (1-800-CDC-INFO) or  Visit CDCs influenza website at www.cdc.gov/flu. Vaccine Information Statement   Inactivated Influenza Vaccine   8/6/2021  42 HEATHER Aguilar [id number]   Department of Health and Human Services  Centers for Disease Control and Prevention    Office Use Only

## 2022-11-17 NOTE — PROGRESS NOTES
Wolf Wilks presents today for   Chief Complaint   Patient presents with    Diabetes       Vitals:    11/17/22 1040   BP: 130/80   Pulse: 83   Resp: 16   Temp: 98.5 °F (36.9 °C)   TempSrc: Oral   SpO2: 98%   Weight: 183 lb 12.8 oz (83.4 kg)        Christa Hasten Sr. preferred language for health care discussion is english/other. Is someone accompanying this pt? No     Is the patient using any DME equipment during OV? No     Depression Screening:  3 most recent PHQ Screens 7/13/2022   Little interest or pleasure in doing things Not at all   Feeling down, depressed, irritable, or hopeless Not at all   Total Score PHQ 2 0   Trouble falling or staying asleep, or sleeping too much -   Feeling tired or having little energy -   Poor appetite, weight loss, or overeating -   Feeling bad about yourself - or that you are a failure or have let yourself or your family down -   Trouble concentrating on things such as school, work, reading, or watching TV -   Moving or speaking so slowly that other people could have noticed; or the opposite being so fidgety that others notice -   Thoughts of being better off dead, or hurting yourself in some way -   PHQ 9 Score -   How difficult have these problems made it for you to do your work, take care of your home and get along with others -       Learning Assessment:  Learning Assessment 1/5/2021   PRIMARY LEARNER Patient   HIGHEST LEVEL OF EDUCATION - PRIMARY LEARNER  DID NOT GRADUATE HIGH SCHOOL   BARRIERS PRIMARY LEARNER NONE   CO-LEARNER CAREGIVER No   PRIMARY LANGUAGE ENGLISH   LEARNER PREFERENCE PRIMARY DEMONSTRATION     -   ANSWERED BY Patient   RELATIONSHIP SELF       Abuse Screening:  Abuse Screening Questionnaire 6/9/2021   Do you ever feel afraid of your partner? N   Are you in a relationship with someone who physically or mentally threatens you? N   Is it safe for you to go home? Y       Generalized Anxiety  No flowsheet data found.       Health Maintenance Due   Topic Date Due    Hepatitis B Vaccine (1 of 3 - Risk 3-dose series) Never done    Shingrix Vaccine Age 50> (1 of 2) Never done    Foot Exam Q1  02/13/2018    Colorectal Cancer Screening Combo  08/23/2020    COVID-19 Vaccine (4 - Booster for Pfizer series) 03/01/2022    Flu Vaccine (1) 08/01/2022   . Health Maintenance reviewed and discussed and ordered per Provider. VACCINES DUE   SCREENINGS DUE       Candelaria Amezcua. is updated on all HM    1. \"Have you been to the ER, urgent care clinic since your last visit? Hospitalized since your last visit? \" No    2. \"Have you seen or consulted any other health care providers outside of the [address] [place] [address] since your last visit? \" No     3. For patients aged 39-70: Has the patient had a colonoscopy / FIT/ Cologuard?  No

## 2022-11-17 NOTE — PROGRESS NOTES
Chief Complaint   Patient presents with    Diabetes      Will see endo at the end of this month          HPI    Lise Sotomayor. is a 62 y.o. male presenting today for 6 weeks  follow up of dm. His diet has improved. Pt has stopped drinking soda. His highest blood sugar was 286. His usual range is 230-260s. Pt will see endo 11/30/22. Patient does not need medication refills today. New concerns today: Pt would like to have a flu shot today. Review of Systems   Constitutional: Negative. HENT: Negative. Respiratory: Negative. Cardiovascular: Negative. All other systems reviewed and are negative. Physical Exam  Vitals and nursing note reviewed. Constitutional:       General: He is not in acute distress. Appearance: Normal appearance. HENT:      Head: Normocephalic and atraumatic. Right Ear: External ear normal.      Left Ear: External ear normal.      Nose: Nose normal.   Eyes:      Extraocular Movements: Extraocular movements intact. Conjunctiva/sclera: Conjunctivae normal.   Cardiovascular:      Rate and Rhythm: Normal rate and regular rhythm. Heart sounds: No murmur heard. No friction rub. No gallop. Pulmonary:      Effort: Pulmonary effort is normal.      Breath sounds: Normal breath sounds. No wheezing, rhonchi or rales. Musculoskeletal:         General: Normal range of motion. Cervical back: Normal range of motion. No rigidity. Skin:     General: Skin is warm and dry. Neurological:      Mental Status: He is alert and oriented to person, place, and time. Coordination: Coordination normal.   Psychiatric:         Mood and Affect: Mood normal.         Behavior: Behavior normal.         Thought Content: Thought content normal.         Judgment: Judgment normal.       Diagnoses and all orders for this visit:    1. Type 2 diabetes mellitus with hyperglycemia, with long-term current use of insulin ([address]  2.  Type 2 diabetes mellitus with diabetic neuropathy, with long-term current use of insulin (HCC)  -    dose change:  dulaglutide (Trulicity) 3 XJ/1.7 mL pnij; 3 mg by SubCUTAneous route every seven (7) days. Pt to titrate dose with next weekly injection. Cont to log sugars. Take log to endo appt. 3. Needs flu shot  -     INFLUENZA, FLUARIX, FLULAVAL, FLUZONE (AGE 6 MO+), AFLURIA(AGE 3Y+) IM, PF, 0.5 ML    Follow-up and Dispositions    Return in about 3 months (around 2/17/2023) for diabetes, cad, chf, depression.

## 2022-11-26 DIAGNOSIS — E11.40 TYPE 2 DIABETES MELLITUS WITH DIABETIC NEUROPATHY, WITHOUT LONG-TERM CURRENT USE OF INSULIN (HCC): ICD-10-CM

## 2022-11-27 RX ORDER — TRAZODONE HYDROCHLORIDE 100 MG/1
TABLET ORAL
Qty: 300 TABLET | Refills: 0 | Status: SHIPPED | OUTPATIENT
Start: 2022-11-27

## 2022-11-27 RX ORDER — INSULIN GLARGINE 100 [IU]/ML
INJECTION, SOLUTION SUBCUTANEOUS
Qty: 45 ML | Refills: 1 | Status: SHIPPED | OUTPATIENT
Start: 2022-11-27

## 2022-11-28 RX ORDER — LOSARTAN POTASSIUM 100 MG/1
TABLET ORAL
Qty: 90 TABLET | Refills: 2 | Status: SHIPPED | OUTPATIENT
Start: 2022-11-28

## 2022-11-29 ENCOUNTER — TELEPHONE (OUTPATIENT)
Dept: FAMILY MEDICINE CLINIC | Age: 57
End: 2022-11-29

## 2022-11-29 NOTE — TELEPHONE ENCOUNTER
Mr Tasneem Helton needs to have a tooth extraction. The pt was just seen on 11/20/22, his care giver is hoping that you will be willing to fax a letter to dentist dr. Angelique Hogue giving any instructions you may have since he is diabetic, their fax # is [phone]. Their office # is [phone] in case you need to speak with them.    Please advise

## 2022-11-29 NOTE — LETTER
12/2/2022 10:54 AM     Caridad Velazquezsusanna Hernandez [address] [facility] [address]      Dr. Annemarie Crum ,     We recently received request for clearance for this patient to undergo tooth extraction with your office . Unless is is emergent I do not recommend patient have extraction done  As his A1C is 10.5 which puts this patient at risk for poor healing and Infection . Please call our office for any  Questions or concerns at [phone] .                Sincerely,      Cisco Espitia MD

## 2022-12-02 NOTE — TELEPHONE ENCOUNTER
Office given verbal that we do not recommend surgery and letter faxed to dr Alessia Em office with conformation .

## 2022-12-06 ENCOUNTER — PATIENT OUTREACH (OUTPATIENT)
Dept: CASE MANAGEMENT | Age: 57
End: 2022-12-06

## 2022-12-06 DIAGNOSIS — F32.A DEPRESSION, UNSPECIFIED DEPRESSION TYPE: ICD-10-CM

## 2022-12-06 NOTE — PROGRESS NOTES
Complex Case Management      Date/Time:  2022 10:37 AM    Method of communication with patient:phone    LPN Care Coordinator(LPN CC) contacted the patient by telephone to perform Ambulatory Care Coordination. Verified name and  (PHI) with patient as identifiers. Provided introduction to self, and explanation of the LPN CC's role. Reviewed most recent clinic visit w/ patient who verbalized understanding. Patient given an opportunity to ask questions. Top Challenges reviewed with the Provider   Patient is requesting refill of Lexapro 20 mg  Dr. Dacia Garcia rescheduled appt until 2023     The patient agrees to contact the PCP office or the LPN CC for questions related to their healthcare. Provided contact information for future reference. Disease Specific:   CHF- does not weigh daily no swelling or SOB noted    Home Health Active: No    DME Active: No    Barriers to care?  none    Advance Care Planning:   Does patient have an Advance Directive:  not on file     Medication(s):   Medication reconciliation was not performed with patient, who verbalizes understanding of administration of home medications. There were no barriers to obtaining medications identified at this time. Referral to Pharm D needed: no     Current Outpatient Medications   Medication Sig    losartan (COZAAR) 100 mg tablet TAKE ONE TABLET BY MOUTH EVERY DAY    Lantus Solostar U-100 Insulin 100 unit/mL (3 mL) inpn INJECT 46 UNITS UNDER THE SKIN NIGHTLY    traZODone (DESYREL) 100 mg tablet TAKE 1 OR 2 TABLETS BY MOUTH AT BEDTIME AS NEEDED FOR SLEEP    dulaglutide (Trulicity) 3 NM/4.2 mL pnij 3 mg by SubCUTAneous route every seven (7) days. Ventolin HFA 90 mcg/actuation inhaler INHALE TWO PUFFS BY MOUTH EVERY 6 HOURS AS NEEDED FOR WHEEZING OR shortness of breath    ticagrelor (BRILINTA) 90 mg tablet Take 1 Tablet by mouth two (2) times a day.     metFORMIN ER (GLUCOPHAGE XR) 500 mg tablet TAKE TWO TABLETS BY MOUTH TWICE DAILY WITH MEALS    metoprolol tartrate (LOPRESSOR) 25 mg tablet TAKE ONE TABLET BY MOUTH TWICE DAILY    Insulin Needles, Disposable, 32 gauge x 1/4\" ndle Use to inject insulin daily as directed    furosemide (LASIX) 40 mg tablet Take 1 Tablet by mouth in the morning. amLODIPine (NORVASC) 5 mg tablet Take 1 Tablet by mouth daily. Indications: high blood pressure    atorvastatin (LIPITOR) 80 mg tablet Take 1 Tablet by mouth daily. hydroCHLOROthiazide (HYDRODIURIL) 12.5 mg tablet Take 1 Tablet by mouth daily. aspirin 81 mg chewable tablet Take 2 Tablets by mouth daily. OneTouch Verio test strips strip test BLOOD SUGAR TWICE DAILY AS DIRECTED    gabapentin (NEURONTIN) 300 mg capsule TAKE TWO CAPSULES BY MOUTH EVERY EVENING     No current facility-administered medications for this visit. [facility] follow up appointment(s):   Future Appointments   Date Time Provider Amalia Silver   2/17/2023 10:00 AM Divina Kang MD [facility] AMB   2/28/2023 10:30 AM Sen Fyr MD [facility] AMB        Non-[facility] follow up appointment(s): Endocrinology 1/13/2023     Goals Addressed                   This Visit's Progress     Patient verbalizes understanding of self -management goals of living with Diabetes. On track     Monitor blood sugars everyday  Healthy diet       Reduce risk of CHF exacerbations and complications.    Not on track     Daily weights- report weight gain of 3 lbs in a day or 5 lbs in a week  Monitor for SOB and swelling       Self-schedules and keeps appointments    On track

## 2022-12-06 NOTE — Clinical Note
Patient would like Lexapro 20 mg refilled. Medication was d/c'd 9/2022. Patient has started back taking Lexapro that he had left over. Last ov was 11/17/2022. Endocrinology rescheduled patient's appointment until 1/13/2023.

## 2022-12-07 RX ORDER — ESCITALOPRAM OXALATE 20 MG/1
20 TABLET ORAL DAILY
Qty: 90 TABLET | Refills: 1 | Status: CANCELLED | OUTPATIENT
Start: 2022-12-07

## 2022-12-08 DIAGNOSIS — F32.A DEPRESSION, UNSPECIFIED DEPRESSION TYPE: ICD-10-CM

## 2022-12-08 RX ORDER — ESCITALOPRAM OXALATE 20 MG/1
20 TABLET ORAL DAILY
Qty: 30 TABLET | Refills: 5 | Status: SHIPPED | OUTPATIENT
Start: 2022-12-08

## 2022-12-08 RX ORDER — ESCITALOPRAM OXALATE 20 MG/1
20 TABLET ORAL DAILY
Qty: 90 TABLET | Refills: 1 | OUTPATIENT
Start: 2022-12-08

## 2022-12-09 ENCOUNTER — PATIENT OUTREACH (OUTPATIENT)
Dept: CASE MANAGEMENT | Age: 57
End: 2022-12-09

## 2022-12-09 NOTE — PROGRESS NOTES
Complex Case Management      Date/Time:  2022 2:57 PM    Method of communication with patient:phone    LPN Care Coordinator(LPN CC) contacted the patient by telephone to perform Ambulatory Care Coordination. Verified name and  (PHI) with patient as identifiers. Provided introduction to self, and explanation of the LPN CC's role. Reviewed most recent clinic visit w/ patient who verbalized understanding. Patient given an opportunity to ask questions. Top Challenges reviewed with the Provider   N/a     Ms. Stover notified that rx was sent to patient's mail order pharmacy. Advised to continue to follow up with dentist reference patient's tooth pain. The patient agrees to contact the PCP office or the LPN CC for questions related to their healthcare. Provided contact information for future reference. Disease Specific:   CHF    Home Health Active: No    DME Active: No    Barriers to care?  none      Current Outpatient Medications   Medication Sig    escitalopram oxalate (LEXAPRO) 20 mg tablet Take 1 Tablet by mouth daily. losartan (COZAAR) 100 mg tablet TAKE ONE TABLET BY MOUTH EVERY DAY    Lantus Solostar U-100 Insulin 100 unit/mL (3 mL) inpn INJECT 46 UNITS UNDER THE SKIN NIGHTLY    traZODone (DESYREL) 100 mg tablet TAKE 1 OR 2 TABLETS BY MOUTH AT BEDTIME AS NEEDED FOR SLEEP    dulaglutide (Trulicity) 3 EJ/3.4 mL pnij 3 mg by SubCUTAneous route every seven (7) days. Ventolin HFA 90 mcg/actuation inhaler INHALE TWO PUFFS BY MOUTH EVERY 6 HOURS AS NEEDED FOR WHEEZING OR shortness of breath    ticagrelor (BRILINTA) 90 mg tablet Take 1 Tablet by mouth two (2) times a day.     metFORMIN ER (GLUCOPHAGE XR) 500 mg tablet TAKE TWO TABLETS BY MOUTH TWICE DAILY WITH MEALS    metoprolol tartrate (LOPRESSOR) 25 mg tablet TAKE ONE TABLET BY MOUTH TWICE DAILY    Insulin Needles, Disposable, 32 gauge x 1/4\" ndle Use to inject insulin daily as directed    furosemide (LASIX) 40 mg tablet Take 1 Tablet by mouth in the morning. amLODIPine (NORVASC) 5 mg tablet Take 1 Tablet by mouth daily. Indications: high blood pressure    atorvastatin (LIPITOR) 80 mg tablet Take 1 Tablet by mouth daily. hydroCHLOROthiazide (HYDRODIURIL) 12.5 mg tablet Take 1 Tablet by mouth daily. aspirin 81 mg chewable tablet Take 2 Tablets by mouth daily. OneTouch Verio test strips strip test BLOOD SUGAR TWICE DAILY AS DIRECTED    gabapentin (NEURONTIN) 300 mg capsule TAKE TWO CAPSULES BY MOUTH EVERY EVENING     No current facility-administered medications for this visit.        [facility] follow up appointment(s):   Future Appointments   Date Time Provider Amalia Bolanosi   2/17/2023 10:00 AM Dariana Simpson MD [facility] BS AMB   2/28/2023 10:30 AM Miroslava Sam MD [facility] BS AMB

## 2022-12-28 ENCOUNTER — PATIENT OUTREACH (OUTPATIENT)
Dept: CASE MANAGEMENT | Age: 57
End: 2022-12-28

## 2022-12-28 NOTE — PROGRESS NOTES
Complex Case Management      Date/Time:  2022 12:55 PM    Method of communication with patient:phone    LPN Care Coordinator(LPN CC) contacted the patient by telephone to perform Ambulatory Care Coordination. Verified name and  (PHI) with patient as identifiers. Provided introduction to self, and explanation of the LPN CC's role. Reviewed most recent clinic visit w/ patient who verbalized understanding. Patient given an opportunity to ask questions. Top Challenges reviewed with the Provider   N/a       The patient agrees to contact the PCP office or the LPN CC for questions related to their healthcare. Provided contact information for future reference. Disease Specific:   CHF      Barriers to care?  none        Current Outpatient Medications   Medication Sig    escitalopram oxalate (LEXAPRO) 20 mg tablet Take 1 Tablet by mouth daily. losartan (COZAAR) 100 mg tablet TAKE ONE TABLET BY MOUTH EVERY DAY    Lantus Solostar U-100 Insulin 100 unit/mL (3 mL) inpn INJECT 46 UNITS UNDER THE SKIN NIGHTLY    traZODone (DESYREL) 100 mg tablet TAKE 1 OR 2 TABLETS BY MOUTH AT BEDTIME AS NEEDED FOR SLEEP    dulaglutide (Trulicity) 3 QO/9.1 mL pnij 3 mg by SubCUTAneous route every seven (7) days. Ventolin HFA 90 mcg/actuation inhaler INHALE TWO PUFFS BY MOUTH EVERY 6 HOURS AS NEEDED FOR WHEEZING OR shortness of breath    ticagrelor (BRILINTA) 90 mg tablet Take 1 Tablet by mouth two (2) times a day. metFORMIN ER (GLUCOPHAGE XR) 500 mg tablet TAKE TWO TABLETS BY MOUTH TWICE DAILY WITH MEALS    metoprolol tartrate (LOPRESSOR) 25 mg tablet TAKE ONE TABLET BY MOUTH TWICE DAILY    Insulin Needles, Disposable, 32 gauge x 1/\" ndle Use to inject insulin daily as directed    furosemide (LASIX) 40 mg tablet Take 1 Tablet by mouth in the morning. amLODIPine (NORVASC) 5 mg tablet Take 1 Tablet by mouth daily. Indications: high blood pressure    atorvastatin (LIPITOR) 80 mg tablet Take 1 Tablet by mouth daily. hydroCHLOROthiazide (HYDRODIURIL) 12.5 mg tablet Take 1 Tablet by mouth daily. aspirin 81 mg chewable tablet Take 2 Tablets by mouth daily. OneTouch Verio test strips strip test BLOOD SUGAR TWICE DAILY AS DIRECTED    gabapentin (NEURONTIN) 300 mg capsule TAKE TWO CAPSULES BY MOUTH EVERY EVENING     No current facility-administered medications for this visit. [facility] follow up appointment(s):   Future Appointments   Date Time Provider Amalia Silver   2/17/2023 10:00 AM Mayra So MD [facility] BS AMB   2/28/2023 10:30 AM Samara Peña MD [facility] AMB        Non-[facility] follow up appointment(s): endocrinologist     Goals Addressed                   This Visit's Progress     Patient verbalizes understanding of self -management goals of living with Diabetes. On track     Monitor blood sugars everyday  Healthy diet       Reduce risk of CHF exacerbations and complications.    On track     Daily weights- report weight gain of 3 lbs in a day or 5 lbs in a week  Monitor for SOB and swelling       Self-schedules and keeps appointments    On track

## 2023-02-01 ENCOUNTER — PATIENT OUTREACH (OUTPATIENT)
Dept: CASE MANAGEMENT | Age: 58
End: 2023-02-01

## 2023-02-01 NOTE — PROGRESS NOTES
Patient has graduated from the Complex Case Management  program on 2/1/23. Patient/family has the ability to self-manage at this time. Care management goals have been completed. No further LPN Care Coordinator follow up scheduled. Goals Addressed                   This Visit's Progress     COMPLETED: Patient verbalizes understanding of self -management goals of living with Diabetes. Monitor blood sugars everyday  Healthy diet       COMPLETED: Reduce risk of CHF exacerbations and complications. Daily weights- report weight gain of 3 lbs in a day or 5 lbs in a week  Monitor for SOB and swelling       COMPLETED: Self-schedules and keeps appointments                Patient has LPN CC's contact information for any further questions, concerns, or needs. Patients upcoming visits:  No future appointments.

## 2023-02-17 ENCOUNTER — TELEPHONE (OUTPATIENT)
Age: 58
End: 2023-02-17

## 2023-02-17 NOTE — TELEPHONE ENCOUNTER
Girlfriend states patient woke up this morning around 2:30 vomiting and complaining of low abdominal pain. He is now 'dry heaving'. Advised that patient go to urgent care to be evaluated.  Girlfriend stated she understood

## 2023-03-22 DIAGNOSIS — I10 ESSENTIAL (PRIMARY) HYPERTENSION: ICD-10-CM

## 2023-03-31 RX ORDER — TRAZODONE HYDROCHLORIDE 100 MG/1
TABLET ORAL
Qty: 300 TABLET | Refills: 0 | Status: SHIPPED | OUTPATIENT
Start: 2023-03-31

## 2023-04-06 ENCOUNTER — HOSPITAL ENCOUNTER (OUTPATIENT)
Facility: HOSPITAL | Age: 58
Discharge: HOME | End: 2023-04-08
Payer: COMMERCIAL

## 2023-04-06 DIAGNOSIS — E11.69 OBESITY, DIABETES, AND HYPERTENSION SYNDROME (HCC): ICD-10-CM

## 2023-04-06 DIAGNOSIS — I15.2 OBESITY, DIABETES, AND HYPERTENSION SYNDROME (HCC): ICD-10-CM

## 2023-04-06 DIAGNOSIS — E66.9 OBESITY, DIABETES, AND HYPERTENSION SYNDROME (HCC): ICD-10-CM

## 2023-04-06 DIAGNOSIS — E11.59 OBESITY, DIABETES, AND HYPERTENSION SYNDROME (HCC): ICD-10-CM

## 2023-04-06 LAB
VAS LEFT ABI: 1.19
VAS LEFT ARM BP: 145 MMHG
VAS LEFT DORSALIS PEDIS BP: 168 MMHG
VAS LEFT PTA BP: 173 MMHG
VAS LEFT TBI: 1.01
VAS LEFT TOE PRESSURE: 147 MMHG
VAS RIGHT ABI: 1.17
VAS RIGHT ARM BP: 143 MMHG
VAS RIGHT DORSALIS PEDIS BP: 165 MMHG
VAS RIGHT PTA BP: 170 MMHG
VAS RIGHT TBI: 1.03
VAS RIGHT TOE PRESSURE: 149 MMHG

## 2023-04-06 PROCEDURE — 93922 UPR/L XTREMITY ART 2 LEVELS: CPT

## 2023-04-06 PROCEDURE — 93922 UPR/L XTREMITY ART 2 LEVELS: CPT | Performed by: STUDENT IN AN ORGANIZED HEALTH CARE EDUCATION/TRAINING PROGRAM

## 2023-04-06 PROCEDURE — 93923 UPR/LXTR ART STDY 3+ LVLS: CPT

## 2023-04-20 DIAGNOSIS — E11.65 TYPE 2 DIABETES MELLITUS WITH HYPERGLYCEMIA (HCC): ICD-10-CM

## 2023-04-20 DIAGNOSIS — Z86.73 PERSONAL HISTORY OF TRANSIENT ISCHEMIC ATTACK (TIA), AND CEREBRAL INFARCTION WITHOUT RESIDUAL DEFICITS: ICD-10-CM

## 2023-04-20 DIAGNOSIS — I10 ESSENTIAL (PRIMARY) HYPERTENSION: ICD-10-CM

## 2023-04-20 RX ORDER — ASPIRIN 81 MG
TABLET,CHEWABLE ORAL
Qty: 180 TABLET | Refills: 3 | Status: SHIPPED | OUTPATIENT
Start: 2023-04-20

## 2023-04-20 RX ORDER — METFORMIN HYDROCHLORIDE 500 MG/1
TABLET, EXTENDED RELEASE ORAL
Qty: 120 TABLET | Refills: 0 | Status: SHIPPED | OUTPATIENT
Start: 2023-04-20 | End: 2023-05-24

## 2023-05-04 DIAGNOSIS — F32.A DEPRESSION, UNSPECIFIED: ICD-10-CM

## 2023-05-04 RX ORDER — ALBUTEROL SULFATE 90 UG/1
AEROSOL, METERED RESPIRATORY (INHALATION)
Qty: 90 G | Refills: 0 | Status: SHIPPED | OUTPATIENT
Start: 2023-05-04

## 2023-05-04 RX ORDER — ATORVASTATIN CALCIUM 80 MG/1
TABLET, FILM COATED ORAL
Qty: 90 TABLET | Refills: 3 | Status: SHIPPED | OUTPATIENT
Start: 2023-05-04

## 2023-05-04 RX ORDER — ESCITALOPRAM OXALATE 20 MG/1
TABLET ORAL
Qty: 30 TABLET | Refills: 0 | Status: SHIPPED | OUTPATIENT
Start: 2023-05-04 | End: 2023-06-28

## 2023-05-08 RX ORDER — AMLODIPINE BESYLATE 5 MG/1
TABLET ORAL
Qty: 90 TABLET | Refills: 1 | Status: SHIPPED | OUTPATIENT
Start: 2023-05-08 | End: 2023-05-11 | Stop reason: SDUPTHER

## 2023-05-10 ASSESSMENT — ENCOUNTER SYMPTOMS: RESPIRATORY NEGATIVE: 1

## 2023-05-11 ENCOUNTER — OFFICE VISIT (OUTPATIENT)
Facility: CLINIC | Age: 58
End: 2023-05-11
Payer: COMMERCIAL

## 2023-05-11 VITALS
HEART RATE: 69 BPM | SYSTOLIC BLOOD PRESSURE: 142 MMHG | OXYGEN SATURATION: 98 % | BODY MASS INDEX: 30.45 KG/M2 | TEMPERATURE: 98 F | DIASTOLIC BLOOD PRESSURE: 86 MMHG | WEIGHT: 183 LBS

## 2023-05-11 DIAGNOSIS — E11.40 TYPE 2 DIABETES MELLITUS WITH DIABETIC NEUROPATHY, WITH LONG-TERM CURRENT USE OF INSULIN (HCC): ICD-10-CM

## 2023-05-11 DIAGNOSIS — I10 ESSENTIAL (PRIMARY) HYPERTENSION: Primary | ICD-10-CM

## 2023-05-11 DIAGNOSIS — Z79.4 TYPE 2 DIABETES MELLITUS WITH DIABETIC NEUROPATHY, WITH LONG-TERM CURRENT USE OF INSULIN (HCC): ICD-10-CM

## 2023-05-11 DIAGNOSIS — E11.69 HYPERLIPIDEMIA ASSOCIATED WITH TYPE 2 DIABETES MELLITUS (HCC): ICD-10-CM

## 2023-05-11 DIAGNOSIS — F32.A DEPRESSION, UNSPECIFIED DEPRESSION TYPE: ICD-10-CM

## 2023-05-11 DIAGNOSIS — E78.5 HYPERLIPIDEMIA ASSOCIATED WITH TYPE 2 DIABETES MELLITUS (HCC): ICD-10-CM

## 2023-05-11 PROCEDURE — 3074F SYST BP LT 130 MM HG: CPT | Performed by: FAMILY MEDICINE

## 2023-05-11 PROCEDURE — 99214 OFFICE O/P EST MOD 30 MIN: CPT | Performed by: FAMILY MEDICINE

## 2023-05-11 PROCEDURE — 3078F DIAST BP <80 MM HG: CPT | Performed by: FAMILY MEDICINE

## 2023-05-11 RX ORDER — AMLODIPINE BESYLATE 5 MG/1
TABLET ORAL
Qty: 90 TABLET | Refills: 1 | Status: SHIPPED | OUTPATIENT
Start: 2023-05-11

## 2023-05-11 RX ORDER — AMLODIPINE BESYLATE 5 MG/1
5 TABLET ORAL DAILY
Qty: 30 TABLET | Refills: 5 | Status: SHIPPED | OUTPATIENT
Start: 2023-05-11

## 2023-05-11 RX ORDER — EMPAGLIFLOZIN AND METFORMIN HYDROCHLORIDE 5; 500 MG/1; MG/1
TABLET ORAL DAILY
COMMUNITY
Start: 2023-04-14

## 2023-05-11 SDOH — ECONOMIC STABILITY: FOOD INSECURITY: WITHIN THE PAST 12 MONTHS, THE FOOD YOU BOUGHT JUST DIDN'T LAST AND YOU DIDN'T HAVE MONEY TO GET MORE.: SOMETIMES TRUE

## 2023-05-11 SDOH — ECONOMIC STABILITY: HOUSING INSECURITY
IN THE LAST 12 MONTHS, WAS THERE A TIME WHEN YOU DID NOT HAVE A STEADY PLACE TO SLEEP OR SLEPT IN A SHELTER (INCLUDING NOW)?: NO

## 2023-05-11 SDOH — ECONOMIC STABILITY: FOOD INSECURITY: WITHIN THE PAST 12 MONTHS, YOU WORRIED THAT YOUR FOOD WOULD RUN OUT BEFORE YOU GOT MONEY TO BUY MORE.: SOMETIMES TRUE

## 2023-05-11 SDOH — ECONOMIC STABILITY: INCOME INSECURITY: HOW HARD IS IT FOR YOU TO PAY FOR THE VERY BASICS LIKE FOOD, HOUSING, MEDICAL CARE, AND HEATING?: SOMEWHAT HARD

## 2023-05-11 ASSESSMENT — PATIENT HEALTH QUESTIONNAIRE - PHQ9
SUM OF ALL RESPONSES TO PHQ QUESTIONS 1-9: 6
8. MOVING OR SPEAKING SO SLOWLY THAT OTHER PEOPLE COULD HAVE NOTICED. OR THE OPPOSITE, BEING SO FIGETY OR RESTLESS THAT YOU HAVE BEEN MOVING AROUND A LOT MORE THAN USUAL: 0
7. TROUBLE CONCENTRATING ON THINGS, SUCH AS READING THE NEWSPAPER OR WATCHING TELEVISION: 2
SUM OF ALL RESPONSES TO PHQ QUESTIONS 1-9: 6
3. TROUBLE FALLING OR STAYING ASLEEP: 0
2. FEELING DOWN, DEPRESSED OR HOPELESS: 2
9. THOUGHTS THAT YOU WOULD BE BETTER OFF DEAD, OR OF HURTING YOURSELF: 0
6. FEELING BAD ABOUT YOURSELF - OR THAT YOU ARE A FAILURE OR HAVE LET YOURSELF OR YOUR FAMILY DOWN: 0
SUM OF ALL RESPONSES TO PHQ9 QUESTIONS 1 & 2: 2
4. FEELING TIRED OR HAVING LITTLE ENERGY: 2
SUM OF ALL RESPONSES TO PHQ QUESTIONS 1-9: 6
1. LITTLE INTEREST OR PLEASURE IN DOING THINGS: 0
5. POOR APPETITE OR OVEREATING: 0
10. IF YOU CHECKED OFF ANY PROBLEMS, HOW DIFFICULT HAVE THESE PROBLEMS MADE IT FOR YOU TO DO YOUR WORK, TAKE CARE OF THINGS AT HOME, OR GET ALONG WITH OTHER PEOPLE: 1
SUM OF ALL RESPONSES TO PHQ QUESTIONS 1-9: 6

## 2023-05-11 NOTE — PROGRESS NOTES
[facility] presents today for   Chief Complaint   Patient presents with    Diabetes    Coronary Artery Disease    Congestive Heart Failure    Depression       Is someone accompanying this pt? Yes, Ms. Armas    Is the patient using any DME equipment during [address]? no    Depression Screening:  PHQ-9 Questionaire 5/11/2023 7/13/2022   Little interest or pleasure in doing things 0 0   Feeling down, depressed, or hopeless 2 0   Trouble falling or staying asleep, or sleeping too much 0 -   Feeling tired or having little energy 2 -   Poor appetite or overeating 0 -   Feeling bad about yourself - or that you are a failure or have let yourself or your family down 0 -   Trouble concentrating on things, such as reading the newspaper or watching television 2 -   Moving or speaking so slowly that other people could have noticed. Or the opposite - being so fidgety or restless that you have been moving around a lot more than usual 0 -   Thoughts that you would be better off dead, or of hurting yourself in some way 0 -   PHQ-9 Total Score 6 0   If you checked off any problems, how difficult have these problems made it for you to do your work, take care of things at home, or get along with other people? 1 -        PAUL 7-Anxiety   No flowsheet data found. Learning Assessment:  No question data found. Fall Risk  No flowsheet data found. Travel Screening:    Travel Screening       Question Response    In the last 10 days, have you been in contact with someone who was confirmed or suspected to have Coronavirus/COVID-19? --    Have you had a COVID-19 viral test in the last 10 days? No    Do you have any of the following new or worsening symptoms? None of these    Have you traveled internationally or domestically in the last month? No          Travel History   Travel since 04/11/23    No documented travel since 04/11/23          Health Maintenance reviewed and discussed and ordered per Provider.   Social Determinants of

## 2023-05-11 NOTE — PROGRESS NOTES
ASSESSMENT/PLAN:  1. Essential (primary) hypertension  Resume amlodipine  -     amLODIPine (NORVASC) 5 MG tablet; TAKE 1 TABLET BY MOUTH ONCE DAILY FOR HIGH BLOOD PRESSURE, Disp-90 tablet, R-1Normal  -     amLODIPine (NORVASC) 5 MG tablet; Take 1 tablet by mouth daily, Disp-30 tablet, R-5Normal  Cont to monitor    2. Type 2 diabetes mellitus with diabetic neuropathy, with long-term current use of insulin (HCC)  Doing better with cgm; care as per endo    3. Depression, unspecified depression type  Recommend talk therapy    4. Hyperlipidemia associated with type 2 diabetes mellitus ([facility] [address]  Anticipate labs by endo      Return in about 3 months (around 8/11/2023) for DM, HTN, CAD, CHF. SUBJECTIVE/OBJECTIVE:    Chief Complaint   Patient presents with    Diabetes    Coronary Artery Disease    Congestive Heart Failure    Depression         ANTHONY    Mickey Driscoll. is a 62 y.o. male presenting today for delayed  follow up of dm, cad, chf, depression. Pt has been seeing endo for his dm. His most recent A1c was around 9. Pt will see endo again tomorrow. They anticipate labs at that visit. Pt will see cards later this month. Pt admits that his mood is not great right now. He is working on his overall health with a lot of support from his fiancee. Patient does need medication refills today. New concerns today: none        Review of Systems   Constitutional: Negative. HENT: Negative. Respiratory: Negative. Cardiovascular: Negative. All other systems reviewed and are negative. Physical Exam  Vitals and nursing note reviewed. Constitutional:       General: He is not in acute distress. Appearance: Normal appearance. HENT:      Head: Normocephalic and atraumatic. Right Ear: External ear normal.      Left Ear: External ear normal.      Nose: Nose normal.      Mouth/Throat:      Mouth: Mucous membranes are moist.   Eyes:      Extraocular Movements: Extraocular movements intact.

## 2023-05-24 ENCOUNTER — OFFICE VISIT (OUTPATIENT)
Age: 58
End: 2023-05-24
Payer: COMMERCIAL

## 2023-05-24 VITALS
HEIGHT: 65 IN | BODY MASS INDEX: 29.49 KG/M2 | OXYGEN SATURATION: 97 % | WEIGHT: 177 LBS | HEART RATE: 92 BPM | DIASTOLIC BLOOD PRESSURE: 84 MMHG | SYSTOLIC BLOOD PRESSURE: 146 MMHG

## 2023-05-24 DIAGNOSIS — I50.32 CHRONIC DIASTOLIC (CONGESTIVE) HEART FAILURE (HCC): ICD-10-CM

## 2023-05-24 DIAGNOSIS — I10 ESSENTIAL (PRIMARY) HYPERTENSION: ICD-10-CM

## 2023-05-24 DIAGNOSIS — E78.49 OTHER HYPERLIPIDEMIA: ICD-10-CM

## 2023-05-24 DIAGNOSIS — I25.118 ATHEROSCLEROTIC HEART DISEASE OF NATIVE CORONARY ARTERY WITH OTHER FORMS OF ANGINA PECTORIS (HCC): Primary | ICD-10-CM

## 2023-05-24 DIAGNOSIS — Z95.5 PRESENCE OF CORONARY ANGIOPLASTY IMPLANT AND GRAFT: ICD-10-CM

## 2023-05-24 PROCEDURE — 99214 OFFICE O/P EST MOD 30 MIN: CPT | Performed by: INTERNAL MEDICINE

## 2023-05-24 PROCEDURE — 3079F DIAST BP 80-89 MM HG: CPT | Performed by: INTERNAL MEDICINE

## 2023-05-24 PROCEDURE — 3077F SYST BP >= 140 MM HG: CPT | Performed by: INTERNAL MEDICINE

## 2023-05-24 RX ORDER — METOPROLOL SUCCINATE 50 MG/1
50 TABLET, EXTENDED RELEASE ORAL DAILY
Qty: 90 TABLET | Refills: 3 | Status: SHIPPED | OUTPATIENT
Start: 2023-05-24

## 2023-05-24 RX ORDER — AMLODIPINE BESYLATE 5 MG/1
5 TABLET ORAL DAILY
Qty: 90 TABLET | Refills: 3 | Status: SHIPPED | OUTPATIENT
Start: 2023-05-24

## 2023-05-24 NOTE — PROGRESS NOTES
1. Have you been to the ER, urgent care clinic since your last visit? Hospitalized since your last visit? No    2. Have you seen or consulted any other health care providers outside of the [address] [place] [address] since your last visit? Include any pap smears or colon screening. No     3. Do you need any refills today?    no
TRILEAFLET AORTIC VALVE. INSUFFICIENT TRICUSPID REGURGITANT JET TO ESTIMATE PULMONARY ARTERY PRESSURE. NEGATIVE BUBBLE STUDY ON PRIOR ECHO REPORT 4/09/2012. NO SIGNIFICANT CHANGES FROM PRIOR ECHO REPORT ON 4/09/2012. 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 S/p ptca/stent to mid LCX. Continue DAPT and intense risk factor modification. Signed by: Marge Mon MD   I Have personally reviewed recent relevant labs available and 10/2022-CMP, CBC, lipid    No flowsheet data found. Assessment        Diagnosis Orders   1. Atherosclerotic heart disease of native coronary artery with other forms of angina pectoris ([address]      Stable continue treatment      2. Essential (primary) hypertension  amLODIPine (NORVASC) 5 MG tablet    Mildly elevated. Change metoprolol 25 to Toprol 50 mg a day      3. Other hyperlipidemia      Continue treatment with PCP      4. Presence of coronary angioplasty implant and graft      Stable      5. Chronic diastolic (congestive) heart failure (HCC)      Stable compensated continue therapy      6.  Essential (primary) hypertension  amLODIPine (NORVASC) 5 MG tablet          Medications Discontinued During This Encounter   Medication Reason    ticagrelor (BRILINTA) 90 MG TABS tablet Therapy completed    metFORMIN (GLUCOPHAGE-XR) 500 MG extended release tablet Not A Current Medication    furosemide (LASIX) 40 MG tablet Not A Current Medication    gabapentin (NEURONTIN) 300 MG capsule Not A

## 2023-06-02 DIAGNOSIS — E11.40 TYPE 2 DIABETES MELLITUS WITH DIABETIC NEUROPATHY, UNSPECIFIED (HCC): ICD-10-CM

## 2023-06-02 DIAGNOSIS — E11.65 TYPE 2 DIABETES MELLITUS WITH HYPERGLYCEMIA (HCC): ICD-10-CM

## 2023-06-02 RX ORDER — TICAGRELOR 90 MG/1
TABLET ORAL
Qty: 180 TABLET | Refills: 2 | Status: SHIPPED | OUTPATIENT
Start: 2023-06-02

## 2023-06-02 RX ORDER — INSULIN GLARGINE 100 [IU]/ML
INJECTION, SOLUTION SUBCUTANEOUS
Qty: 45 ML | Refills: 1 | Status: SHIPPED | OUTPATIENT
Start: 2023-06-02

## 2023-06-02 RX ORDER — METFORMIN HYDROCHLORIDE 500 MG/1
TABLET, EXTENDED RELEASE ORAL
Qty: 60 TABLET | Refills: 0 | OUTPATIENT
Start: 2023-06-02

## 2023-06-02 RX ORDER — HYDROCHLOROTHIAZIDE 12.5 MG/1
TABLET ORAL
Qty: 30 TABLET | Refills: 5 | Status: SHIPPED | OUTPATIENT
Start: 2023-06-02

## 2023-06-02 NOTE — TELEPHONE ENCOUNTER
Lm for pt to call back to inquire if he is still taking this medication and if he is still following up with endocrinologist

## 2023-06-28 DIAGNOSIS — F32.A DEPRESSION, UNSPECIFIED: ICD-10-CM

## 2023-06-28 RX ORDER — ESCITALOPRAM OXALATE 20 MG/1
TABLET ORAL
Qty: 90 TABLET | Refills: 1 | Status: SHIPPED | OUTPATIENT
Start: 2023-06-28

## 2023-07-03 DIAGNOSIS — E11.65 TYPE 2 DIABETES MELLITUS WITH HYPERGLYCEMIA (HCC): ICD-10-CM

## 2023-07-06 RX ORDER — METFORMIN HYDROCHLORIDE 500 MG/1
TABLET, EXTENDED RELEASE ORAL
Qty: 120 TABLET | Refills: 0 | OUTPATIENT
Start: 2023-07-06

## 2023-07-06 NOTE — TELEPHONE ENCOUNTER
The original prescription was discontinued on 5/24/2023 by Dima Klein MD for the following reason: Not A Current Medication. Renewing this prescription may not be appropriate.     Msg in chart noted by cardiology

## 2023-07-06 NOTE — TELEPHONE ENCOUNTER
Pt seeing endo now. All meds should be coordinated through that ofc to avoid errors. Please notify pt and fiancee.

## 2023-07-31 DIAGNOSIS — E11.65 TYPE 2 DIABETES MELLITUS WITH HYPERGLYCEMIA (HCC): ICD-10-CM

## 2023-08-01 RX ORDER — METFORMIN HYDROCHLORIDE 500 MG/1
TABLET, EXTENDED RELEASE ORAL
Qty: 120 TABLET | Refills: 0 | OUTPATIENT
Start: 2023-08-01

## 2023-08-10 ENCOUNTER — OFFICE VISIT (OUTPATIENT)
Facility: CLINIC | Age: 58
End: 2023-08-10
Payer: COMMERCIAL

## 2023-08-10 VITALS
HEART RATE: 82 BPM | SYSTOLIC BLOOD PRESSURE: 116 MMHG | BODY MASS INDEX: 30.42 KG/M2 | RESPIRATION RATE: 16 BRPM | OXYGEN SATURATION: 100 % | DIASTOLIC BLOOD PRESSURE: 68 MMHG | WEIGHT: 182.8 LBS

## 2023-08-10 DIAGNOSIS — Z12.11 SCREEN FOR COLON CANCER: ICD-10-CM

## 2023-08-10 DIAGNOSIS — E11.65 TYPE 2 DIABETES MELLITUS WITH HYPERGLYCEMIA, UNSPECIFIED WHETHER LONG TERM INSULIN USE (HCC): Primary | ICD-10-CM

## 2023-08-10 DIAGNOSIS — E11.69 HYPERLIPIDEMIA ASSOCIATED WITH TYPE 2 DIABETES MELLITUS (HCC): ICD-10-CM

## 2023-08-10 DIAGNOSIS — I10 ESSENTIAL (PRIMARY) HYPERTENSION: ICD-10-CM

## 2023-08-10 DIAGNOSIS — E78.5 HYPERLIPIDEMIA ASSOCIATED WITH TYPE 2 DIABETES MELLITUS (HCC): ICD-10-CM

## 2023-08-10 DIAGNOSIS — F32.A DEPRESSION, UNSPECIFIED DEPRESSION TYPE: ICD-10-CM

## 2023-08-10 PROCEDURE — 99214 OFFICE O/P EST MOD 30 MIN: CPT | Performed by: FAMILY MEDICINE

## 2023-08-10 PROCEDURE — 3078F DIAST BP <80 MM HG: CPT | Performed by: FAMILY MEDICINE

## 2023-08-10 PROCEDURE — 3074F SYST BP LT 130 MM HG: CPT | Performed by: FAMILY MEDICINE

## 2023-08-10 RX ORDER — PIOGLITAZONE AND GLIMEPIRIDE 30; 2 MG/1; MG/1
1 TABLET ORAL DAILY
COMMUNITY
Start: 2023-07-17

## 2023-08-10 ASSESSMENT — ENCOUNTER SYMPTOMS: RESPIRATORY NEGATIVE: 1

## 2023-08-10 NOTE — PROGRESS NOTES
ASSESSMENT/PLAN:  1. Type 2 diabetes mellitus with hyperglycemia, unspecified whether long term insulin use (HCC)  Cont close monitoring. Cont careful diet. Care as per endo    2. Essential (primary) hypertension  Stable, cont pres tx plan. 3. Hyperlipidemia associated with type 2 diabetes mellitus ([address])  Request endo labs    4. Depression, unspecified depression type  Stable, cont pres tx plan. 5. Screen for colon cancer  -     External Referral To Gastroenterology        Return in about 3 months (around 11/10/2023) for DM, HTN, HLD, depression. SUBJECTIVE/OBJECTIVE:    Chief Complaint   Patient presents with    Diabetes    Hypertension    Coronary Artery Disease    Congestive Heart Failure         HPI    Janina Norman. is a 62 y.o. male presenting today for    3 months  follow up of dm, htn, cad, chf.  Pt cont to f/u with endo for his dm. At his last appt, his sugar was at 400. They had him stay there for a while. His A1c was elevated. His meds were adjusted. Pt has had a f/u with his eye doctor yesterday. Pt     Pt had f/u with cards shortly after his last visit here. His metoprolol was increased due to mildly elevated bp. Patient does not need medication refills today. New concerns today: none        Review of Systems   Constitutional: Negative. HENT: Negative. Respiratory: Negative. Cardiovascular: Negative. All other systems reviewed and are negative. Physical Exam  Vitals and nursing note reviewed. Constitutional:       General: He is not in acute distress. Appearance: Normal appearance. HENT:      Head: Normocephalic and atraumatic. Right Ear: External ear normal.      Left Ear: External ear normal.      Nose: Nose normal.      Mouth/Throat:      Mouth: Mucous membranes are moist.   Eyes:      Extraocular Movements: Extraocular movements intact.       Conjunctiva/sclera: Conjunctivae normal.   Cardiovascular:      Rate and Rhythm: Normal rate and

## 2023-08-10 NOTE — PROGRESS NOTES
Chief Complaint   Patient presents with    Diabetes    Hypertension    Coronary Artery Disease    Congestive Heart Failure        Vitals:    08/10/23 1018   BP: 116/68   Pulse: 82   Resp: 16   SpO2: 100%        Depression: At risk    PHQ-2 Score: 6        No flowsheet data found. Meggan Bruner \"Have you been to the ER, urgent care clinic since your last visit? Hospitalized since your last visit? \" no    2. \"Have you seen or consulted any other health care providers outside of the [address] [place] [address] since your last visit? \" [facility]  endocrinology      3. For patients aged 43-73: Has the patient had a colonoscopy / FIT/ Cologuard?  No

## 2023-08-16 RX ORDER — LOSARTAN POTASSIUM 100 MG/1
TABLET ORAL
Qty: 90 TABLET | Refills: 2 | Status: SHIPPED | OUTPATIENT
Start: 2023-08-16

## 2023-08-25 DIAGNOSIS — E11.65 TYPE 2 DIABETES MELLITUS WITH HYPERGLYCEMIA (HCC): ICD-10-CM

## 2023-08-29 RX ORDER — METFORMIN HYDROCHLORIDE 500 MG/1
TABLET, EXTENDED RELEASE ORAL
Qty: 180 TABLET | Refills: 3 | OUTPATIENT
Start: 2023-08-29

## 2023-08-29 NOTE — TELEPHONE ENCOUNTER
Spoke with Ms. Armas (antwan) in ref to request for Metformin.  She stated pt had been told by Endo to discontinue medication

## 2023-09-25 DIAGNOSIS — E11.65 TYPE 2 DIABETES MELLITUS WITH HYPERGLYCEMIA (HCC): ICD-10-CM

## 2023-09-25 RX ORDER — METFORMIN HYDROCHLORIDE 500 MG/1
TABLET, EXTENDED RELEASE ORAL
Qty: 120 TABLET | Refills: 0 | OUTPATIENT
Start: 2023-09-25

## 2023-10-09 DIAGNOSIS — E11.40 TYPE 2 DIABETES MELLITUS WITH DIABETIC NEUROPATHY, UNSPECIFIED (HCC): ICD-10-CM

## 2023-10-09 DIAGNOSIS — E11.65 TYPE 2 DIABETES MELLITUS WITH HYPERGLYCEMIA (HCC): ICD-10-CM

## 2023-10-09 RX ORDER — TRAZODONE HYDROCHLORIDE 100 MG/1
TABLET ORAL
Qty: 300 TABLET | Refills: 5 | OUTPATIENT
Start: 2023-10-09

## 2023-10-09 RX ORDER — DULAGLUTIDE 3 MG/.5ML
INJECTION, SOLUTION SUBCUTANEOUS
Qty: 4 ML | Refills: 5 | OUTPATIENT
Start: 2023-10-09

## 2023-10-25 DIAGNOSIS — E11.65 TYPE 2 DIABETES MELLITUS WITH HYPERGLYCEMIA (HCC): ICD-10-CM

## 2023-10-25 RX ORDER — METFORMIN HYDROCHLORIDE 500 MG/1
TABLET, EXTENDED RELEASE ORAL
Qty: 120 TABLET | Refills: 5 | OUTPATIENT
Start: 2023-10-25

## 2023-10-25 NOTE — TELEPHONE ENCOUNTER
Patient been stopped taking this medication several months ago per Ms. Pawel. She states that patient endocrinologist took him off. Ms. Kaylee Harris also want to know if patient can get colonoscopy completed closer to home. She states that she received a call from [place] but she declined because she want it closer. Consulted with provider. Provider strongly recommends patient to go to [place] due to insurance purposes. Spoke with Ms. Kaylee Harris. I explained to her that she would have to call patient insurance to see who would take him. She states that she will call [place] back to get patient rescheduled for colonoscopy. Ms. Kaylee Harris was given number to [place] central scheduling.

## 2023-11-10 RX ORDER — HYDROCHLOROTHIAZIDE 12.5 MG/1
TABLET ORAL
Qty: 30 TABLET | Refills: 5 | Status: SHIPPED | OUTPATIENT
Start: 2023-11-10

## 2023-11-13 ENCOUNTER — APPOINTMENT (OUTPATIENT)
Facility: HOSPITAL | Age: 58
End: 2023-11-13
Payer: COMMERCIAL

## 2023-11-13 ENCOUNTER — APPOINTMENT (OUTPATIENT)
Facility: HOSPITAL | Age: 58
End: 2023-11-13
Attending: STUDENT IN AN ORGANIZED HEALTH CARE EDUCATION/TRAINING PROGRAM
Payer: COMMERCIAL

## 2023-11-13 ENCOUNTER — HOSPITAL ENCOUNTER (INPATIENT)
Facility: HOSPITAL | Age: 58
LOS: 4 days | Discharge: HOME | End: 2023-11-17
Attending: EMERGENCY MEDICINE | Admitting: INTERNAL MEDICINE
Payer: COMMERCIAL

## 2023-11-13 DIAGNOSIS — I10 ESSENTIAL (PRIMARY) HYPERTENSION: ICD-10-CM

## 2023-11-13 DIAGNOSIS — I10 MALIGNANT HYPERTENSION: ICD-10-CM

## 2023-11-13 DIAGNOSIS — I48.91 ATRIAL FIBRILLATION, UNSPECIFIED TYPE (HCC): ICD-10-CM

## 2023-11-13 DIAGNOSIS — I50.21 ACUTE SYSTOLIC (CONGESTIVE) HEART FAILURE (HCC): Primary | ICD-10-CM

## 2023-11-13 DIAGNOSIS — I42.9 CARDIOMYOPATHY (HCC): ICD-10-CM

## 2023-11-13 DIAGNOSIS — J96.00 ACUTE RESPIRATORY FAILURE, UNSPECIFIED WHETHER WITH HYPOXIA OR HYPERCAPNIA (HCC): ICD-10-CM

## 2023-11-13 DIAGNOSIS — Z86.73 PERSONAL HISTORY OF TRANSIENT ISCHEMIC ATTACK (TIA), AND CEREBRAL INFARCTION WITHOUT RESIDUAL DEFICITS: ICD-10-CM

## 2023-11-13 DIAGNOSIS — I50.33 ACUTE ON CHRONIC DIASTOLIC CONGESTIVE HEART FAILURE (HCC): ICD-10-CM

## 2023-11-13 DIAGNOSIS — R09.02 HYPOXEMIA: ICD-10-CM

## 2023-11-13 DIAGNOSIS — R06.02 SHORTNESS OF BREATH: ICD-10-CM

## 2023-11-13 PROBLEM — I16.1 HYPERTENSIVE EMERGENCY: Status: ACTIVE | Noted: 2023-11-13

## 2023-11-13 PROBLEM — J96.02 ACUTE RESPIRATORY FAILURE WITH HYPERCAPNIA (HCC): Status: ACTIVE | Noted: 2023-11-13

## 2023-11-13 PROBLEM — J81.1 PULMONARY EDEMA: Status: ACTIVE | Noted: 2023-11-13

## 2023-11-13 PROBLEM — R73.9 HYPERGLYCEMIA: Status: ACTIVE | Noted: 2023-11-13

## 2023-11-13 PROBLEM — I48.92 ATRIAL FLUTTER (HCC): Status: ACTIVE | Noted: 2023-11-13

## 2023-11-13 LAB
ALBUMIN SERPL-MCNC: 3.5 G/DL (ref 3.4–5)
ALBUMIN/GLOB SERPL: 0.8 (ref 0.8–1.7)
ALP SERPL-CCNC: 87 U/L (ref 45–117)
ALT SERPL-CCNC: 106 U/L (ref 16–61)
AMPHET UR QL SCN: NEGATIVE
ANION GAP BLD CALC-SCNC: 9 (ref 10–20)
ANION GAP SERPL CALC-SCNC: 6 MMOL/L (ref 3–18)
ANION GAP SERPL CALC-SCNC: 9 MMOL/L (ref 3–18)
APPEARANCE UR: ABNORMAL
APTT PPP: 131.4 SEC (ref 23–36.4)
APTT PPP: 137.4 SEC (ref 23–36.4)
ARTERIAL PATENCY WRIST A: POSITIVE
ARTERIAL PATENCY WRIST A: POSITIVE
AST SERPL-CCNC: 46 U/L (ref 10–38)
B-OH-BUTYR SERPL-SCNC: 0.38 MMOL/L
BACTERIA URNS QL MICRO: ABNORMAL /HPF
BARBITURATES UR QL SCN: NEGATIVE
BASE DEFICIT BLD-SCNC: 1.8 MMOL/L
BASE DEFICIT BLD-SCNC: 4.4 MMOL/L
BASE DEFICIT BLD-SCNC: 8.1 MMOL/L
BASOPHILS # BLD: 0 K/UL (ref 0–0.1)
BASOPHILS # BLD: 0.1 K/UL (ref 0–0.1)
BASOPHILS NFR BLD: 0 % (ref 0–2)
BASOPHILS NFR BLD: 1 % (ref 0–2)
BDY SITE: ABNORMAL
BDY SITE: ABNORMAL
BENZODIAZ UR QL: POSITIVE
BILIRUB SERPL-MCNC: 0.5 MG/DL (ref 0.2–1)
BILIRUB UR QL: NEGATIVE
BUN SERPL-MCNC: 29 MG/DL (ref 7–18)
BUN SERPL-MCNC: 32 MG/DL (ref 7–18)
BUN/CREAT SERPL: 17 (ref 12–20)
BUN/CREAT SERPL: 21 (ref 12–20)
CA-I BLD-MCNC: 1.21 MMOL/L (ref 1.12–1.32)
CALCIUM SERPL-MCNC: 8.7 MG/DL (ref 8.5–10.1)
CALCIUM SERPL-MCNC: 9.4 MG/DL (ref 8.5–10.1)
CANNABINOIDS UR QL SCN: POSITIVE
CHLORIDE BLD-SCNC: 103 MMOL/L (ref 100–108)
CHLORIDE SERPL-SCNC: 104 MMOL/L (ref 100–111)
CHLORIDE SERPL-SCNC: 110 MMOL/L (ref 100–111)
CHOLEST SERPL-MCNC: 152 MG/DL
CO2 BLD-SCNC: 28 MMOL/L (ref 19–24)
CO2 SERPL-SCNC: 24 MMOL/L (ref 21–32)
CO2 SERPL-SCNC: 24 MMOL/L (ref 21–32)
COCAINE UR QL SCN: NEGATIVE
COLOR UR: YELLOW
CREAT SERPL-MCNC: 1.53 MG/DL (ref 0.6–1.3)
CREAT SERPL-MCNC: 1.71 MG/DL (ref 0.6–1.3)
CREAT UR-MCNC: 1.2 MG/DL (ref 0.6–1.3)
D DIMER PPP FEU-MCNC: 0.66 UG/ML(FEU)
DIFFERENTIAL METHOD BLD: ABNORMAL
DIFFERENTIAL METHOD BLD: ABNORMAL
ECHO AO ASC DIAM: 4 CM
ECHO AO ASCENDING AORTA INDEX: 2.06 CM/M2
ECHO AO ROOT DIAM: 3.4 CM
ECHO AO ROOT INDEX: 1.75 CM/M2
ECHO BSA: 2 M2
ECHO EST RA PRESSURE: 8 MMHG
ECHO LA VOL A-L A2C: 73 ML (ref 18–58)
ECHO LA VOL A-L A4C: 68 ML (ref 18–58)
ECHO LA VOL BP: 67 ML (ref 18–58)
ECHO LA VOL MOD A2C: 69 ML (ref 18–58)
ECHO LA VOL MOD A4C: 64 ML (ref 18–58)
ECHO LA VOL/BSA BIPLANE: 35 ML/M2 (ref 16–34)
ECHO LA VOLUME AREA LENGTH: 72 ML
ECHO LA VOLUME INDEX A-L A2C: 38 ML/M2 (ref 16–34)
ECHO LA VOLUME INDEX A-L A4C: 35 ML/M2 (ref 16–34)
ECHO LA VOLUME INDEX AREA LENGTH: 37 ML/M2 (ref 16–34)
ECHO LA VOLUME INDEX MOD A2C: 36 ML/M2 (ref 16–34)
ECHO LA VOLUME INDEX MOD A4C: 33 ML/M2 (ref 16–34)
ECHO LV E' LATERAL VELOCITY: 8 CM/S
ECHO LV E' SEPTAL VELOCITY: 6 CM/S
ECHO LV FRACTIONAL SHORTENING: 12 % (ref 28–44)
ECHO LV GLOBAL LONGITUDINAL STRAIN (GLS): -11.7 %
ECHO LV INTERNAL DIMENSION DIASTOLE INDEX: 2.53 CM/M2
ECHO LV INTERNAL DIMENSION DIASTOLIC: 4.9 CM (ref 4.2–5.9)
ECHO LV INTERNAL DIMENSION SYSTOLIC INDEX: 2.22 CM/M2
ECHO LV INTERNAL DIMENSION SYSTOLIC: 4.3 CM
ECHO LV IVSD: 1.1 CM (ref 0.6–1)
ECHO LV MASS 2D: 188.1 G (ref 88–224)
ECHO LV MASS INDEX 2D: 97 G/M2 (ref 49–115)
ECHO LV POSTERIOR WALL DIASTOLIC: 1 CM (ref 0.6–1)
ECHO LV RELATIVE WALL THICKNESS RATIO: 0.41
ECHO LVOT AREA: 3.5 CM2
ECHO LVOT DIAM: 2.1 CM
ECHO LVOT MEAN GRADIENT: 1 MMHG
ECHO LVOT PEAK GRADIENT: 2 MMHG
ECHO LVOT PEAK VELOCITY: 0.8 M/S
ECHO LVOT STROKE VOLUME INDEX: 23.6 ML/M2
ECHO LVOT SV: 45.7 ML
ECHO LVOT VTI: 13.2 CM
ECHO MV A VELOCITY: 0.33 M/S
ECHO MV E DECELERATION TIME (DT): 174.6 MS
ECHO MV E VELOCITY: 0.74 M/S
ECHO MV E/A RATIO: 2.24
ECHO MV E/E' LATERAL: 9.25
ECHO MV E/E' RATIO (AVERAGED): 10.79
ECHO MV E/E' SEPTAL: 12
ECHO MV REGURGITANT ALIASING (NYQUIST) VELOCITY: 42 CM/S
ECHO MV REGURGITANT RADIUS PISA: 0.72 CM
ECHO MV REGURGITANT VELOCITY PISA: 4.3 M/S
ECHO MV REGURGITANT VTIA: 128.3 CM
ECHO RIGHT VENTRICULAR SYSTOLIC PRESSURE (RVSP): 35 MMHG
ECHO RV BASAL DIMENSION: 3.5 CM
ECHO RV FREE WALL PEAK S': 6 CM/S
ECHO RV GLOBAL SYSTOLIC STRAIN (GLS): 13.8 %
ECHO RV TAPSE: 1 CM (ref 1.7–?)
ECHO RV TAPSE: 1.4 CM (ref 1.7–?)
ECHO TV REGURGITANT MAX VELOCITY: 2.6 M/S
ECHO TV REGURGITANT PEAK GRADIENT: 27 MMHG
EKG ATRIAL RATE: 178 BPM
EKG ATRIAL RATE: 256 BPM
EKG ATRIAL RATE: 84 BPM
EKG DIAGNOSIS: NORMAL
EKG P AXIS: 54 DEGREES
EKG P-R INTERVAL: 166 MS
EKG Q-T INTERVAL: 316 MS
EKG Q-T INTERVAL: 362 MS
EKG Q-T INTERVAL: 386 MS
EKG QRS DURATION: 100 MS
EKG QRS DURATION: 118 MS
EKG QRS DURATION: 124 MS
EKG QTC CALCULATION (BAZETT): 456 MS
EKG QTC CALCULATION (BAZETT): 475 MS
EKG QTC CALCULATION (BAZETT): 489 MS
EKG R AXIS: -60 DEGREES
EKG R AXIS: -70 DEGREES
EKG R AXIS: -87 DEGREES
EKG T AXIS: 17 DEGREES
EKG T AXIS: 31 DEGREES
EKG T AXIS: 80 DEGREES
EKG VENTRICULAR RATE: 110 BPM
EKG VENTRICULAR RATE: 136 BPM
EKG VENTRICULAR RATE: 84 BPM
EOSINOPHIL # BLD: 0 K/UL (ref 0–0.4)
EOSINOPHIL # BLD: 0.2 K/UL (ref 0–0.4)
EOSINOPHIL NFR BLD: 0 % (ref 0–5)
EOSINOPHIL NFR BLD: 2 % (ref 0–5)
EPITH CASTS URNS QL MICRO: ABNORMAL /LPF (ref 0–5)
ERYTHROCYTE [DISTWIDTH] IN BLOOD BY AUTOMATED COUNT: 13 % (ref 11.6–14.5)
ERYTHROCYTE [DISTWIDTH] IN BLOOD BY AUTOMATED COUNT: 13.2 % (ref 11.6–14.5)
EST. AVERAGE GLUCOSE BLD GHB EST-MCNC: 146 MG/DL
ETHANOL SERPL-MCNC: <3 MG/DL (ref 0–3)
FLUAV AG NPH QL IA: NEGATIVE
FLUBV AG NOSE QL IA: NEGATIVE
GAS FLOW.O2 O2 DELIVERY SYS: ABNORMAL
GAS FLOW.O2 O2 DELIVERY SYS: ABNORMAL
GAS FLOW.O2 SETTING OXYMISER: 18 BPM
GAS FLOW.O2 SETTING OXYMISER: 22 BPM
GLOBULIN SER CALC-MCNC: 4.2 G/DL (ref 2–4)
GLUCOSE BLD STRIP.AUTO-MCNC: 108 MG/DL (ref 70–110)
GLUCOSE BLD STRIP.AUTO-MCNC: 121 MG/DL (ref 70–110)
GLUCOSE BLD STRIP.AUTO-MCNC: 174 MG/DL (ref 70–110)
GLUCOSE BLD STRIP.AUTO-MCNC: 176 MG/DL (ref 70–110)
GLUCOSE BLD STRIP.AUTO-MCNC: 201 MG/DL (ref 74–106)
GLUCOSE BLD STRIP.AUTO-MCNC: 214 MG/DL (ref 70–110)
GLUCOSE BLD STRIP.AUTO-MCNC: 248 MG/DL (ref 70–110)
GLUCOSE BLD STRIP.AUTO-MCNC: 282 MG/DL (ref 70–110)
GLUCOSE BLD STRIP.AUTO-MCNC: 356 MG/DL (ref 70–110)
GLUCOSE BLD STRIP.AUTO-MCNC: 400 MG/DL (ref 70–110)
GLUCOSE BLD STRIP.AUTO-MCNC: 437 MG/DL (ref 70–110)
GLUCOSE BLD STRIP.AUTO-MCNC: 97 MG/DL (ref 70–110)
GLUCOSE SERPL-MCNC: 121 MG/DL (ref 74–99)
GLUCOSE SERPL-MCNC: 364 MG/DL (ref 74–99)
GLUCOSE UR STRIP.AUTO-MCNC: >1000 MG/DL
HBA1C MFR BLD: 6.7 % (ref 4.2–5.6)
HCO3 BLD-SCNC: 21.1 MMOL/L (ref 22–26)
HCO3 BLD-SCNC: 21.7 MMOL/L (ref 22–26)
HCO3 BLD-SCNC: 21.8 MMOL/L (ref 22–26)
HCT VFR BLD AUTO: 35.2 % (ref 36–48)
HCT VFR BLD AUTO: 41 % (ref 36–48)
HDLC SERPL-MCNC: 56 MG/DL (ref 40–60)
HDLC SERPL: 2.7 (ref 0–5)
HGB BLD-MCNC: 11.3 G/DL (ref 13–16)
HGB BLD-MCNC: 13.1 G/DL (ref 13–16)
HGB UR QL STRIP: ABNORMAL
IMM GRANULOCYTES # BLD AUTO: 0 K/UL (ref 0–0.04)
IMM GRANULOCYTES # BLD AUTO: 0.1 K/UL (ref 0–0.04)
IMM GRANULOCYTES NFR BLD AUTO: 0 % (ref 0–0.5)
IMM GRANULOCYTES NFR BLD AUTO: 1 % (ref 0–0.5)
INR PPP: 1.2 (ref 0.9–1.1)
INSPIRATION.DURATION SETTING TIME VENT: 0.9 SEC
INSPIRATION.DURATION SETTING TIME VENT: 0.9 SEC
KETONES UR QL STRIP.AUTO: NEGATIVE MG/DL
LACTATE BLD-SCNC: 1.52 MMOL/L (ref 0.4–2)
LACTATE BLD-SCNC: 2.92 MMOL/L (ref 0.4–2)
LACTATE SERPL-SCNC: 1.8 MMOL/L (ref 0.4–2)
LACTATE SERPL-SCNC: 4.5 MMOL/L (ref 0.4–2)
LDLC SERPL CALC-MCNC: 90.2 MG/DL (ref 0–100)
LEUKOCYTE ESTERASE UR QL STRIP.AUTO: NEGATIVE
LIPID PANEL: NORMAL
LYMPHOCYTES # BLD: 0.5 K/UL (ref 0.9–3.6)
LYMPHOCYTES # BLD: 1.8 K/UL (ref 0.9–3.6)
LYMPHOCYTES NFR BLD: 22 % (ref 21–52)
LYMPHOCYTES NFR BLD: 3 % (ref 21–52)
Lab: ABNORMAL
MAGNESIUM SERPL-MCNC: 2 MG/DL (ref 1.6–2.6)
MAGNESIUM SERPL-MCNC: 2.1 MG/DL (ref 1.6–2.6)
MCH RBC QN AUTO: 30.7 PG (ref 24–34)
MCH RBC QN AUTO: 31.3 PG (ref 24–34)
MCHC RBC AUTO-ENTMCNC: 32 G/DL (ref 31–37)
MCHC RBC AUTO-ENTMCNC: 32.1 G/DL (ref 31–37)
MCV RBC AUTO: 95.7 FL (ref 78–100)
MCV RBC AUTO: 98.1 FL (ref 78–100)
METHADONE UR QL: NEGATIVE
MONOCYTES # BLD: 0.2 K/UL (ref 0.05–1.2)
MONOCYTES # BLD: 0.7 K/UL (ref 0.05–1.2)
MONOCYTES NFR BLD: 1 % (ref 3–10)
MONOCYTES NFR BLD: 8 % (ref 3–10)
NEUTS BAND NFR BLD MANUAL: 1 %
NEUTS SEG # BLD: 16.9 K/UL (ref 1.8–8)
NEUTS SEG # BLD: 5.6 K/UL (ref 1.8–8)
NEUTS SEG NFR BLD: 67 % (ref 40–73)
NEUTS SEG NFR BLD: 95 % (ref 40–73)
NITRITE UR QL STRIP.AUTO: NEGATIVE
NRBC # BLD: 0 K/UL (ref 0–0.01)
NRBC # BLD: 0 K/UL (ref 0–0.01)
NRBC BLD-RTO: 0 PER 100 WBC
NRBC BLD-RTO: 0 PER 100 WBC
NT PRO BNP: 698 PG/ML (ref 0–900)
O2/TOTAL GAS SETTING VFR VENT: 100 %
O2/TOTAL GAS SETTING VFR VENT: 100 %
OPIATES UR QL: NEGATIVE
PCO2 BLD: 30.1 MMHG (ref 35–45)
PCO2 BLD: 43 MMHG (ref 35–45)
PCO2 BLD: 61.5 MMHG (ref 35–45)
PCP UR QL: NEGATIVE
PEEP RESPIRATORY: 10 CMH2O
PEEP RESPIRATORY: 8 CMH2O
PH BLD: 7.16 (ref 7.35–7.45)
PH BLD: 7.31 (ref 7.35–7.45)
PH BLD: 7.45 (ref 7.35–7.45)
PH UR STRIP: 5 (ref 5–8)
PHOSPHATE SERPL-MCNC: 4.2 MG/DL (ref 2.5–4.9)
PLATELET # BLD AUTO: 235 K/UL (ref 135–420)
PLATELET # BLD AUTO: 264 K/UL (ref 135–420)
PLATELET COMMENT: ABNORMAL
PMV BLD AUTO: 10.7 FL (ref 9.2–11.8)
PMV BLD AUTO: 12.3 FL (ref 9.2–11.8)
PO2 BLD: 164 MMHG (ref 80–100)
PO2 BLD: 238 MMHG (ref 80–100)
PO2 BLD: 78 MMHG (ref 80–100)
POTASSIUM BLD-SCNC: 4 MMOL/L (ref 3.5–5.5)
POTASSIUM SERPL-SCNC: 3.5 MMOL/L (ref 3.5–5.5)
POTASSIUM SERPL-SCNC: 4.1 MMOL/L (ref 3.5–5.5)
PROT SERPL-MCNC: 7.7 G/DL (ref 6.4–8.2)
PROT UR STRIP-MCNC: NEGATIVE MG/DL
PROTHROMBIN TIME: 15.6 SEC (ref 11.9–14.7)
RBC # BLD AUTO: 3.68 M/UL (ref 4.35–5.65)
RBC # BLD AUTO: 4.18 M/UL (ref 4.35–5.65)
RBC #/AREA URNS HPF: ABNORMAL /HPF (ref 0–5)
RBC MORPH BLD: ABNORMAL
RESPIRATORY RATE, POC: 18 (ref 5–40)
RESPIRATORY RATE, POC: 22 (ref 5–40)
SAO2 % BLD: 94.2 % (ref 92–97)
SAO2 % BLD: 98.9 % (ref 92–97)
SAO2 % BLD: 99.9 % (ref 92–97)
SARS-COV-2 RDRP RESP QL NAA+PROBE: NOT DETECTED
SERVICE CMNT-IMP: ABNORMAL
SODIUM BLD-SCNC: 140 MMOL/L (ref 136–145)
SODIUM SERPL-SCNC: 137 MMOL/L (ref 136–145)
SODIUM SERPL-SCNC: 140 MMOL/L (ref 136–145)
SOURCE: NORMAL
SP GR UR REFRACTOMETRY: 1.02 (ref 1–1.03)
SPECIMEN TYPE: ABNORMAL
TRIGL SERPL-MCNC: 29 MG/DL
TROPONIN I BLD-MCNC: <0.04 NG/ML (ref 0–0.08)
TROPONIN I SERPL HS-MCNC: 332 NG/L (ref 0–78)
TROPONIN I SERPL HS-MCNC: 411 NG/L (ref 0–78)
UROBILINOGEN UR QL STRIP.AUTO: 0.2 EU/DL (ref 0.2–1)
VENTILATION MODE VENT: ABNORMAL
VENTILATION MODE VENT: ABNORMAL
VLDLC SERPL CALC-MCNC: 5.8 MG/DL
VT SETTING VENT: 460 ML
VT SETTING VENT: 460 ML
WBC # BLD AUTO: 17.6 K/UL (ref 4.6–13.2)
WBC # BLD AUTO: 8.4 K/UL (ref 4.6–13.2)
WBC URNS QL MICRO: ABNORMAL /HPF (ref 0–4)

## 2023-11-13 PROCEDURE — 93005 ELECTROCARDIOGRAM TRACING: CPT | Performed by: STUDENT IN AN ORGANIZED HEALTH CARE EDUCATION/TRAINING PROGRAM

## 2023-11-13 PROCEDURE — 31500 INSERT EMERGENCY AIRWAY: CPT

## 2023-11-13 PROCEDURE — 71045 X-RAY EXAM CHEST 1 VIEW: CPT

## 2023-11-13 PROCEDURE — 89220 SPUTUM SPECIMEN COLLECTION: CPT

## 2023-11-13 PROCEDURE — 81001 URINALYSIS AUTO W/SCOPE: CPT

## 2023-11-13 PROCEDURE — 6370000000 HC RX 637 (ALT 250 FOR IP)

## 2023-11-13 PROCEDURE — 83036 HEMOGLOBIN GLYCOSYLATED A1C: CPT

## 2023-11-13 PROCEDURE — 80307 DRUG TEST PRSMV CHEM ANLYZR: CPT

## 2023-11-13 PROCEDURE — 83880 ASSAY OF NATRIURETIC PEPTIDE: CPT

## 2023-11-13 PROCEDURE — 36600 WITHDRAWAL OF ARTERIAL BLOOD: CPT

## 2023-11-13 PROCEDURE — 83605 ASSAY OF LACTIC ACID: CPT

## 2023-11-13 PROCEDURE — 93306 TTE W/DOPPLER COMPLETE: CPT

## 2023-11-13 PROCEDURE — 93010 ELECTROCARDIOGRAM REPORT: CPT | Performed by: INTERNAL MEDICINE

## 2023-11-13 PROCEDURE — 5A1945Z RESPIRATORY VENTILATION, 24-96 CONSECUTIVE HOURS: ICD-10-PCS | Performed by: HOSPITALIST

## 2023-11-13 PROCEDURE — 2700000000 HC OXYGEN THERAPY PER DAY

## 2023-11-13 PROCEDURE — 6370000000 HC RX 637 (ALT 250 FOR IP): Performed by: NURSE PRACTITIONER

## 2023-11-13 PROCEDURE — 2580000003 HC RX 258: Performed by: STUDENT IN AN ORGANIZED HEALTH CARE EDUCATION/TRAINING PROGRAM

## 2023-11-13 PROCEDURE — 85379 FIBRIN DEGRADATION QUANT: CPT

## 2023-11-13 PROCEDURE — 80053 COMPREHEN METABOLIC PANEL: CPT

## 2023-11-13 PROCEDURE — 94761 N-INVAS EAR/PLS OXIMETRY MLT: CPT

## 2023-11-13 PROCEDURE — 6360000002 HC RX W HCPCS: Performed by: STUDENT IN AN ORGANIZED HEALTH CARE EDUCATION/TRAINING PROGRAM

## 2023-11-13 PROCEDURE — 96375 TX/PRO/DX INJ NEW DRUG ADDON: CPT

## 2023-11-13 PROCEDURE — 96374 THER/PROPH/DIAG INJ IV PUSH: CPT

## 2023-11-13 PROCEDURE — 2500000003 HC RX 250 WO HCPCS

## 2023-11-13 PROCEDURE — 6360000002 HC RX W HCPCS

## 2023-11-13 PROCEDURE — 2580000003 HC RX 258

## 2023-11-13 PROCEDURE — 6360000002 HC RX W HCPCS: Performed by: INTERNAL MEDICINE

## 2023-11-13 PROCEDURE — 87205 SMEAR GRAM STAIN: CPT

## 2023-11-13 PROCEDURE — 99291 CRITICAL CARE FIRST HOUR: CPT | Performed by: INTERNAL MEDICINE

## 2023-11-13 PROCEDURE — 2000000000 HC ICU R&B

## 2023-11-13 PROCEDURE — 85610 PROTHROMBIN TIME: CPT

## 2023-11-13 PROCEDURE — 85025 COMPLETE CBC W/AUTO DIFF WBC: CPT

## 2023-11-13 PROCEDURE — 85730 THROMBOPLASTIN TIME PARTIAL: CPT

## 2023-11-13 PROCEDURE — 87040 BLOOD CULTURE FOR BACTERIA: CPT

## 2023-11-13 PROCEDURE — 93970 EXTREMITY STUDY: CPT

## 2023-11-13 PROCEDURE — 80061 LIPID PANEL: CPT

## 2023-11-13 PROCEDURE — 84100 ASSAY OF PHOSPHORUS: CPT

## 2023-11-13 PROCEDURE — 87804 INFLUENZA ASSAY W/OPTIC: CPT

## 2023-11-13 PROCEDURE — 83735 ASSAY OF MAGNESIUM: CPT

## 2023-11-13 PROCEDURE — 6370000000 HC RX 637 (ALT 250 FOR IP): Performed by: STUDENT IN AN ORGANIZED HEALTH CARE EDUCATION/TRAINING PROGRAM

## 2023-11-13 PROCEDURE — 93005 ELECTROCARDIOGRAM TRACING: CPT | Performed by: EMERGENCY MEDICINE

## 2023-11-13 PROCEDURE — 2580000003 HC RX 258: Performed by: EMERGENCY MEDICINE

## 2023-11-13 PROCEDURE — 82803 BLOOD GASES ANY COMBINATION: CPT

## 2023-11-13 PROCEDURE — 87086 URINE CULTURE/COLONY COUNT: CPT

## 2023-11-13 PROCEDURE — 84484 ASSAY OF TROPONIN QUANT: CPT

## 2023-11-13 PROCEDURE — 82010 KETONE BODYS QUAN: CPT

## 2023-11-13 PROCEDURE — 6360000002 HC RX W HCPCS: Performed by: EMERGENCY MEDICINE

## 2023-11-13 PROCEDURE — 2500000003 HC RX 250 WO HCPCS: Performed by: EMERGENCY MEDICINE

## 2023-11-13 PROCEDURE — 87070 CULTURE OTHR SPECIMN AEROBIC: CPT

## 2023-11-13 PROCEDURE — 6370000000 HC RX 637 (ALT 250 FOR IP): Performed by: EMERGENCY MEDICINE

## 2023-11-13 PROCEDURE — 82077 ASSAY SPEC XCP UR&BREATH IA: CPT

## 2023-11-13 PROCEDURE — 82962 GLUCOSE BLOOD TEST: CPT

## 2023-11-13 PROCEDURE — 99285 EMERGENCY DEPT VISIT HI MDM: CPT

## 2023-11-13 PROCEDURE — 5A2204Z RESTORATION OF CARDIAC RHYTHM, SINGLE: ICD-10-PCS | Performed by: INTERNAL MEDICINE

## 2023-11-13 PROCEDURE — 94002 VENT MGMT INPAT INIT DAY: CPT

## 2023-11-13 PROCEDURE — 93306 TTE W/DOPPLER COMPLETE: CPT | Performed by: INTERNAL MEDICINE

## 2023-11-13 PROCEDURE — 2500000003 HC RX 250 WO HCPCS: Performed by: STUDENT IN AN ORGANIZED HEALTH CARE EDUCATION/TRAINING PROGRAM

## 2023-11-13 PROCEDURE — 80047 BASIC METABLC PNL IONIZED CA: CPT

## 2023-11-13 PROCEDURE — A4216 STERILE WATER/SALINE, 10 ML: HCPCS

## 2023-11-13 PROCEDURE — 0BH17EZ INSERTION OF ENDOTRACHEAL AIRWAY INTO TRACHEA, VIA NATURAL OR ARTIFICIAL OPENING: ICD-10-PCS | Performed by: HOSPITALIST

## 2023-11-13 PROCEDURE — 87635 SARS-COV-2 COVID-19 AMP PRB: CPT

## 2023-11-13 RX ORDER — ETOMIDATE 2 MG/ML
20 INJECTION INTRAVENOUS
Status: COMPLETED | OUTPATIENT
Start: 2023-11-13 | End: 2023-11-13

## 2023-11-13 RX ORDER — METOPROLOL TARTRATE 1 MG/ML
2.5 INJECTION, SOLUTION INTRAVENOUS
Status: COMPLETED | OUTPATIENT
Start: 2023-11-13 | End: 2023-11-13

## 2023-11-13 RX ORDER — FUROSEMIDE 10 MG/ML
20 INJECTION INTRAMUSCULAR; INTRAVENOUS 2 TIMES DAILY
Status: DISCONTINUED | OUTPATIENT
Start: 2023-11-13 | End: 2023-11-13

## 2023-11-13 RX ORDER — LINEZOLID 2 MG/ML
600 INJECTION, SOLUTION INTRAVENOUS EVERY 12 HOURS
Status: DISCONTINUED | OUTPATIENT
Start: 2023-11-13 | End: 2023-11-13

## 2023-11-13 RX ORDER — PROPOFOL 10 MG/ML
5-50 INJECTION, EMULSION INTRAVENOUS ONCE
Status: COMPLETED | OUTPATIENT
Start: 2023-11-13 | End: 2023-11-13

## 2023-11-13 RX ORDER — FUROSEMIDE 10 MG/ML
20 INJECTION INTRAMUSCULAR; INTRAVENOUS
Status: COMPLETED | OUTPATIENT
Start: 2023-11-13 | End: 2023-11-13

## 2023-11-13 RX ORDER — FUROSEMIDE 10 MG/ML
40 INJECTION INTRAMUSCULAR; INTRAVENOUS ONCE
Status: DISCONTINUED | OUTPATIENT
Start: 2023-11-13 | End: 2023-11-13

## 2023-11-13 RX ORDER — PROPOFOL 10 MG/ML
5-50 INJECTION, EMULSION INTRAVENOUS CONTINUOUS
Status: DISCONTINUED | OUTPATIENT
Start: 2023-11-13 | End: 2023-11-14

## 2023-11-13 RX ORDER — MULTIVIT-MIN/FERROUS GLUCONATE 9 MG/15 ML
15 LIQUID (ML) ORAL DAILY
Status: DISCONTINUED | OUTPATIENT
Start: 2023-11-14 | End: 2023-11-15

## 2023-11-13 RX ORDER — INSULIN LISPRO 100 [IU]/ML
0-4 INJECTION, SOLUTION INTRAVENOUS; SUBCUTANEOUS NIGHTLY
Status: DISCONTINUED | OUTPATIENT
Start: 2023-11-13 | End: 2023-11-13

## 2023-11-13 RX ORDER — DEXTROSE MONOHYDRATE 100 MG/ML
INJECTION, SOLUTION INTRAVENOUS CONTINUOUS PRN
Status: DISCONTINUED | OUTPATIENT
Start: 2023-11-13 | End: 2023-11-17 | Stop reason: HOSPADM

## 2023-11-13 RX ORDER — INSULIN LISPRO 100 [IU]/ML
0-8 INJECTION, SOLUTION INTRAVENOUS; SUBCUTANEOUS
Status: DISCONTINUED | OUTPATIENT
Start: 2023-11-13 | End: 2023-11-13

## 2023-11-13 RX ORDER — METOPROLOL TARTRATE 1 MG/ML
5 INJECTION, SOLUTION INTRAVENOUS
Status: DISCONTINUED | OUTPATIENT
Start: 2023-11-13 | End: 2023-11-13

## 2023-11-13 RX ORDER — IPRATROPIUM BROMIDE AND ALBUTEROL SULFATE 2.5; .5 MG/3ML; MG/3ML
1 SOLUTION RESPIRATORY (INHALATION)
Status: COMPLETED | OUTPATIENT
Start: 2023-11-13 | End: 2023-11-13

## 2023-11-13 RX ORDER — NITROGLYCERIN 20 MG/100ML
INJECTION INTRAVENOUS
Status: DISCONTINUED
Start: 2023-11-13 | End: 2023-11-13

## 2023-11-13 RX ORDER — ALBUTEROL SULFATE 2.5 MG/3ML
5 SOLUTION RESPIRATORY (INHALATION)
Status: COMPLETED | OUTPATIENT
Start: 2023-11-13 | End: 2023-11-13

## 2023-11-13 RX ORDER — MIDAZOLAM HYDROCHLORIDE 2 MG/2ML
2 INJECTION, SOLUTION INTRAMUSCULAR; INTRAVENOUS
Status: DISCONTINUED | OUTPATIENT
Start: 2023-11-13 | End: 2023-11-14

## 2023-11-13 RX ORDER — SUCCINYLCHOLINE/SOD CL,ISO/PF 100 MG/5ML
120 SYRINGE (ML) INTRAVENOUS
Status: COMPLETED | OUTPATIENT
Start: 2023-11-13 | End: 2023-11-13

## 2023-11-13 RX ORDER — POLYETHYLENE GLYCOL 3350 17 G/17G
17 POWDER, FOR SOLUTION ORAL DAILY
Status: DISCONTINUED | OUTPATIENT
Start: 2023-11-13 | End: 2023-11-17 | Stop reason: HOSPADM

## 2023-11-13 RX ORDER — IPRATROPIUM BROMIDE AND ALBUTEROL SULFATE 2.5; .5 MG/3ML; MG/3ML
1 SOLUTION RESPIRATORY (INHALATION)
Status: DISPENSED | OUTPATIENT
Start: 2023-11-13 | End: 2023-11-13

## 2023-11-13 RX ORDER — SODIUM CHLORIDE 9 MG/ML
INJECTION, SOLUTION INTRAVENOUS PRN
Status: DISCONTINUED | OUTPATIENT
Start: 2023-11-13 | End: 2023-11-17 | Stop reason: HOSPADM

## 2023-11-13 RX ORDER — HEPARIN SODIUM 10000 [USP'U]/100ML
5-30 INJECTION, SOLUTION INTRAVENOUS CONTINUOUS
Status: DISCONTINUED | OUTPATIENT
Start: 2023-11-13 | End: 2023-11-16

## 2023-11-13 RX ORDER — FUROSEMIDE 10 MG/ML
INJECTION INTRAMUSCULAR; INTRAVENOUS
Status: COMPLETED
Start: 2023-11-13 | End: 2023-11-13

## 2023-11-13 RX ORDER — VECURONIUM BROMIDE 1 MG/ML
8 INJECTION, POWDER, LYOPHILIZED, FOR SOLUTION INTRAVENOUS
Status: DISCONTINUED | OUTPATIENT
Start: 2023-11-13 | End: 2023-11-13

## 2023-11-13 RX ORDER — HEPARIN SODIUM 1000 [USP'U]/ML
2000 INJECTION, SOLUTION INTRAVENOUS; SUBCUTANEOUS PRN
Status: DISCONTINUED | OUTPATIENT
Start: 2023-11-13 | End: 2023-11-16

## 2023-11-13 RX ORDER — ROCURONIUM BROMIDE 10 MG/ML
0.6 INJECTION, SOLUTION INTRAVENOUS
Status: DISCONTINUED | OUTPATIENT
Start: 2023-11-13 | End: 2023-11-13

## 2023-11-13 RX ORDER — NITROGLYCERIN 20 MG/100ML
5-200 INJECTION INTRAVENOUS CONTINUOUS
Status: DISCONTINUED | OUTPATIENT
Start: 2023-11-13 | End: 2023-11-13

## 2023-11-13 RX ORDER — MIDAZOLAM HYDROCHLORIDE 2 MG/2ML
2 INJECTION, SOLUTION INTRAMUSCULAR; INTRAVENOUS
Status: COMPLETED | OUTPATIENT
Start: 2023-11-13 | End: 2023-11-13

## 2023-11-13 RX ORDER — DEXMEDETOMIDINE HYDROCHLORIDE 4 UG/ML
.1-1.5 INJECTION, SOLUTION INTRAVENOUS CONTINUOUS
Status: DISCONTINUED | OUTPATIENT
Start: 2023-11-13 | End: 2023-11-14

## 2023-11-13 RX ORDER — MAGNESIUM SULFATE IN WATER 40 MG/ML
2000 INJECTION, SOLUTION INTRAVENOUS PRN
Status: DISCONTINUED | OUTPATIENT
Start: 2023-11-13 | End: 2023-11-17 | Stop reason: HOSPADM

## 2023-11-13 RX ORDER — ATORVASTATIN CALCIUM 40 MG/1
80 TABLET, FILM COATED ORAL NIGHTLY
Status: DISCONTINUED | OUTPATIENT
Start: 2023-11-13 | End: 2023-11-15

## 2023-11-13 RX ORDER — MIDAZOLAM HYDROCHLORIDE 2 MG/2ML
2 INJECTION, SOLUTION INTRAMUSCULAR; INTRAVENOUS
Status: DISCONTINUED | OUTPATIENT
Start: 2023-11-13 | End: 2023-11-13

## 2023-11-13 RX ORDER — SODIUM CHLORIDE 0.9 % (FLUSH) 0.9 %
5-40 SYRINGE (ML) INJECTION PRN
Status: DISCONTINUED | OUTPATIENT
Start: 2023-11-13 | End: 2023-11-17 | Stop reason: HOSPADM

## 2023-11-13 RX ORDER — FENTANYL CITRATE 50 UG/ML
50 INJECTION, SOLUTION INTRAMUSCULAR; INTRAVENOUS
Status: DISCONTINUED | OUTPATIENT
Start: 2023-11-13 | End: 2023-11-14

## 2023-11-13 RX ORDER — FUROSEMIDE 10 MG/ML
40 INJECTION INTRAMUSCULAR; INTRAVENOUS 2 TIMES DAILY
Status: DISCONTINUED | OUTPATIENT
Start: 2023-11-13 | End: 2023-11-17

## 2023-11-13 RX ORDER — ENOXAPARIN SODIUM 100 MG/ML
40 INJECTION SUBCUTANEOUS DAILY
Status: DISCONTINUED | OUTPATIENT
Start: 2023-11-14 | End: 2023-11-13

## 2023-11-13 RX ORDER — ENOXAPARIN SODIUM 100 MG/ML
1 INJECTION SUBCUTANEOUS
Status: COMPLETED | OUTPATIENT
Start: 2023-11-13 | End: 2023-11-13

## 2023-11-13 RX ORDER — CHLORHEXIDINE GLUCONATE ORAL RINSE 1.2 MG/ML
15 SOLUTION DENTAL 2 TIMES DAILY
Status: DISCONTINUED | OUTPATIENT
Start: 2023-11-13 | End: 2023-11-14

## 2023-11-13 RX ORDER — MIDAZOLAM HYDROCHLORIDE 1 MG/ML
INJECTION INTRAMUSCULAR; INTRAVENOUS
Status: DISCONTINUED
Start: 2023-11-13 | End: 2023-11-13

## 2023-11-13 RX ORDER — IPRATROPIUM BROMIDE AND ALBUTEROL SULFATE 2.5; .5 MG/3ML; MG/3ML
1 SOLUTION RESPIRATORY (INHALATION) EVERY 4 HOURS PRN
Status: DISCONTINUED | OUTPATIENT
Start: 2023-11-13 | End: 2023-11-14

## 2023-11-13 RX ORDER — SODIUM CHLORIDE 0.9 % (FLUSH) 0.9 %
5-40 SYRINGE (ML) INJECTION EVERY 12 HOURS SCHEDULED
Status: DISCONTINUED | OUTPATIENT
Start: 2023-11-13 | End: 2023-11-17 | Stop reason: HOSPADM

## 2023-11-13 RX ORDER — ENOXAPARIN SODIUM 100 MG/ML
40 INJECTION SUBCUTANEOUS DAILY
Status: DISCONTINUED | OUTPATIENT
Start: 2023-11-13 | End: 2023-11-13

## 2023-11-13 RX ORDER — HEPARIN SODIUM 1000 [USP'U]/ML
4000 INJECTION, SOLUTION INTRAVENOUS; SUBCUTANEOUS ONCE
Status: COMPLETED | OUTPATIENT
Start: 2023-11-13 | End: 2023-11-13

## 2023-11-13 RX ORDER — ASPIRIN 81 MG/1
81 TABLET, CHEWABLE ORAL DAILY
Status: DISCONTINUED | OUTPATIENT
Start: 2023-11-13 | End: 2023-11-15

## 2023-11-13 RX ORDER — HEPARIN SODIUM 1000 [USP'U]/ML
4000 INJECTION, SOLUTION INTRAVENOUS; SUBCUTANEOUS PRN
Status: DISCONTINUED | OUTPATIENT
Start: 2023-11-13 | End: 2023-11-16

## 2023-11-13 RX ORDER — FUROSEMIDE 10 MG/ML
40 INJECTION INTRAMUSCULAR; INTRAVENOUS 2 TIMES DAILY
Status: DISCONTINUED | OUTPATIENT
Start: 2023-11-13 | End: 2023-11-13

## 2023-11-13 RX ORDER — MIDAZOLAM HYDROCHLORIDE 2 MG/2ML
3 INJECTION, SOLUTION INTRAMUSCULAR; INTRAVENOUS
Status: DISCONTINUED | OUTPATIENT
Start: 2023-11-13 | End: 2023-11-13

## 2023-11-13 RX ORDER — MAGNESIUM SULFATE IN WATER 40 MG/ML
2000 INJECTION, SOLUTION INTRAVENOUS
Status: DISPENSED | OUTPATIENT
Start: 2023-11-13 | End: 2023-11-13

## 2023-11-13 RX ADMIN — MIDAZOLAM 2 MG: 1 INJECTION INTRAMUSCULAR; INTRAVENOUS at 07:43

## 2023-11-13 RX ADMIN — ETOMIDATE 20 MG: 2 INJECTION INTRAVENOUS at 06:58

## 2023-11-13 RX ADMIN — HEPARIN SODIUM 4000 UNITS: 1000 INJECTION INTRAVENOUS; SUBCUTANEOUS at 12:07

## 2023-11-13 RX ADMIN — NITROGLYCERIN 20 MCG/MIN: 20 INJECTION INTRAVENOUS at 06:03

## 2023-11-13 RX ADMIN — AMIODARONE HYDROCHLORIDE 150 MG: 1.5 INJECTION, SOLUTION INTRAVENOUS at 05:30

## 2023-11-13 RX ADMIN — PIPERACILLIN AND TAZOBACTAM 3375 MG: 3; .375 INJECTION, POWDER, FOR SOLUTION INTRAVENOUS at 23:35

## 2023-11-13 RX ADMIN — Medication 120 MG: at 06:20

## 2023-11-13 RX ADMIN — ALBUTEROL SULFATE 5 MG: 2.5 SOLUTION RESPIRATORY (INHALATION) at 06:10

## 2023-11-13 RX ADMIN — INSULIN HUMAN 7.54 UNITS/HR: 1 INJECTION, SOLUTION INTRAVENOUS at 13:14

## 2023-11-13 RX ADMIN — IPRATROPIUM BROMIDE AND ALBUTEROL SULFATE 1 DOSE: 2.5; .5 SOLUTION RESPIRATORY (INHALATION) at 07:25

## 2023-11-13 RX ADMIN — FENTANYL CITRATE 50 MCG: 50 INJECTION INTRAMUSCULAR; INTRAVENOUS at 23:26

## 2023-11-13 RX ADMIN — METOPROLOL TARTRATE 2.5 MG: 1 INJECTION, SOLUTION INTRAVENOUS at 06:55

## 2023-11-13 RX ADMIN — HEPARIN SODIUM 11 UNITS/KG/HR: 10000 INJECTION, SOLUTION INTRAVENOUS at 12:08

## 2023-11-13 RX ADMIN — WATER 125 MG: 1 INJECTION INTRAMUSCULAR; INTRAVENOUS; SUBCUTANEOUS at 05:13

## 2023-11-13 RX ADMIN — AMIODARONE HYDROCHLORIDE 0.5 MG/MIN: 1.8 INJECTION, SOLUTION INTRAVENOUS at 12:02

## 2023-11-13 RX ADMIN — 0.12% CHLORHEXIDINE GLUCONATE 15 ML: 1.2 RINSE ORAL at 12:20

## 2023-11-13 RX ADMIN — ASPIRIN 81 MG: 81 TABLET, CHEWABLE ORAL at 12:19

## 2023-11-13 RX ADMIN — POLYETHYLENE GLYCOL 3350 17 G: 17 POWDER, FOR SOLUTION ORAL at 12:34

## 2023-11-13 RX ADMIN — MIDAZOLAM 2 MG: 1 INJECTION INTRAMUSCULAR; INTRAVENOUS at 07:35

## 2023-11-13 RX ADMIN — PROPOFOL 10 MCG/KG/MIN: 10 INJECTION, EMULSION INTRAVENOUS at 14:46

## 2023-11-13 RX ADMIN — 0.12% CHLORHEXIDINE GLUCONATE 15 ML: 1.2 RINSE ORAL at 20:08

## 2023-11-13 RX ADMIN — AMIODARONE HYDROCHLORIDE 1 MG/MIN: 1.8 INJECTION, SOLUTION INTRAVENOUS at 07:02

## 2023-11-13 RX ADMIN — DEXMEDETOMIDINE HYDROCHLORIDE 0.4 MCG/KG/HR: 4 INJECTION, SOLUTION INTRAVENOUS at 10:10

## 2023-11-13 RX ADMIN — FAMOTIDINE 20 MG: 10 INJECTION, SOLUTION INTRAVENOUS at 12:33

## 2023-11-13 RX ADMIN — FUROSEMIDE 20 MG: 10 INJECTION, SOLUTION INTRAMUSCULAR; INTRAVENOUS at 05:15

## 2023-11-13 RX ADMIN — FAMOTIDINE 20 MG: 10 INJECTION, SOLUTION INTRAVENOUS at 20:05

## 2023-11-13 RX ADMIN — SODIUM CHLORIDE, PRESERVATIVE FREE 20 ML: 5 INJECTION INTRAVENOUS at 12:00

## 2023-11-13 RX ADMIN — POTASSIUM BICARBONATE 40 MEQ: 782 TABLET, EFFERVESCENT ORAL at 23:27

## 2023-11-13 RX ADMIN — ENOXAPARIN SODIUM 90 MG: 100 INJECTION SUBCUTANEOUS at 07:11

## 2023-11-13 RX ADMIN — IPRATROPIUM BROMIDE AND ALBUTEROL SULFATE 1 DOSE: .5; 3 SOLUTION RESPIRATORY (INHALATION) at 04:55

## 2023-11-13 RX ADMIN — MIDAZOLAM 2 MG: 1 INJECTION INTRAMUSCULAR; INTRAVENOUS at 20:37

## 2023-11-13 RX ADMIN — SODIUM CHLORIDE, PRESERVATIVE FREE 10 ML: 5 INJECTION INTRAVENOUS at 20:08

## 2023-11-13 RX ADMIN — NITROGLYCERIN 1 INCH: 20 OINTMENT TOPICAL at 05:10

## 2023-11-13 RX ADMIN — ATORVASTATIN CALCIUM 80 MG: 40 TABLET, FILM COATED ORAL at 20:08

## 2023-11-13 RX ADMIN — FUROSEMIDE 40 MG: 10 INJECTION, SOLUTION INTRAMUSCULAR; INTRAVENOUS at 18:44

## 2023-11-13 RX ADMIN — PIPERACILLIN AND TAZOBACTAM 3375 MG: 3; .375 INJECTION, POWDER, FOR SOLUTION INTRAVENOUS at 15:56

## 2023-11-13 RX ADMIN — FUROSEMIDE 60 MG: 10 INJECTION, SOLUTION INTRAMUSCULAR; INTRAVENOUS at 05:47

## 2023-11-13 RX ADMIN — PROPOFOL 20 MCG/KG/MIN: 10 INJECTION, EMULSION INTRAVENOUS at 06:41

## 2023-11-13 RX ADMIN — AMIODARONE HYDROCHLORIDE 0.5 MG/MIN: 1.8 INJECTION, SOLUTION INTRAVENOUS at 23:42

## 2023-11-13 RX ADMIN — PIPERACILLIN AND TAZOBACTAM 4500 MG: 4; .5 INJECTION, POWDER, LYOPHILIZED, FOR SOLUTION INTRAVENOUS at 12:04

## 2023-11-13 ASSESSMENT — PULMONARY FUNCTION TESTS
PIF_VALUE: 23
PIF_VALUE: 22

## 2023-11-13 NOTE — PROGRESS NOTES
On my way to perform echocardiogram beside, noticed HR too high. Will keep checking.  Only able to perform echo if HR is below 120bpm.

## 2023-11-13 NOTE — CARE COORDINATION
Case Management Assessment  Initial Evaluation    Date/Time of Evaluation: 11/13/2023 12:44 PM  Assessment Completed by: Rik Marino    If patient is discharged prior to next notation, then this note serves as note for discharge by case management. Patient Name: Rima Cisneros. YOB: 1965  Diagnosis: Shortness of breath [R06.02]  Hypoxemia [R09.02]  Malignant hypertension [I10]  Acute respiratory failure, unspecified whether with hypoxia or hypercapnia ([address]) [J96.00]  Hypertensive emergency [I16.1]                   Date / Time: 11/13/2023  3:43 AM    Patient Admission Status: Inpatient   Readmission Risk (Low < 19, Mod (19-27), High > 27): Readmission Risk Score: 9.5    Current PCP: Bud Clifford MD  PCP verified by CM? (P) Yes    Chart Reviewed: Yes      History Provided by: Significant Other  Patient Orientation: Sedated    Patient Cognition:      Hospitalization in the last 30 days (Readmission):  No    If yes, Readmission Assessment in  Navigator will be completed. Advance Directives:      Code Status: Full Code   Patient's Primary Decision Maker is: (P) Legal Next of Kin      Discharge Planning:    Patient lives with: (P) Spouse/Significant Other Type of Home: (P) Apartment  Primary Care Giver: Self  Patient Support Systems include: (P) Spouse/Significant Other, Parent   Current Financial resources: (P) Medicaid  Current services prior to admission: (P) None            Current DME:  Patient and significant other have walker, wheelchair, shower chair in the home.             Type of Home Care services:  (P) None    ADLS  Prior functional level: (P) Independent in ADLs/IADLs  Current functional level: (P) Independent in ADLs/IADLs    PT AM-PAC:   /24  OT AM-PAC:   /24    Family can provide assistance at DC: (P) Yes  Would you like Case Management to discuss the discharge plan with any other family members/significant others, and if so, who? (P) Yes (Significant failure, unspecified whether with hypoxia or hypercapnia ([address]) [J96.00]  Hypertensive emergency [S48.1]    IF APPLICABLE: The Patient and/or patient representative Bibi Cureil and his family were provided with a choice of provider and agrees with the discharge plan. [facility] for [facility]. Patient Representative Name: Bridget Canchola) [facility]     The Patient and/or Patient Representative Agree with the Discharge Plan? (P) Yes       11/13/23 1237   Service Assessment   Patient Orientation Sedated   History Provided By Significant Other   Primary Caregiver Self   Accompanied By/Relationship Parent - Jfmelany David & Significant other - [address] Spouse/Significant Other;Parent   Patient's Healthcare Decision Maker is: Legal Next of Kin   PCP Verified by CM Yes   Last Visit to PCP Within last 3 months   Prior Functional Level Independent in ADLs/IADLs   Current Functional Level Independent in ADLs/IADLs   Can patient return to prior living arrangement Unknown at present   Ability to make needs known: Unable   Family able to assist with home care needs: Yes   Would you like for me to discuss the discharge plan with any other family members/significant others, and if so, who? Yes  (Significant other)   Financial Resources Medicaid   Social/Functional History   Lives With Significant other   Type of Home Apartment   Home Layout One level   Home Access Level entry   Bathroom Shower/Tub Tub/Shower unit; Shower chair with back   [address] [facility] chair   [address] [facility] , rolling   ADL Assistance Independent   Homemaking Assistance Independent   Ambulation Assistance Independent   Transfer Assistance Independent   Occupation On disability   Discharge Planning   Type of Residence Apartment   Living Arrangements Spouse/Significant Other   Current Services Prior To Admission None   [address]   DME

## 2023-11-13 NOTE — ED PROVIDER NOTES
[facility] ENCOUNTER      Pt Name: Klye Last. MRN: [id number]  Birthdate 1965  Date of evaluation: 11/13/2023  Provider: Tati Montero MD    CHIEF COMPLAINT       Chief Complaint   Patient presents with    Shortness of Breath       HPI:  Kyle Last is a 62 y.o. male who presents to the emergency department pt c/o sob, x one day, w non prod cough. No h/o sob in past.  Cp w cough/inspiration. No leg pain. No abd pain  H/o prior cva w mild rt sided weakness, no change. HPI    Nursing Notes were reviewed. REVIEW OF SYSTEMS    (2-9 systems for level 4, 10 or more for level 5)     Review of Systems    Except as noted above the remainder of the review of systems was reviewed and negative. PAST MEDICAL HISTORY     Past Medical History:   Diagnosis Date    Arthritis     Chest pain     CHF (congestive heart failure) ([address])     CVA (cerebral vascular accident) ([address]) 2011    r side stroke    CVA (cerebral vascular accident) ([address]) 09/05/2018    admitted at [address]) 2010    chest pains    Hypercholesterolemia     Hypertension     Loss of hearing     right ear, patient states it comes and goes    Polyuria     Shortness of breath 2011    Type 2 diabetes mellitus without complication, with long-term current use of insulin ([address]) 07/23/2013    Type 2 diabetes mellitus, with long-term current use of insulin ([address]) 7/23/2013         SURGICAL HISTORY       Past Surgical History:   Procedure Laterality Date    CARDIAC CATHETERIZATION           CURRENT MEDICATIONS       Current Discharge Medication List        CONTINUE these medications which have NOT CHANGED    Details   hydroCHLOROthiazide (HYDRODIURIL) 12.5 MG tablet TAKE ONE TABLET BY MOUTH DAILY  Qty: 30 tablet, Refills: 5      losartan (COZAAR) 100 MG tablet TAKE ONE TABLET BY MOUTH EVERY DAY  Qty: 90 tablet, Refills: 2    Comments:  This prescription was filled on

## 2023-11-13 NOTE — PROGRESS NOTES
Blood sugar of 214 obtained via glucommander. BS not displaying in results. Insulin drip rate changed to 6.16 units/hr per MAR. Verified w/ second RN, Terell Mayo.

## 2023-11-13 NOTE — CONSULTS
Comprehensive Nutrition Assessment    Type and Reason for Visit:  Initial, Consult    Nutrition Recommendations/Plan:   Initiate tube feeding:   Formula: Vital AF 1.2  Initial Rate: 20 mL/hr  Advancement: not at this time  Goal Rate: 20 mL/hr x 20 hours (for anticipation of holding TF for standard nursing care)  Water Flushes: 30 mL q 4 hours (180 mL total)  Modular(s):  none at this time  Goal Regimen Provides (with modulars): 480 kcal, 30 gm pro, 324 mL free water from tube feeding only, 34% RDIs    Add daily liquid MVI r/t current TF regimen does not meet 100% RDIs    Monitor TF tolerance and readiness for advancing by 10 mL q 8 hours towards goal rate of 70 mL/hr (x 20 hours for anticipation of holding TF for standard nursing care) and water flushes of 50 mL q 4 hours   - goal provides 1680 kcal, 105 gm protein, 1135 mL free water from TF only, 100% RDIs     Malnutrition Assessment:  Malnutrition Status:  Insufficient data (at risk for malnutrition; pt is NPO; intubated, on mechanical ventilator) (11/13/23 1202)    Context:  Acute Illness       Nutrition History and Allergies:   Past medical hx:  arthritis, CHF, CVA, depression, heart attack, HTN, hypercholesterolemia, DM type 2. Wt trends PTA per chart hx:  194 lb on 11/10/21,  191 lb on 10/24/22,  192 lb on 11/13/23. No known food allergies     Nutrition Assessment:    Pt admitted with c/o nonproductive cough and shortness of breath; failed trial of bipap, was intubated, placed on mechanical ventilator. Consult received for management of TF. Discussed during interdisciplinary rounds, plan to start trickle TF. Nutrition Related Findings:    Last BM not reported. no edema. Pertinent meds: lipitor, pepcid, lasix, SSI, steroid therapy, versed, antibiotic. Pertinent labs: Na 134 low, K 3.7 wnl, Mg 2.1 wnl, Ca 9.4 wnl. Lactic acid 2.92 high, then 1.52 wnl.  Wound Type: None       Current Nutrition Intake & Therapies:    Average Meal Intake: NPO  Average

## 2023-11-13 NOTE — PROGRESS NOTES
Critical Care Update:    - Updated the pt's girlfriend, Joby Moran at bedside. She is listed as the medical emergency contact in Living Will scanned into media dated 2019    - Lower extremity PVLs did not demonstrate evidence of DVT. Continue to have low suspicion for PE as cause of symptoms.     - Noted elevated Troponin, Lactic acid, Creatinine, and LFTs. No previous creatinine or LFT values available from within last 12 months for comparison, but above value from 10/3/22.   - POCUS demonstrated B-lines predominately in right upper lobe. Dilated IVC without respiratory variation.   - Concern for SCAI C Cardiogenic shock. Plan to continue with loop diuretics. Scheduled Furosemide IVP BID. He has voided > 2L with initial doses today. - Severe Sepsis remains in differential w/ leukocytosis   - Noted improving PF ratio, continue aggressive FiO2 wean.  PEEP will assist with preload reduction.   - Trend Troponin, Lactic Acid, and BMP  - f/u ECHO report    _ _ _ _ _ _ _ _ _ _ _ _ _  Migue Thompson MD Fellow  [facility] Critical Care  Available via [facility]

## 2023-11-13 NOTE — INTERDISCIPLINARY ROUNDS
[facility]  Pulmonary, Critical Care, and Sleep Medicine  Interdisciplinary and Ventilator Weaning Rounds    Patient discussed in morning walking rounds and interdisciplinary rounds. ICU admission day: 1    Overnight events:   Admitted this morning    Assessments and best practice:  Ventilator  Ventilator day 1  Vent settings: VC/VC+ with RR 18 and TV of 460, FiO2 100, PEEP 8  VAP bundle, aim to keep peak plateau pressure 18-27VE H2O  Weaning assessed and documented   Patient does notmeet criteria for SBT. Patient is  on sedation holiday. Plan to wean with PS n/a. Outcome: n/a   Final plan: keep intubated  Glycemic control  Diet  NPO  Stress ulcer prophylaxis. Pepcid  DVT prophylaxis. Lovenox  Need for Lines, crump assessed. Yes  Restraint Reevaluation     Yes  I have reevaluated the patient one hour after initiation of intervention. The patient is comfortable, uninjured, but continues to pose an imminent risk of injury to self to themselves and/or serious disruption of medical treatment required to keep patient stable. The patient's current medical and behavioral conditions that warrant the use intervention include pulling lines. Restraint or seclusion will be discontinued at the earliest possible time, regardless of the scheduled expiration of the order.  Based on my evaluation, restraints will be continued: Yes  Disposition regarding transferring out of the ICU  RED      Family contact/MPOA:   Family updated     Palliative consult within 3 days of admission to ICU-  Ethics Guidance: 21 days      Daily Plans:  Con't Amio gtt, turn to 0.5 after 6 hrs  Precedex gtt  Wean vent as able  ECHO  Trickle feeds    SHAYNA time 12 minutes        KEITH FABIAN PA-C  11/13/23  Pulmonary, [address]

## 2023-11-13 NOTE — ED TRIAGE NOTES
Aox4 with a cc of shortness of breath that started last night, woke up to shortness of breath. Has happened before with his previous stroke.

## 2023-11-13 NOTE — PROGRESS NOTES
attended the interdisciplinary rounds for Amilcar Stein., who is a 62 y. o.,male. Patient's Primary Language is: [place]. According to the patient's EMR Hinduism Affiliation is: [address]. The reason the Patient came to the hospital is:   Patient Active Problem List    Diagnosis Date Noted    CAD (coronary artery disease) 11/05/2020    Hypoxemia 11/13/2023    Hypertensive emergency 11/13/2023    Hyperlipidemia associated with type 2 diabetes mellitus ([address]) 10/04/2022    Depression 03/01/2021    Stable angina pectoris 10/30/2020    Right sided abdominal pain 01/15/2020    Acute right-sided low back pain without sciatica 01/15/2020    CHF (congestive heart failure) ([address]) 06/18/2019    Type 2 diabetes mellitus with diabetic neuropathy ([address]) 04/25/2019    Essential hypertension 07/23/2013    Type 2 diabetes mellitus, with long-term current use of insulin ([address]) 07/23/2013    History of CVA (cerebrovascular accident) 07/23/2013    History of MI (myocardial infarction) 07/23/2013          Plan:   listen to the recommendations of the IDR team. Patient intubated. Sedated. Chaplains will continue to follow and will provide pastoral care on an as needed/requested basis.  recommends bedside caregivers page  on duty if patient shows signs of acute spiritual or emotional distress.     [address] [facility] [place]  Staff [id number] [facility]   [phone]

## 2023-11-13 NOTE — H&P
[facility]  Pulmonary, Critical Care, and Sleep Medicine    Name: Ana Willett MRN: [id number]   : 1965 [facility]   Date: 2023        Critical Care History and Physical      IMPRESSION:   Acute Hypoxemic, Hypercapnic Respiratory Failure  Bilateral Pulmonary Infiltrates     Patient Active Problem List   Diagnosis    Essential hypertension    Type 2 diabetes mellitus with diabetic neuropathy (HCC)    Type 2 diabetes mellitus, with long-term current use of insulin ([facility])    History of CVA (cerebrovascular accident)    History of MI (myocardial infarction)    CAD (coronary artery disease)    Stable angina pectoris    CHF (congestive heart failure) ([address])    Right sided abdominal pain    Acute right-sided low back pain without sciatica    Depression    Hyperlipidemia associated with type 2 diabetes mellitus ([address])    Hypoxemia    Hypertensive emergency        RECOMMENDATIONS:   Neurologic:  Titrate sedation for RASS goal -3, daily sedation holiday. - Dexmedetomidine   - Propofol Drip, Triglycerides ordered q3day while on infusion    # h/o CVA w/ \"Mild right sided weakness\" per ED Note    # Depression  - Escitalopram, holding    Cardiovascular:    # Atrial Flutter  - Heparin drip  - Amiodarone Drip, dose decrease at 13:00 today    # Episode of VT w/ Pulse  - s/p Cardiogersion  - Amiodarone drip    # HFpEF  - Last ECHO in 2019 ([place]) demonstrated HFpEF (LVEF 55%), LA Enlargement  - Hold home Metoprolol   - f/u ECHO     # CAD s/p Stent to Mid LCX Stent   - ASA (Ticagrelor stopped by Cardiologist at 23 Clinic Visit)  - f/u Troponin     # h/o HTN  - Holding home Amlodipine    # HLD  - Atorvastatin at home dose    Pulmonary:  Aspiration precautions, HOB>30'.  VAP Bundle    # Elevated D-Dimer  - Lower suspicion for Pulmonary Embolism  - Ordered Bilateral Lower Extremity Duplex    # Bilateral Pulmonary Edema  - Furosemide 40mg IVP once, may require further

## 2023-11-13 NOTE — ED NOTES
Pt significant other made aware that pt has been intubated. Faylene Borer [phone]. No further question or concerns at this time.      Nellene Blizzard, RN  11/13/23 3225

## 2023-11-13 NOTE — ED NOTES
Patient was placed on bipap, went to package patient for CT when patient went into vt.  Informed MD Maddy Saavedra, RN  11/13/23 5896

## 2023-11-13 NOTE — PROGRESS NOTES
attended the interdisciplinary rounds for Chasity Santiago, who is a 62 y. o.,male. Patient's Primary Language is: [place]. According to the patient's EMR Yarsanism Affiliation is: [address]. The reason the Patient came to the hospital is:   Patient Active Problem List    Diagnosis Date Noted    CAD (coronary artery disease) 11/05/2020    Hypoxemia 11/13/2023    Hypertensive emergency 11/13/2023    Hyperlipidemia associated with type 2 diabetes mellitus ([address]) 10/04/2022    Depression 03/01/2021    Stable angina pectoris 10/30/2020    Right sided abdominal pain 01/15/2020    Acute right-sided low back pain without sciatica 01/15/2020    CHF (congestive heart failure) ([address]) 06/18/2019    Type 2 diabetes mellitus with diabetic neuropathy ([address]) 04/25/2019    Essential hypertension 07/23/2013    Type 2 diabetes mellitus, with long-term current use of insulin ([address]) 07/23/2013    History of CVA (cerebrovascular accident) 07/23/2013    History of MI (myocardial infarction) 07/23/2013          Plan:  Jaskaran Stroud listen to t    Chaplains will continue to follow and will provide pastoral care on an as needed/requested basis. ANTONIO lira recommends bedside caregivers page  on duty if patient shows signs of acute spiritual or emotional distress.     [address] [facility] [address]  Staff [address] [facility]   [phone]

## 2023-11-14 ENCOUNTER — APPOINTMENT (OUTPATIENT)
Facility: HOSPITAL | Age: 58
End: 2023-11-14
Payer: COMMERCIAL

## 2023-11-14 PROBLEM — I47.20 VENTRICULAR TACHYARRHYTHMIA (HCC): Status: ACTIVE | Noted: 2023-11-14

## 2023-11-14 PROBLEM — I48.91 ATRIAL FIBRILLATION (HCC): Status: ACTIVE | Noted: 2023-11-14

## 2023-11-14 PROBLEM — J96.00 ACUTE RESPIRATORY FAILURE (HCC): Status: ACTIVE | Noted: 2023-11-13

## 2023-11-14 LAB
ALBUMIN SERPL-MCNC: 2.9 G/DL (ref 3.4–5)
ALBUMIN/GLOB SERPL: 0.9 (ref 0.8–1.7)
ALP SERPL-CCNC: 61 U/L (ref 45–117)
ALT SERPL-CCNC: 68 U/L (ref 16–61)
ANION GAP SERPL CALC-SCNC: 7 MMOL/L (ref 3–18)
APTT PPP: 59.6 SEC (ref 23–36.4)
APTT PPP: 92.5 SEC (ref 23–36.4)
ARTERIAL PATENCY WRIST A: POSITIVE
AST SERPL-CCNC: 22 U/L (ref 10–38)
BACTERIA SPEC CULT: NORMAL
BASE EXCESS BLD CALC-SCNC: 1.2 MMOL/L
BDY SITE: ABNORMAL
BILIRUB DIRECT SERPL-MCNC: 0.2 MG/DL (ref 0–0.2)
BILIRUB SERPL-MCNC: 0.6 MG/DL (ref 0.2–1)
BUN SERPL-MCNC: 35 MG/DL (ref 7–18)
BUN/CREAT SERPL: 21 (ref 12–20)
CALCIUM SERPL-MCNC: 8.7 MG/DL (ref 8.5–10.1)
CHLORIDE SERPL-SCNC: 109 MMOL/L (ref 100–111)
CO2 SERPL-SCNC: 24 MMOL/L (ref 21–32)
CREAT SERPL-MCNC: 1.66 MG/DL (ref 0.6–1.3)
ECHO BSA: 2 M2
EKG DIAGNOSIS: NORMAL
EKG Q-T INTERVAL: 356 MS
EKG QRS DURATION: 92 MS
EKG QTC CALCULATION (BAZETT): 523 MS
EKG R AXIS: -74 DEGREES
EKG T AXIS: -30 DEGREES
EKG VENTRICULAR RATE: 130 BPM
ERYTHROCYTE [DISTWIDTH] IN BLOOD BY AUTOMATED COUNT: 13.2 % (ref 11.6–14.5)
EST. AVERAGE GLUCOSE BLD GHB EST-MCNC: 157 MG/DL
GAS FLOW.O2 O2 DELIVERY SYS: ABNORMAL
GAS FLOW.O2 SETTING OXYMISER: 18 BPM
GLOBULIN SER CALC-MCNC: 3.1 G/DL (ref 2–4)
GLUCOSE BLD STRIP.AUTO-MCNC: 112 MG/DL (ref 70–110)
GLUCOSE BLD STRIP.AUTO-MCNC: 115 MG/DL (ref 70–110)
GLUCOSE BLD STRIP.AUTO-MCNC: 117 MG/DL (ref 70–110)
GLUCOSE BLD STRIP.AUTO-MCNC: 127 MG/DL (ref 70–110)
GLUCOSE BLD STRIP.AUTO-MCNC: 145 MG/DL (ref 70–110)
GLUCOSE BLD STRIP.AUTO-MCNC: 150 MG/DL (ref 70–110)
GLUCOSE BLD STRIP.AUTO-MCNC: 163 MG/DL (ref 70–110)
GLUCOSE BLD STRIP.AUTO-MCNC: 174 MG/DL (ref 70–110)
GLUCOSE BLD STRIP.AUTO-MCNC: 176 MG/DL (ref 70–110)
GLUCOSE BLD STRIP.AUTO-MCNC: 192 MG/DL (ref 70–110)
GLUCOSE BLD STRIP.AUTO-MCNC: 199 MG/DL (ref 70–110)
GLUCOSE BLD STRIP.AUTO-MCNC: 255 MG/DL (ref 70–110)
GLUCOSE BLD STRIP.AUTO-MCNC: 274 MG/DL (ref 70–110)
GLUCOSE SERPL-MCNC: 164 MG/DL (ref 74–99)
HBA1C MFR BLD: 7.1 % (ref 4.2–5.6)
HCO3 BLD-SCNC: 23 MMOL/L (ref 22–26)
HCT VFR BLD AUTO: 33 % (ref 36–48)
HGB BLD-MCNC: 10.7 G/DL (ref 13–16)
INSPIRATION.DURATION SETTING TIME VENT: 0.9 SEC
IPAP/PIP/HIGH PEEP: 23
LACTATE SERPL-SCNC: 1.7 MMOL/L (ref 0.4–2)
MAGNESIUM SERPL-MCNC: 2.1 MG/DL (ref 1.6–2.6)
MCH RBC QN AUTO: 30.6 PG (ref 24–34)
MCHC RBC AUTO-ENTMCNC: 32.4 G/DL (ref 31–37)
MCV RBC AUTO: 94.3 FL (ref 78–100)
NRBC # BLD: 0 K/UL (ref 0–0.01)
NRBC BLD-RTO: 0 PER 100 WBC
O2/TOTAL GAS SETTING VFR VENT: 80 %
PAW @ MEAN EXP FLOW ON VENT: 14 CMH2O
PCO2 BLD: 27.5 MMHG (ref 35–45)
PEEP RESPIRATORY: 10 CMH2O
PH BLD: 7.53 (ref 7.35–7.45)
PHOSPHATE SERPL-MCNC: 2.5 MG/DL (ref 2.5–4.9)
PLATELET # BLD AUTO: 246 K/UL (ref 135–420)
PMV BLD AUTO: 11.5 FL (ref 9.2–11.8)
PO2 BLD: 165 MMHG (ref 80–100)
POTASSIUM SERPL-SCNC: 3.5 MMOL/L (ref 3.5–5.5)
PROT SERPL-MCNC: 6 G/DL (ref 6.4–8.2)
RBC # BLD AUTO: 3.5 M/UL (ref 4.35–5.65)
RESPIRATORY RATE, POC: 18 (ref 5–40)
SAO2 % BLD: 99.7 % (ref 92–97)
SERVICE CMNT-IMP: ABNORMAL
SERVICE CMNT-IMP: NORMAL
SERVICE CMNT-IMP: NORMAL
SODIUM SERPL-SCNC: 140 MMOL/L (ref 136–145)
SPECIMEN TYPE: ABNORMAL
T3FREE SERPL-MCNC: 1.3 PG/ML (ref 2.18–3.98)
T4 FREE SERPL-MCNC: 1.1 NG/DL (ref 0.7–1.5)
TRIGL SERPL-MCNC: 45 MG/DL
TROPONIN I SERPL HS-MCNC: 253 NG/L (ref 0–78)
TSH SERPL DL<=0.05 MIU/L-ACNC: 0.56 UIU/ML (ref 0.36–3.74)
VANCOMYCIN SERPL-MCNC: 16.9 UG/ML (ref 5–40)
VENTILATION MODE VENT: ABNORMAL
VT SETTING VENT: 460 ML
WBC # BLD AUTO: 22 K/UL (ref 4.6–13.2)

## 2023-11-14 PROCEDURE — 6370000000 HC RX 637 (ALT 250 FOR IP): Performed by: PHYSICIAN ASSISTANT

## 2023-11-14 PROCEDURE — 2580000003 HC RX 258

## 2023-11-14 PROCEDURE — 84100 ASSAY OF PHOSPHORUS: CPT

## 2023-11-14 PROCEDURE — 80048 BASIC METABOLIC PNL TOTAL CA: CPT

## 2023-11-14 PROCEDURE — 82962 GLUCOSE BLOOD TEST: CPT

## 2023-11-14 PROCEDURE — 36415 COLL VENOUS BLD VENIPUNCTURE: CPT

## 2023-11-14 PROCEDURE — 6360000002 HC RX W HCPCS: Performed by: STUDENT IN AN ORGANIZED HEALTH CARE EDUCATION/TRAINING PROGRAM

## 2023-11-14 PROCEDURE — 2700000000 HC OXYGEN THERAPY PER DAY

## 2023-11-14 PROCEDURE — 85730 THROMBOPLASTIN TIME PARTIAL: CPT

## 2023-11-14 PROCEDURE — 94761 N-INVAS EAR/PLS OXIMETRY MLT: CPT

## 2023-11-14 PROCEDURE — 80076 HEPATIC FUNCTION PANEL: CPT

## 2023-11-14 PROCEDURE — 83735 ASSAY OF MAGNESIUM: CPT

## 2023-11-14 PROCEDURE — 6370000000 HC RX 637 (ALT 250 FOR IP): Performed by: NURSE PRACTITIONER

## 2023-11-14 PROCEDURE — 6360000002 HC RX W HCPCS

## 2023-11-14 PROCEDURE — 84481 FREE ASSAY (FT-3): CPT

## 2023-11-14 PROCEDURE — 84478 ASSAY OF TRIGLYCERIDES: CPT

## 2023-11-14 PROCEDURE — 2500000003 HC RX 250 WO HCPCS: Performed by: STUDENT IN AN ORGANIZED HEALTH CARE EDUCATION/TRAINING PROGRAM

## 2023-11-14 PROCEDURE — 84484 ASSAY OF TROPONIN QUANT: CPT

## 2023-11-14 PROCEDURE — 99222 1ST HOSP IP/OBS MODERATE 55: CPT | Performed by: INTERNAL MEDICINE

## 2023-11-14 PROCEDURE — 82803 BLOOD GASES ANY COMBINATION: CPT

## 2023-11-14 PROCEDURE — 84443 ASSAY THYROID STIM HORMONE: CPT

## 2023-11-14 PROCEDURE — 2580000003 HC RX 258: Performed by: STUDENT IN AN ORGANIZED HEALTH CARE EDUCATION/TRAINING PROGRAM

## 2023-11-14 PROCEDURE — 93010 ELECTROCARDIOGRAM REPORT: CPT | Performed by: INTERNAL MEDICINE

## 2023-11-14 PROCEDURE — 83036 HEMOGLOBIN GLYCOSYLATED A1C: CPT

## 2023-11-14 PROCEDURE — 83605 ASSAY OF LACTIC ACID: CPT

## 2023-11-14 PROCEDURE — 36600 WITHDRAWAL OF ARTERIAL BLOOD: CPT

## 2023-11-14 PROCEDURE — A4216 STERILE WATER/SALINE, 10 ML: HCPCS

## 2023-11-14 PROCEDURE — 93005 ELECTROCARDIOGRAM TRACING: CPT | Performed by: STUDENT IN AN ORGANIZED HEALTH CARE EDUCATION/TRAINING PROGRAM

## 2023-11-14 PROCEDURE — 6370000000 HC RX 637 (ALT 250 FOR IP): Performed by: STUDENT IN AN ORGANIZED HEALTH CARE EDUCATION/TRAINING PROGRAM

## 2023-11-14 PROCEDURE — 6360000002 HC RX W HCPCS: Performed by: INTERNAL MEDICINE

## 2023-11-14 PROCEDURE — 80202 ASSAY OF VANCOMYCIN: CPT

## 2023-11-14 PROCEDURE — 2500000003 HC RX 250 WO HCPCS

## 2023-11-14 PROCEDURE — 2000000000 HC ICU R&B

## 2023-11-14 PROCEDURE — 2580000003 HC RX 258: Performed by: INTERNAL MEDICINE

## 2023-11-14 PROCEDURE — 84439 ASSAY OF FREE THYROXINE: CPT

## 2023-11-14 PROCEDURE — 6370000000 HC RX 637 (ALT 250 FOR IP)

## 2023-11-14 PROCEDURE — 85027 COMPLETE CBC AUTOMATED: CPT

## 2023-11-14 PROCEDURE — 71045 X-RAY EXAM CHEST 1 VIEW: CPT

## 2023-11-14 PROCEDURE — 99291 CRITICAL CARE FIRST HOUR: CPT | Performed by: INTERNAL MEDICINE

## 2023-11-14 PROCEDURE — 94003 VENT MGMT INPAT SUBQ DAY: CPT

## 2023-11-14 PROCEDURE — 93970 EXTREMITY STUDY: CPT | Performed by: SURGERY

## 2023-11-14 PROCEDURE — 6370000000 HC RX 637 (ALT 250 FOR IP): Performed by: INTERNAL MEDICINE

## 2023-11-14 RX ORDER — INSULIN LISPRO 100 [IU]/ML
0-4 INJECTION, SOLUTION INTRAVENOUS; SUBCUTANEOUS EVERY 6 HOURS
Status: DISCONTINUED | OUTPATIENT
Start: 2023-11-14 | End: 2023-11-15

## 2023-11-14 RX ORDER — INSULIN GLARGINE 100 [IU]/ML
5 INJECTION, SOLUTION SUBCUTANEOUS DAILY
Status: DISCONTINUED | OUTPATIENT
Start: 2023-11-14 | End: 2023-11-17

## 2023-11-14 RX ORDER — METOPROLOL SUCCINATE 25 MG/1
25 TABLET, EXTENDED RELEASE ORAL DAILY
Status: DISCONTINUED | OUTPATIENT
Start: 2023-11-14 | End: 2023-11-17 | Stop reason: HOSPADM

## 2023-11-14 RX ADMIN — POTASSIUM BICARBONATE 40 MEQ: 782 TABLET, EFFERVESCENT ORAL at 08:59

## 2023-11-14 RX ADMIN — FENTANYL CITRATE 50 MCG: 50 INJECTION INTRAMUSCULAR; INTRAVENOUS at 04:43

## 2023-11-14 RX ADMIN — INSULIN LISPRO 2 UNITS: 100 INJECTION, SOLUTION INTRAVENOUS; SUBCUTANEOUS at 22:39

## 2023-11-14 RX ADMIN — AMIODARONE HYDROCHLORIDE 0.5 MG/MIN: 1.8 INJECTION, SOLUTION INTRAVENOUS at 21:42

## 2023-11-14 RX ADMIN — POLYETHYLENE GLYCOL 3350 17 G: 17 POWDER, FOR SOLUTION ORAL at 08:59

## 2023-11-14 RX ADMIN — FUROSEMIDE 40 MG: 10 INJECTION, SOLUTION INTRAMUSCULAR; INTRAVENOUS at 17:10

## 2023-11-14 RX ADMIN — PIPERACILLIN AND TAZOBACTAM 3375 MG: 3; .375 INJECTION, POWDER, FOR SOLUTION INTRAVENOUS at 16:00

## 2023-11-14 RX ADMIN — VANCOMYCIN HYDROCHLORIDE 1000 MG: 1 INJECTION, POWDER, LYOPHILIZED, FOR SOLUTION INTRAVENOUS at 07:30

## 2023-11-14 RX ADMIN — FAMOTIDINE 20 MG: 10 INJECTION, SOLUTION INTRAVENOUS at 08:59

## 2023-11-14 RX ADMIN — Medication 15 ML: at 08:59

## 2023-11-14 RX ADMIN — HEPARIN SODIUM 4000 UNITS: 1000 INJECTION INTRAVENOUS; SUBCUTANEOUS at 17:07

## 2023-11-14 RX ADMIN — INSULIN LISPRO 2 UNITS: 100 INJECTION, SOLUTION INTRAVENOUS; SUBCUTANEOUS at 17:12

## 2023-11-14 RX ADMIN — FUROSEMIDE 40 MG: 10 INJECTION, SOLUTION INTRAMUSCULAR; INTRAVENOUS at 08:59

## 2023-11-14 RX ADMIN — SODIUM CHLORIDE, PRESERVATIVE FREE 10 ML: 5 INJECTION INTRAVENOUS at 22:40

## 2023-11-14 RX ADMIN — 0.12% CHLORHEXIDINE GLUCONATE 15 ML: 1.2 RINSE ORAL at 08:59

## 2023-11-14 RX ADMIN — ASPIRIN 81 MG: 81 TABLET, CHEWABLE ORAL at 08:59

## 2023-11-14 RX ADMIN — PROPOFOL 15 MCG/KG/MIN: 10 INJECTION, EMULSION INTRAVENOUS at 03:50

## 2023-11-14 RX ADMIN — PROPOFOL 20 MCG/KG/MIN: 10 INJECTION, EMULSION INTRAVENOUS at 04:15

## 2023-11-14 RX ADMIN — PIPERACILLIN AND TAZOBACTAM 3375 MG: 3; .375 INJECTION, POWDER, FOR SOLUTION INTRAVENOUS at 08:30

## 2023-11-14 RX ADMIN — AMIODARONE HYDROCHLORIDE 0.5 MG/MIN: 1.8 INJECTION, SOLUTION INTRAVENOUS at 11:06

## 2023-11-14 RX ADMIN — ATORVASTATIN CALCIUM 80 MG: 40 TABLET, FILM COATED ORAL at 22:30

## 2023-11-14 RX ADMIN — SODIUM CHLORIDE, PRESERVATIVE FREE 10 ML: 5 INJECTION INTRAVENOUS at 09:01

## 2023-11-14 RX ADMIN — HEPARIN SODIUM 12 UNITS/KG/HR: 10000 INJECTION, SOLUTION INTRAVENOUS at 17:51

## 2023-11-14 RX ADMIN — INSULIN GLARGINE 5 UNITS: 100 INJECTION, SOLUTION SUBCUTANEOUS at 10:39

## 2023-11-14 RX ADMIN — METOPROLOL SUCCINATE 25 MG: 25 TABLET, EXTENDED RELEASE ORAL at 14:05

## 2023-11-14 ASSESSMENT — PULMONARY FUNCTION TESTS
PIF_VALUE: 17
PIF_VALUE: 22
PIF_VALUE: 22

## 2023-11-14 NOTE — INTERDISCIPLINARY ROUNDS
[facility]  Pulmonary, Critical Care, and Sleep Medicine  Interdisciplinary and Ventilator Weaning Rounds    Patient discussed in morning walking rounds and interdisciplinary rounds. ICU admission day: admitted on 11/13    Overnight events:   Awake, alert, on SBT    Assessments and best practice:  Ventilator  Ventilator day intubated on 11/13  Vent settings: VC/VC+ with RR 18 and TV of 460, FiO2 55, PEEP 7  VAP bundle, aim to keep peak plateau pressure 82-60EX H2O  Weaning assessed and documented   Patient does  meet criteria for SBT. Patient is  on sedation holiday. Plan to wean with PS 8  Outcome: doing well   Final plan: possibly extubate  Glycemic control  Diet  NPO- TF on hold for SBT  Stress ulcer prophylaxis. Pepcid  DVT prophylaxis. Lovenox  Need for Lines, crump assessed. Yes  Restraint Reevaluation     Yes  I have reevaluated the patient one hour after initiation of intervention. The patient is comfortable, uninjured, but continues to pose an imminent risk of injury to self to themselves and/or serious disruption of medical treatment required to keep patient stable. The patient's current medical and behavioral conditions that warrant the use intervention include pulling lines. Restraint or seclusion will be discontinued at the earliest possible time, regardless of the scheduled expiration of the order.  Based on my evaluation, restraints will be continued: Yes  Disposition regarding transferring out of the ICU  RED      Family contact/MPOA:    bethel che Child  Attach Yes      Legal Guardian?:      Primary Phone: [phone] (H)     Home Phone: [phone]     Mobile Phone:      Preferred Language:       Needed?:      Comment:              Family updated : will update today     Palliative consult within 3 days of admission to ICU-  Ethics Guidance: 21 days      Daily Plans:  Wean FiO2 and possibly extubate  D/c insulin gtt      SHAYNA time 12 minutes        Tabatha

## 2023-11-14 NOTE — FLOWSHEET NOTE
SBAR REPORT was given to receiving nurse Lynsey CASTRO RN from [facility] ED nurse Colin Anton RN. SBAR was complete with background and assessment. Patient arrives to ICU unit via ambulance services transferring from [facility]. Arrives slightly restless, tachy on monitor in AFIB/Aflutter. Patient arrives on an Amiodarone gtt at [facility]. Initial UOP was good on admission with patient have clear yellow UOP. Skin was assessed and is intact. Noted tattoos. Nurse confirms today that long time girlfriend  is POA. Advance Directives are in chart under media. Patient was started today on several drips. . Heparin at 11 units, receiving a bolus of 4000 units. Amiodarone  GTT was titrated per protocol to 0.5mcg. Precedex Gtt was started at 0.4 mcg. Insulin GTT for a BS of >400. Proprofol was started this afternoon at 10 mcg to keep patient calm. Patient converted to SR at 1348 and has maintain this rhythm. Precedex was stopped at end of the shift due to heart rate of 64 @ 1835. SBAR report was given to Henok Menon RN on coming nurse.

## 2023-11-14 NOTE — PROGRESS NOTES
K/UL    Lymphocytes Absolute 0.5 (L) 0.9 - 3.6 K/UL    Monocytes Absolute 0.2 0.05 - 1.2 K/UL    Eosinophils Absolute 0.0 0.0 - 0.4 K/UL    Basophils Absolute 0.0 0.0 - 0.1 K/UL    Absolute Immature Granulocyte 0.0 0.00 - 0.04 K/UL    Differential Type MANUAL      Platelet Comment ADEQUATE PLATELETS      RBC Comment NORMOCYTIC, NORMOCHROMIC     POCT Glucose    Collection Time: 11/13/23  2:26 PM   Result Value Ref Range    POC Glucose 356 (H) 70 - 110 mg/dL   POCT Glucose    Collection Time: 11/13/23  3:25 PM   Result Value Ref Range    POC Glucose 282 (H) 70 - 110 mg/dL   Echo (TTE) complete (PRN contrast/bubble/strain/3D)    Collection Time: 11/13/23  3:38 PM   Result Value Ref Range    Body Surface Area 2 m2    Fractional Shortening 2D 12 28 - 44 %    LVIDd Index 2.53 cm/m2    LVIDs Index 2.22 cm/m2    LV RWT Ratio 0.41     LV Mass 2D 188.1 88 - 224 g    LV Mass 2D Index 97.0 49 - 115 g/m2    MV E/A 2.24     E/E' Ratio (Averaged) 10.79     E/E' Lateral 9.25     LA Volume Index BP 35 (A) 16 - 34 ml/m2    LA Volume Index A/L 37 16 - 34 mL/m2    LVOT Stroke Volume Index 23.6 mL/m2    LVOT Area 3.5 cm2    LA Volume Index A-L A2C 38 (A) 16 - 34 mL/m2    LA Volume Index A-L A4C 35 (A) 16 - 34 mL/m2    LA Volume Index MOD A2C 36 (A) 16 - 34 ml/m2    LA Volume Index MOD A4C 33 16 - 34 ml/m2    Ao Root Index 1.75 cm/m2    Ascending Aorta Index 2.06 cm/m2    E/E' Septal 12.00     RV GLS 13.8 %    Est. RA Pressure 8 mmHg    RVSP 35 mmHg    IVSd 1.1 (A) 0.6 - 1.0 cm    LVIDd 4.9 4.2 - 5.9 cm    LVIDs 4.3 cm    LVOT Diameter 2.1 cm    LVPWd 1.0 0.6 - 1.0 cm    LVOT Peak Gradient 2 mmHg    LVOT Mean Gradient 1 mmHg    LVOT SV 45.7 ml    LVOT Peak Velocity 0.8 m/s    LVOT VTI 13.2 cm    RV Basal Dimension 3.5 cm    Global Longitudinal Strain -11.7 %    TAPSE 1.0 (A) 1.7 cm    RV Free Wall Peak S' 6 cm/s    LA Volume A/L 72 mL    LA Volume A-L A4C 73 (A) 18 - 58 mL    LA Volume A-L A4C 68 (A) 18 - 58 mL    LA Volume MOD A2C 69 WBC 22.0 (H) 4.6 - 13.2 K/uL    RBC 3.50 (L) 4.35 - 5.65 M/uL    Hemoglobin 10.7 (L) 13.0 - 16.0 g/dL    Hematocrit 33.0 (L) 36.0 - 48.0 %    MCV 94.3 78.0 - 100.0 FL    MCH 30.6 24.0 - 34.0 PG    MCHC 32.4 31.0 - 37.0 g/dL    RDW 13.2 11.6 - 14.5 %    Platelets 961 950 - 403 K/uL    MPV 11.5 9.2 - 11.8 FL    Nucleated RBCs 0.0 0  WBC    nRBC 0.00 0.00 - 0.01 K/uL   Magnesium    Collection Time: 11/14/23  5:11 AM   Result Value Ref Range    Magnesium 2.1 1.6 - 2.6 mg/dL   Phosphorus    Collection Time: 11/14/23  5:11 AM   Result Value Ref Range    Phosphorus 2.5 2.5 - 4.9 MG/DL   Troponin    Collection Time: 11/14/23  5:11 AM   Result Value Ref Range    Troponin, High Sensitivity 253 (HH) 0 - 78 ng/L   Lactic Acid    Collection Time: 11/14/23  5:11 AM   Result Value Ref Range    Lactic Acid, Plasma 1.7 0.4 - 2.0 MMOL/L   Triglyceride    Collection Time: 11/14/23  5:11 AM   Result Value Ref Range    Triglycerides 45 <150 MG/DL   Vancomycin Level, Random    Collection Time: 11/14/23  5:11 AM   Result Value Ref Range    Vancomycin Rm 16.9 5.0 - 40.0 UG/ML   Hepatic Function Panel    Collection Time: 11/14/23  5:11 AM   Result Value Ref Range    Total Protein 6.0 (L) 6.4 - 8.2 g/dL    Albumin 2.9 (L) 3.4 - 5.0 g/dL    Globulin 3.1 2.0 - 4.0 g/dL    Albumin/Globulin Ratio 0.9 0.8 - 1.7      Total Bilirubin 0.6 0.2 - 1.0 MG/DL    Bilirubin, Direct 0.2 0.0 - 0.2 MG/DL    Alk Phosphatase 61 45 - 117 U/L    AST 22 10 - 38 U/L    ALT 68 (H) 16 - 61 U/L   POCT Glucose    Collection Time: 11/14/23  5:18 AM   Result Value Ref Range    POC Glucose 174 (H) 70 - 110 mg/dL   POCT Glucose    Collection Time: 11/14/23  6:23 AM   Result Value Ref Range    POC Glucose 117 (H) 70 - 110 mg/dL   TSH    Collection Time: 11/14/23  6:51 AM   Result Value Ref Range    TSH, 3RD GENERATION 0.56 0.36 - 3.74 uIU/mL   T4, Free    Collection Time: 11/14/23  6:51 AM   Result Value Ref Range    T4 Free 1.1 0.7 - 1.5 NG/DL   T3, Free

## 2023-11-14 NOTE — PROGRESS NOTES
Pt placed on spont mode 8/5@ around this 0750 morning. pt is tolerating spont mode and is able to follow commands. vital signs stable. no sings of respiratory distress noted. will cont to monitor pt per MD orders.

## 2023-11-14 NOTE — PLAN OF CARE
Problem: Discharge Planning  Goal: Discharge to home or other facility with appropriate resources  11/14/2023 0325 by Lizzy Alexander RN  Outcome: Progressing  Flowsheets (Taken 11/13/2023 2000)  Discharge to home or other facility with appropriate resources:   Identify barriers to discharge with patient and caregiver   Refer to discharge planning if patient needs post-hospital services based on physician order or complex needs related to functional status, cognitive ability or social support system   Identify discharge learning needs (meds, wound care, etc)   Arrange for needed discharge resources and transportation as appropriate  11/13/2023 1951 by Vernice Leventhal, RN  Outcome: Not Progressing  Flowsheets (Taken 11/13/2023 0900)  Discharge to home or other facility with appropriate resources:   Identify barriers to discharge with patient and caregiver   Arrange for needed discharge resources and transportation as appropriate   Refer to discharge planning if patient needs post-hospital services based on physician order or complex needs related to functional status, cognitive ability or social support system     Problem: Safety - Medical Restraint  Goal: Remains free of injury from restraints (Restraint for Interference with Medical Device)  Description: INTERVENTIONS:  1. Determine that other, less restrictive measures have been tried or would not be effective before applying the restraint  2. Evaluate the patient's condition at the time of restraint application  3. Inform patient/family regarding the reason for restraint  4.  Q2H: Monitor safety, psychosocial status, comfort, nutrition and hydration  11/14/2023 0325 by Lizzy Alexander RN  Outcome: Progressing  Flowsheets (Taken 11/13/2023 2000)  Remains free of injury from restraints (restraint for interference with medical device):   Determine that other, less restrictive measures have been tried or would not be effective before applying the restraint   Inform caregiver   Arrange for needed discharge resources and transportation as appropriate   Refer to discharge planning if patient needs post-hospital services based on physician order or complex needs related to functional status, cognitive ability or social support system

## 2023-11-14 NOTE — PLAN OF CARE
Problem: Safety - Medical Restraint  Goal: Remains free of injury from restraints (Restraint for Interference with Medical Device)  Description: INTERVENTIONS:  1. Determine that other, less restrictive measures have been tried or would not be effective before applying the restraint  2. Evaluate the patient's condition at the time of restraint application  3. Inform patient/family regarding the reason for restraint  4.  Q2H: Monitor safety, psychosocial status, comfort, nutrition and hydration  Outcome: Progressing     Problem: Nutrition Deficit:  Goal: Optimize nutritional status  Outcome: Progressing  Flowsheets (Taken 11/13/2023 1203 by Yanira Hinton RD)  Nutrient intake appropriate for improving, restoring, or maintaining nutritional needs:   Assess nutritional status and recommend course of action   Recommend, monitor, and adjust tube feedings and TPN/PPN based on assessed needs     Problem: Chronic Conditions and Co-morbidities  Goal: Patient's chronic conditions and co-morbidity symptoms are monitored and maintained or improved  Outcome: Progressing  Flowsheets (Taken 11/13/2023 0900)  Care Plan - Patient's Chronic Conditions and Co-Morbidity Symptoms are Monitored and Maintained or Improved:   Monitor and assess patient's chronic conditions and comorbid symptoms for stability, deterioration, or improvement   Collaborate with multidisciplinary team to address chronic and comorbid conditions and prevent exacerbation or deterioration   Update acute care plan with appropriate goals if chronic or comorbid symptoms are exacerbated and prevent overall improvement and discharge     Problem: Pain  Goal: Verbalizes/displays adequate comfort level or baseline comfort level  Recent Flowsheet Documentation  Taken 11/13/2023 1000 by Vannie Blizzard, April, RN  Verbalizes/displays adequate comfort level or baseline comfort level: Assess pain using appropriate pain scale     Problem: Safety - Adult  Goal: Free from fall injury  Outcome: Progressing     Problem: ABCDS Injury Assessment  Goal: Absence of physical injury  Outcome: Progressing     Problem: Discharge Planning  Goal: Discharge to home or other facility with appropriate resources  Outcome: Not Progressing  Flowsheets (Taken 11/13/2023 0900)  Discharge to home or other facility with appropriate resources:   Identify barriers to discharge with patient and caregiver   Arrange for needed discharge resources and transportation as appropriate   Refer to discharge planning if patient needs post-hospital services based on physician order or complex needs related to functional status, cognitive ability or social support system

## 2023-11-14 NOTE — PROGRESS NOTES
attended the interdisciplinary rounds for Marcelino Arguelles, who is a 62 y. o.,male. Patient's Primary Language is: [place]. According to the patient's EMR Yarsani Affiliation is: [address]. The reason the Patient came to the hospital is:   Patient Active Problem List    Diagnosis Date Noted    CAD (coronary artery disease) 11/05/2020    Hypoxemia 11/13/2023    Hypertensive emergency 11/13/2023    Acute respiratory failure with hypercapnia ([address]) 11/13/2023    Atrial flutter ([address]) 11/13/2023    Hyperglycemia 11/13/2023    Pulmonary edema 11/13/2023    Hyperlipidemia associated with type 2 diabetes mellitus ([address]) 10/04/2022    Depression 03/01/2021    Stable angina pectoris 10/30/2020    Right sided abdominal pain 01/15/2020    Acute right-sided low back pain without sciatica 01/15/2020    CHF (congestive heart failure) ([address]) 06/18/2019    Type 2 diabetes mellitus with diabetic neuropathy ([address]) 04/25/2019    Essential hypertension 07/23/2013    Type 2 diabetes mellitus, with long-term current use of insulin ([address]) 07/23/2013    History of CVA (cerebrovascular accident) 07/23/2013    History of MI (myocardial infarction) 07/23/2013          Plan:   listen to the recommendations of the IDR team. Patient intubated and in restraints. Discuss extubation. Chaplains will continue to follow and will provide pastoral care on an as needed/requested basis.  recommends bedside caregivers page  on duty if patient shows signs of acute spiritual or emotional distress.     [address] [facility] [place]  Staff [address] [facility] [address]   [phone]

## 2023-11-14 NOTE — CONSULTS
[facility] - Consult Note    Cardiology consultation request from Dr. Jeet Hess for evaluation and management/treatment of a/c HFrEF, VT     Date of  Admission: 11/13/2023  3:43 AM   Primary Care Physician:  Yuli Ferro MD    Attending Cardiologist: Dr. Mel Garcia:     -Acute hypoxic, hypercapnic respiratory failure, requiring intubation and mechanical ventilation,  now extubated.   -Atrial flutter/Afib with RVR, new dx this admission. Chadsvasc at least 4, (HTN, CHF, CVA, CAD)   -Reported pulsatile VT, s/p external cardioversion x 1.   -RD on CKD. -Elevated troponin, type 2 vs type 1 in setting of above, troponin peaked at 411. ECG AFL with RBBB, previously incomplete.   -Cardiomyopathy, new dx this admission. EF 35-40%, previously normal in 2020.   -CAD, s/p PTCA and SHERLYN to [facility] 2020.    -Moderate MR by TTE.   -HTN  -HLD   -Hx CVA   -Lisinopril allergy, cough. Primary cardiologist is Dr. Nir Ba:     -Continue IV lasix 40 mg BID for another 24-48 hrs if renal indices will allow. Once respiratory and volume status have improved, recommend ischemic workup. For now would continue Heparin gtt, ASA, statin. Will start po BB, titrate as indicated. -Check thyroid function labs in setting of new afib/AFL and cardiomyopathy.   -Heparin gtt for stroke prophylaxis and NSTEMI. -Currently in SR, Continue IV amio gtt loading.   -Further GDMT pending renal function, hospital course. History of Present Illness: This is a 62 y.o. male admitted for Shortness of breath [R06.02]  Hypoxemia [R09.02]  Malignant hypertension [I10]  Acute respiratory failure, unspecified whether with hypoxia or hypercapnia ([address]) [J96.00]  Hypertensive emergency [I16.1]. Patient complains of: SOB, CP    Lilli Naqvi. is a 62 y.o. male, pmhx as stated above, who we are seeing in consult for new AF, CHF and elevated troponin.  Patient presented with SOB that awoke him from sleep with

## 2023-11-15 LAB
ALBUMIN SERPL-MCNC: 2.6 G/DL (ref 3.4–5)
ALBUMIN/GLOB SERPL: 0.7 (ref 0.8–1.7)
ALP SERPL-CCNC: 70 U/L (ref 45–117)
ALT SERPL-CCNC: 42 U/L (ref 16–61)
ANION GAP SERPL CALC-SCNC: 6 MMOL/L (ref 3–18)
APTT PPP: 42.2 SEC (ref 23–36.4)
APTT PPP: 55.2 SEC (ref 23–36.4)
APTT PPP: 93.8 SEC (ref 23–36.4)
AST SERPL-CCNC: 14 U/L (ref 10–38)
BACTERIA SPEC CULT: NORMAL
BILIRUB DIRECT SERPL-MCNC: 0.3 MG/DL (ref 0–0.2)
BILIRUB SERPL-MCNC: 1.3 MG/DL (ref 0.2–1)
BUN SERPL-MCNC: 27 MG/DL (ref 7–18)
BUN/CREAT SERPL: 19 (ref 12–20)
CALCIUM SERPL-MCNC: 8.5 MG/DL (ref 8.5–10.1)
CHLORIDE SERPL-SCNC: 104 MMOL/L (ref 100–111)
CO2 SERPL-SCNC: 29 MMOL/L (ref 21–32)
CREAT SERPL-MCNC: 1.43 MG/DL (ref 0.6–1.3)
ERYTHROCYTE [DISTWIDTH] IN BLOOD BY AUTOMATED COUNT: 13.3 % (ref 11.6–14.5)
GLOBULIN SER CALC-MCNC: 3.5 G/DL (ref 2–4)
GLUCOSE BLD STRIP.AUTO-MCNC: 195 MG/DL (ref 70–110)
GLUCOSE BLD STRIP.AUTO-MCNC: 229 MG/DL (ref 70–110)
GLUCOSE BLD STRIP.AUTO-MCNC: 300 MG/DL (ref 70–110)
GLUCOSE BLD STRIP.AUTO-MCNC: 377 MG/DL (ref 70–110)
GLUCOSE SERPL-MCNC: 173 MG/DL (ref 74–99)
GRAM STN SPEC: NORMAL
HCT VFR BLD AUTO: 29.7 % (ref 36–48)
HGB BLD-MCNC: 9.5 G/DL (ref 13–16)
MAGNESIUM SERPL-MCNC: 1.9 MG/DL (ref 1.6–2.6)
MCH RBC QN AUTO: 31.4 PG (ref 24–34)
MCHC RBC AUTO-ENTMCNC: 32 G/DL (ref 31–37)
MCV RBC AUTO: 98 FL (ref 78–100)
NRBC # BLD: 0 K/UL (ref 0–0.01)
NRBC BLD-RTO: 0 PER 100 WBC
PHOSPHATE SERPL-MCNC: 2.9 MG/DL (ref 2.5–4.9)
PLATELET # BLD AUTO: 193 K/UL (ref 135–420)
PMV BLD AUTO: 12.4 FL (ref 9.2–11.8)
POTASSIUM SERPL-SCNC: 3.2 MMOL/L (ref 3.5–5.5)
PROT SERPL-MCNC: 6.1 G/DL (ref 6.4–8.2)
RBC # BLD AUTO: 3.03 M/UL (ref 4.35–5.65)
SERVICE CMNT-IMP: NORMAL
SODIUM SERPL-SCNC: 139 MMOL/L (ref 136–145)
WBC # BLD AUTO: 17.4 K/UL (ref 4.6–13.2)

## 2023-11-15 PROCEDURE — 99291 CRITICAL CARE FIRST HOUR: CPT | Performed by: INTERNAL MEDICINE

## 2023-11-15 PROCEDURE — 2580000003 HC RX 258: Performed by: STUDENT IN AN ORGANIZED HEALTH CARE EDUCATION/TRAINING PROGRAM

## 2023-11-15 PROCEDURE — 80048 BASIC METABOLIC PNL TOTAL CA: CPT

## 2023-11-15 PROCEDURE — 2500000003 HC RX 250 WO HCPCS: Performed by: STUDENT IN AN ORGANIZED HEALTH CARE EDUCATION/TRAINING PROGRAM

## 2023-11-15 PROCEDURE — 2580000003 HC RX 258

## 2023-11-15 PROCEDURE — 83735 ASSAY OF MAGNESIUM: CPT

## 2023-11-15 PROCEDURE — 6370000000 HC RX 637 (ALT 250 FOR IP): Performed by: HOSPITALIST

## 2023-11-15 PROCEDURE — 6370000000 HC RX 637 (ALT 250 FOR IP)

## 2023-11-15 PROCEDURE — 85027 COMPLETE CBC AUTOMATED: CPT

## 2023-11-15 PROCEDURE — 85730 THROMBOPLASTIN TIME PARTIAL: CPT

## 2023-11-15 PROCEDURE — 6360000002 HC RX W HCPCS: Performed by: INTERNAL MEDICINE

## 2023-11-15 PROCEDURE — 99233 SBSQ HOSP IP/OBS HIGH 50: CPT | Performed by: INTERNAL MEDICINE

## 2023-11-15 PROCEDURE — 2700000000 HC OXYGEN THERAPY PER DAY

## 2023-11-15 PROCEDURE — 6360000002 HC RX W HCPCS: Performed by: STUDENT IN AN ORGANIZED HEALTH CARE EDUCATION/TRAINING PROGRAM

## 2023-11-15 PROCEDURE — 36415 COLL VENOUS BLD VENIPUNCTURE: CPT

## 2023-11-15 PROCEDURE — 6370000000 HC RX 637 (ALT 250 FOR IP): Performed by: INTERNAL MEDICINE

## 2023-11-15 PROCEDURE — 80076 HEPATIC FUNCTION PANEL: CPT

## 2023-11-15 PROCEDURE — 2580000003 HC RX 258: Performed by: INTERNAL MEDICINE

## 2023-11-15 PROCEDURE — 97162 PT EVAL MOD COMPLEX 30 MIN: CPT

## 2023-11-15 PROCEDURE — 84100 ASSAY OF PHOSPHORUS: CPT

## 2023-11-15 PROCEDURE — 6370000000 HC RX 637 (ALT 250 FOR IP): Performed by: PHYSICIAN ASSISTANT

## 2023-11-15 PROCEDURE — 99232 SBSQ HOSP IP/OBS MODERATE 35: CPT

## 2023-11-15 PROCEDURE — 94761 N-INVAS EAR/PLS OXIMETRY MLT: CPT

## 2023-11-15 PROCEDURE — 97165 OT EVAL LOW COMPLEX 30 MIN: CPT

## 2023-11-15 PROCEDURE — 2000000000 HC ICU R&B

## 2023-11-15 PROCEDURE — 6370000000 HC RX 637 (ALT 250 FOR IP): Performed by: STUDENT IN AN ORGANIZED HEALTH CARE EDUCATION/TRAINING PROGRAM

## 2023-11-15 PROCEDURE — 82962 GLUCOSE BLOOD TEST: CPT

## 2023-11-15 RX ORDER — INSULIN LISPRO 100 [IU]/ML
0-4 INJECTION, SOLUTION INTRAVENOUS; SUBCUTANEOUS
Status: DISCONTINUED | OUTPATIENT
Start: 2023-11-15 | End: 2023-11-17 | Stop reason: HOSPADM

## 2023-11-15 RX ORDER — ATORVASTATIN CALCIUM 40 MG/1
80 TABLET, FILM COATED ORAL NIGHTLY
Status: DISCONTINUED | OUTPATIENT
Start: 2023-11-15 | End: 2023-11-17 | Stop reason: HOSPADM

## 2023-11-15 RX ORDER — MULTIVIT-MIN/FERROUS GLUCONATE 9 MG/15 ML
15 LIQUID (ML) ORAL DAILY
Status: DISCONTINUED | OUTPATIENT
Start: 2023-11-16 | End: 2023-11-17 | Stop reason: HOSPADM

## 2023-11-15 RX ORDER — ASPIRIN 81 MG/1
81 TABLET, CHEWABLE ORAL DAILY
Status: DISCONTINUED | OUTPATIENT
Start: 2023-11-16 | End: 2023-11-17 | Stop reason: HOSPADM

## 2023-11-15 RX ORDER — ACETAMINOPHEN 325 MG/1
650 TABLET ORAL EVERY 6 HOURS PRN
Status: DISCONTINUED | OUTPATIENT
Start: 2023-11-15 | End: 2023-11-17 | Stop reason: HOSPADM

## 2023-11-15 RX ORDER — SODIUM CHLORIDE, SODIUM LACTATE, POTASSIUM CHLORIDE, CALCIUM CHLORIDE 600; 310; 30; 20 MG/100ML; MG/100ML; MG/100ML; MG/100ML
INJECTION, SOLUTION INTRAVENOUS CONTINUOUS
Status: DISPENSED | OUTPATIENT
Start: 2023-11-15 | End: 2023-11-16

## 2023-11-15 RX ADMIN — PIPERACILLIN AND TAZOBACTAM 3375 MG: 3; .375 INJECTION, POWDER, FOR SOLUTION INTRAVENOUS at 08:30

## 2023-11-15 RX ADMIN — METOPROLOL SUCCINATE 25 MG: 25 TABLET, EXTENDED RELEASE ORAL at 10:25

## 2023-11-15 RX ADMIN — AMIODARONE HYDROCHLORIDE 0.5 MG/MIN: 1.8 INJECTION, SOLUTION INTRAVENOUS at 10:46

## 2023-11-15 RX ADMIN — POTASSIUM BICARBONATE 60 MEQ: 782 TABLET, EFFERVESCENT ORAL at 17:58

## 2023-11-15 RX ADMIN — INSULIN LISPRO 1 UNITS: 100 INJECTION, SOLUTION INTRAVENOUS; SUBCUTANEOUS at 18:03

## 2023-11-15 RX ADMIN — SODIUM CHLORIDE, PRESERVATIVE FREE 20 ML: 5 INJECTION INTRAVENOUS at 22:17

## 2023-11-15 RX ADMIN — FUROSEMIDE 40 MG: 10 INJECTION, SOLUTION INTRAMUSCULAR; INTRAVENOUS at 17:59

## 2023-11-15 RX ADMIN — INSULIN LISPRO 3 UNITS: 100 INJECTION, SOLUTION INTRAVENOUS; SUBCUTANEOUS at 13:00

## 2023-11-15 RX ADMIN — HEPARIN SODIUM 14 UNITS/KG/HR: 10000 INJECTION, SOLUTION INTRAVENOUS at 17:58

## 2023-11-15 RX ADMIN — VANCOMYCIN HYDROCHLORIDE 1000 MG: 1 INJECTION, POWDER, LYOPHILIZED, FOR SOLUTION INTRAVENOUS at 01:46

## 2023-11-15 RX ADMIN — POLYETHYLENE GLYCOL 3350 17 G: 17 POWDER, FOR SOLUTION ORAL at 10:25

## 2023-11-15 RX ADMIN — Medication 15 ML: at 10:25

## 2023-11-15 RX ADMIN — FUROSEMIDE 40 MG: 10 INJECTION, SOLUTION INTRAMUSCULAR; INTRAVENOUS at 10:24

## 2023-11-15 RX ADMIN — ACETAMINOPHEN 325MG 650 MG: 325 TABLET ORAL at 22:14

## 2023-11-15 RX ADMIN — ASPIRIN 81 MG: 81 TABLET, CHEWABLE ORAL at 10:25

## 2023-11-15 RX ADMIN — INSULIN LISPRO 4 UNITS: 100 INJECTION, SOLUTION INTRAVENOUS; SUBCUTANEOUS at 22:39

## 2023-11-15 RX ADMIN — INSULIN GLARGINE 5 UNITS: 100 INJECTION, SOLUTION SUBCUTANEOUS at 09:30

## 2023-11-15 RX ADMIN — ATORVASTATIN CALCIUM 80 MG: 40 TABLET, FILM COATED ORAL at 22:14

## 2023-11-15 RX ADMIN — HEPARIN SODIUM 4000 UNITS: 1000 INJECTION INTRAVENOUS; SUBCUTANEOUS at 21:04

## 2023-11-15 RX ADMIN — PIPERACILLIN AND TAZOBACTAM 3375 MG: 3; .375 INJECTION, POWDER, FOR SOLUTION INTRAVENOUS at 00:11

## 2023-11-15 RX ADMIN — SODIUM CHLORIDE, PRESERVATIVE FREE 10 ML: 5 INJECTION INTRAVENOUS at 11:00

## 2023-11-15 RX ADMIN — HEPARIN SODIUM 4000 UNITS: 1000 INJECTION INTRAVENOUS; SUBCUTANEOUS at 11:01

## 2023-11-15 NOTE — PLAN OF CARE
Problem: Discharge Planning  Goal: Discharge to home or other facility with appropriate resources  Outcome: Progressing     Problem: Safety - Medical Restraint  Goal: Remains free of injury from restraints (Restraint for Interference with Medical Device)  Description: INTERVENTIONS:  1. Determine that other, less restrictive measures have been tried or would not be effective before applying the restraint  2. Evaluate the patient's condition at the time of restraint application  3. Inform patient/family regarding the reason for restraint  4.  Q2H: Monitor safety, psychosocial status, comfort, nutrition and hydration  Outcome: Completed     Problem: Nutrition Deficit:  Goal: Optimize nutritional status  Outcome: Progressing     Problem: Chronic Conditions and Co-morbidities  Goal: Patient's chronic conditions and co-morbidity symptoms are monitored and maintained or improved  Outcome: Progressing     Problem: Pain  Goal: Verbalizes/displays adequate comfort level or baseline comfort level  Outcome: Progressing

## 2023-11-15 NOTE — PROGRESS NOTES
[address] [facility]ist [facility]  Progress Note    Patient: Mita Vigil Sr. Age: 62 y.o. : 1965 MR#: [id number] SSN: [id number]  Date: 11/15/2023        Assessment/Plan:       Hospital Problems             Last Modified POA    * (Principal) Hypoxemia 2023 Yes    Hypertensive emergency 2023 Yes    Acute respiratory failure ([address]) 2023 Yes    Atrial flutter ([address]) 2023 Yes    Hyperglycemia 2023 Yes    Pulmonary edema 2023 Yes    Atrial fibrillation ([address]) 2023 Yes    Ventricular tachyarrhythmia ([address]) 2023 Yes         Assessment:  Acute hypoxic hypercapnic respiratory failure requiring intubation, now extubated on 23  A-flutter/A-fib with RVR-new diagnosis this admission (now converted to sinus rhythm)  Episode of pulsatile VT, status post external cardioversion x1   ?  CAP with Bilateral pulmonary infiltrates- was started on Vanc and Zosyn but has been d/c now with negative cultures   Decompensated HFrEF  NSTEMI, type I vs II , trop peaked at 56   Cardiomyopathy with EF 35 to 40%   RD on CKD  Hypokalemia  Metabolic acidosis-resolved   Hypertension  Hyperlipidemia  Coronary artery disease status post stent in   DMT2      Plan:  -Continue heparin and amiodarone drip, currently in sinus rhythm  -Continue IV Lasix 40 mg twice daily   -N.p.o. after midnight for tentative left heart cath tomorrow if able to lie flat per cardiology  -Continue aspirin, statin, beta-blocker  -Continue to monitor renal functions while patient is being diuresed  -Sliding scale insulin with 5 units Lantus daily  -Monitor strict I's and O's  -Cardiology following-appreciate assistance    PT/OT/IS      DVT Prophylaxis:  []Lovenox  []Hep SQ  []SCDs  []Coumadin   [x]On Heparin gtt []PO anticoagulant    Anticipated discharge: 2-3 days     Time spent reviewing records, independently interpreting results, obtaining history from patient or caregiver, performing mL  15 mL Per NG tube Daily    perflutren lipid microspheres (DEFINITY) injection 2 mL  2 mL IntraVENous Once    furosemide (LASIX) injection 40 mg  40 mg IntraVENous BID        Labs:    Recent Results (from the past 24 hour(s))   EKG 12 Lead    Collection Time: 11/14/23  1:46 PM   Result Value Ref Range    Ventricular Rate 130 BPM    QRS Duration 92 ms    Q-T Interval 356 ms    QTc Calculation (Bazett) 523 ms    R Axis -74 degrees    T Axis -30 degrees    Diagnosis       Atrial flutter with rapid ventricular response with premature ventricular or   aberrantly conducted complexes  Left axis deviation  Possible Lateral infarct (cited on or before 13-NOV-2023)  Inferior-posterior infarct , age undetermined  Abnormal ECG  When compared with ECG of 13-NOV-2023 09:14,  Inferior-posterior infarct is now present  Questionable change in initial forces of Lateral leads  Confirmed by Magi Packer MD, ----- ([id number]) on 11/14/2023 2:00:43 PM     APTT    Collection Time: 11/14/23  4:00 PM   Result Value Ref Range    PTT 59.6 (H) 23.0 - 36.4 SEC   POCT Glucose    Collection Time: 11/14/23  4:15 PM   Result Value Ref Range    POC Glucose 274 (H) 70 - 110 mg/dL   POCT Glucose    Collection Time: 11/14/23 10:28 PM   Result Value Ref Range    POC Glucose 255 (H) 70 - 110 mg/dL   APTT    Collection Time: 11/14/23 10:50 PM   Result Value Ref Range    PTT 93.8 (H) 23.0 - 36.4 SEC   Basic Metabolic Panel    Collection Time: 11/15/23  5:43 AM   Result Value Ref Range    Sodium 139 136 - 145 mmol/L    Potassium 3.2 (L) 3.5 - 5.5 mmol/L    Chloride 104 100 - 111 mmol/L    CO2 29 21 - 32 mmol/L    Anion Gap 6 3.0 - 18 mmol/L    Glucose 173 (H) 74 - 99 mg/dL    BUN 27 (H) 7.0 - 18 MG/DL    Creatinine 1.43 (H) 0.6 - 1.3 MG/DL    Bun/Cre Ratio 19 12 - 20      Est, Glom Filt Rate 57 (L) >60 ml/min/1.73m2    Calcium 8.5 8.5 - 10.1 MG/DL   CBC    Collection Time: 11/15/23  5:43 AM   Result Value Ref Range    WBC 17.4 (H) 4.6 - 13.2 K/uL    RBC

## 2023-11-15 NOTE — INTERDISCIPLINARY ROUNDS
[facility]  Pulmonary, Critical Care, and Sleep Medicine  Interdisciplinary and Ventilator Weaning Rounds    Patient discussed in morning walking rounds and interdisciplinary rounds. ICU admission day: admitted on 11/13    Overnight events:   Doing great, no complaints     Assessments and best practice:  Ventilator  N/a  Glycemic control  Diet  NPO- TF on hold for SBT  Stress ulcer prophylaxis. Pepcid  DVT prophylaxis. Heparin gtt  Need for Lines, crump assessed.   Yes  Restraint Reevaluation     na  Disposition regarding transferring out of the ICU  GREEN      Family contact/MPOA:    che,ronir Child  Attach Yes      Legal Guardian?:      Primary Phone: [phone] (H)     Home Phone: [phone]     Mobile Phone:      Preferred Language:       Needed?:      Comment:              Family updated : will update today , patient updated at bedside    Palliative consult within 3 days of admission to ICU-  Ethics Guidance: 21 days      Daily Plans:  Transfer to CVT stepdown  Defer amio and heparin gtt management to Cards        SHAYNA time 12 minutes        Rachel Martin PA-C  11/15/23  Pulmonary, [address] [facility]

## 2023-11-15 NOTE — CARE COORDINATION
SOCIAL WORK UPDATE      ANTICIPATED DISCHARGE DISPOSITION: Home    DECISION MAKER: Patient    UAI/LTSS: NA        UPDATE: Per IDRs this AM, patient's status is as follows:  - Abx d/c'd today  - Tolerating diet  - on Heparin and Amnio drips  - Afib improving      Patient is ready to downgrade from ICU. Patient is not medically stable to discharge at this time. Patient will return home at discharge.           THI Mann  Case Management

## 2023-11-15 NOTE — PROGRESS NOTES
Comprehensive Nutrition Assessment    Type and Reason for Visit:  Reassess    Nutrition Recommendations/Plan:   Modify PO diet to [facility], 2gm Na Restriction. Continue to monitor tolerance of PO, weight, labs, and plan of care during admission. Malnutrition Assessment:  Malnutrition Status:  No malnutrition (s/p ext) (11/15/23 1109)    Context:  Acute Illness       Nutrition Assessment:    Pt admitted with c/o nonproductive cough and shortness of breath; failed trial of bipap, was intubated 11/13, s/p extubation 11/14. Ordered for trickle feeds only prior to extubation. Noted PO diet was advanced per PA to regular, thin liquids. Discussed care during interdisciplinary rounds. Per RN, pt tolerating PO diet, eating well. Plan to transfer pt out of ICU. Visited pt, reports tolerating/eating PO diet. Declining any oral supplements at this time. States  lb, denies any wt changes, denies any food allergies. Has no other concerns at this time. Nutrition Related Findings:    Last BM (including prior to admit):  (PTA)  Edema: Left upper extremity  Pertinent Meds: lipitor, MVI/min, lasix, lantus, humalog, zosyn, glycolax, amiodarone gtt, heparin Pertinent Labs: POC Glucose 112-274 mg/dl x 24 hrs, K 3.2 L (trending down), BUN/Cr 27/1.43, GFR 57 Wound Type: None       Current Nutrition Intake & Therapies:    Average Meal Intake: %  Average Supplements Intake: None Ordered  ADULT DIET; Regular; 3 carb choices (45 gm/meal)    Anthropometric Measures:  Height: 165.1 cm (5' 5\")  Ideal Body Weight (IBW): 136 lbs (62 kg)    Admission Body Weight: 87.1 kg (192 lb 0.3 oz)  Current Body Weight: 89 kg (196 lb 3.4 oz), 141.2 % IBW. Weight Source: Bed Scale  Current BMI (kg/m2): 32.7  Usual Body Weight: 86.6 kg (191 lb)  % Weight Change (Calculated): 0.5  Weight Adjustment For: No Adjustment  BMI Categories: Obese Class 1 (BMI 30.0-34. 9)    Estimated Daily Nutrient Needs:  Energy Requirements Based On: Formula  Weight Used for Energy Requirements: Admission  Energy (kcal/day): 9838-7072 (MSJ 1.1-1.3)  Weight Used for Protein Requirements: Ideal  Protein (g/day):  (wt x1.5-2)  Method Used for Fluid Requirements: 1 ml/kcal  Fluid (ml/day): 8248-4470    Nutrition Diagnosis:   Altered nutrition-related lab values related to endocrine dysfuntion as evidenced by lab values    Nutrition Interventions:   Food and/or Nutrient Delivery: Modify Current Diet  Nutrition Education/Counseling: No recommendation at this time  Coordination of Nutrition Care: Continue to monitor while inpatient, Interdisciplinary Rounds  Plan of Care discussed with: interdisciplinary team, pt    Goals:  Previous Goal Met: Progressing toward Goal(s)  Goals: Meet at least 75% of estimated needs, by next RD assessment       Nutrition Monitoring and Evaluation:   Behavioral-Environmental Outcomes: None Identified  Food/Nutrient Intake Outcomes: Food and Nutrient Intake  Physical Signs/Symptoms Outcomes: Biochemical Data, Constipation, GI Status, Chewing or Swallowing, Nausea or Vomiting, Fluid Status or Edema, Hemodynamic Status, Meal Time Behavior, Weight    Discharge Planning:    Continue current diet     Cristian Zimmerman, [address] [facility], [address] [facility], [address] [place] [address]  Contact: [phone]

## 2023-11-16 PROBLEM — I42.9 CARDIOMYOPATHY (HCC): Status: ACTIVE | Noted: 2023-11-16

## 2023-11-16 PROBLEM — I34.0 MITRAL VALVE INSUFFICIENCY: Status: ACTIVE | Noted: 2023-11-16

## 2023-11-16 PROBLEM — E78.5 HYPERLIPIDEMIA: Status: ACTIVE | Noted: 2022-10-04

## 2023-11-16 PROBLEM — I21.4 NSTEMI (NON-ST ELEVATED MYOCARDIAL INFARCTION) (HCC): Status: ACTIVE | Noted: 2023-11-16

## 2023-11-16 LAB
ACT BLD: 281 SECS (ref 79–138)
ACT BLD: 407 SECS (ref 79–138)
ALBUMIN SERPL-MCNC: 2.5 G/DL (ref 3.4–5)
ALBUMIN/GLOB SERPL: 0.6 (ref 0.8–1.7)
ALP SERPL-CCNC: 63 U/L (ref 45–117)
ALT SERPL-CCNC: 37 U/L (ref 16–61)
ANION GAP SERPL CALC-SCNC: 2 MMOL/L (ref 3–18)
ANION GAP SERPL CALC-SCNC: 7 MMOL/L (ref 3–18)
APTT PPP: 155.3 SEC (ref 23–36.4)
APTT PPP: 59.3 SEC (ref 23–36.4)
AST SERPL-CCNC: 10 U/L (ref 10–38)
BILIRUB DIRECT SERPL-MCNC: 0.3 MG/DL (ref 0–0.2)
BILIRUB SERPL-MCNC: 1 MG/DL (ref 0.2–1)
BUN SERPL-MCNC: 20 MG/DL (ref 7–18)
BUN SERPL-MCNC: 24 MG/DL (ref 7–18)
BUN/CREAT SERPL: 16 (ref 12–20)
BUN/CREAT SERPL: 16 (ref 12–20)
CALCIUM SERPL-MCNC: 8.5 MG/DL (ref 8.5–10.1)
CALCIUM SERPL-MCNC: 8.8 MG/DL (ref 8.5–10.1)
CHLORIDE SERPL-SCNC: 100 MMOL/L (ref 100–111)
CHLORIDE SERPL-SCNC: 103 MMOL/L (ref 100–111)
CO2 SERPL-SCNC: 32 MMOL/L (ref 21–32)
CO2 SERPL-SCNC: 32 MMOL/L (ref 21–32)
CREAT SERPL-MCNC: 1.22 MG/DL (ref 0.6–1.3)
CREAT SERPL-MCNC: 1.53 MG/DL (ref 0.6–1.3)
ECHO BSA: 2 M2
ERYTHROCYTE [DISTWIDTH] IN BLOOD BY AUTOMATED COUNT: 12.8 % (ref 11.6–14.5)
GLOBULIN SER CALC-MCNC: 3.9 G/DL (ref 2–4)
GLUCOSE BLD STRIP.AUTO-MCNC: 206 MG/DL (ref 70–110)
GLUCOSE BLD STRIP.AUTO-MCNC: 241 MG/DL (ref 70–110)
GLUCOSE BLD STRIP.AUTO-MCNC: 258 MG/DL (ref 70–110)
GLUCOSE BLD STRIP.AUTO-MCNC: 347 MG/DL (ref 70–110)
GLUCOSE SERPL-MCNC: 237 MG/DL (ref 74–99)
GLUCOSE SERPL-MCNC: 285 MG/DL (ref 74–99)
HCT VFR BLD AUTO: 31.5 % (ref 36–48)
HGB BLD-MCNC: 9.9 G/DL (ref 13–16)
MAGNESIUM SERPL-MCNC: 2 MG/DL (ref 1.6–2.6)
MCH RBC QN AUTO: 30 PG (ref 24–34)
MCHC RBC AUTO-ENTMCNC: 31.4 G/DL (ref 31–37)
MCV RBC AUTO: 95.5 FL (ref 78–100)
NRBC # BLD: 0 K/UL (ref 0–0.01)
NRBC BLD-RTO: 0 PER 100 WBC
PHOSPHATE SERPL-MCNC: 2.5 MG/DL (ref 2.5–4.9)
PLATELET # BLD AUTO: 197 K/UL (ref 135–420)
PMV BLD AUTO: 11.8 FL (ref 9.2–11.8)
POTASSIUM SERPL-SCNC: 2.8 MMOL/L (ref 3.5–5.5)
POTASSIUM SERPL-SCNC: 3.6 MMOL/L (ref 3.5–5.5)
PROT SERPL-MCNC: 6.4 G/DL (ref 6.4–8.2)
RBC # BLD AUTO: 3.3 M/UL (ref 4.35–5.65)
SODIUM SERPL-SCNC: 137 MMOL/L (ref 136–145)
SODIUM SERPL-SCNC: 139 MMOL/L (ref 136–145)
WBC # BLD AUTO: 14.7 K/UL (ref 4.6–13.2)

## 2023-11-16 PROCEDURE — 76937 US GUIDE VASCULAR ACCESS: CPT | Performed by: INTERNAL MEDICINE

## 2023-11-16 PROCEDURE — 99232 SBSQ HOSP IP/OBS MODERATE 35: CPT | Performed by: HOSPITALIST

## 2023-11-16 PROCEDURE — 2580000003 HC RX 258: Performed by: STUDENT IN AN ORGANIZED HEALTH CARE EDUCATION/TRAINING PROGRAM

## 2023-11-16 PROCEDURE — 2500000003 HC RX 250 WO HCPCS: Performed by: STUDENT IN AN ORGANIZED HEALTH CARE EDUCATION/TRAINING PROGRAM

## 2023-11-16 PROCEDURE — 85027 COMPLETE CBC AUTOMATED: CPT

## 2023-11-16 PROCEDURE — 99233 SBSQ HOSP IP/OBS HIGH 50: CPT | Performed by: INTERNAL MEDICINE

## 2023-11-16 PROCEDURE — 82962 GLUCOSE BLOOD TEST: CPT

## 2023-11-16 PROCEDURE — 2580000003 HC RX 258

## 2023-11-16 PROCEDURE — 93005 ELECTROCARDIOGRAM TRACING: CPT | Performed by: INTERNAL MEDICINE

## 2023-11-16 PROCEDURE — 93458 L HRT ARTERY/VENTRICLE ANGIO: CPT | Performed by: INTERNAL MEDICINE

## 2023-11-16 PROCEDURE — 99152 MOD SED SAME PHYS/QHP 5/>YRS: CPT | Performed by: INTERNAL MEDICINE

## 2023-11-16 PROCEDURE — 6370000000 HC RX 637 (ALT 250 FOR IP)

## 2023-11-16 PROCEDURE — 4A033BC MEASUREMENT OF ARTERIAL PRESSURE, CORONARY, PERCUTANEOUS APPROACH: ICD-10-PCS | Performed by: INTERNAL MEDICINE

## 2023-11-16 PROCEDURE — B2111ZZ FLUOROSCOPY OF MULTIPLE CORONARY ARTERIES USING LOW OSMOLAR CONTRAST: ICD-10-PCS | Performed by: INTERNAL MEDICINE

## 2023-11-16 PROCEDURE — 85730 THROMBOPLASTIN TIME PARTIAL: CPT

## 2023-11-16 PROCEDURE — 6370000000 HC RX 637 (ALT 250 FOR IP): Performed by: INTERNAL MEDICINE

## 2023-11-16 PROCEDURE — C9600 PERC DRUG-EL COR STENT SING: HCPCS | Performed by: INTERNAL MEDICINE

## 2023-11-16 PROCEDURE — C1874 STENT, COATED/COV W/DEL SYS: HCPCS | Performed by: INTERNAL MEDICINE

## 2023-11-16 PROCEDURE — 92928 PRQ TCAT PLMT NTRAC ST 1 LES: CPT | Performed by: INTERNAL MEDICINE

## 2023-11-16 PROCEDURE — 2000000000 HC ICU R&B

## 2023-11-16 PROCEDURE — C1753 CATH, INTRAVAS ULTRASOUND: HCPCS | Performed by: INTERNAL MEDICINE

## 2023-11-16 PROCEDURE — 6360000002 HC RX W HCPCS: Performed by: INTERNAL MEDICINE

## 2023-11-16 PROCEDURE — 83735 ASSAY OF MAGNESIUM: CPT

## 2023-11-16 PROCEDURE — 76000 FLUOROSCOPY <1 HR PHYS/QHP: CPT | Performed by: INTERNAL MEDICINE

## 2023-11-16 PROCEDURE — 027034Z DILATION OF CORONARY ARTERY, ONE ARTERY WITH DRUG-ELUTING INTRALUMINAL DEVICE, PERCUTANEOUS APPROACH: ICD-10-PCS | Performed by: INTERNAL MEDICINE

## 2023-11-16 PROCEDURE — 2709999900 HC NON-CHARGEABLE SUPPLY: Performed by: INTERNAL MEDICINE

## 2023-11-16 PROCEDURE — C1887 CATHETER, GUIDING: HCPCS | Performed by: INTERNAL MEDICINE

## 2023-11-16 PROCEDURE — C1725 CATH, TRANSLUMIN NON-LASER: HCPCS | Performed by: INTERNAL MEDICINE

## 2023-11-16 PROCEDURE — 84100 ASSAY OF PHOSPHORUS: CPT

## 2023-11-16 PROCEDURE — 36415 COLL VENOUS BLD VENIPUNCTURE: CPT

## 2023-11-16 PROCEDURE — C1713 ANCHOR/SCREW BN/BN,TIS/BN: HCPCS | Performed by: INTERNAL MEDICINE

## 2023-11-16 PROCEDURE — C1769 GUIDE WIRE: HCPCS | Performed by: INTERNAL MEDICINE

## 2023-11-16 PROCEDURE — 4A023N7 MEASUREMENT OF CARDIAC SAMPLING AND PRESSURE, LEFT HEART, PERCUTANEOUS APPROACH: ICD-10-PCS | Performed by: INTERNAL MEDICINE

## 2023-11-16 PROCEDURE — 6360000004 HC RX CONTRAST MEDICATION: Performed by: INTERNAL MEDICINE

## 2023-11-16 PROCEDURE — 6370000000 HC RX 637 (ALT 250 FOR IP): Performed by: HOSPITALIST

## 2023-11-16 PROCEDURE — 94761 N-INVAS EAR/PLS OXIMETRY MLT: CPT

## 2023-11-16 PROCEDURE — 85347 COAGULATION TIME ACTIVATED: CPT

## 2023-11-16 PROCEDURE — 92978 ENDOLUMINL IVUS OCT C 1ST: CPT | Performed by: INTERNAL MEDICINE

## 2023-11-16 PROCEDURE — 99153 MOD SED SAME PHYS/QHP EA: CPT | Performed by: INTERNAL MEDICINE

## 2023-11-16 PROCEDURE — 93571 IV DOP VEL&/PRESS C FLO 1ST: CPT | Performed by: INTERNAL MEDICINE

## 2023-11-16 PROCEDURE — 6370000000 HC RX 637 (ALT 250 FOR IP): Performed by: PHYSICIAN ASSISTANT

## 2023-11-16 PROCEDURE — 6360000002 HC RX W HCPCS: Performed by: STUDENT IN AN ORGANIZED HEALTH CARE EDUCATION/TRAINING PROGRAM

## 2023-11-16 PROCEDURE — 80076 HEPATIC FUNCTION PANEL: CPT

## 2023-11-16 PROCEDURE — C1894 INTRO/SHEATH, NON-LASER: HCPCS | Performed by: INTERNAL MEDICINE

## 2023-11-16 PROCEDURE — B24BZZ3 ULTRASONOGRAPHY OF HEART WITH AORTA, INTRAVASCULAR: ICD-10-PCS | Performed by: INTERNAL MEDICINE

## 2023-11-16 PROCEDURE — 2500000003 HC RX 250 WO HCPCS: Performed by: INTERNAL MEDICINE

## 2023-11-16 PROCEDURE — 80048 BASIC METABOLIC PNL TOTAL CA: CPT

## 2023-11-16 DEVICE — STENT ONYXNG30026UX ONYX 3.00X26RX
Type: IMPLANTABLE DEVICE | Status: FUNCTIONAL
Brand: ONYX FRONTIER™

## 2023-11-16 RX ORDER — SODIUM CHLORIDE 0.9 % (FLUSH) 0.9 %
5-40 SYRINGE (ML) INJECTION PRN
Status: DISCONTINUED | OUTPATIENT
Start: 2023-11-16 | End: 2023-11-17 | Stop reason: HOSPADM

## 2023-11-16 RX ORDER — MIDAZOLAM HYDROCHLORIDE 1 MG/ML
INJECTION INTRAMUSCULAR; INTRAVENOUS PRN
Status: DISCONTINUED | OUTPATIENT
Start: 2023-11-16 | End: 2023-11-16 | Stop reason: HOSPADM

## 2023-11-16 RX ORDER — POTASSIUM CHLORIDE 20 MEQ/1
40 TABLET, EXTENDED RELEASE ORAL ONCE
Status: COMPLETED | OUTPATIENT
Start: 2023-11-16 | End: 2023-11-16

## 2023-11-16 RX ORDER — ACETAMINOPHEN 325 MG/1
650 TABLET ORAL EVERY 4 HOURS PRN
Status: DISCONTINUED | OUTPATIENT
Start: 2023-11-16 | End: 2023-11-17 | Stop reason: SDUPTHER

## 2023-11-16 RX ORDER — SODIUM CHLORIDE 9 MG/ML
INJECTION, SOLUTION INTRAVENOUS PRN
Status: DISCONTINUED | OUTPATIENT
Start: 2023-11-16 | End: 2023-11-17 | Stop reason: HOSPADM

## 2023-11-16 RX ORDER — SODIUM CHLORIDE 9 MG/ML
INJECTION, SOLUTION INTRAVENOUS CONTINUOUS
Status: DISPENSED | OUTPATIENT
Start: 2023-11-16 | End: 2023-11-17

## 2023-11-16 RX ORDER — CLOPIDOGREL BISULFATE 75 MG/1
75 TABLET ORAL DAILY
Status: DISCONTINUED | OUTPATIENT
Start: 2023-11-16 | End: 2023-11-17 | Stop reason: HOSPADM

## 2023-11-16 RX ORDER — FENTANYL CITRATE 50 UG/ML
INJECTION, SOLUTION INTRAMUSCULAR; INTRAVENOUS PRN
Status: DISCONTINUED | OUTPATIENT
Start: 2023-11-16 | End: 2023-11-16 | Stop reason: HOSPADM

## 2023-11-16 RX ORDER — HEPARIN SODIUM 1000 [USP'U]/ML
INJECTION, SOLUTION INTRAVENOUS; SUBCUTANEOUS PRN
Status: DISCONTINUED | OUTPATIENT
Start: 2023-11-16 | End: 2023-11-16 | Stop reason: HOSPADM

## 2023-11-16 RX ORDER — SODIUM CHLORIDE 0.9 % (FLUSH) 0.9 %
5-40 SYRINGE (ML) INJECTION EVERY 12 HOURS SCHEDULED
Status: DISCONTINUED | OUTPATIENT
Start: 2023-11-16 | End: 2023-11-17 | Stop reason: HOSPADM

## 2023-11-16 RX ORDER — HYDRALAZINE HYDROCHLORIDE 20 MG/ML
INJECTION INTRAMUSCULAR; INTRAVENOUS PRN
Status: DISCONTINUED | OUTPATIENT
Start: 2023-11-16 | End: 2023-11-16 | Stop reason: HOSPADM

## 2023-11-16 RX ORDER — POTASSIUM CHLORIDE 7.45 MG/ML
10 INJECTION INTRAVENOUS
Status: COMPLETED | OUTPATIENT
Start: 2023-11-16 | End: 2023-11-16

## 2023-11-16 RX ORDER — HYDRALAZINE HYDROCHLORIDE 25 MG/1
25 TABLET, FILM COATED ORAL EVERY 8 HOURS SCHEDULED
Status: DISCONTINUED | OUTPATIENT
Start: 2023-11-16 | End: 2023-11-17

## 2023-11-16 RX ORDER — NITROGLYCERIN 40 MG/100ML
INJECTION INTRAVENOUS CONTINUOUS PRN
Status: COMPLETED | OUTPATIENT
Start: 2023-11-16 | End: 2023-11-16

## 2023-11-16 RX ADMIN — APIXABAN 5 MG: 5 TABLET, FILM COATED ORAL at 21:05

## 2023-11-16 RX ADMIN — HYDRALAZINE HYDROCHLORIDE 25 MG: 25 TABLET, FILM COATED ORAL at 21:05

## 2023-11-16 RX ADMIN — ATORVASTATIN CALCIUM 80 MG: 40 TABLET, FILM COATED ORAL at 21:05

## 2023-11-16 RX ADMIN — SODIUM CHLORIDE, PRESERVATIVE FREE 10 ML: 5 INJECTION INTRAVENOUS at 08:27

## 2023-11-16 RX ADMIN — POTASSIUM CHLORIDE 10 MEQ: 7.45 INJECTION INTRAVENOUS at 10:01

## 2023-11-16 RX ADMIN — INSULIN LISPRO 1 UNITS: 100 INJECTION, SOLUTION INTRAVENOUS; SUBCUTANEOUS at 16:47

## 2023-11-16 RX ADMIN — POTASSIUM CHLORIDE 10 MEQ: 7.45 INJECTION INTRAVENOUS at 05:08

## 2023-11-16 RX ADMIN — POTASSIUM CHLORIDE 10 MEQ: 7.45 INJECTION INTRAVENOUS at 08:27

## 2023-11-16 RX ADMIN — POTASSIUM CHLORIDE 10 MEQ: 7.45 INJECTION INTRAVENOUS at 06:45

## 2023-11-16 RX ADMIN — ASPIRIN 81 MG: 81 TABLET, CHEWABLE ORAL at 08:27

## 2023-11-16 RX ADMIN — HEPARIN SODIUM 4000 UNITS: 1000 INJECTION INTRAVENOUS; SUBCUTANEOUS at 12:31

## 2023-11-16 RX ADMIN — CLOPIDOGREL BISULFATE 75 MG: 75 TABLET ORAL at 21:13

## 2023-11-16 RX ADMIN — POTASSIUM CHLORIDE 10 MEQ: 7.45 INJECTION INTRAVENOUS at 07:00

## 2023-11-16 RX ADMIN — METOPROLOL SUCCINATE 25 MG: 25 TABLET, EXTENDED RELEASE ORAL at 08:27

## 2023-11-16 RX ADMIN — INSULIN LISPRO 3 UNITS: 100 INJECTION, SOLUTION INTRAVENOUS; SUBCUTANEOUS at 21:13

## 2023-11-16 RX ADMIN — AMIODARONE HYDROCHLORIDE 0.5 MG/MIN: 1.8 INJECTION, SOLUTION INTRAVENOUS at 22:51

## 2023-11-16 RX ADMIN — AMIODARONE HYDROCHLORIDE 0.5 MG/MIN: 1.8 INJECTION, SOLUTION INTRAVENOUS at 10:06

## 2023-11-16 RX ADMIN — HYDRALAZINE HYDROCHLORIDE 25 MG: 25 TABLET, FILM COATED ORAL at 10:16

## 2023-11-16 RX ADMIN — SODIUM CHLORIDE, POTASSIUM CHLORIDE, SODIUM LACTATE AND CALCIUM CHLORIDE: 600; 310; 30; 20 INJECTION, SOLUTION INTRAVENOUS at 01:16

## 2023-11-16 RX ADMIN — POTASSIUM CHLORIDE 40 MEQ: 1500 TABLET, EXTENDED RELEASE ORAL at 12:33

## 2023-11-16 NOTE — PROGRESS NOTES
Patient arrived back from cath lab per cath lab nurse no need to restart heparin gtt. Patient was given 8,000 units intra-op and started on Brilinta . Page cardiologist Jerold Hodgkin to verify and per cardiologist no need to restart heparin gtt.

## 2023-11-16 NOTE — PROGRESS NOTES
Physician Progress Note      Leonora Tolentino  CSN #:                  [id number]  :                       1965  ADMIT DATE:       2023 3:43 AM  DISCH DATE:  Abril Richard  PROVIDER #:        Grecia Boo PA-C        QUERY TEXT:    Stage of Chronic Kidney Disease: Please provide further specificity, if known. Clinical indicators include: ckd, bun, creatinine, ntprobnp, dialysis, bnp,   hd, cr  Options provided:  -- Chronic kidney disease stage 1  -- Chronic kidney disease stage 2  -- Chronic kidney disease stage 3  -- Chronic kidney disease stage 3a  -- Chronic kidney disease stage 3b  -- Chronic kidney disease stage 4  -- Chronic kidney disease stage 5  -- Chronic kidney disease stage 5, requiring dialysis  -- End stage renal disease  -- Other - I will add my own diagnosis  -- Disagree - Not applicable / Not valid  -- Disagree - Clinically Unable to determine / Unknown        PROVIDER RESPONSE TEXT:    The patient has chronic kidney disease stage 3.       Electronically signed by:  Grecia Boo PA-C 11/15/2023 8:36 PM

## 2023-11-16 NOTE — CARE COORDINATION
11/16/23  MUSTAPHA reviewed patient's chart. PT/OT not recommending any additional rehab. Patient has all DME in the home. Patient remains NPO pending cardiac procedure. Patient not medically ready for discharge at this time. Disposition for home when medically cleared. 11/13/23  Patient resides with significant other in 1 level home. The following DME is in the home: wheelchair, walker, shower chair. Per significant other, advance directive documents were completed at [facility], and she will provide a copy to ICU staff. Per Ms. Armas, patient was functioning independently but has unsteady gait at times, however does not utilize walker or cane. Patient has received [facility] care through [facility] previously, however preference is for [facility] home care upon discharge. Pending medical prognosis, disposition plans for home with home care and significant other to transport home.     Caesar Sin LMSW   Case Management

## 2023-11-17 ENCOUNTER — HOME HEALTH ADMISSION (OUTPATIENT)
Age: 58
End: 2023-11-17
Payer: COMMERCIAL

## 2023-11-17 VITALS
BODY MASS INDEX: 32.32 KG/M2 | HEART RATE: 89 BPM | OXYGEN SATURATION: 95 % | WEIGHT: 194 LBS | TEMPERATURE: 98.9 F | SYSTOLIC BLOOD PRESSURE: 139 MMHG | HEIGHT: 65 IN | DIASTOLIC BLOOD PRESSURE: 65 MMHG | RESPIRATION RATE: 22 BRPM

## 2023-11-17 PROBLEM — I16.1 HYPERTENSIVE EMERGENCY: Status: RESOLVED | Noted: 2023-11-13 | Resolved: 2023-11-17

## 2023-11-17 PROBLEM — J96.00 ACUTE RESPIRATORY FAILURE (HCC): Status: RESOLVED | Noted: 2023-11-13 | Resolved: 2023-11-17

## 2023-11-17 PROBLEM — I47.20 VENTRICULAR TACHYARRHYTHMIA (HCC): Status: RESOLVED | Noted: 2023-11-14 | Resolved: 2023-11-17

## 2023-11-17 PROBLEM — R09.02 HYPOXEMIA: Status: RESOLVED | Noted: 2023-11-13 | Resolved: 2023-11-17

## 2023-11-17 PROBLEM — J81.1 PULMONARY EDEMA: Status: RESOLVED | Noted: 2023-11-13 | Resolved: 2023-11-17

## 2023-11-17 LAB
ANION GAP SERPL CALC-SCNC: 4 MMOL/L (ref 3–18)
BASOPHILS # BLD: 0 K/UL (ref 0–0.1)
BASOPHILS NFR BLD: 0 % (ref 0–2)
BUN SERPL-MCNC: 17 MG/DL (ref 7–18)
BUN/CREAT SERPL: 13 (ref 12–20)
CALCIUM SERPL-MCNC: 8.5 MG/DL (ref 8.5–10.1)
CHLORIDE SERPL-SCNC: 101 MMOL/L (ref 100–111)
CO2 SERPL-SCNC: 31 MMOL/L (ref 21–32)
CREAT SERPL-MCNC: 1.29 MG/DL (ref 0.6–1.3)
DIFFERENTIAL METHOD BLD: ABNORMAL
EKG DIAGNOSIS: NORMAL
EKG Q-T INTERVAL: 364 MS
EKG QRS DURATION: 94 MS
EKG QTC CALCULATION (BAZETT): 516 MS
EKG R AXIS: -75 DEGREES
EKG T AXIS: -15 DEGREES
EKG VENTRICULAR RATE: 121 BPM
EOSINOPHIL # BLD: 0.1 K/UL (ref 0–0.4)
EOSINOPHIL NFR BLD: 1 % (ref 0–5)
ERYTHROCYTE [DISTWIDTH] IN BLOOD BY AUTOMATED COUNT: 12.9 % (ref 11.6–14.5)
GLUCOSE BLD STRIP.AUTO-MCNC: 309 MG/DL (ref 70–110)
GLUCOSE BLD STRIP.AUTO-MCNC: 351 MG/DL (ref 70–110)
GLUCOSE BLD STRIP.AUTO-MCNC: 363 MG/DL (ref 70–110)
GLUCOSE SERPL-MCNC: 326 MG/DL (ref 74–99)
HCT VFR BLD AUTO: 30.1 % (ref 36–48)
HGB BLD-MCNC: 9.6 G/DL (ref 13–16)
IMM GRANULOCYTES # BLD AUTO: 0.1 K/UL (ref 0–0.04)
IMM GRANULOCYTES NFR BLD AUTO: 1 % (ref 0–0.5)
LYMPHOCYTES # BLD: 1.6 K/UL (ref 0.9–3.6)
LYMPHOCYTES NFR BLD: 12 % (ref 21–52)
MAGNESIUM SERPL-MCNC: 2.1 MG/DL (ref 1.6–2.6)
MCH RBC QN AUTO: 29.9 PG (ref 24–34)
MCHC RBC AUTO-ENTMCNC: 31.9 G/DL (ref 31–37)
MCV RBC AUTO: 93.8 FL (ref 78–100)
MONOCYTES # BLD: 1 K/UL (ref 0.05–1.2)
MONOCYTES NFR BLD: 8 % (ref 3–10)
NEUTS SEG # BLD: 10.1 K/UL (ref 1.8–8)
NEUTS SEG NFR BLD: 78 % (ref 40–73)
NRBC # BLD: 0 K/UL (ref 0–0.01)
NRBC BLD-RTO: 0 PER 100 WBC
PHOSPHATE SERPL-MCNC: 2.5 MG/DL (ref 2.5–4.9)
PLATELET # BLD AUTO: 216 K/UL (ref 135–420)
PMV BLD AUTO: 11.4 FL (ref 9.2–11.8)
POTASSIUM SERPL-SCNC: 3.7 MMOL/L (ref 3.5–5.5)
PROCALCITONIN SERPL-MCNC: 1.17 NG/ML
RBC # BLD AUTO: 3.21 M/UL (ref 4.35–5.65)
SODIUM SERPL-SCNC: 136 MMOL/L (ref 136–145)
WBC # BLD AUTO: 12.9 K/UL (ref 4.6–13.2)

## 2023-11-17 PROCEDURE — 94761 N-INVAS EAR/PLS OXIMETRY MLT: CPT

## 2023-11-17 PROCEDURE — 93010 ELECTROCARDIOGRAM REPORT: CPT | Performed by: INTERNAL MEDICINE

## 2023-11-17 PROCEDURE — 85025 COMPLETE CBC W/AUTO DIFF WBC: CPT

## 2023-11-17 PROCEDURE — 83735 ASSAY OF MAGNESIUM: CPT

## 2023-11-17 PROCEDURE — 6370000000 HC RX 637 (ALT 250 FOR IP): Performed by: INTERNAL MEDICINE

## 2023-11-17 PROCEDURE — 6370000000 HC RX 637 (ALT 250 FOR IP): Performed by: STUDENT IN AN ORGANIZED HEALTH CARE EDUCATION/TRAINING PROGRAM

## 2023-11-17 PROCEDURE — 99232 SBSQ HOSP IP/OBS MODERATE 35: CPT | Performed by: INTERNAL MEDICINE

## 2023-11-17 PROCEDURE — 6370000000 HC RX 637 (ALT 250 FOR IP): Performed by: PHYSICIAN ASSISTANT

## 2023-11-17 PROCEDURE — 99239 HOSP IP/OBS DSCHRG MGMT >30: CPT | Performed by: HOSPITALIST

## 2023-11-17 PROCEDURE — 6370000000 HC RX 637 (ALT 250 FOR IP): Performed by: HOSPITALIST

## 2023-11-17 PROCEDURE — 82962 GLUCOSE BLOOD TEST: CPT

## 2023-11-17 PROCEDURE — 84145 PROCALCITONIN (PCT): CPT

## 2023-11-17 PROCEDURE — 6370000000 HC RX 637 (ALT 250 FOR IP)

## 2023-11-17 PROCEDURE — 2580000003 HC RX 258

## 2023-11-17 PROCEDURE — 84100 ASSAY OF PHOSPHORUS: CPT

## 2023-11-17 PROCEDURE — 2580000003 HC RX 258: Performed by: INTERNAL MEDICINE

## 2023-11-17 PROCEDURE — 80048 BASIC METABOLIC PNL TOTAL CA: CPT

## 2023-11-17 RX ORDER — CLOPIDOGREL BISULFATE 75 MG/1
75 TABLET ORAL DAILY
Qty: 30 TABLET | Refills: 3 | Status: SHIPPED | OUTPATIENT
Start: 2023-11-18

## 2023-11-17 RX ORDER — INSULIN GLARGINE 100 [IU]/ML
7 INJECTION, SOLUTION SUBCUTANEOUS
Status: COMPLETED | OUTPATIENT
Start: 2023-11-17 | End: 2023-11-17

## 2023-11-17 RX ORDER — METOPROLOL SUCCINATE 25 MG/1
25 TABLET, EXTENDED RELEASE ORAL DAILY
Qty: 30 TABLET | Refills: 3 | Status: SHIPPED | OUTPATIENT
Start: 2023-11-18

## 2023-11-17 RX ORDER — AMIODARONE HYDROCHLORIDE 200 MG/1
200 TABLET ORAL DAILY
Qty: 30 TABLET | Refills: 0 | Status: SHIPPED | OUTPATIENT
Start: 2023-11-18 | End: 2023-12-18

## 2023-11-17 RX ORDER — ASPIRIN 81 MG/1
81 TABLET, CHEWABLE ORAL DAILY
Qty: 180 TABLET | Refills: 3 | Status: SHIPPED | OUTPATIENT
Start: 2023-11-17

## 2023-11-17 RX ORDER — ALBUTEROL SULFATE 90 UG/1
AEROSOL, METERED RESPIRATORY (INHALATION)
Qty: 90 G | Refills: 0 | OUTPATIENT
Start: 2023-11-17

## 2023-11-17 RX ORDER — AMIODARONE HYDROCHLORIDE 200 MG/1
200 TABLET ORAL DAILY
Status: DISCONTINUED | OUTPATIENT
Start: 2023-11-17 | End: 2023-11-17 | Stop reason: HOSPADM

## 2023-11-17 RX ORDER — INSULIN GLARGINE 100 [IU]/ML
12 INJECTION, SOLUTION SUBCUTANEOUS DAILY
Status: DISCONTINUED | OUTPATIENT
Start: 2023-11-18 | End: 2023-11-17 | Stop reason: HOSPADM

## 2023-11-17 RX ORDER — FUROSEMIDE 40 MG/1
40 TABLET ORAL 2 TIMES DAILY
Qty: 60 TABLET | Refills: 3 | Status: SHIPPED | OUTPATIENT
Start: 2023-11-17

## 2023-11-17 RX ORDER — SPIRONOLACTONE 25 MG/1
25 TABLET ORAL DAILY
Status: DISCONTINUED | OUTPATIENT
Start: 2023-11-17 | End: 2023-11-17 | Stop reason: HOSPADM

## 2023-11-17 RX ORDER — SPIRONOLACTONE 25 MG/1
25 TABLET ORAL DAILY
Qty: 30 TABLET | Refills: 3 | Status: SHIPPED | OUTPATIENT
Start: 2023-11-18

## 2023-11-17 RX ORDER — FUROSEMIDE 40 MG/1
40 TABLET ORAL 2 TIMES DAILY
Status: DISCONTINUED | OUTPATIENT
Start: 2023-11-17 | End: 2023-11-17 | Stop reason: HOSPADM

## 2023-11-17 RX ADMIN — APIXABAN 5 MG: 5 TABLET, FILM COATED ORAL at 08:33

## 2023-11-17 RX ADMIN — INSULIN LISPRO 3 UNITS: 100 INJECTION, SOLUTION INTRAVENOUS; SUBCUTANEOUS at 08:37

## 2023-11-17 RX ADMIN — SPIRONOLACTONE 25 MG: 25 TABLET ORAL at 10:31

## 2023-11-17 RX ADMIN — Medication 15 ML: at 08:33

## 2023-11-17 RX ADMIN — AMIODARONE HYDROCHLORIDE 200 MG: 200 TABLET ORAL at 08:33

## 2023-11-17 RX ADMIN — CLOPIDOGREL BISULFATE 75 MG: 75 TABLET ORAL at 08:33

## 2023-11-17 RX ADMIN — HYDRALAZINE HYDROCHLORIDE 25 MG: 25 TABLET, FILM COATED ORAL at 08:00

## 2023-11-17 RX ADMIN — INSULIN GLARGINE 5 UNITS: 100 INJECTION, SOLUTION SUBCUTANEOUS at 08:33

## 2023-11-17 RX ADMIN — INSULIN GLARGINE 7 UNITS: 100 INJECTION, SOLUTION SUBCUTANEOUS at 10:31

## 2023-11-17 RX ADMIN — SODIUM CHLORIDE, PRESERVATIVE FREE 10 ML: 5 INJECTION INTRAVENOUS at 08:37

## 2023-11-17 RX ADMIN — SACUBITRIL AND VALSARTAN 1 TABLET: 24; 26 TABLET, FILM COATED ORAL at 08:33

## 2023-11-17 RX ADMIN — METOPROLOL SUCCINATE 25 MG: 25 TABLET, EXTENDED RELEASE ORAL at 08:33

## 2023-11-17 RX ADMIN — ASPIRIN 81 MG: 81 TABLET, CHEWABLE ORAL at 08:33

## 2023-11-17 RX ADMIN — FUROSEMIDE 40 MG: 40 TABLET ORAL at 08:37

## 2023-11-17 RX ADMIN — INSULIN LISPRO 4 UNITS: 100 INJECTION, SOLUTION INTRAVENOUS; SUBCUTANEOUS at 12:19

## 2023-11-17 RX ADMIN — POLYETHYLENE GLYCOL 3350 17 G: 17 POWDER, FOR SOLUTION ORAL at 08:34

## 2023-11-17 NOTE — PROGRESS NOTES
Physician Progress Note      George Shepherd  [facility] #:                  [id number]  :                       1965  ADMIT DATE:       2023 3:43 AM  DISCH DATE:        2023 2:22 PM  RESPONDING  PROVIDER #:        Roger Austin MD          QUERY TEXT:    Patient admitted with acute hypoxic hypercapnic respiratory failure . Noted   documentation of Acute Kidney Injury in Hospitalist note on . In order   to support the diagnosis of RD, please include additional clinical indicators   in your documentation. ? Or please document if the diagnosis of RD has been   ruled out after further study. The medical record reflects the following:  Risk Factors: CKD3  Clinical Indicators:    Hospitalist note:  RD on CKD  Serial creatinine:  1.71, 1.53, 1.66, 1.43, 1.53, 1.22, 1.29  Treatment: monitor renal functions while patient is being diuresed; I&Os    Defined by Kidney Disease Improving Global Outcomes (KDIGO) clinical practice   guideline for acute kidney injury:  -Increase in SCr by greater than or equal to 0.3 mg/dl within 48 hours; or  -Increase or decrease in SCr to greater than or equal to 1.5 times baseline,   which is known or presumed to have occurred within the prior 7 days; or  -Urine volume < 0.5ml/kg/h for 6 hours. Thank you,  Yusef Drew [email or web address]  Options provided:  -- Acute kidney injury evidenced by, Please document evidence as well as a   numerical baseline creatinine, if known. -- Currently resolved acute kidney injury was evidenced by, Please document   evidence as well as a numerical baseline creatinine, if known.   -- Acute kidney injury ruled out after study  -- Other - I will add my own diagnosis  -- Disagree - Not applicable / Not valid  -- Disagree - Clinically unable to determine / Unknown  -- Refer to Clinical Documentation Reviewer    PROVIDER RESPONSE TEXT:    This patient has acute kidney injury

## 2023-11-17 NOTE — DISCHARGE INSTRUCTIONS
Discharge Instructions    Patient: Lilli Naqvi. MRN: [id number]  [facility]: [id number]    YOB: 1965  Age: 62 y.o. Sex: male    DOA: 11/13/2023       DIET:  cardiac diet and diabetic diet    ACTIVITY: activity as tolerated    Home health care for Skilled care for CHF management, DM, Hypertension and medication management      ADDITIONAL INFORMATION: If you experience any of the following symptoms but not limited to Fever, chills, nausea, vomiting, diarrhea, change in mentation, falling, bleeding, shortness of breath, chest pain, please call your primary care physician or return to the emergency room if you cannot get hold of your doctor:     FOLLOW UP CARE:   Follow-up Information     Yuli Ferro MD .    Specialty: Family Medicine  Contact information:  [address] [place] [address]             Bella Marvin MD. Schedule an appointment as soon as possible for a   visit in 1 week(s).     Specialties: Cardiology, Internal Medicine, Interventional Cardiology  Contact information:  [address]                       Melvin Mccoy MD  11/17/2023 12:26 PM

## 2023-11-17 NOTE — CONSULTS
Consult noted for Home Health. Spoke to patient at bedside he prefers [facility]. Referral sent via [employer]. Notified [facility].

## 2023-11-17 NOTE — PROGRESS NOTES
[id number] patient arrived from ICU accompanied by Latha Merino RN. A&0x4. Assessment performed. Bedside and Verbal shift change report given to MARKELL Avina RN (oncoming nurse) by [employer], RN (offgoing nurse). Report included the following information Nurse Handoff Report, Recent Results, and Cardiac Rhythm Sinus Rhythm .

## 2023-11-19 ENCOUNTER — HOME CARE VISIT (OUTPATIENT)
Age: 58
End: 2023-11-19
Payer: COMMERCIAL

## 2023-11-19 VITALS
HEART RATE: 82 BPM | DIASTOLIC BLOOD PRESSURE: 88 MMHG | RESPIRATION RATE: 16 BRPM | TEMPERATURE: 98.1 F | SYSTOLIC BLOOD PRESSURE: 145 MMHG | OXYGEN SATURATION: 98 %

## 2023-11-19 LAB
BACTERIA SPEC CULT: NORMAL
BACTERIA SPEC CULT: NORMAL
SERVICE CMNT-IMP: NORMAL
SERVICE CMNT-IMP: NORMAL

## 2023-11-19 PROCEDURE — G0299 HHS/HOSPICE OF RN EA 15 MIN: HCPCS

## 2023-11-19 ASSESSMENT — ENCOUNTER SYMPTOMS
DYSPNEA ACTIVITY LEVEL: AFTER AMBULATING 10 - 20 FT
PAIN LOCATION - PAIN QUALITY: SORE ACHE
HEMOPTYSIS: 0

## 2023-11-20 ENCOUNTER — CLINICAL DOCUMENTATION (OUTPATIENT)
Age: 58
End: 2023-11-20

## 2023-11-20 NOTE — HOME CARE
Late Entry:  for 11/17/23  Receive referral for SN. [facility] orders have been noted and arranged by colleague and sent to central intake and scheduling.

## 2023-11-20 NOTE — CASE COMMUNICATION
Patient admitted to [facility] services on 11/19/23 for CHF. Raymundo Casanova MD notified of patient admission to home health and plan of care including anticipated frequency of 2w2 1w4 2 prn and treatments/interventions/modalities of disease process teaching and medication management. patient declines SN recommendation for PT/OT evaluations based on fall hx. Medications reconciled and all medications are not available in the home this visit. pt needing to get synjardy filled. pt not taking duetact. entresto was not ready yet, awaiting to be filled by pharmacy. potential for major interactions as follows: amiodarone and lipitor; aldactone and entresto; eliquis and plavix; lexapro and trazodone; lexapro and amiodarone; aspirin and eliquis. Please call #[phone] with any questions. Thank you!   Sonu Salomon RN, BSN

## 2023-11-20 NOTE — HOME HEALTH
Skilled services/Home bound verification:   Skilled Reason for admission/summary of clinical condition:  CHF requiring disease process teaching and medication management . This patient is homebound for the following reasons Requires considerable and taxing effort to leave the home  and Requires the assistance of 1 or more persons to leave the home . Primary caregiver is his life partner who is available 24/7 and is able to assist with bathing, dressing, walking, turn in bed, bathroom, meal prep and setup, medication management, grocery shopping, household chores and transportation to MD appointment. Medications reconciled and all medications are not available in the home this visit. pt needing to get synjardy filled. pt not taking duetact. entresto was not ready yet, awaiting to be filled by pharmacy. potential for major interactions as follows: amiodarone and lipitor; aldactone and entresto; eliquis and plavix; lexapro and trazodone; lexapro and amiodarone; aspirin and eliquis. The following education was provided regarding medications: patient instructed to continue to take medications as prescribed. patient aware to monitor for effectiveness and to notify staff of any adverse reactions to medications/any changes to medication regimen. Medications  are effective at this time. High risk medication teaching regarding anticoagulants, hyperglycemic agents or opiod narcotics performed (specify) eliquis, plavix, trulicity, lantus, asprin- Instructed patient/caregiver to notify SN/PT of any signs and symptoms of antiplatelet adverse effects including SOB, bleeding longer/heavier with cuts, bruising, nose bleeds, and/or upset stomach. Instructed patient/caregiver to notify SN/PT of signs and symptoms of anticoagulant adverse effects including bleeding gums, blood in urine or stool, easily bruising. Instructed on importance of fall prevention due to risk for bleeding.   Instructed patient/caregiver that

## 2023-11-20 NOTE — PROGRESS NOTES
PATIENT REQUIRES TCM OUTREACH    MRN: [id number]     PATIENT:  Melva Gurrola Sr.    ADMIT DATE:  11/13/23    DISCHARGE DATE:  11/17/23     ADMITTING DX: atrial flutter/acute respiratory failure requiring intubation    MEDICATION CHANGES:   Start: amiodarone, apixaban, clopidogrel, furosemide, Entresto, Aldactone  Stop: Norvasc, Brilinta, HCTZ, Cozaar  Change:aspirin, metoprolol succinate    SPECIALISTS:  Pulm and cardiology    HOME HEALTH/WOUND CARE/PT/OT:  home health care, PT, and OT    SCHEDULED FOLLOW UP APPTS:    Future Appointments   Date Time Provider [address]   11/21/2023 To Be Determined [facility]ather, [address]   11/28/2023 To Be Determined [facility] Gather, [address]   12/1/2023 To Be Determined [facility] Gather, [address]   12/5/2023 To Be Determined [facility] Gather, N [address]   12/6/2023  9:30 AM Ai Steinberg MD [facility]   12/12/2023 To Be Determined [facility] Gather, [address]   12/19/2023 To Be Determined [facility] Gather, N [address]   12/26/2023 To Be Determined [facility] Gather, [address]       *Please contact patient within 2 business days and document under . TCMOFFICE.   A scheduled Hospital Follow-up for patient within 7 days is preferred*

## 2023-11-21 ENCOUNTER — HOME CARE VISIT (OUTPATIENT)
Age: 58
End: 2023-11-21
Payer: COMMERCIAL

## 2023-11-21 VITALS
TEMPERATURE: 98.6 F | HEART RATE: 78 BPM | SYSTOLIC BLOOD PRESSURE: 148 MMHG | DIASTOLIC BLOOD PRESSURE: 80 MMHG | RESPIRATION RATE: 20 BRPM | OXYGEN SATURATION: 96 %

## 2023-11-21 DIAGNOSIS — E11.65 TYPE 2 DIABETES MELLITUS WITH HYPERGLYCEMIA (HCC): ICD-10-CM

## 2023-11-21 PROCEDURE — G0300 HHS/HOSPICE OF LPN EA 15 MIN: HCPCS

## 2023-11-21 RX ORDER — ALBUTEROL SULFATE 90 UG/1
AEROSOL, METERED RESPIRATORY (INHALATION)
Qty: 90 G | Refills: 5 | OUTPATIENT
Start: 2023-11-21

## 2023-11-22 RX ORDER — METFORMIN HYDROCHLORIDE 500 MG/1
TABLET, EXTENDED RELEASE ORAL
Qty: 120 TABLET | Refills: 5 | OUTPATIENT
Start: 2023-11-22

## 2023-11-27 NOTE — HOME HEALTH
Skilled reason for visit: CHF requiring disease process teaching and medication management . Caregiver involvement: assist with bathing, dressing, walking, turn in bed, bathroom, meal prep and setup, medication management, grocery shopping, household chores and transportation to MD appointment. .    Medications reviewed and all medications are available in the home this visit. The following education was provided regarding medications: Instructed patient/caregiver to notify SN/PT of any signs and symptoms of antiplatelet adverse effects including SOB, bleeding longer/heavier with cuts, bruising, nose bleeds, and/or upset stomach. Instructed patient/caregiver to notify SN/PT of any adverse effects of antipsychotic medications including uncontrollable movements of the jaw, lips and tongue (tardive dyskinesia), uncomfortable restlessness (akathisia), sexual problems due to hormonal changes, drowsiness/sedation, constipation, and/or dry mouth. Instructed patient/caregiver that hypoglycemic medications may result in lower blood sugars than body is accustomed to, notify SN/PT of dizziness, confusion, nervousness, or any other symptoms of low blood sugar. MD notified of any discrepancies/look a-like medications/medication interactions: n/a  Medications are somewhat effective at this time. Home health supplies by type and quantity ordered/delivered this visit include: n/a    Patient education provided this visit: INSTRUCTED PATIENT AND CG THAT SHOULD ANY NEEDS OR CONCERNS ARISE TO FIRST CALL OUR OFFICE, OR THE DR'S OFFICE  OR GO TO AN URGENT CARE CENTER AND NOT TO THE ED FOR NON-LIFE THREATENING EVENTS. IF IT IS LIFE THREATENING THEN CALL 911 OR GO TO THE CLOSEST ER. Patient was instructed on type II diabetes. Some people with type II diabetes can get blood sugar control through diabetes pills. These people do produce some insulin. But it is not enough to keep their blood sugar under control.  Type II diabetes

## 2023-11-28 ENCOUNTER — HOME CARE VISIT (OUTPATIENT)
Age: 58
End: 2023-11-28
Payer: COMMERCIAL

## 2023-11-28 VITALS
TEMPERATURE: 98.2 F | OXYGEN SATURATION: 99 % | SYSTOLIC BLOOD PRESSURE: 116 MMHG | HEART RATE: 108 BPM | RESPIRATION RATE: 18 BRPM | DIASTOLIC BLOOD PRESSURE: 84 MMHG

## 2023-11-28 PROCEDURE — G0300 HHS/HOSPICE OF LPN EA 15 MIN: HCPCS

## 2023-11-30 NOTE — HOME HEALTH
Skilled reason for visit: CHF requiring disease process teaching and medication management . Caregiver involvement: assist with bathing, dressing, walking, turn in bed, bathroom, meal prep and setup, medication management, grocery shopping, household chores and transportation to MD appointment. .         Medications reviewed and all medications are available in the home this visit. The following education was provided regarding medications: Instructed patient/caregiver to notify SN/PT of any signs and symptoms of antiplatelet adverse effects including SOB, bleeding longer/heavier with cuts, bruising, nose bleeds, and/or upset stomach. Instructed patient/caregiver to notify SN/PT of any adverse effects of antipsychotic medications including uncontrollable movements of the jaw, lips and tongue (tardive dyskinesia), uncomfortable restlessness (akathisia), sexual problems due to hormonal changes, drowsiness/sedation, constipation, and/or dry mouth. Instructed patient/caregiver that hypoglycemic medications may result in lower blood sugars than body is accustomed to, notify SN/PT of dizziness, confusion, nervousness, or any other symptoms of low blood sugar. MD notified of any discrepancies/look a-like medications/medication interactions: n/a    Medications are somewhat effective at this time. Home health supplies by type and quantity ordered/delivered this visit include: n/a         Patient education provided this visit: INSTRUCTED PATIENT AND CG THAT SHOULD ANY NEEDS OR CONCERNS ARISE TO FIRST CALL OUR OFFICE, OR THE DR'S OFFICE  OR GO TO AN URGENT CARE CENTER AND NOT TO THE ED FOR NON-LIFE THREATENING EVENTS. IF IT IS LIFE THREATENING THEN CALL 911 OR GO TO THE CLOSEST ER. Patient was instructed on type II diabetes. Some people with type II diabetes can get blood sugar control through diabetes pills. These people do produce some insulin.  But it is not enough to keep their blood sugar under

## 2023-12-02 ENCOUNTER — HOME CARE VISIT (OUTPATIENT)
Age: 58
End: 2023-12-02
Payer: COMMERCIAL

## 2023-12-05 ENCOUNTER — HOME CARE VISIT (OUTPATIENT)
Age: 58
End: 2023-12-05
Payer: COMMERCIAL

## 2023-12-05 VITALS
WEIGHT: 189 LBS | SYSTOLIC BLOOD PRESSURE: 134 MMHG | BODY MASS INDEX: 31.45 KG/M2 | RESPIRATION RATE: 16 BRPM | OXYGEN SATURATION: 96 % | HEART RATE: 70 BPM | DIASTOLIC BLOOD PRESSURE: 80 MMHG

## 2023-12-05 PROCEDURE — G0300 HHS/HOSPICE OF LPN EA 15 MIN: HCPCS

## 2023-12-06 ENCOUNTER — OFFICE VISIT (OUTPATIENT)
Age: 58
End: 2023-12-06
Payer: COMMERCIAL

## 2023-12-06 VITALS
HEART RATE: 76 BPM | OXYGEN SATURATION: 99 % | DIASTOLIC BLOOD PRESSURE: 81 MMHG | WEIGHT: 178 LBS | SYSTOLIC BLOOD PRESSURE: 146 MMHG | HEIGHT: 65 IN | BODY MASS INDEX: 29.66 KG/M2

## 2023-12-06 DIAGNOSIS — I42.9 CARDIOMYOPATHY, UNSPECIFIED TYPE (HCC): ICD-10-CM

## 2023-12-06 DIAGNOSIS — Z95.5 PRESENCE OF CORONARY ANGIOPLASTY IMPLANT AND GRAFT: ICD-10-CM

## 2023-12-06 DIAGNOSIS — I25.118 ATHEROSCLEROTIC HEART DISEASE OF NATIVE CORONARY ARTERY WITH OTHER FORMS OF ANGINA PECTORIS (HCC): Primary | ICD-10-CM

## 2023-12-06 DIAGNOSIS — I50.21 ACUTE SYSTOLIC (CONGESTIVE) HEART FAILURE (HCC): ICD-10-CM

## 2023-12-06 DIAGNOSIS — I48.92 PAROXYSMAL ATRIAL FLUTTER (HCC): ICD-10-CM

## 2023-12-06 DIAGNOSIS — E78.49 OTHER HYPERLIPIDEMIA: ICD-10-CM

## 2023-12-06 DIAGNOSIS — I10 ESSENTIAL (PRIMARY) HYPERTENSION: ICD-10-CM

## 2023-12-06 PROCEDURE — 99214 OFFICE O/P EST MOD 30 MIN: CPT | Performed by: INTERNAL MEDICINE

## 2023-12-06 PROCEDURE — 3079F DIAST BP 80-89 MM HG: CPT | Performed by: INTERNAL MEDICINE

## 2023-12-06 PROCEDURE — 93000 ELECTROCARDIOGRAM COMPLETE: CPT | Performed by: INTERNAL MEDICINE

## 2023-12-06 PROCEDURE — 3077F SYST BP >= 140 MM HG: CPT | Performed by: INTERNAL MEDICINE

## 2023-12-06 NOTE — PROGRESS NOTES
1. Have you been to the ER, urgent care clinic since your last visit? Hospitalized since your last visit? Yes Where: [facility] Reason for visit: Pneumonia    2. Have you seen or consulted any other health care providers outside of the [address] [place] [address] Avenue since your last visit? Include any pap smears or colon screening.  No
is well-developed. He is not diaphoretic. HENT:      Head: Atraumatic. Mouth/Throat:      Pharynx: No oropharyngeal exudate. Eyes:      General: No scleral icterus. Conjunctiva/sclera: Conjunctivae normal.   Neck:      Thyroid: No thyromegaly. Vascular: No JVD. Trachea: No tracheal deviation. Cardiovascular:      Rate and Rhythm: Normal rate and regular rhythm. Heart sounds: No murmur heard. No gallop. Pulmonary:      Effort: Pulmonary effort is normal.      Breath sounds: Normal breath sounds. No stridor. No wheezing or rales. Abdominal:      Palpations: Abdomen is soft. Tenderness: There is no abdominal tenderness. There is no guarding or rebound. Musculoskeletal:         General: No tenderness. Normal range of motion. Cervical back: Normal range of motion and neck supple. Skin:     General: Skin is warm. Neurological:      Mental Status: He is alert and oriented to person, place, and time. Motor: No abnormal muscle tone. Psychiatric:         Behavior: Behavior normal.     9/2018 - 14 day event - NSR  Echo 09/2018:  NORMAL LEFT VENTRICULAR CAVITY SIZE AND SYSTOLIC FUNCTION WITH AN EJECTION FRACTION   VISUALLY ESTIMATED AT 60%. NORMAL DIASTOLIC FUNCTION. MILD LEFT VENTRICULAR HYPERTROPHY PRESENT. TRACE MITRAL REGURGITATION. MILDLY SCLEROTIC TRILEAFLET AORTIC VALVE. INSUFFICIENT TRICUSPID REGURGITANT JET TO ESTIMATE PULMONARY ARTERY PRESSURE. NEGATIVE BUBBLE STUDY ON PRIOR ECHO REPORT 4/09/2012. NO SIGNIFICANT CHANGES FROM PRIOR ECHO REPORT ON 4/09/2012.   Lexiscan 2018-  THIS MYOCARDIAL PERFUSION STUDY IS ABNORMAL   THIS IS A MEDIUM RISK STUDY   ABNORMAL NUCLEAR SCAN WITH  A SMALL AREA OF NON PERFUSION ON BOTH THE STRESS AND REST IMAGES   IN THE INFERIOR APICAL WALL CONSISTENT WITH EITHER PRIOR INFERIOR APICAL INFARCTION OR HIGH GRADE   OCCLUSION TO THE DISTAL RCA        INFERIOR APICAL AKINESIS WITH AN EJECTION FRACTION  ESTIMATED AT

## 2023-12-07 ENCOUNTER — TELEPHONE (OUTPATIENT)
Age: 58
End: 2023-12-07

## 2023-12-07 DIAGNOSIS — F32.A DEPRESSION, UNSPECIFIED: ICD-10-CM

## 2023-12-07 DIAGNOSIS — I50.21 ACUTE SYSTOLIC (CONGESTIVE) HEART FAILURE (HCC): ICD-10-CM

## 2023-12-07 NOTE — TELEPHONE ENCOUNTER
Last Filled - 6/28/23    Last Appt -8/10/23    Next Appt -none    Labs - 10/3/22      Additional Notes:

## 2023-12-07 NOTE — TELEPHONE ENCOUNTER
Returned patient wife call regarding Entresto:Copay $800.00    I explained pt. May require a prior auth ( I'll contact pharmacy) meanwhile pt can apply for patient assistance. Will need the following:    Bring copy of Social Security letter   Sign pt.  Assistance application   samples 24mg/26mg Lamin Red

## 2023-12-08 RX ORDER — ESCITALOPRAM OXALATE 20 MG/1
TABLET ORAL
Qty: 30 TABLET | Refills: 0 | Status: SHIPPED | OUTPATIENT
Start: 2023-12-08 | End: 2024-01-08

## 2023-12-11 ENCOUNTER — HOME CARE VISIT (OUTPATIENT)
Age: 58
End: 2023-12-11
Payer: COMMERCIAL

## 2023-12-11 VITALS
SYSTOLIC BLOOD PRESSURE: 100 MMHG | RESPIRATION RATE: 16 BRPM | TEMPERATURE: 97.4 F | HEART RATE: 70 BPM | DIASTOLIC BLOOD PRESSURE: 80 MMHG

## 2023-12-11 PROCEDURE — G0300 HHS/HOSPICE OF LPN EA 15 MIN: HCPCS

## 2023-12-12 ENCOUNTER — TELEPHONE (OUTPATIENT)
Facility: CLINIC | Age: 58
End: 2023-12-12

## 2023-12-12 ENCOUNTER — HOME CARE VISIT (OUTPATIENT)
Age: 58
End: 2023-12-12
Payer: COMMERCIAL

## 2023-12-12 PROCEDURE — G0300 HHS/HOSPICE OF LPN EA 15 MIN: HCPCS

## 2023-12-15 ENCOUNTER — OFFICE VISIT (OUTPATIENT)
Facility: CLINIC | Age: 58
End: 2023-12-15
Payer: COMMERCIAL

## 2023-12-15 VITALS
RESPIRATION RATE: 16 BRPM | SYSTOLIC BLOOD PRESSURE: 110 MMHG | HEART RATE: 74 BPM | OXYGEN SATURATION: 98 % | DIASTOLIC BLOOD PRESSURE: 74 MMHG

## 2023-12-15 DIAGNOSIS — I10 ESSENTIAL (PRIMARY) HYPERTENSION: ICD-10-CM

## 2023-12-15 DIAGNOSIS — Z79.4 TYPE 2 DIABETES MELLITUS WITH DIABETIC NEUROPATHY, WITH LONG-TERM CURRENT USE OF INSULIN (HCC): Primary | ICD-10-CM

## 2023-12-15 DIAGNOSIS — E11.40 TYPE 2 DIABETES MELLITUS WITH DIABETIC NEUROPATHY, WITH LONG-TERM CURRENT USE OF INSULIN (HCC): Primary | ICD-10-CM

## 2023-12-15 DIAGNOSIS — L21.9 SEBORRHEIC DERMATITIS OF SCALP: ICD-10-CM

## 2023-12-15 DIAGNOSIS — E11.69 HYPERLIPIDEMIA ASSOCIATED WITH TYPE 2 DIABETES MELLITUS (HCC): ICD-10-CM

## 2023-12-15 DIAGNOSIS — E11.40 TYPE 2 DIABETES MELLITUS WITH DIABETIC NEUROPATHY, UNSPECIFIED (HCC): ICD-10-CM

## 2023-12-15 DIAGNOSIS — F32.A DEPRESSION, UNSPECIFIED DEPRESSION TYPE: Chronic | ICD-10-CM

## 2023-12-15 DIAGNOSIS — E78.5 HYPERLIPIDEMIA ASSOCIATED WITH TYPE 2 DIABETES MELLITUS (HCC): ICD-10-CM

## 2023-12-15 PROCEDURE — 3074F SYST BP LT 130 MM HG: CPT | Performed by: FAMILY MEDICINE

## 2023-12-15 PROCEDURE — 99214 OFFICE O/P EST MOD 30 MIN: CPT | Performed by: FAMILY MEDICINE

## 2023-12-15 PROCEDURE — 3078F DIAST BP <80 MM HG: CPT | Performed by: FAMILY MEDICINE

## 2023-12-15 PROCEDURE — 3051F HG A1C>EQUAL 7.0%<8.0%: CPT | Performed by: FAMILY MEDICINE

## 2023-12-15 RX ORDER — INSULIN GLARGINE 100 [IU]/ML
46 INJECTION, SOLUTION SUBCUTANEOUS NIGHTLY PRN
Qty: 45 ML | Refills: 1 | COMMUNITY
Start: 2023-12-15

## 2023-12-15 RX ORDER — INSULIN GLARGINE 100 [IU]/ML
INJECTION, SOLUTION SUBCUTANEOUS
Qty: 45 ML | Refills: 1 | OUTPATIENT
Start: 2023-12-15

## 2023-12-15 RX ORDER — EMPAGLIFLOZIN AND METFORMIN HYDROCHLORIDE 5; 500 MG/1; MG/1
1 TABLET ORAL 2 TIMES DAILY
Qty: 60 TABLET | COMMUNITY
Start: 2023-12-15

## 2023-12-15 NOTE — PROGRESS NOTES
Chief Complaint   Patient presents with    Diabetes    Cholesterol Problem    Hypertension    Depression        Vitals:    12/15/23 1052   BP: 110/74   Pulse: 74   Resp: 16   SpO2: 98%        Depression: At risk (5/11/2023)    PHQ-2     PHQ-2 Score: 6             No data to display                   . \"Have you been to the ER, urgent care clinic since your last visit? Hospitalized since your last visit? \" [address]  -     2. \"Have you seen or consulted any other health care providers outside of the [address] [place] [address] since your last visit? \" No      3. For patients aged 43-73: Has the patient had a colonoscopy / FIT/ Cologuard?  No

## 2023-12-15 NOTE — PROGRESS NOTES
ASSESSMENT/PLAN:  1. Type 2 diabetes mellitus with diabetic neuropathy, with long-term current use of insulin (HCC)  Assessment & Plan:   Well-controlled, continue current medications  2. Essential (primary) hypertension  Assessment & Plan:   Well-controlled, continue current medications  3. Hyperlipidemia associated with type 2 diabetes mellitus ([address])  Assessment & Plan:   Well-controlled, continue current medications  4. Seborrheic dermatitis of scalp  -     External Referral To Dermatology  5. Depression, unspecified depression type  Assessment & Plan:   Borderline controlled, continue current medications and continue current treatment plan        Return in about 3 months (around 3/15/2024) for DM, HTN, HLD, depression. SUBJECTIVE/OBJECTIVE:    Chief Complaint   Patient presents with    Diabetes    Cholesterol Problem    Hypertension    Depression         HPI    Rima Cisneros. is a 62 y.o. male presenting today for delayed  follow up of dm, htn, hld, depression. Pt and girlfriend report that he has stopped drinking etoh, smoking, and has decreased his sugar intake. Home bp readings are good. Blood sugars are always under 135. His lowest was 72. Endo has made adjustments in his meds. Pt will have cardioversion with EP on 12/22/23. Patient does not need medication refills today. New concerns today: pt needs a dermatologist for seb dermatitis. Pt has completed his paperwork for his colo; it will be done in late Jan 2024. Review of Systems   Constitutional: Negative. HENT: Negative. Respiratory: Negative. Cardiovascular: Negative. Skin:  Positive for rash. All other systems reviewed and are negative. Physical Exam  Vitals and nursing note reviewed. Constitutional:       General: He is not in acute distress.      Appearance:

## 2023-12-17 PROBLEM — E11.40 TYPE 2 DIABETES MELLITUS WITH DIABETIC NEUROPATHY, UNSPECIFIED (HCC): Chronic | Status: ACTIVE | Noted: 2019-04-25

## 2023-12-17 PROBLEM — I25.10 CAD (CORONARY ARTERY DISEASE): Chronic | Status: ACTIVE | Noted: 2020-11-05

## 2023-12-17 PROBLEM — E78.5 HYPERLIPIDEMIA ASSOCIATED WITH TYPE 2 DIABETES MELLITUS (HCC): Chronic | Status: ACTIVE | Noted: 2022-10-04

## 2023-12-17 PROBLEM — Z79.4 TYPE 2 DIABETES MELLITUS WITH DIABETIC NEUROPATHY, WITH LONG-TERM CURRENT USE OF INSULIN (HCC): Chronic | Status: ACTIVE | Noted: 2019-04-25

## 2023-12-17 PROBLEM — E11.40 TYPE 2 DIABETES MELLITUS WITH DIABETIC NEUROPATHY, UNSPECIFIED (HCC): Status: ACTIVE | Noted: 2019-04-25

## 2023-12-17 PROBLEM — Z79.4 TYPE 2 DIABETES MELLITUS WITH DIABETIC NEUROPATHY, WITH LONG-TERM CURRENT USE OF INSULIN (HCC): Status: ACTIVE | Noted: 2019-04-25

## 2023-12-17 PROBLEM — L21.9 SEBORRHEIC DERMATITIS OF SCALP: Status: ACTIVE | Noted: 2023-12-17

## 2023-12-17 PROBLEM — E11.69 HYPERLIPIDEMIA ASSOCIATED WITH TYPE 2 DIABETES MELLITUS (HCC): Chronic | Status: ACTIVE | Noted: 2022-10-04

## 2023-12-17 PROBLEM — E11.69 HYPERLIPIDEMIA ASSOCIATED WITH TYPE 2 DIABETES MELLITUS (HCC): Status: ACTIVE | Noted: 2022-10-04

## 2023-12-17 PROBLEM — F32.A DEPRESSION: Chronic | Status: ACTIVE | Noted: 2021-03-01

## 2023-12-19 ENCOUNTER — HOME CARE VISIT (OUTPATIENT)
Age: 58
End: 2023-12-19
Payer: COMMERCIAL

## 2023-12-19 VITALS
RESPIRATION RATE: 16 BRPM | SYSTOLIC BLOOD PRESSURE: 102 MMHG | TEMPERATURE: 98 F | DIASTOLIC BLOOD PRESSURE: 70 MMHG | HEART RATE: 80 BPM | OXYGEN SATURATION: 98 %

## 2023-12-19 PROCEDURE — G0300 HHS/HOSPICE OF LPN EA 15 MIN: HCPCS

## 2023-12-26 ENCOUNTER — TELEPHONE (OUTPATIENT)
Age: 58
End: 2023-12-26

## 2023-12-26 NOTE — TELEPHONE ENCOUNTER
12/22/23 Conference call w/patient and [employer] pt. Assistance associate)   Per Chiara Briseno ~ pt. assistance form was incomplete. Needed patient [phone]. Patient advised Chiara Briseno he does not file taxes. Per Chiara Briseno will make a note of this and submit for re-processing. May have an update know later than 12/27/23.    12/26/23 Spoke w/ Jeanette Mendoza w/[facility] patient assistance associate. To check status of assistance form. Stated still in reprocess status.

## 2023-12-30 ENCOUNTER — HOME CARE VISIT (OUTPATIENT)
Age: 58
End: 2023-12-30
Payer: COMMERCIAL

## 2023-12-31 ENCOUNTER — HOME CARE VISIT (OUTPATIENT)
Age: 58
End: 2023-12-31
Payer: COMMERCIAL

## 2024-01-04 ENCOUNTER — HOME CARE VISIT (OUTPATIENT)
Age: 59
End: 2024-01-04
Payer: COMMERCIAL

## 2024-01-04 PROCEDURE — G0299 HHS/HOSPICE OF RN EA 15 MIN: HCPCS

## 2024-01-05 ENCOUNTER — OFFICE VISIT (OUTPATIENT)
Facility: CLINIC | Age: 59
End: 2024-01-05

## 2024-01-05 VITALS
OXYGEN SATURATION: 98 % | WEIGHT: 188 LBS | DIASTOLIC BLOOD PRESSURE: 87 MMHG | HEART RATE: 77 BPM | RESPIRATION RATE: 16 BRPM | SYSTOLIC BLOOD PRESSURE: 148 MMHG | BODY MASS INDEX: 31.28 KG/M2

## 2024-01-05 VITALS
SYSTOLIC BLOOD PRESSURE: 138 MMHG | RESPIRATION RATE: 16 BRPM | HEART RATE: 76 BPM | DIASTOLIC BLOOD PRESSURE: 68 MMHG | TEMPERATURE: 97.9 F | OXYGEN SATURATION: 98 %

## 2024-01-05 DIAGNOSIS — R00.2 PALPITATIONS: ICD-10-CM

## 2024-01-05 DIAGNOSIS — I48.92 ATRIAL FLUTTER, UNSPECIFIED TYPE (HCC): ICD-10-CM

## 2024-01-05 DIAGNOSIS — Z09 HOSPITAL DISCHARGE FOLLOW-UP: Primary | ICD-10-CM

## 2024-01-05 NOTE — HOME HEALTH
Skilled reason for visit: body systems assess, teach disease process and medication management, dc teaching.    Caregiver involvement: family assists with adl's and iadl's prn.    Medications reviewed and all medications are available in the home this visit.    The following education was provided regarding medications: obtain take medications as ordered, side effects, dosages, purposes, frequencies, do not stop or start any medications without notifying MD.    MD notified of any discrepancies/look a-like medications/medication interactions: n/a  Medications are effective at this time.      Home health supplies by type and quantity ordered/delivered this visit include: n/a    Patient education provided this visit: as above, INSTRUCTED PATIENT AND CG THAT SHOULD ANY NEEDS OR CONCERNS ARISE TO FIRST CALL OUR OFFICE, OR THE DR'S OFFICE OR GO TO AN URGENT CARE CENTER AND NOT TO THE ED FOR NON-LIFE THREATENING EVENTS. IF IT IS LIFE THREATENING THEN CALL 911 OR GO TO THE CLOSEST ER, see care plan.    Sharps education provided: Patient/ family knowledgeable.    Patient level of understanding of education provided: partial understanding and teach back    Skilled Care Performed this visit: VS, body systems assessment, teach CHF, DM disease process and medication management    Patient response to procedure performed: receptive to teaching, tolerated assess and teaching without adverse effects noted.    Agency Progress toward goals: progressing, see care plan    Patient's Progress towards personal goals: progressing, see care plan, Care plan reviewed and precautions discussed. Medications were reviewed with the patient. Patient given opportunity to voice questions and/ or concerns. All pt's questions and concerns were addressed. Increasing symptoms and return precautions associated with chief complaint and assessment were reviewed with the patient in detail. The patient demonstrated adequate understanding.   Home exercise

## 2024-01-05 NOTE — PROGRESS NOTES
Chief Complaint   Patient presents with    cardioversion       Patient had recent cardioversion  complains of SOB and \"feeling his heart beat funny\"  .       Upon this admission from patient this nurse notified Dr. Denise . Per verbal conversation Dr Denise recommends that patient go to ED immediately due to being symptomatic .  Pt is accompanied by partner  who was also notified .   Pt has left building but no clear verification that they would present to ED for Cardiac Evaluation .     Vitals:    01/05/24 1320   BP: (!) 148/87   Pulse: 77   Resp: 16   SpO2: 98%        Depression: At risk (5/11/2023)    PHQ-2     PHQ-2 Score: 6             No data to display

## 2024-01-05 NOTE — PROGRESS NOTES
Post-Discharge Transitional Care Follow Up      Rose Pulido Sr.   YOB: 1965    Date of Office Visit:  1/5/2024  Date of Hospital Admission: 12/22/23  Date of Hospital Discharge: 12/22/23  Readmission Risk Score (high >=14%. Medium >=10%):Readmission Risk Score: 13.9      Care management risk score Rising risk (score 2-5) and Complex Care (Scores >=6): No Risk Score On File     Non face to face  following discharge, date last encounter closed (first attempt may have been earlier): *No documented post hospital discharge outreach found in the last 14 days     Call initiated 2 business days of discharge: *No response recorded in the last 14 days     Hospital discharge follow-up  -     DC DISCHARGE MEDS RECONCILED W/ CURRENT OUTPATIENT MED LIST  Palpitations  Atrial flutter, unspecified type (formerly [facility])  RECOMMEND PT GO TO ER FOR EVAL.  OFFERED EMS TRANSPORT.  PT DECLINES.  PT'S S/O WAS UNAWARE OF HIS PALPITATIONS.  SHE IS IN AGREEMENT WITH ER EVAL C/W INSTRUCTIONS FROM CARDIOLOGY.  PT STATES THAT HE WILL NOT BE GOING TO THE ER OR GETTING ANY FURTHER EVALUATION AT THIS TIME.       Medical Decision Making: moderate complexity  No follow-ups on file.           Subjective:   HPI    Inpatient course: Discharge summary reviewed- see chart.  Pt was admitted for cardioversion to treat atrial flutter.  The procedure was successful.      Interval history/Current status: pt has been having palpitations.      Patient Active Problem List   Diagnosis    Essential (primary) hypertension    Type 2 diabetes mellitus with diabetic neuropathy, with long-term current use of insulin (formerly [facility])    Type 2 diabetes mellitus, with long-term current use of insulin (formerly [facility])    History of CVA (cerebrovascular accident)    History of MI (myocardial infarction)    CAD (coronary artery disease)    Stable angina pectoris    CHF (congestive heart failure) (formerly [facility])    Right sided abdominal pain    Acute right-sided low back pain without sciatica

## 2024-01-08 DIAGNOSIS — F32.A DEPRESSION, UNSPECIFIED: ICD-10-CM

## 2024-01-08 RX ORDER — ESCITALOPRAM OXALATE 20 MG/1
TABLET ORAL
Qty: 30 TABLET | Refills: 5 | Status: SHIPPED | OUTPATIENT
Start: 2024-01-08

## 2024-01-08 NOTE — TELEPHONE ENCOUNTER
Last Filled: 12/8/23    Last appt: 1/5/24    Next appt: 3/15/24    Labs:10/3/22    UDS:    CSA:    Additional Notes:

## 2024-01-11 ENCOUNTER — HOME CARE VISIT (OUTPATIENT)
Age: 59
End: 2024-01-11
Payer: COMMERCIAL

## 2024-01-11 VITALS
DIASTOLIC BLOOD PRESSURE: 76 MMHG | TEMPERATURE: 98 F | OXYGEN SATURATION: 98 % | HEART RATE: 70 BPM | SYSTOLIC BLOOD PRESSURE: 118 MMHG

## 2024-01-11 PROCEDURE — G0300 HHS/HOSPICE OF LPN EA 15 MIN: HCPCS

## 2024-01-16 ASSESSMENT — ENCOUNTER SYMPTOMS: STOOL DESCRIPTION: FORMED

## 2024-01-17 ENCOUNTER — HOME CARE VISIT (OUTPATIENT)
Age: 59
End: 2024-01-17
Payer: COMMERCIAL

## 2024-01-17 VITALS
TEMPERATURE: 97.4 F | SYSTOLIC BLOOD PRESSURE: 100 MMHG | OXYGEN SATURATION: 95 % | DIASTOLIC BLOOD PRESSURE: 60 MMHG | RESPIRATION RATE: 18 BRPM | HEART RATE: 66 BPM

## 2024-01-17 PROCEDURE — G0299 HHS/HOSPICE OF RN EA 15 MIN: HCPCS

## 2024-01-17 ASSESSMENT — ENCOUNTER SYMPTOMS: DYSPNEA ACTIVITY LEVEL: AFTER AMBULATING MORE THAN 20 FT

## 2024-01-18 NOTE — HOME HEALTH
confusion, nervousness, or any other symptoms of low blood sugar.    SN instructed patient about the heart is a muscle that pumps oxygen-rich blood to all parts of the body. When you have heartfailure, the heart is not able to pump as well as it should. Blood and fluid may back up into the lungs (congestive heart failure), and some parts of the body don’t get enough oxygen-rich blood to work normally. These problems lead to the symptoms of heartfailure. Follow a heart healthy diet. And make sure to limit the salt (sodium) in your diet. Salt causes your body to hold water. This makes your heart work harder as there is more fluid for the heart to pump.  Instructed patient/caregiver to notify SN/PT of any signs and symptoms of antiplatelet adverse effects including SOB, bleeding longer/heavier with cuts, bruising, nose bleeds, and/or upset stomach.        Sharps education provided: Clinician instructed patient/CG on proper disposal of sharps: Containers should be made of hard plastic, be puncture-resistant and leakproof, such as a laundry detergent or bleach bottle.  When the container is ¾ full, it should be sealed with tape and labeled DO NOT RECYCLE prior to discarding in the regular trash.        Patient level of understanding of education provided: Patient and caregiver verbalized understanding      Patient response to procedure performed:  Patient tolerated w/o any increased level of pain      Agency Progress toward goals: goals met    Patient's Progress towards personal goals: goals met    Home exercise program: Take all medications as prescribed, follow all fall precaution measures, attend all future medical appointments

## 2024-01-23 ENCOUNTER — TELEPHONE (OUTPATIENT)
Facility: CLINIC | Age: 59
End: 2024-01-23

## 2024-01-23 NOTE — TELEPHONE ENCOUNTER
[facility] w/ [facility] called inquring about certificate of medical necessity for diabetic supplies. Requested for [facility] to refax in the event we do not have.

## 2024-01-24 NOTE — PROGRESS NOTES
HISTORY OF PRESENT ILLNESS  Rose Pulido Sr. is a 58 y.o. male.    Past medical history congestive heart failure, history of CVA, history MI, hypertension, hyperlipidemia, coronary risk equivalent diabetes mellitus, CAD history of PCI.    ----  CARDIAC STUDIES  ----  EP cardioversion 12/22/2023  Successful Cardioversion utilizing 20J synchronized Biphasic from Atrial flutter to Normal sinus rhythm  Plan: Continue Anticoagulation.    ----  LHC 11/16/2023  Severe Coronary Artery Disease involving the mLAD 50-69% iFR 0.85, IVUS 3.1 mm squared  Tubular segment  Successful balloon angioplasty and SHERLYN placed at the mLAD 50-69% to 0% DENILSON III to DENILSON III flow utilizing 3.0x26mm SHERLYN postdilated with 3.5mm balloon to 3.6  LM luminal irregularities, rPDA 30-50% stenosis, dLAD 30-40% stenosis long, dLCFX 100% stenosis as it goes into AV groove with R to L collaterals, patent stent in the OM  LVEDP 11mmHg  RCA Dominant System  Plan: Continue with triple therapy including aspirin 81mg po daily, plavix 75mg po daily, eliquis 5mg po bid for 4 weeks followed by eliquis 5mg po bid and plavix 75mg po daily after that for at least 12 months.  Patient received brilinta load today 180mg po x once  ----  2d echo 11/13/2023    Left Ventricle: Moderately reduced left ventricular systolic function with a visually estimated EF of 35 - 40%. Left ventricle size is normal. Mild septal thickening. Moderate global hypokinesis present. Indeterminate diastolic function.    Right Ventricle: Right ventricle size is normal. Reduced systolic function. TAPSE is abnormal. RV Peak S' is 6 cm/s. TDI systolic excursion is abnormal. RV free wall strain is abnormal.    Aortic Valve: No stenosis.    Mitral Valve: Moderate regurgitation. MR ERO is 0.3cm2.    Tricuspid Valve: The estimated RVSP is 35 mmHg.    Pulmonary Arteries: Mild pulmonary hypertension present.    Aorta: Normal sized aortic root. Dilated ascending aorta. Ao ascending diameter is 4.0 cm.

## 2024-01-31 ENCOUNTER — OFFICE VISIT (OUTPATIENT)
Age: 59
End: 2024-01-31
Payer: COMMERCIAL

## 2024-01-31 VITALS
BODY MASS INDEX: 31.12 KG/M2 | HEIGHT: 65 IN | OXYGEN SATURATION: 98 % | SYSTOLIC BLOOD PRESSURE: 170 MMHG | WEIGHT: 186.8 LBS | DIASTOLIC BLOOD PRESSURE: 93 MMHG | HEART RATE: 72 BPM

## 2024-01-31 DIAGNOSIS — I50.21 ACUTE SYSTOLIC (CONGESTIVE) HEART FAILURE (HCC): ICD-10-CM

## 2024-01-31 DIAGNOSIS — I10 HYPERTENSION, UNSPECIFIED TYPE: ICD-10-CM

## 2024-01-31 DIAGNOSIS — I48.92 ATRIAL FLUTTER, UNSPECIFIED TYPE (HCC): ICD-10-CM

## 2024-01-31 DIAGNOSIS — I25.10 CORONARY ARTERY DISEASE INVOLVING NATIVE CORONARY ARTERY OF NATIVE HEART WITHOUT ANGINA PECTORIS: Primary | ICD-10-CM

## 2024-01-31 DIAGNOSIS — E78.5 HYPERLIPIDEMIA, UNSPECIFIED HYPERLIPIDEMIA TYPE: ICD-10-CM

## 2024-01-31 DIAGNOSIS — R06.02 SHORTNESS OF BREATH: ICD-10-CM

## 2024-01-31 DIAGNOSIS — I48.91 ATRIAL FIBRILLATION, UNSPECIFIED TYPE (HCC): ICD-10-CM

## 2024-01-31 DIAGNOSIS — Z86.73 HISTORY OF CVA (CEREBROVASCULAR ACCIDENT): ICD-10-CM

## 2024-01-31 DIAGNOSIS — I50.42 CHRONIC HEART FAILURE WITH REDUCED EJECTION FRACTION AND DIASTOLIC DYSFUNCTION (HCC): ICD-10-CM

## 2024-01-31 DIAGNOSIS — J96.00 ACUTE RESPIRATORY FAILURE, UNSPECIFIED WHETHER WITH HYPOXIA OR HYPERCAPNIA (HCC): ICD-10-CM

## 2024-01-31 PROCEDURE — 3080F DIAST BP >= 90 MM HG: CPT | Performed by: INTERNAL MEDICINE

## 2024-01-31 PROCEDURE — 99214 OFFICE O/P EST MOD 30 MIN: CPT | Performed by: INTERNAL MEDICINE

## 2024-01-31 PROCEDURE — 3077F SYST BP >= 140 MM HG: CPT | Performed by: INTERNAL MEDICINE

## 2024-01-31 PROCEDURE — 93000 ELECTROCARDIOGRAM COMPLETE: CPT | Performed by: INTERNAL MEDICINE

## 2024-01-31 RX ORDER — SPIRONOLACTONE 25 MG/1
25 TABLET ORAL DAILY
Qty: 30 TABLET | Refills: 5 | Status: SHIPPED | OUTPATIENT
Start: 2024-01-31

## 2024-01-31 RX ORDER — METOPROLOL SUCCINATE 25 MG/1
25 TABLET, EXTENDED RELEASE ORAL DAILY
Qty: 30 TABLET | Refills: 5 | Status: SHIPPED | OUTPATIENT
Start: 2024-01-31

## 2024-01-31 RX ORDER — ATORVASTATIN CALCIUM 80 MG/1
80 TABLET, FILM COATED ORAL DAILY
Qty: 90 TABLET | Refills: 5 | Status: SHIPPED | OUTPATIENT
Start: 2024-01-31

## 2024-01-31 RX ORDER — FUROSEMIDE 40 MG/1
40 TABLET ORAL 2 TIMES DAILY
Qty: 60 TABLET | Refills: 3 | Status: SHIPPED | OUTPATIENT
Start: 2024-01-31

## 2024-01-31 RX ORDER — CLOPIDOGREL BISULFATE 75 MG/1
75 TABLET ORAL DAILY
Qty: 30 TABLET | Refills: 5 | Status: SHIPPED | OUTPATIENT
Start: 2024-01-31

## 2024-01-31 ASSESSMENT — ENCOUNTER SYMPTOMS
NAUSEA: 0
SHORTNESS OF BREATH: 0
BLOOD IN STOOL: 0
COUGH: 0
CONSTIPATION: 0
CHEST TIGHTNESS: 0
WHEEZING: 0
DIARRHEA: 0
COLOR CHANGE: 0
APNEA: 0
ABDOMINAL PAIN: 0

## 2024-01-31 NOTE — PATIENT INSTRUCTIONS
Learning About the Mediterranean Diet  What is the Mediterranean diet?     The Mediterranean diet is a style of eating rather than a diet plan. It features foods eaten in [place], [place], [place] and [place], and other countries along the [place] Sea. It emphasizes eating foods like fish, fruits, vegetables, beans, high-fiber breads and whole grains, nuts, and olive oil. This style of eating includes limited red meat, cheese, and sweets.  Why choose the Mediterranean diet?  A Mediterranean-style diet may improve heart health. It contains more fat than other heart-healthy diets. But the fats are mainly from nuts, unsaturated oils (such as fish oils and olive oil), and certain nut or seed oils (such as canola, soybean, or flaxseed oil). These fats may help protect the heart and blood vessels.  How can you get started on the Mediterranean diet?  Here are some things you can do to switch to a more Mediterranean way of eating.  What to eat  Eat a variety of fruits and vegetables each day, such as grapes, blueberries, tomatoes, broccoli, peppers, figs, olives, spinach, eggplant, beans, lentils, and chickpeas.  Eat a variety of whole-grain foods each day, such as oats, brown rice, and whole wheat bread, pasta, and couscous.  Eat fish at least 2 times a week. Try tuna, salmon, mackerel, lake trout, herring, or sardines.  Eat moderate amounts of low-fat dairy products, such as milk, cheese, or yogurt.  Eat moderate amounts of poultry and eggs.  Choose healthy (unsaturated) fats, such as nuts, olive oil, and certain nut or seed oils like canola, soybean, and flaxseed.  Limit unhealthy (saturated) fats, such as butter, palm oil, and coconut oil. And limit fats found in animal products, such as meat and dairy products made with whole milk. Try to eat red meat only a few times a month in very small amounts.  Limit sweets and desserts to only a few times a week. This includes sugar-sweetened drinks like soda.  The

## 2024-01-31 NOTE — PROGRESS NOTES
Have you had Fatigue?  No      2.   Have you had have you had Chest Pain? No     3.   Have you had Dyspnea (SOB)? No     4.   Have you had Orthopnea? No      5.   Have you had PND? No     6.   Have you had leg swelling? No     7.    Have you had any weight gain? No    8.    Have you had any palpitations? No     9.    Have you had any syncope? No     10. Do you have any wounds on legs? No

## 2024-02-16 ENCOUNTER — TELEPHONE (OUTPATIENT)
Facility: CLINIC | Age: 59
End: 2024-02-16

## 2024-02-16 NOTE — TELEPHONE ENCOUNTER
----- Message from Steffimikael Frank sent at 2/15/2024 11:50 AM EST -----  Subject: Message to Provider    QUESTIONS  Information for Provider? Rose Pulido [facility] sent a fax on 1/23/24 for   diabetic supply has not receive a reply. She is also sending a refax today   2/15/24 If was received to return fax if question call [phone]  ---------------------------------------------------------------------------  --------------  CALL BACK INFO  [phone]; OK to leave message on voicemail  ---------------------------------------------------------------------------  --------------  SCRIPT ANSWERS  Relationship to Patient? Covered Entity  Covered Entity Type? Durable Medical Equipment?  Representative Name? Antonio

## 2024-02-22 ENCOUNTER — HOSPITAL ENCOUNTER (EMERGENCY)
Facility: HOSPITAL | Age: 59
Discharge: HOME | End: 2024-02-22
Attending: STUDENT IN AN ORGANIZED HEALTH CARE EDUCATION/TRAINING PROGRAM
Payer: COMMERCIAL

## 2024-02-22 ENCOUNTER — APPOINTMENT (OUTPATIENT)
Facility: HOSPITAL | Age: 59
End: 2024-02-22
Payer: COMMERCIAL

## 2024-02-22 VITALS
SYSTOLIC BLOOD PRESSURE: 158 MMHG | DIASTOLIC BLOOD PRESSURE: 85 MMHG | RESPIRATION RATE: 18 BRPM | TEMPERATURE: 98.1 F | BODY MASS INDEX: 30.82 KG/M2 | HEART RATE: 97 BPM | WEIGHT: 185 LBS | HEIGHT: 65 IN | OXYGEN SATURATION: 97 %

## 2024-02-22 DIAGNOSIS — R22.0 SWELLING OF UPPER LIP: Primary | ICD-10-CM

## 2024-02-22 DIAGNOSIS — I48.91 ATRIAL FIBRILLATION WITH RVR (HCC): ICD-10-CM

## 2024-02-22 DIAGNOSIS — R73.9 HYPERGLYCEMIA: ICD-10-CM

## 2024-02-22 LAB
ANION GAP SERPL CALC-SCNC: 6 MMOL/L (ref 3–18)
BASOPHILS # BLD: 0.1 K/UL (ref 0–0.1)
BASOPHILS NFR BLD: 1 % (ref 0–2)
BUN SERPL-MCNC: 32 MG/DL (ref 7–18)
BUN/CREAT SERPL: 16 (ref 12–20)
CALCIUM SERPL-MCNC: 8.9 MG/DL (ref 8.5–10.1)
CHLORIDE SERPL-SCNC: 94 MMOL/L (ref 100–111)
CO2 SERPL-SCNC: 30 MMOL/L (ref 21–32)
CREAT SERPL-MCNC: 2.05 MG/DL (ref 0.6–1.3)
DIFFERENTIAL METHOD BLD: ABNORMAL
EKG ATRIAL RATE: 288 BPM
EKG DIAGNOSIS: NORMAL
EKG P-R INTERVAL: 128 MS
EKG Q-T INTERVAL: 391 MS
EKG QRS DURATION: 99 MS
EKG QTC CALCULATION (BAZETT): 569 MS
EKG R AXIS: 256 DEGREES
EKG T AXIS: -31 DEGREES
EKG VENTRICULAR RATE: 127 BPM
EOSINOPHIL # BLD: 0.1 K/UL (ref 0–0.4)
EOSINOPHIL NFR BLD: 0 % (ref 0–5)
ERYTHROCYTE [DISTWIDTH] IN BLOOD BY AUTOMATED COUNT: 11.5 % (ref 11.6–14.5)
GLUCOSE BLD STRIP.AUTO-MCNC: 354 MG/DL (ref 70–110)
GLUCOSE BLD STRIP.AUTO-MCNC: 449 MG/DL (ref 70–110)
GLUCOSE BLD STRIP.AUTO-MCNC: 449 MG/DL (ref 70–110)
GLUCOSE SERPL-MCNC: 544 MG/DL (ref 74–99)
HCT VFR BLD AUTO: 43.4 % (ref 36–48)
HGB BLD-MCNC: 14.7 G/DL (ref 13–16)
IMM GRANULOCYTES # BLD AUTO: 0.1 K/UL (ref 0–0.04)
IMM GRANULOCYTES NFR BLD AUTO: 1 % (ref 0–0.5)
LYMPHOCYTES # BLD: 2 K/UL (ref 0.9–3.6)
LYMPHOCYTES NFR BLD: 14 % (ref 21–52)
MCH RBC QN AUTO: 31.2 PG (ref 24–34)
MCHC RBC AUTO-ENTMCNC: 33.9 G/DL (ref 31–37)
MCV RBC AUTO: 92.1 FL (ref 78–100)
MONOCYTES # BLD: 0.7 K/UL (ref 0.05–1.2)
MONOCYTES NFR BLD: 5 % (ref 3–10)
NEUTS SEG # BLD: 11.9 K/UL (ref 1.8–8)
NEUTS SEG NFR BLD: 80 % (ref 40–73)
NRBC # BLD: 0 K/UL (ref 0–0.01)
NRBC BLD-RTO: 0 PER 100 WBC
PLATELET # BLD AUTO: 343 K/UL (ref 135–420)
PMV BLD AUTO: 10.9 FL (ref 9.2–11.8)
POTASSIUM SERPL-SCNC: 4.2 MMOL/L (ref 3.5–5.5)
RBC # BLD AUTO: 4.71 M/UL (ref 4.35–5.65)
SODIUM SERPL-SCNC: 130 MMOL/L (ref 136–145)
TROPONIN I SERPL HS-MCNC: 35 NG/L (ref 0–78)
WBC # BLD AUTO: 14.8 K/UL (ref 4.6–13.2)

## 2024-02-22 PROCEDURE — 71045 X-RAY EXAM CHEST 1 VIEW: CPT

## 2024-02-22 PROCEDURE — 80048 BASIC METABOLIC PNL TOTAL CA: CPT

## 2024-02-22 PROCEDURE — 2580000003 HC RX 258: Performed by: STUDENT IN AN ORGANIZED HEALTH CARE EDUCATION/TRAINING PROGRAM

## 2024-02-22 PROCEDURE — 99285 EMERGENCY DEPT VISIT HI MDM: CPT

## 2024-02-22 PROCEDURE — 6370000000 HC RX 637 (ALT 250 FOR IP): Performed by: STUDENT IN AN ORGANIZED HEALTH CARE EDUCATION/TRAINING PROGRAM

## 2024-02-22 PROCEDURE — 82962 GLUCOSE BLOOD TEST: CPT

## 2024-02-22 PROCEDURE — 96374 THER/PROPH/DIAG INJ IV PUSH: CPT

## 2024-02-22 PROCEDURE — 96361 HYDRATE IV INFUSION ADD-ON: CPT

## 2024-02-22 PROCEDURE — 85025 COMPLETE CBC W/AUTO DIFF WBC: CPT

## 2024-02-22 PROCEDURE — 93005 ELECTROCARDIOGRAM TRACING: CPT | Performed by: STUDENT IN AN ORGANIZED HEALTH CARE EDUCATION/TRAINING PROGRAM

## 2024-02-22 PROCEDURE — 84484 ASSAY OF TROPONIN QUANT: CPT

## 2024-02-22 PROCEDURE — 2500000003 HC RX 250 WO HCPCS: Performed by: STUDENT IN AN ORGANIZED HEALTH CARE EDUCATION/TRAINING PROGRAM

## 2024-02-22 PROCEDURE — 96372 THER/PROPH/DIAG INJ SC/IM: CPT

## 2024-02-22 RX ORDER — 0.9 % SODIUM CHLORIDE 0.9 %
1000 INTRAVENOUS SOLUTION INTRAVENOUS ONCE
Status: COMPLETED | OUTPATIENT
Start: 2024-02-22 | End: 2024-02-22

## 2024-02-22 RX ORDER — AMOXICILLIN AND CLAVULANATE POTASSIUM 875; 125 MG/1; MG/1
1 TABLET, FILM COATED ORAL 2 TIMES DAILY
Qty: 14 TABLET | Refills: 0 | Status: SHIPPED | OUTPATIENT
Start: 2024-02-22 | End: 2024-02-29

## 2024-02-22 RX ORDER — DILTIAZEM HYDROCHLORIDE 5 MG/ML
20 INJECTION INTRAVENOUS ONCE
Status: COMPLETED | OUTPATIENT
Start: 2024-02-22 | End: 2024-02-22

## 2024-02-22 RX ADMIN — SODIUM CHLORIDE 1000 ML: 9 INJECTION, SOLUTION INTRAVENOUS at 09:47

## 2024-02-22 RX ADMIN — DILTIAZEM HYDROCHLORIDE 20 MG: 5 INJECTION INTRAVENOUS at 11:57

## 2024-02-22 RX ADMIN — INSULIN HUMAN 3 UNITS: 100 INJECTION, SOLUTION PARENTERAL at 11:04

## 2024-02-22 RX ADMIN — SODIUM CHLORIDE 1000 ML: 9 INJECTION, SOLUTION INTRAVENOUS at 10:56

## 2024-02-22 ASSESSMENT — ENCOUNTER SYMPTOMS
ABDOMINAL PAIN: 0
VOMITING: 0
NAUSEA: 0
DIARRHEA: 0
CHEST TIGHTNESS: 0
SHORTNESS OF BREATH: 0
SORE THROAT: 0

## 2024-02-22 ASSESSMENT — LIFESTYLE VARIABLES
HOW OFTEN DO YOU HAVE A DRINK CONTAINING ALCOHOL: NEVER
HOW MANY STANDARD DRINKS CONTAINING ALCOHOL DO YOU HAVE ON A TYPICAL DAY: PATIENT DOES NOT DRINK

## 2024-02-22 ASSESSMENT — PAIN SCALES - GENERAL: PAINLEVEL_OUTOF10: 0

## 2024-02-22 NOTE — ED PROVIDER NOTES
physician.    XR CHEST 1 VIEW   Final Result   No acute cardiopulmonary process.              Medical Decision Making   I am the first provider for this patient.    I reviewed the vital signs, available nursing notes, past medical history, past surgical history, family history and social history.      Vital Signs-Reviewed the patient's vital signs.    EKG: All EKG's are interpreted by the Emergency Department Physician who either signs or Co-signs this chart in the absence of a cardiologist.  Atrial fibrillation with RVR  at a rate of 127.              Interpretation per the Radiologist below, if available at the time of this note:    ED Course: Progress Notes, Reevaluation, and Consults:    Provider Notes (Medical Decision Making):       MDM  Number of Diagnoses or Management Options  Atrial fibrillation with RVR (HCC)  Hyperglycemia  Swelling of upper lip  Diagnosis management comments: Quick bedside assessment was performed by myself for possible stroke.  Upon assessment I have found his NIH to be 0.  It seems that the facial asymmetry that he for anteriorly is due to the swelling of his right sided upper lip.  It could be due to facial skin infection or infection stemming from his dentition.  No concern for CVA.,  He was found to be in atrial fibrillation with RVR with heart rate in the 140s to 150s.  He is also hyperglycemic with blood sugars greater than 3.  Patient was started on IV fluids.  Screening labs and troponin obtained.  This is negative.  His heart rate has come down to 120s but despite an additional bolus of fluids remains in the 120s.  So he was given a single 20 mg bolus of Cardizem with improvement of his A-fib with RVR to the 90s remained that way for our.  His blood sugar has also improved.  Plan on discharge.  Will also start Augmentin for the possible cellulitis on his upper lip.  I have advised him to follow-up with a dentist.  I will also advised him to follow-up with his PCP and his

## 2024-02-22 NOTE — ED TRIAGE NOTES
Woke this am with swelling to right upper lip and right facial droop, last known well last tariq, unsure what time went to bed. No other s/s stroke per Dr. Prado at bedside.

## 2024-02-22 NOTE — DISCHARGE INSTRUCTIONS
Please make a follow-up appointment with your primary care doctor regarding your blood sugar.  Appointment with your cardiologist regarding your atrial fibrillation.  Take the antibiotics prescribed for additional treatment.  See a dentist for the cavities you have.  Return for any new or worsening symptoms.

## 2024-03-15 ENCOUNTER — OFFICE VISIT (OUTPATIENT)
Facility: CLINIC | Age: 59
End: 2024-03-15
Payer: COMMERCIAL

## 2024-03-15 VITALS
HEIGHT: 65 IN | BODY MASS INDEX: 30.66 KG/M2 | OXYGEN SATURATION: 99 % | HEART RATE: 78 BPM | TEMPERATURE: 97.9 F | DIASTOLIC BLOOD PRESSURE: 80 MMHG | WEIGHT: 184 LBS | SYSTOLIC BLOOD PRESSURE: 120 MMHG | RESPIRATION RATE: 13 BRPM

## 2024-03-15 DIAGNOSIS — Z79.4 TYPE 2 DIABETES MELLITUS WITH DIABETIC NEUROPATHY, WITH LONG-TERM CURRENT USE OF INSULIN (HCC): Primary | Chronic | ICD-10-CM

## 2024-03-15 DIAGNOSIS — E11.40 TYPE 2 DIABETES MELLITUS WITH DIABETIC NEUROPATHY, WITH LONG-TERM CURRENT USE OF INSULIN (HCC): Primary | Chronic | ICD-10-CM

## 2024-03-15 DIAGNOSIS — E11.69 HYPERLIPIDEMIA ASSOCIATED WITH TYPE 2 DIABETES MELLITUS (HCC): Chronic | ICD-10-CM

## 2024-03-15 DIAGNOSIS — I25.10 CORONARY ARTERY DISEASE INVOLVING NATIVE CORONARY ARTERY OF NATIVE HEART, UNSPECIFIED WHETHER ANGINA PRESENT: Chronic | ICD-10-CM

## 2024-03-15 DIAGNOSIS — I10 ESSENTIAL (PRIMARY) HYPERTENSION: Chronic | ICD-10-CM

## 2024-03-15 DIAGNOSIS — F32.A DEPRESSION, UNSPECIFIED DEPRESSION TYPE: Chronic | ICD-10-CM

## 2024-03-15 DIAGNOSIS — E78.5 HYPERLIPIDEMIA ASSOCIATED WITH TYPE 2 DIABETES MELLITUS (HCC): Chronic | ICD-10-CM

## 2024-03-15 PROCEDURE — 99214 OFFICE O/P EST MOD 30 MIN: CPT | Performed by: FAMILY MEDICINE

## 2024-03-15 PROCEDURE — 3079F DIAST BP 80-89 MM HG: CPT | Performed by: FAMILY MEDICINE

## 2024-03-15 PROCEDURE — 3074F SYST BP LT 130 MM HG: CPT | Performed by: FAMILY MEDICINE

## 2024-03-15 SDOH — ECONOMIC STABILITY: FOOD INSECURITY: WITHIN THE PAST 12 MONTHS, YOU WORRIED THAT YOUR FOOD WOULD RUN OUT BEFORE YOU GOT MONEY TO BUY MORE.: NEVER TRUE

## 2024-03-15 SDOH — ECONOMIC STABILITY: INCOME INSECURITY: HOW HARD IS IT FOR YOU TO PAY FOR THE VERY BASICS LIKE FOOD, HOUSING, MEDICAL CARE, AND HEATING?: NOT VERY HARD

## 2024-03-15 SDOH — ECONOMIC STABILITY: FOOD INSECURITY: WITHIN THE PAST 12 MONTHS, THE FOOD YOU BOUGHT JUST DIDN'T LAST AND YOU DIDN'T HAVE MONEY TO GET MORE.: NEVER TRUE

## 2024-03-15 NOTE — PROGRESS NOTES
Rose Pulido Sr. presents today for   Chief Complaint   Patient presents with    Cholesterol Problem    Depression    Hypertension    Diabetes       Is someone accompanying this pt? NO     Is the patient using any DME equipment during OV? NO     Depression Screenin/11/2023    11:06 AM 2022    11:10 AM   PHQ-9 Questionaire   Little interest or pleasure in doing things 0 0   Feeling down, depressed, or hopeless 2 0   Trouble falling or staying asleep, or sleeping too much 0    Feeling tired or having little energy 2    Poor appetite or overeating 0    Feeling bad about yourself - or that you are a failure or have let yourself or your family down 0    Trouble concentrating on things, such as reading the newspaper or watching television 2    Moving or speaking so slowly that other people could have noticed. Or the opposite - being so fidgety or restless that you have been moving around a lot more than usual 0    Thoughts that you would be better off dead, or of hurting yourself in some way 0    PHQ-9 Total Score 6 0   If you checked off any problems, how difficult have these problems made it for you to do your work, take care of things at home, or get along with other people? 1         PAUL 7-Anxiety        No data to display                   Learning Assessment:  No question data found.     Fall Risk      2023     2:05 AM   FALL RISK   Difficulty walking/impaired gait Yes   Outpatient fall risk intervention strategies Fall risk education provided          Travel Screening:    Travel Screening       Question Response    Have you been in contact with someone who was sick? No / Unsure    Do you have any of the following new or worsening symptoms? None of these    Have you traveled internationally or domestically in the last month? No          Travel History   Travel since 02/15/24    No documented travel since 02/15/24          Health Maintenance reviewed and discussed and ordered per

## 2024-03-15 NOTE — PROGRESS NOTES
ASSESSMENT/PLAN:  1. Type 2 diabetes mellitus with diabetic neuropathy, with long-term current use of insulin (HCC)  Assessment & Plan:   Monitored by specialist- no acute findings meriting change in the plan.  Pt reminded of importance of self-care.   Orders:  -     Lipid Panel; Future  -     Comprehensive Metabolic Panel; Future  2. Essential (primary) hypertension  Assessment & Plan:   Well-controlled, continue current medications  Orders:  -     Lipid Panel; Future  -     Comprehensive Metabolic Panel; Future  3. Hyperlipidemia associated with type 2 diabetes mellitus (HCC)  -     Lipid Panel; Future  -     Comprehensive Metabolic Panel; Future  4. Depression, unspecified depression type  Assessment & Plan:   Borderline controlled, continue current medications and continue current treatment plan  5. Coronary artery disease involving native coronary artery of native heart, unspecified whether angina present  Assessment & Plan:   Monitored by specialist- no acute findings meriting change in the plan.  Pt reminded of the importance of self care.   Orders:  -     Lipid Panel; Future  -     Comprehensive Metabolic Panel; Future        Return in about 3 months (around 6/15/2024) for DM, HTN, HLD, CAD, depression, lab review.          SUBJECTIVE/OBJECTIVE:    Chief Complaint   Patient presents with    Cholesterol Problem    Depression    Hypertension    Diabetes    Other      Patient was not able to have colonoscopy due  cardiac issues . Pt now has to wait 6 months for colo and will need clearance prior  .         HPI    Rose Pulido Sr. is a 58 y.o. male presenting today for    3 months  follow up of dm, htn, hld, depression.  Pt's fiancee is very upset and concerned as he has reverted to prior bad habits.  She does not feel he is making an adequate effort to maintain his health.     Pt has not seen derm.     Pt had ER eval on 2/22/24 for facial droop.  Ultimately, his sx were thought to be related to facial

## 2024-03-18 ASSESSMENT — ENCOUNTER SYMPTOMS: RESPIRATORY NEGATIVE: 1

## 2024-03-18 NOTE — ASSESSMENT & PLAN NOTE
Monitored by specialist- no acute findings meriting change in the plan.  Pt reminded of importance of self-care.

## 2024-03-18 NOTE — ASSESSMENT & PLAN NOTE
Monitored by specialist- no acute findings meriting change in the plan.  Pt reminded of the importance of self care.

## 2024-03-19 ENCOUNTER — TELEPHONE (OUTPATIENT)
Facility: CLINIC | Age: 59
End: 2024-03-19

## 2024-03-19 NOTE — TELEPHONE ENCOUNTER
----- Message from Natasha Denise MD sent at 3/18/2024 12:57 AM EDT -----  Please mail lab order.

## 2024-04-30 NOTE — PROGRESS NOTES
Aortic angiogram  Chief Complaint   Patient presents with    Follow-up     1 month f/u    Hypertension    Diabetes    Results     discuss lab results       Health Maintenance Due   Topic Date Due    EYE EXAM RETINAL OR DILATED Q1  07/26/1975    FOOT EXAM Q1  02/13/2018    MICROALBUMIN Q1  06/07/2018       Health Maintenance reviewed       1. Have you been to the ER, urgent care clinic since your last visit? Hospitalized since your last visit? No    2. Have you seen or consulted any other health care providers outside of the [address] [place] [address] since your last visit? Include any pap smears or colon screening.  No

## 2024-05-15 NOTE — PROGRESS NOTES
[facility] - 8th Floor Med Surg  Urology  Consult Note    Patient Name: James Reid  MRN: [id number]  Admission Date: 5/14/2024  Hospital Length of Stay: 1 days  Code Status: Full Code   Consulting Provider: Kelvin Shelby MD  Primary Care Physician: Safia Walters Jr., MD  Principal Problem:<principal problem not specified>    Inpatient consult to Urology  Consult performed by: Kelvin Shelby MD  Consult ordered by: Emelina Valdovinos FNP          Subjective:     HPI: Pt with ESRD presented with urgency to void despite empty bladder; CT demonstrates gas in bladder and seminal vesicles    Past Medical History:   Diagnosis Date    Anesthesia complication     intubated    Chronic coughing     Dialysis patient     T-TH-SAT    Essential (primary) hypertension     GERD (gastroesophageal reflux disease)     Insomnia     Mixed hyperlipidemia     Pleural effusion     Pleural effusion     Renal disorder     ESRD    SOB (shortness of breath) on exertion     Type 2 diabetes mellitus        Past Surgical History:   Procedure Laterality Date    APPENDECTOMY      AV FISTULA PLACEMENT Left     CHOLECYSTECTOMY      COLONOSCOPY      ELBOW SURGERY Left     EYE SURGERY Bilateral     CATARACT EXTRACTION    FISTULOGRAM Left 11/13/2023    Procedure: Fistulogram;  Surgeon: Stew Fink DO;  Location: [facility] CATH LAB;  Service: Nephrology;  Laterality: Left;    FUSION, JOINT, ANKLE Right     pins and rods    LEG AMPUTATION Left     BKA    SURGICAL REMOVAL OF ABSCESS      RIGHT BUTTOCK    THORACENTESIS      VIDEO-ASSISTED THORACOSCOPIC SURGERY (VATS) Right 5/31/2023    Procedure: VATS (VIDEO-ASSISTED THORACOSCOPIC SURGERY);  Surgeon: Mason Malik IV, MD;  Location: [facility] OR;  Service: Cardiovascular;  Laterality: Right;  RIGHT VATS, PLEURAL BIOPSY, POSSIBLE TALC PLEURODESIS       Review of patient's allergies indicates:  No Known Allergies    Family History    None         Tobacco Use    Smoking status: Some Days     No SBT at this time-     11/13/23 0837   Weaning Parameters   Spontaneous Breathing Trial Complete (S)  No (comment)  (Sedated, just arrived from [facility])  Current packs/day: 0.50     Average packs/day: 0.5 packs/day for 30.0 years (15.0 ttl pk-yrs)     Types: Cigarettes    Smokeless tobacco: Former    Tobacco comments:     Pt smoke about a pack for three days.    Substance and Sexual Activity    Alcohol use: Not Currently    Drug use: Not Currently    Sexual activity: Not on file       Review of Systems    Objective:     Temp:  [97.6 °F (36.4 °C)-98.1 °F (36.7 °C)] 97.9 °F (36.6 °C)  Pulse:  [83-91] 88  Resp:  [10-19] 18  SpO2:  [92 %-100 %] 93 %  BP: (142-183)/() 161/90     Body mass index is 28.62 kg/m².      Bladder Scan Volume (mL): 62 mL (05/14/24 1533)        Physical Exam  Abd soft  Normal genitalia  ALLA prostate 1+ with induration and tenderness    Significant Labs:  BMP:  Recent Labs   Lab 05/14/24  1548   *   K 3.0*   CO2 30*   BUN 14.4   CREATININE 4.95*   GLUCOSE 351*   CALCIUM 9.0       CBC:  Recent Labs   Lab 05/14/24  1548   WBC 12.19*   HGB 11.1*   HCT 36.5*          All pertinent labs results from the past 24 hours have been reviewed.    Significant Imaging:  All pertinent imaging results/findings from the past 24 hours have been reviewed.    Assessment and Plan:     There are no hospital problems to display for this patient.    Emphysmatous cystitis involving seminal vesicles  Place No  Cont cipro  Consult ID        Thank you for your consult.       Kelvin Shelby MD  Urology  [facility] - 8th Floor Med Surg

## 2024-05-20 ASSESSMENT — ENCOUNTER SYMPTOMS
COLOR CHANGE: 0
CONSTIPATION: 0
BLOOD IN STOOL: 0
CHEST TIGHTNESS: 0
SHORTNESS OF BREATH: 0
NAUSEA: 0
COUGH: 0
ABDOMINAL PAIN: 0
WHEEZING: 0
DIARRHEA: 0
APNEA: 0

## 2024-05-21 ENCOUNTER — OFFICE VISIT (OUTPATIENT)
Age: 59
End: 2024-05-21
Payer: COMMERCIAL

## 2024-05-21 VITALS
OXYGEN SATURATION: 98 % | DIASTOLIC BLOOD PRESSURE: 80 MMHG | BODY MASS INDEX: 30.29 KG/M2 | HEART RATE: 72 BPM | SYSTOLIC BLOOD PRESSURE: 158 MMHG | WEIGHT: 182 LBS

## 2024-05-21 DIAGNOSIS — I25.118 CORONARY ARTERY DISEASE INVOLVING NATIVE CORONARY ARTERY OF NATIVE HEART WITH OTHER FORM OF ANGINA PECTORIS (HCC): Primary | ICD-10-CM

## 2024-05-21 DIAGNOSIS — E78.5 HYPERLIPIDEMIA, UNSPECIFIED HYPERLIPIDEMIA TYPE: ICD-10-CM

## 2024-05-21 DIAGNOSIS — I50.42 CHRONIC HEART FAILURE WITH REDUCED EJECTION FRACTION AND DIASTOLIC DYSFUNCTION (HCC): ICD-10-CM

## 2024-05-21 DIAGNOSIS — Z86.79 HISTORY OF ATRIAL FLUTTER: ICD-10-CM

## 2024-05-21 DIAGNOSIS — Z86.73 HISTORY OF CVA (CEREBROVASCULAR ACCIDENT): ICD-10-CM

## 2024-05-21 DIAGNOSIS — I10 HYPERTENSION, UNSPECIFIED TYPE: ICD-10-CM

## 2024-05-21 PROCEDURE — 3079F DIAST BP 80-89 MM HG: CPT | Performed by: INTERNAL MEDICINE

## 2024-05-21 PROCEDURE — 99214 OFFICE O/P EST MOD 30 MIN: CPT | Performed by: INTERNAL MEDICINE

## 2024-05-21 PROCEDURE — 3077F SYST BP >= 140 MM HG: CPT | Performed by: INTERNAL MEDICINE

## 2024-05-21 RX ORDER — ASPIRIN 81 MG/1
81 TABLET ORAL DAILY
Qty: 90 TABLET | Refills: 3 | Status: SHIPPED | OUTPATIENT
Start: 2024-05-21

## 2024-05-21 NOTE — PATIENT INSTRUCTIONS
Learning About the Mediterranean Diet  What is the Mediterranean diet?     The Mediterranean diet is a style of eating rather than a diet plan. It features foods eaten in [place], [place], [place], and other countries along the [place] Sea. It emphasizes eating foods like fish, fruits, vegetables, beans, high-fiber breads and whole grains, nuts, and olive oil. This style of eating includes limited red meat, cheese, and sweets.  Why choose the Mediterranean diet?  A Mediterranean-style diet may improve heart health. It contains more fat than other heart-healthy diets. But the fats are mainly from nuts, unsaturated oils (such as fish oils and olive oil), and certain nut or seed oils (such as canola, soybean, or flaxseed oil). These fats may help protect the heart and blood vessels.  How can you get started on the Mediterranean diet?  Here are some things you can do to switch to a more Mediterranean way of eating.  What to eat  Eat a variety of fruits and vegetables each day, such as grapes, blueberries, tomatoes, broccoli, peppers, figs, olives, spinach, eggplant, beans, lentils, and chickpeas.  Eat a variety of whole-grain foods each day, such as oats, brown rice, and whole wheat bread, pasta, and couscous.  Eat fish at least 2 times a week. Try tuna, salmon, mackerel, lake trout, herring, or sardines.  Eat moderate amounts of low-fat dairy products, such as milk, cheese, or yogurt.  Eat moderate amounts of poultry and eggs.  Choose healthy (unsaturated) fats, such as nuts, olive oil, and certain nut or seed oils like canola, soybean, and flaxseed.  Limit unhealthy (saturated) fats, such as butter, palm oil, and coconut oil. And limit fats found in animal products, such as meat and dairy products made with whole milk. Try to eat red meat only a few times a month in very small amounts.  Limit sweets and desserts to only a few times a week. This includes sugar-sweetened drinks like soda.  The

## 2024-05-21 NOTE — PROGRESS NOTES
HISTORY OF PRESENT ILLNESS  Rose Pulido Sr. is a 58 y.o. male.    Past medical history congestive heart failure, history of CVA, history MI, hypertension, hyperlipidemia, coronary risk equivalent diabetes mellitus, CAD history of PCI.    ----  CARDIAC STUDIES  ----  2d echo 5/18/2024    Left Ventricle: Low normal left ventricular systolic function with a visually estimated EF of 50 - 55%. Left ventricle size is normal. Normal wall thickness. Normal wall motion.    Right Ventricle: Right ventricle size is normal. Low normal systolic function. TAPSE is normal. TAPSE is 1.7 cm.    Aortic Valve: Trileaflet valve. No stenosis.    Mitral Valve: Mild regurgitation.    Tricuspid Valve: Unable to assess RVSP due to inadequate or insignificant tricuspid regurgitation.    Left Atrium: Not assessed.    Right Atrium: Not assessed.    Aorta: Dilated ascending aorta. Ao ascending diameter is 3.8 cm.    Image quality is adequate.  ----  EP cardioversion 12/22/2023  Successful Cardioversion utilizing 20J synchronized Biphasic from Atrial flutter to Normal sinus rhythm  Plan: Continue Anticoagulation.    ----  [facility] 11/16/2023  Severe Coronary Artery Disease involving the mLAD 50-69% iFR 0.85, IVUS 3.1 mm squared  Tubular segment  Successful balloon angioplasty and SHERLYN placed at the mLAD 50-69% to 0% DENILSON III to DENILSON III flow utilizing 3.0x26mm SHERLYN postdilated with 3.5mm balloon to 3.6  LM luminal irregularities, rPDA 30-50% stenosis, dLAD 30-40% stenosis long, dLCFX 100% stenosis as it goes into AV groove with R to L collaterals, patent stent in the OM  LVEDP 11mmHg  RCA Dominant System  Plan: Continue with triple therapy including aspirin 81mg po daily, plavix 75mg po daily, eliquis 5mg po bid for 4 weeks followed by eliquis 5mg po bid and plavix 75mg po daily after that for at least 12 months. Patient received brilinta load today 180mg po x once  ----  2d echo 11/13/2023    Left Ventricle: Moderately reduced left ventricular

## 2024-05-21 NOTE — PROGRESS NOTES
Have you had Fatigue?  Yes, will occasionally experience fatigue throughout he day. Depending on the activity    Have you had Chest Pain? No   3.   Have you had Dyspnea (SOB) ? No  4.   Have you had Orthopnea? No  5.   Have you had PND? No   6.   Have you had leg swelling? No   7.    Have you had any weight gain? No   8. Have you had any palpitations? No   9. Have you had any syncope? No   10. Do you have any wounds on legs? NO

## 2024-05-23 DIAGNOSIS — R06.02 SHORTNESS OF BREATH: ICD-10-CM

## 2024-05-23 DIAGNOSIS — F32.A DEPRESSION, UNSPECIFIED: ICD-10-CM

## 2024-05-23 DIAGNOSIS — J96.00 ACUTE RESPIRATORY FAILURE, UNSPECIFIED WHETHER WITH HYPOXIA OR HYPERCAPNIA (HCC): ICD-10-CM

## 2024-05-23 RX ORDER — FUROSEMIDE 40 MG/1
TABLET ORAL
Qty: 180 TABLET | Refills: 1 | Status: SHIPPED | OUTPATIENT
Start: 2024-05-23

## 2024-05-23 RX ORDER — METOPROLOL SUCCINATE 25 MG/1
25 TABLET, EXTENDED RELEASE ORAL DAILY
Qty: 90 TABLET | Refills: 1 | Status: SHIPPED | OUTPATIENT
Start: 2024-05-23

## 2024-05-23 RX ORDER — ESCITALOPRAM OXALATE 20 MG/1
TABLET ORAL
Qty: 90 TABLET | Refills: 3 | Status: SHIPPED | OUTPATIENT
Start: 2024-05-23

## 2024-05-23 NOTE — TELEPHONE ENCOUNTER
Last seen 3/15/2024   Last labs 2/22/24  Last filled 1/8/24  Next appointment 6/14/2024       Lab Results   Component Value Date     (L) 02/22/2024    K 4.2 02/22/2024    CL 94 (L) 02/22/2024    CO2 30 02/22/2024    BUN 32 (H) 02/22/2024    CREATININE 2.05 (H) 02/22/2024    GLUCOSE 544 (HH) 02/22/2024    CALCIUM 8.9 02/22/2024    BILITOT 1.0 11/16/2023    ALKPHOS 63 11/16/2023    AST 10 11/16/2023    ALT 37 11/16/2023    LABGLOM 37 (L) 02/22/2024    GFRAA >60 10/03/2022    AGRATIO 1.0 10/03/2022    GLOB 3.9 11/16/2023

## 2024-05-23 NOTE — TELEPHONE ENCOUNTER
Requested Prescriptions     Pending Prescriptions Disp Refills    furosemide (LASIX) 40 MG tablet [Pharmacy Med Name: furosemide 40 mg tablet] 90 tablet 3     Sig: TAKE ONE TABLET BY MOUTH TWICE DAILY (IN THE MORNING AND IN THE EVENING)    metoprolol succinate (TOPROL XL) 25 MG extended release tablet 90 tablet 3     Sig: Take 1 tablet by mouth daily

## 2024-06-14 ENCOUNTER — OFFICE VISIT (OUTPATIENT)
Facility: CLINIC | Age: 59
End: 2024-06-14
Payer: COMMERCIAL

## 2024-06-14 VITALS
DIASTOLIC BLOOD PRESSURE: 77 MMHG | SYSTOLIC BLOOD PRESSURE: 168 MMHG | WEIGHT: 179 LBS | HEART RATE: 78 BPM | HEIGHT: 65 IN | RESPIRATION RATE: 13 BRPM | OXYGEN SATURATION: 98 % | BODY MASS INDEX: 29.82 KG/M2 | TEMPERATURE: 97.9 F

## 2024-06-14 DIAGNOSIS — Z79.4 TYPE 2 DIABETES MELLITUS WITH DIABETIC NEUROPATHY, WITH LONG-TERM CURRENT USE OF INSULIN (HCC): Primary | ICD-10-CM

## 2024-06-14 DIAGNOSIS — E11.40 TYPE 2 DIABETES MELLITUS WITH DIABETIC NEUROPATHY, WITH LONG-TERM CURRENT USE OF INSULIN (HCC): Primary | ICD-10-CM

## 2024-06-14 DIAGNOSIS — I10 ESSENTIAL (PRIMARY) HYPERTENSION: ICD-10-CM

## 2024-06-14 DIAGNOSIS — I25.10 CORONARY ARTERY DISEASE INVOLVING NATIVE CORONARY ARTERY OF NATIVE HEART, UNSPECIFIED WHETHER ANGINA PRESENT: ICD-10-CM

## 2024-06-14 DIAGNOSIS — F32.A DEPRESSION, UNSPECIFIED DEPRESSION TYPE: ICD-10-CM

## 2024-06-14 DIAGNOSIS — E11.69 HYPERLIPIDEMIA ASSOCIATED WITH TYPE 2 DIABETES MELLITUS (HCC): ICD-10-CM

## 2024-06-14 DIAGNOSIS — E78.5 HYPERLIPIDEMIA ASSOCIATED WITH TYPE 2 DIABETES MELLITUS (HCC): ICD-10-CM

## 2024-06-14 PROCEDURE — 99214 OFFICE O/P EST MOD 30 MIN: CPT | Performed by: FAMILY MEDICINE

## 2024-06-14 PROCEDURE — 3078F DIAST BP <80 MM HG: CPT | Performed by: FAMILY MEDICINE

## 2024-06-14 PROCEDURE — 3077F SYST BP >= 140 MM HG: CPT | Performed by: FAMILY MEDICINE

## 2024-06-14 SDOH — ECONOMIC STABILITY: FOOD INSECURITY: WITHIN THE PAST 12 MONTHS, THE FOOD YOU BOUGHT JUST DIDN'T LAST AND YOU DIDN'T HAVE MONEY TO GET MORE.: NEVER TRUE

## 2024-06-14 SDOH — ECONOMIC STABILITY: FOOD INSECURITY: WITHIN THE PAST 12 MONTHS, YOU WORRIED THAT YOUR FOOD WOULD RUN OUT BEFORE YOU GOT MONEY TO BUY MORE.: NEVER TRUE

## 2024-06-14 SDOH — ECONOMIC STABILITY: INCOME INSECURITY: HOW HARD IS IT FOR YOU TO PAY FOR THE VERY BASICS LIKE FOOD, HOUSING, MEDICAL CARE, AND HEATING?: NOT HARD AT ALL

## 2024-06-14 ASSESSMENT — PATIENT HEALTH QUESTIONNAIRE - PHQ9
SUM OF ALL RESPONSES TO PHQ QUESTIONS 1-9: 0
2. FEELING DOWN, DEPRESSED OR HOPELESS: NOT AT ALL
9. THOUGHTS THAT YOU WOULD BE BETTER OFF DEAD, OR OF HURTING YOURSELF: NOT AT ALL
6. FEELING BAD ABOUT YOURSELF - OR THAT YOU ARE A FAILURE OR HAVE LET YOURSELF OR YOUR FAMILY DOWN: NOT AT ALL
4. FEELING TIRED OR HAVING LITTLE ENERGY: NOT AT ALL
7. TROUBLE CONCENTRATING ON THINGS, SUCH AS READING THE NEWSPAPER OR WATCHING TELEVISION: NOT AT ALL
SUM OF ALL RESPONSES TO PHQ QUESTIONS 1-9: 0
3. TROUBLE FALLING OR STAYING ASLEEP: NOT AT ALL
SUM OF ALL RESPONSES TO PHQ QUESTIONS 1-9: 0
5. POOR APPETITE OR OVEREATING: NOT AT ALL
10. IF YOU CHECKED OFF ANY PROBLEMS, HOW DIFFICULT HAVE THESE PROBLEMS MADE IT FOR YOU TO DO YOUR WORK, TAKE CARE OF THINGS AT HOME, OR GET ALONG WITH OTHER PEOPLE: NOT DIFFICULT AT ALL
SUM OF ALL RESPONSES TO PHQ9 QUESTIONS 1 & 2: 0
1. LITTLE INTEREST OR PLEASURE IN DOING THINGS: NOT AT ALL
8. MOVING OR SPEAKING SO SLOWLY THAT OTHER PEOPLE COULD HAVE NOTICED. OR THE OPPOSITE, BEING SO FIGETY OR RESTLESS THAT YOU HAVE BEEN MOVING AROUND A LOT MORE THAN USUAL: NOT AT ALL
SUM OF ALL RESPONSES TO PHQ QUESTIONS 1-9: 0

## 2024-06-14 NOTE — PROGRESS NOTES
ASSESSMENT/PLAN:  1. Type 2 diabetes mellitus with diabetic neuropathy, with long-term current use of insulin (HCC)  Assessment & Plan:   Monitored by specialist- no acute findings meriting change in the plan  2. Essential (primary) hypertension  Assessment & Plan:   Unclear control, continue current medications and NV for bp recheck in 1-2 wks if elevated on 2nd check with nurse.    3. Hyperlipidemia associated with type 2 diabetes mellitus (HCC)  Assessment & Plan:   Unclear control, continue current medications and await labs  4. Coronary artery disease involving native coronary artery of native heart, unspecified whether angina present  Assessment & Plan:   Monitored by specialist- no acute findings meriting change in the plan  5. Depression, unspecified depression type  Assessment & Plan:   At goal, continue current medications        Return in about 4 months (around 10/14/2024) for DM, HTN, HLD, CAD, depression, lab review.      SUBJECTIVE/OBJECTIVE:    Chief Complaint   Patient presents with    Discuss Labs    Hypertension    Diabetes    Coronary Artery Disease    Depression         HPI    Rose Pulido Sr. is a 58 y.o. male presenting today for    3 months  follow up of dm, htn, hld, cad, depression.  Pt has been doing ok overall.      Labs not yet completed.     Pt had a recent f/u with cards.  Plavix was changed to asa 81mg qd.  He will cont eliquis for secondary prevention of stroke.    Patient does not need medication refills today.      New concerns today: none        Review of Systems   Constitutional: Negative.    HENT: Negative.     Respiratory: Negative.     Cardiovascular: Negative.    All other systems reviewed and are negative.      Physical Exam  Vitals and nursing note reviewed.   Constitutional:       General: He is not in acute distress.     Appearance: Normal appearance.   HENT:      Head: Normocephalic and atraumatic.      Right Ear: External ear normal.      Left Ear: External ear normal. 
     Food Insecurity: No Food Insecurity (6/14/2024)    Hunger Vital Sign     Worried About Running Out of Food in the Last Year: Never true     Ran Out of Food in the Last Year: Never true     Financial Resource Strain: Low Risk  (6/14/2024)    Overall Financial Resource Strain (CARDIA)     Difficulty of Paying Living Expenses: Not hard at all     Housing Stability: Unknown (6/14/2024)    Housing Stability Vital Sign     Unable to Pay for Housing in the Last Year: Not on file     Number of Places Lived in the Last Year: Not on file     Unstable Housing in the Last Year: No       Did you provide resources if patient requested them? Yes patient declined       Health Maintenance Due   Topic Date Due    Hepatitis B vaccine (1 of 3 - 3-dose series) Never done    Shingles vaccine (1 of 2) Never done    Diabetic foot exam  02/13/2018    Pneumococcal 0-64 years Vaccine (2 of 2 - PCV) 06/07/2018    Colorectal Cancer Screen  08/23/2020    COVID-19 Vaccine (4 - 2023-24 season) 09/01/2023    Diabetic Alb to Cr ratio (uACR) test  03/10/2024    Diabetic retinal exam  04/05/2024    Depression Monitoring  05/11/2024   .      \"Have you been to the ER, urgent care clinic since your last visit?  Hospitalized since your last visit?\"    NO    “Have you seen or consulted any other health care providers outside of [facility] since your last visit?”    NO    “Have you had a colorectal cancer screening such as a colonoscopy/FIT/Cologuard?    NO    No colonoscopy on file  No cologuard on file  Date of last FIT: 8/23/2019   No flexible sigmoidoscopy on file

## 2024-06-17 NOTE — ASSESSMENT & PLAN NOTE
Unclear control, continue current medications and NV for bp recheck in 1-2 wks if elevated on 2nd check with nurse.

## 2024-06-18 ENCOUNTER — HOSPITAL ENCOUNTER (OUTPATIENT)
Facility: HOSPITAL | Age: 59
Discharge: HOME | End: 2024-06-21
Payer: COMMERCIAL

## 2024-06-18 DIAGNOSIS — I25.10 CORONARY ARTERY DISEASE INVOLVING NATIVE CORONARY ARTERY OF NATIVE HEART, UNSPECIFIED WHETHER ANGINA PRESENT: Chronic | ICD-10-CM

## 2024-06-18 DIAGNOSIS — E11.40 TYPE 2 DIABETES MELLITUS WITH DIABETIC NEUROPATHY, WITH LONG-TERM CURRENT USE OF INSULIN (HCC): Chronic | ICD-10-CM

## 2024-06-18 DIAGNOSIS — I10 ESSENTIAL (PRIMARY) HYPERTENSION: Chronic | ICD-10-CM

## 2024-06-18 DIAGNOSIS — E78.5 HYPERLIPIDEMIA ASSOCIATED WITH TYPE 2 DIABETES MELLITUS (HCC): Chronic | ICD-10-CM

## 2024-06-18 DIAGNOSIS — E11.69 HYPERLIPIDEMIA ASSOCIATED WITH TYPE 2 DIABETES MELLITUS (HCC): Chronic | ICD-10-CM

## 2024-06-18 DIAGNOSIS — Z79.4 TYPE 2 DIABETES MELLITUS WITH DIABETIC NEUROPATHY, WITH LONG-TERM CURRENT USE OF INSULIN (HCC): Chronic | ICD-10-CM

## 2024-06-18 LAB
ALBUMIN SERPL-MCNC: 3.9 G/DL (ref 3.4–5)
ALBUMIN/GLOB SERPL: 1 (ref 0.8–1.7)
ALP SERPL-CCNC: 86 U/L (ref 45–117)
ALT SERPL-CCNC: 20 U/L (ref 16–61)
ANION GAP SERPL CALC-SCNC: 5 MMOL/L (ref 3–18)
AST SERPL-CCNC: 13 U/L (ref 10–38)
BILIRUB SERPL-MCNC: 0.8 MG/DL (ref 0.2–1)
BUN SERPL-MCNC: 27 MG/DL (ref 7–18)
BUN/CREAT SERPL: 16 (ref 12–20)
CALCIUM SERPL-MCNC: 10 MG/DL (ref 8.5–10.1)
CHLORIDE SERPL-SCNC: 99 MMOL/L (ref 100–111)
CHOLEST SERPL-MCNC: 168 MG/DL
CO2 SERPL-SCNC: 32 MMOL/L (ref 21–32)
CREAT SERPL-MCNC: 1.64 MG/DL (ref 0.6–1.3)
GLOBULIN SER CALC-MCNC: 3.8 G/DL (ref 2–4)
GLUCOSE SERPL-MCNC: 332 MG/DL (ref 74–99)
HDLC SERPL-MCNC: 38 MG/DL (ref 40–60)
HDLC SERPL: 4.4 (ref 0–5)
LDLC SERPL CALC-MCNC: 93.2 MG/DL (ref 0–100)
LIPID PANEL: ABNORMAL
POTASSIUM SERPL-SCNC: 4.2 MMOL/L (ref 3.5–5.5)
PROT SERPL-MCNC: 7.7 G/DL (ref 6.4–8.2)
SODIUM SERPL-SCNC: 136 MMOL/L (ref 136–145)
TRIGL SERPL-MCNC: 184 MG/DL
VLDLC SERPL CALC-MCNC: 36.8 MG/DL

## 2024-06-18 PROCEDURE — 36415 COLL VENOUS BLD VENIPUNCTURE: CPT

## 2024-06-18 PROCEDURE — 80053 COMPREHEN METABOLIC PANEL: CPT

## 2024-06-18 PROCEDURE — 80061 LIPID PANEL: CPT

## 2024-06-24 ENCOUNTER — TELEPHONE (OUTPATIENT)
Facility: CLINIC | Age: 59
End: 2024-06-24

## 2024-06-24 NOTE — TELEPHONE ENCOUNTER
----- Message from Natasha Denise MD sent at 6/19/2024  4:06 PM EDT -----  Please call pt and let him know that his sugar was 332 yesterday at the lab.  Find out if he has checked it today.  If it is still over 300, have him contact his endocrinologist.

## 2024-06-24 NOTE — TELEPHONE ENCOUNTER
Spoke with Ms. Armas. She state that she will have patient call in today with a few readings. She was made aware that if patient level is in the 300's patient need to contact ward.

## 2024-06-24 NOTE — TELEPHONE ENCOUNTER
Ms yane called back and the readings is for the last three days he has been at 351 and today he is at 297

## 2024-07-15 DIAGNOSIS — I48.91 ATRIAL FIBRILLATION, UNSPECIFIED TYPE (HCC): ICD-10-CM

## 2024-07-15 DIAGNOSIS — I50.21 ACUTE SYSTOLIC (CONGESTIVE) HEART FAILURE (HCC): ICD-10-CM

## 2024-07-15 RX ORDER — SPIRONOLACTONE 25 MG/1
25 TABLET ORAL DAILY
Qty: 30 TABLET | Refills: 5 | Status: SHIPPED | OUTPATIENT
Start: 2024-07-15

## 2024-07-15 RX ORDER — CLOPIDOGREL BISULFATE 75 MG/1
75 TABLET ORAL DAILY
Qty: 30 TABLET | Refills: 5 | Status: SHIPPED | OUTPATIENT
Start: 2024-07-15

## 2024-10-28 DIAGNOSIS — R06.02 SHORTNESS OF BREATH: ICD-10-CM

## 2024-10-28 DIAGNOSIS — J96.00 ACUTE RESPIRATORY FAILURE, UNSPECIFIED WHETHER WITH HYPOXIA OR HYPERCAPNIA: ICD-10-CM

## 2024-10-28 RX ORDER — METOPROLOL SUCCINATE 25 MG/1
25 TABLET, EXTENDED RELEASE ORAL DAILY
Qty: 90 TABLET | Refills: 3 | Status: SHIPPED | OUTPATIENT
Start: 2024-10-28

## 2024-10-28 RX ORDER — FUROSEMIDE 40 MG/1
TABLET ORAL
Qty: 180 TABLET | Refills: 3 | Status: SHIPPED | OUTPATIENT
Start: 2024-10-28

## 2024-10-29 ASSESSMENT — ENCOUNTER SYMPTOMS: RESPIRATORY NEGATIVE: 1

## 2024-10-30 ENCOUNTER — OFFICE VISIT (OUTPATIENT)
Facility: CLINIC | Age: 59
End: 2024-10-30

## 2024-10-30 VITALS
TEMPERATURE: 97.9 F | WEIGHT: 177.8 LBS | DIASTOLIC BLOOD PRESSURE: 72 MMHG | HEART RATE: 81 BPM | OXYGEN SATURATION: 98 % | SYSTOLIC BLOOD PRESSURE: 116 MMHG | BODY MASS INDEX: 29.62 KG/M2 | RESPIRATION RATE: 14 BRPM | HEIGHT: 65 IN

## 2024-10-30 DIAGNOSIS — I50.9 CONGESTIVE HEART FAILURE, UNSPECIFIED HF CHRONICITY, UNSPECIFIED HEART FAILURE TYPE (HCC): ICD-10-CM

## 2024-10-30 DIAGNOSIS — Z12.5 SCREENING FOR MALIGNANT NEOPLASM OF PROSTATE: ICD-10-CM

## 2024-10-30 DIAGNOSIS — I25.10 CORONARY ARTERY DISEASE INVOLVING NATIVE CORONARY ARTERY OF NATIVE HEART, UNSPECIFIED WHETHER ANGINA PRESENT: Chronic | ICD-10-CM

## 2024-10-30 DIAGNOSIS — Z86.73 HISTORY OF CVA (CEREBROVASCULAR ACCIDENT): ICD-10-CM

## 2024-10-30 DIAGNOSIS — E11.40 TYPE 2 DIABETES MELLITUS WITH DIABETIC NEUROPATHY, WITH LONG-TERM CURRENT USE OF INSULIN (HCC): Primary | Chronic | ICD-10-CM

## 2024-10-30 DIAGNOSIS — E11.69 HYPERLIPIDEMIA ASSOCIATED WITH TYPE 2 DIABETES MELLITUS (HCC): Chronic | ICD-10-CM

## 2024-10-30 DIAGNOSIS — Z23 NEEDS FLU SHOT: ICD-10-CM

## 2024-10-30 DIAGNOSIS — I25.2 HISTORY OF MI (MYOCARDIAL INFARCTION): ICD-10-CM

## 2024-10-30 DIAGNOSIS — F32.A DEPRESSION, UNSPECIFIED DEPRESSION TYPE: Chronic | ICD-10-CM

## 2024-10-30 DIAGNOSIS — I10 ESSENTIAL (PRIMARY) HYPERTENSION: Chronic | ICD-10-CM

## 2024-10-30 DIAGNOSIS — Z79.4 TYPE 2 DIABETES MELLITUS WITH DIABETIC NEUROPATHY, WITH LONG-TERM CURRENT USE OF INSULIN (HCC): Primary | Chronic | ICD-10-CM

## 2024-10-30 DIAGNOSIS — E78.5 HYPERLIPIDEMIA ASSOCIATED WITH TYPE 2 DIABETES MELLITUS (HCC): Chronic | ICD-10-CM

## 2024-10-30 SDOH — ECONOMIC STABILITY: FOOD INSECURITY: WITHIN THE PAST 12 MONTHS, YOU WORRIED THAT YOUR FOOD WOULD RUN OUT BEFORE YOU GOT MONEY TO BUY MORE.: NEVER TRUE

## 2024-10-30 SDOH — ECONOMIC STABILITY: FOOD INSECURITY: WITHIN THE PAST 12 MONTHS, THE FOOD YOU BOUGHT JUST DIDN'T LAST AND YOU DIDN'T HAVE MONEY TO GET MORE.: NEVER TRUE

## 2024-10-30 SDOH — ECONOMIC STABILITY: INCOME INSECURITY: HOW HARD IS IT FOR YOU TO PAY FOR THE VERY BASICS LIKE FOOD, HOUSING, MEDICAL CARE, AND HEATING?: NOT HARD AT ALL

## 2024-10-30 NOTE — PROGRESS NOTES
Rose Pulido Sr. presents today for   Chief Complaint   Patient presents with    Cholesterol Problem    Diabetes    Hypertension    Depression    Discuss Labs    Coronary Artery Disease       Is someone accompanying this pt? no    Is the patient using any DME equipment during OV? no    Depression Screenin/14/2024    10:44 AM 2023    11:06 AM 2022    11:10 AM   PHQ-9 Questionaire   Little interest or pleasure in doing things 0 0 0   Feeling down, depressed, or hopeless 0 2 0   Trouble falling or staying asleep, or sleeping too much 0 0    Feeling tired or having little energy 0 2    Poor appetite or overeating 0 0    Feeling bad about yourself - or that you are a failure or have let yourself or your family down 0 0    Trouble concentrating on things, such as reading the newspaper or watching television 0 2    Moving or speaking so slowly that other people could have noticed. Or the opposite - being so fidgety or restless that you have been moving around a lot more than usual 0 0    Thoughts that you would be better off dead, or of hurting yourself in some way 0 0    PHQ-9 Total Score 0 6 0   If you checked off any problems, how difficult have these problems made it for you to do your work, take care of things at home, or get along with other people? 0 1         PAUL 7-Anxiety        No data to display                 Travel Screening:    Travel Screening       Question Response    Have you been in contact with someone who was sick? No / Unsure    Do you have any of the following new or worsening symptoms? None of these    Have you traveled internationally or domestically in the last month? No          Travel History   Travel since 24    No documented travel since 24          Health Maintenance reviewed and discussed and ordered per Provider.  Transportation Needs: Unknown (10/30/2024)    PRAPARE - Transportation     Lack of Transportation (Medical): Not on file     Lack of

## 2024-10-30 NOTE — PROGRESS NOTES
ASSESSMENT/PLAN:  1. Type 2 diabetes mellitus with diabetic neuropathy, with long-term current use of insulin (HCC)  Assessment & Plan:   Elevated readings.  Med and diet compliance emphasized.  Care as per endo.   Orders:  -     Comprehensive Metabolic Panel; Future  -     Lipid Panel; Future  -     Hemoglobin A1C; Future  -     Microalbumin / Creatinine Urine Ratio; Future  2. Essential (primary) hypertension  Assessment & Plan:   Chronic, at goal (stable), continue current treatment plan  Orders:  -     Comprehensive Metabolic Panel; Future  -     Lipid Panel; Future  3. History of CVA (cerebrovascular accident)  -     Comprehensive Metabolic Panel; Future  -     Lipid Panel; Future  4. History of MI (myocardial infarction)  -     Comprehensive Metabolic Panel; Future  -     Lipid Panel; Future  5. Coronary artery disease involving native coronary artery of native heart, unspecified whether angina present  -     Comprehensive Metabolic Panel; Future  -     Lipid Panel; Future  6. Congestive heart failure, unspecified HF chronicity, unspecified heart failure type (HCC)  -     Comprehensive Metabolic Panel; Future  -     Lipid Panel; Future  7. Hyperlipidemia associated with type 2 diabetes mellitus (HCC)  -     Comprehensive Metabolic Panel; Future  -     Lipid Panel; Future  8. Depression, unspecified depression type  9. Screening for malignant neoplasm of prostate  -     PSA Screening; Future  10. Needs flu shot  -     Influenza, FLUCELVAX Trivalent, (age 6 mo+) IM, Preservative Free, 0.5mL        Return in about 4 months (around 2/28/2025) for DM, HTN, HLD, depression, CAD, specialist eval.      SUBJECTIVE/OBJECTIVE:    Chief Complaint   Patient presents with    Cholesterol Problem    Diabetes    Hypertension    Depression    Discuss Labs    Coronary Artery Disease         HPI    Rose ITZEL Babakstefanie Pham. is a 59 y.o. male presenting today for    4 months  follow up of dm, htn, hld, cad, depression.    Pt notes he is

## 2024-10-30 NOTE — PROGRESS NOTES
Obtained signed  Immunization Consent Form. Patient received  Injection of Flu regular administered in Left Deltoid Site. Verified by Genna Alcantara CMA  that this is the correct immunization/injection. Patient observed for 15 minutes with no adverse reaction.

## 2024-12-19 DIAGNOSIS — I48.91 ATRIAL FIBRILLATION, UNSPECIFIED TYPE (HCC): ICD-10-CM

## 2024-12-19 DIAGNOSIS — I50.21 ACUTE SYSTOLIC (CONGESTIVE) HEART FAILURE (HCC): ICD-10-CM

## 2024-12-19 RX ORDER — CLOPIDOGREL BISULFATE 75 MG/1
75 TABLET ORAL DAILY
Qty: 30 TABLET | Refills: 5 | Status: SHIPPED | OUTPATIENT
Start: 2024-12-19

## 2024-12-19 RX ORDER — SPIRONOLACTONE 25 MG/1
25 TABLET ORAL DAILY
Qty: 30 TABLET | Refills: 5 | Status: SHIPPED | OUTPATIENT
Start: 2024-12-19

## 2025-01-26 NOTE — PROGRESS NOTES
HISTORY OF PRESENT ILLNESS  Rose Pulido Sr. is a 59 y.o. male.    Past medical history congestive heart failure, history of CVA, history MI, hypertension, hyperlipidemia, coronary risk equivalent diabetes mellitus, CAD history of PCI.    ----  CARDIAC STUDIES  ----  2d echo 5/18/2024    Left Ventricle: Low normal left ventricular systolic function with a visually estimated EF of 50 - 55%. Left ventricle size is normal. Normal wall thickness. Normal wall motion.    Right Ventricle: Right ventricle size is normal. Low normal systolic function. TAPSE is normal. TAPSE is 1.7 cm.    Aortic Valve: Trileaflet valve. No stenosis.    Mitral Valve: Mild regurgitation.    Tricuspid Valve: Unable to assess RVSP due to inadequate or insignificant tricuspid regurgitation.    Left Atrium: Not assessed.    Right Atrium: Not assessed.    Aorta: Dilated ascending aorta. Ao ascending diameter is 3.8 cm.    Image quality is adequate.  ----  EP cardioversion 12/22/2023  Successful Cardioversion utilizing 20J synchronized Biphasic from Atrial flutter to Normal sinus rhythm  Plan: Continue Anticoagulation.    ----  [facility] 11/16/2023  Severe Coronary Artery Disease involving the mLAD 50-69% iFR 0.85, IVUS 3.1 mm squared  Tubular segment  Successful balloon angioplasty and SHERLYN placed at the mLAD 50-69% to 0% DENILSON III to DENILSON III flow utilizing 3.0x26mm SHERLYN postdilated with 3.5mm balloon to 3.6  LM luminal irregularities, rPDA 30-50% stenosis, dLAD 30-40% stenosis long, dLCFX 100% stenosis as it goes into AV groove with R to L collaterals, patent stent in the OM  LVEDP 11mmHg  RCA Dominant System  Plan: Continue with triple therapy including aspirin 81mg po daily, plavix 75mg po daily, eliquis 5mg po bid for 4 weeks followed by eliquis 5mg po bid and plavix 75mg po daily after that for at least 12 months. Patient received brilinta load today 180mg po x once  ----  2d echo 11/13/2023    Left Ventricle: Moderately reduced left ventricular

## 2025-01-27 RX ORDER — DULAGLUTIDE 3 MG/.5ML
INJECTION, SOLUTION SUBCUTANEOUS
Qty: 6 ML | Refills: 5 | OUTPATIENT
Start: 2025-01-27

## 2025-01-29 ENCOUNTER — OFFICE VISIT (OUTPATIENT)
Age: 60
End: 2025-01-29
Payer: COMMERCIAL

## 2025-01-29 VITALS
HEIGHT: 65 IN | WEIGHT: 176 LBS | SYSTOLIC BLOOD PRESSURE: 145 MMHG | HEART RATE: 77 BPM | BODY MASS INDEX: 29.32 KG/M2 | OXYGEN SATURATION: 98 % | DIASTOLIC BLOOD PRESSURE: 83 MMHG

## 2025-01-29 DIAGNOSIS — Z86.79 HISTORY OF CARDIOMYOPATHY: ICD-10-CM

## 2025-01-29 DIAGNOSIS — E78.49 OTHER HYPERLIPIDEMIA: ICD-10-CM

## 2025-01-29 DIAGNOSIS — Z86.79 HISTORY OF ATRIAL FLUTTER: ICD-10-CM

## 2025-01-29 DIAGNOSIS — I25.83 CORONARY ARTERY DISEASE DUE TO LIPID RICH PLAQUE: Primary | ICD-10-CM

## 2025-01-29 DIAGNOSIS — I25.10 CORONARY ARTERY DISEASE DUE TO LIPID RICH PLAQUE: Primary | ICD-10-CM

## 2025-01-29 DIAGNOSIS — Z86.73 HISTORY OF CVA (CEREBROVASCULAR ACCIDENT): ICD-10-CM

## 2025-01-29 DIAGNOSIS — I10 HYPERTENSION, UNSPECIFIED TYPE: ICD-10-CM

## 2025-01-29 PROCEDURE — 93000 ELECTROCARDIOGRAM COMPLETE: CPT | Performed by: INTERNAL MEDICINE

## 2025-01-29 PROCEDURE — 3077F SYST BP >= 140 MM HG: CPT | Performed by: INTERNAL MEDICINE

## 2025-01-29 PROCEDURE — 3079F DIAST BP 80-89 MM HG: CPT | Performed by: INTERNAL MEDICINE

## 2025-01-29 PROCEDURE — 99214 OFFICE O/P EST MOD 30 MIN: CPT | Performed by: INTERNAL MEDICINE

## 2025-01-29 NOTE — PROGRESS NOTES
Have you had Fatigue?  No  Have you had Chest Pain? No   3.   Have you had Dyspnea (SOB) ? No  4.   Have you had Orthopnea? No  5.   Have you had PND? No   6.   Have you had leg swelling? No   7.   Have you had any weight gain? No   8.   Have you had any palpitations? No   9.   Have you had any syncope? No   10. Do you have any wounds on legs? NO

## 2025-01-29 NOTE — PATIENT INSTRUCTIONS
Learning About the Mediterranean Diet  What is the Mediterranean diet?     The Mediterranean diet is a style of eating rather than a diet plan. It features foods eaten in [place], [place], southern [place], and other countries along the [place] Sea. It emphasizes eating foods like fish, fruits, vegetables, beans, high-fiber breads and whole grains, nuts, and olive oil. This style of eating includes limited red meat, cheese, and sweets.  Why choose the Mediterranean diet?  A Mediterranean-style diet may improve heart health. It contains more fat than other heart-healthy diets. But the fats are mainly from nuts, unsaturated oils (such as fish oils and olive oil), and certain nut or seed oils (such as canola, soybean, or flaxseed oil). These fats may help protect the heart and blood vessels.  How can you get started on the Mediterranean diet?  Here are some things you can do to switch to a more Mediterranean way of eating.  What to eat  Eat a variety of fruits and vegetables each day, such as grapes, blueberries, tomatoes, broccoli, peppers, figs, olives, spinach, eggplant, beans, lentils, and chickpeas.  Eat a variety of whole-grain foods each day, such as oats, brown rice, and whole wheat bread, pasta, and couscous.  Eat fish at least 2 times a week. Try tuna, salmon, mackerel, lake trout, herring, or sardines.  Eat moderate amounts of low-fat dairy products, such as milk, cheese, or yogurt.  Eat moderate amounts of poultry and eggs.  Choose healthy (unsaturated) fats, such as nuts, olive oil, and certain nut or seed oils like canola, soybean, and flaxseed.  Limit unhealthy (saturated) fats, such as butter, palm oil, and coconut oil. And limit fats found in animal products, such as meat and dairy products made with whole milk. Try to eat red meat only a few times a month in very small amounts.  Limit sweets and desserts to only a few times a week. This includes sugar-sweetened drinks like soda.  The

## 2025-01-31 ENCOUNTER — HOSPITAL ENCOUNTER (OUTPATIENT)
Facility: HOSPITAL | Age: 60
Discharge: HOME | End: 2025-01-31
Payer: COMMERCIAL

## 2025-01-31 DIAGNOSIS — Z12.5 SCREENING FOR MALIGNANT NEOPLASM OF PROSTATE: ICD-10-CM

## 2025-01-31 DIAGNOSIS — I10 ESSENTIAL (PRIMARY) HYPERTENSION: Chronic | ICD-10-CM

## 2025-01-31 DIAGNOSIS — I50.9 CONGESTIVE HEART FAILURE, UNSPECIFIED HF CHRONICITY, UNSPECIFIED HEART FAILURE TYPE (HCC): ICD-10-CM

## 2025-01-31 DIAGNOSIS — E78.5 HYPERLIPIDEMIA ASSOCIATED WITH TYPE 2 DIABETES MELLITUS (HCC): Chronic | ICD-10-CM

## 2025-01-31 DIAGNOSIS — E11.69 HYPERLIPIDEMIA ASSOCIATED WITH TYPE 2 DIABETES MELLITUS (HCC): Chronic | ICD-10-CM

## 2025-01-31 DIAGNOSIS — I25.10 CORONARY ARTERY DISEASE INVOLVING NATIVE CORONARY ARTERY OF NATIVE HEART, UNSPECIFIED WHETHER ANGINA PRESENT: Chronic | ICD-10-CM

## 2025-01-31 DIAGNOSIS — E11.40 TYPE 2 DIABETES MELLITUS WITH DIABETIC NEUROPATHY, WITH LONG-TERM CURRENT USE OF INSULIN (HCC): Chronic | ICD-10-CM

## 2025-01-31 DIAGNOSIS — I25.2 HISTORY OF MI (MYOCARDIAL INFARCTION): ICD-10-CM

## 2025-01-31 DIAGNOSIS — Z79.4 TYPE 2 DIABETES MELLITUS WITH DIABETIC NEUROPATHY, WITH LONG-TERM CURRENT USE OF INSULIN (HCC): Chronic | ICD-10-CM

## 2025-01-31 DIAGNOSIS — Z86.73 HISTORY OF CVA (CEREBROVASCULAR ACCIDENT): ICD-10-CM

## 2025-01-31 LAB
ALBUMIN SERPL-MCNC: 3.3 G/DL (ref 3.4–5)
ALBUMIN/GLOB SERPL: 1 (ref 0.8–1.7)
ALP SERPL-CCNC: 74 U/L (ref 45–117)
ALT SERPL-CCNC: 17 U/L (ref 16–61)
ANION GAP SERPL CALC-SCNC: 3 MMOL/L (ref 3–18)
AST SERPL-CCNC: 16 U/L (ref 10–38)
BILIRUB SERPL-MCNC: 0.7 MG/DL (ref 0.2–1)
BUN SERPL-MCNC: 25 MG/DL (ref 7–18)
BUN/CREAT SERPL: 22 (ref 12–20)
CALCIUM SERPL-MCNC: 8.8 MG/DL (ref 8.5–10.1)
CHLORIDE SERPL-SCNC: 109 MMOL/L (ref 100–111)
CHOLEST SERPL-MCNC: 138 MG/DL
CO2 SERPL-SCNC: 29 MMOL/L (ref 21–32)
CREAT SERPL-MCNC: 1.14 MG/DL (ref 0.6–1.3)
EST. AVERAGE GLUCOSE BLD GHB EST-MCNC: 157 MG/DL
GLOBULIN SER CALC-MCNC: 3.3 G/DL (ref 2–4)
GLUCOSE SERPL-MCNC: 187 MG/DL (ref 74–99)
HBA1C MFR BLD: 7.1 % (ref 4.2–5.6)
HDLC SERPL-MCNC: 40 MG/DL (ref 40–60)
HDLC SERPL: 3.5 (ref 0–5)
LDLC SERPL CALC-MCNC: 77 MG/DL (ref 0–100)
LIPID PANEL: NORMAL
POTASSIUM SERPL-SCNC: 4.3 MMOL/L (ref 3.5–5.5)
PROT SERPL-MCNC: 6.6 G/DL (ref 6.4–8.2)
PSA SERPL-MCNC: 0.3 NG/ML (ref 0–4)
SODIUM SERPL-SCNC: 141 MMOL/L (ref 136–145)
TRIGL SERPL-MCNC: 105 MG/DL
VLDLC SERPL CALC-MCNC: 21 MG/DL

## 2025-01-31 PROCEDURE — 83036 HEMOGLOBIN GLYCOSYLATED A1C: CPT

## 2025-01-31 PROCEDURE — G0103 PSA SCREENING: HCPCS

## 2025-01-31 PROCEDURE — 36415 COLL VENOUS BLD VENIPUNCTURE: CPT

## 2025-01-31 PROCEDURE — 80053 COMPREHEN METABOLIC PANEL: CPT

## 2025-01-31 PROCEDURE — 80061 LIPID PANEL: CPT

## 2025-02-17 DIAGNOSIS — I50.21 ACUTE SYSTOLIC (CONGESTIVE) HEART FAILURE (HCC): ICD-10-CM

## 2025-02-18 RX ORDER — ATORVASTATIN CALCIUM 80 MG/1
80 TABLET, FILM COATED ORAL DAILY
Qty: 90 TABLET | Refills: 5 | Status: SHIPPED | OUTPATIENT
Start: 2025-02-18

## 2025-02-18 RX ORDER — SACUBITRIL AND VALSARTAN 24; 26 MG/1; MG/1
1 TABLET, FILM COATED ORAL 2 TIMES DAILY
Qty: 180 TABLET | Refills: 5 | Status: SHIPPED | OUTPATIENT
Start: 2025-02-18

## 2025-02-26 ENCOUNTER — OFFICE VISIT (OUTPATIENT)
Facility: CLINIC | Age: 60
End: 2025-02-26
Payer: COMMERCIAL

## 2025-02-26 VITALS
TEMPERATURE: 98.8 F | RESPIRATION RATE: 17 BRPM | HEIGHT: 65 IN | OXYGEN SATURATION: 97 % | DIASTOLIC BLOOD PRESSURE: 84 MMHG | WEIGHT: 187 LBS | BODY MASS INDEX: 31.16 KG/M2 | SYSTOLIC BLOOD PRESSURE: 136 MMHG | HEART RATE: 113 BPM

## 2025-02-26 DIAGNOSIS — E11.69 HYPERLIPIDEMIA ASSOCIATED WITH TYPE 2 DIABETES MELLITUS (HCC): ICD-10-CM

## 2025-02-26 DIAGNOSIS — I25.10 CORONARY ARTERY DISEASE INVOLVING NATIVE CORONARY ARTERY OF NATIVE HEART, UNSPECIFIED WHETHER ANGINA PRESENT: ICD-10-CM

## 2025-02-26 DIAGNOSIS — F32.A DEPRESSION, UNSPECIFIED DEPRESSION TYPE: ICD-10-CM

## 2025-02-26 DIAGNOSIS — Z01.818 PREOP EXAMINATION: Primary | ICD-10-CM

## 2025-02-26 DIAGNOSIS — E11.40 TYPE 2 DIABETES MELLITUS WITH DIABETIC NEUROPATHY, WITH LONG-TERM CURRENT USE OF INSULIN (HCC): ICD-10-CM

## 2025-02-26 DIAGNOSIS — I10 ESSENTIAL (PRIMARY) HYPERTENSION: ICD-10-CM

## 2025-02-26 DIAGNOSIS — I25.2 HISTORY OF MI (MYOCARDIAL INFARCTION): ICD-10-CM

## 2025-02-26 DIAGNOSIS — I50.9 CONGESTIVE HEART FAILURE, UNSPECIFIED HF CHRONICITY, UNSPECIFIED HEART FAILURE TYPE (HCC): ICD-10-CM

## 2025-02-26 DIAGNOSIS — Z79.4 TYPE 2 DIABETES MELLITUS WITH DIABETIC NEUROPATHY, WITH LONG-TERM CURRENT USE OF INSULIN (HCC): ICD-10-CM

## 2025-02-26 DIAGNOSIS — Z86.73 HISTORY OF CVA (CEREBROVASCULAR ACCIDENT): ICD-10-CM

## 2025-02-26 DIAGNOSIS — E78.5 HYPERLIPIDEMIA ASSOCIATED WITH TYPE 2 DIABETES MELLITUS (HCC): ICD-10-CM

## 2025-02-26 DIAGNOSIS — H26.9 CATARACT OF BOTH EYES, UNSPECIFIED CATARACT TYPE: ICD-10-CM

## 2025-02-26 PROCEDURE — 3079F DIAST BP 80-89 MM HG: CPT | Performed by: FAMILY MEDICINE

## 2025-02-26 PROCEDURE — 3051F HG A1C>EQUAL 7.0%<8.0%: CPT | Performed by: FAMILY MEDICINE

## 2025-02-26 PROCEDURE — 99215 OFFICE O/P EST HI 40 MIN: CPT | Performed by: FAMILY MEDICINE

## 2025-02-26 PROCEDURE — 3075F SYST BP GE 130 - 139MM HG: CPT | Performed by: FAMILY MEDICINE

## 2025-02-26 SDOH — ECONOMIC STABILITY: FOOD INSECURITY: WITHIN THE PAST 12 MONTHS, YOU WORRIED THAT YOUR FOOD WOULD RUN OUT BEFORE YOU GOT MONEY TO BUY MORE.: NEVER TRUE

## 2025-02-26 SDOH — ECONOMIC STABILITY: FOOD INSECURITY: WITHIN THE PAST 12 MONTHS, THE FOOD YOU BOUGHT JUST DIDN'T LAST AND YOU DIDN'T HAVE MONEY TO GET MORE.: NEVER TRUE

## 2025-02-26 ASSESSMENT — PATIENT HEALTH QUESTIONNAIRE - PHQ9
1. LITTLE INTEREST OR PLEASURE IN DOING THINGS: NOT AT ALL
5. POOR APPETITE OR OVEREATING: NOT AT ALL
8. MOVING OR SPEAKING SO SLOWLY THAT OTHER PEOPLE COULD HAVE NOTICED. OR THE OPPOSITE, BEING SO FIGETY OR RESTLESS THAT YOU HAVE BEEN MOVING AROUND A LOT MORE THAN USUAL: NOT AT ALL
SUM OF ALL RESPONSES TO PHQ QUESTIONS 1-9: 0
2. FEELING DOWN, DEPRESSED OR HOPELESS: NOT AT ALL
7. TROUBLE CONCENTRATING ON THINGS, SUCH AS READING THE NEWSPAPER OR WATCHING TELEVISION: NOT AT ALL
SUM OF ALL RESPONSES TO PHQ QUESTIONS 1-9: 0
3. TROUBLE FALLING OR STAYING ASLEEP: NOT AT ALL
SUM OF ALL RESPONSES TO PHQ9 QUESTIONS 1 & 2: 0
6. FEELING BAD ABOUT YOURSELF - OR THAT YOU ARE A FAILURE OR HAVE LET YOURSELF OR YOUR FAMILY DOWN: NOT AT ALL
9. THOUGHTS THAT YOU WOULD BE BETTER OFF DEAD, OR OF HURTING YOURSELF: NOT AT ALL
10. IF YOU CHECKED OFF ANY PROBLEMS, HOW DIFFICULT HAVE THESE PROBLEMS MADE IT FOR YOU TO DO YOUR WORK, TAKE CARE OF THINGS AT HOME, OR GET ALONG WITH OTHER PEOPLE: NOT DIFFICULT AT ALL
4. FEELING TIRED OR HAVING LITTLE ENERGY: NOT AT ALL
SUM OF ALL RESPONSES TO PHQ QUESTIONS 1-9: 0
SUM OF ALL RESPONSES TO PHQ QUESTIONS 1-9: 0

## 2025-02-26 NOTE — PROGRESS NOTES
Rose Pulido Sr. is a 59 y.o. male (: 1965) presenting to address:    Chief Complaint   Patient presents with    Pre-op Exam       Vitals:    25 0827   BP: 136/84   Pulse: (!) 113   Resp: 17   Temp: 98.8 °F (37.1 °C)   SpO2: 97%       Coordination of Care Questionaire:     \"Have you been to the ER, urgent care clinic since your last visit?  Hospitalized since your last visit?\"    No     “Have you seen or consulted any other health care providers outside our system since your last visit?”    No     “Have you had a colorectal cancer screening such as a colonoscopy/FIT/Cologuard?    No     No colonoscopy on file  No cologuard on file  Date of last FIT: 2019   No flexible sigmoidoscopy on file

## 2025-02-26 NOTE — PROGRESS NOTES
Preoperative Evaluation    Date of Exam: 2025    Rose Pulido Sr. is a 59 y.o. male (:1965) who presents for preoperative evaluation.   Procedure/Surgery: L cataract excision, R cataract excision  Date of Procedure/Surgery: 3/12/25, 3/19/25  Surgeon: Dr Hampton  [facility]  Primary Physician: Natasha Denise MD  Latex Allergy: No    Problem list   Patient Active Problem List   Diagnosis    Essential (primary) hypertension    Type 2 diabetes mellitus with diabetic neuropathy, with long-term current use of insulin ([facility])    Type 2 diabetes mellitus, with long-term current use of insulin ([facility])    History of CVA (cerebrovascular accident)    History of MI (myocardial infarction)    CAD (coronary artery disease)    Stable angina pectoris    CHF (congestive heart failure) ([facility])    Right sided abdominal pain    Acute right-sided low back pain without sciatica    Depression    Hyperlipidemia associated with type 2 diabetes mellitus ([facility])    Atrial flutter ([facility])    Hyperglycemia    Atrial fibrillation ([facility])    Cardiomyopathy ([facility])    NSTEMI (non-ST elevated myocardial infarction) ([facility])    Mitral valve insufficiency    Seborrheic dermatitis of scalp   , Medical History:   Past Medical History:   Diagnosis Date    Arthritis     Chest pain     CHF (congestive heart failure) ([facility])     CVA (cerebral vascular accident) ([facility])     r side stroke    CVA (cerebral vascular accident) ([facility]) 2018    admitted at [facility]    Depression     Heart attack ([facility]) 2010    chest pains    Hypercholesterolemia     Hypertension     Loss of hearing     right ear, patient states it comes and goes    Polyuria     Shortness of breath     Type 2 diabetes mellitus without complication, with long-term current use of insulin ([facility]) 2013    Type 2 diabetes mellitus, with long-term current use of insulin ([facility]) 2013   , Allergies:   Allergies   Allergen Reactions    Lisinopril

## 2025-03-10 ENCOUNTER — CLINICAL DOCUMENTATION (OUTPATIENT)
Facility: CLINIC | Age: 60
End: 2025-03-10

## 2025-03-28 DIAGNOSIS — F32.A DEPRESSION, UNSPECIFIED: ICD-10-CM

## 2025-03-28 RX ORDER — ESCITALOPRAM OXALATE 20 MG/1
20 TABLET ORAL DAILY
Qty: 90 TABLET | Refills: 3 | Status: SHIPPED | OUTPATIENT
Start: 2025-03-28

## 2025-03-28 RX ORDER — ASPIRIN 81 MG/1
81 TABLET, COATED ORAL DAILY
Qty: 90 TABLET | Refills: 3 | Status: SHIPPED | OUTPATIENT
Start: 2025-03-28

## 2025-03-28 NOTE — TELEPHONE ENCOUNTER
Labs:   Lab Results   Component Value Date     01/31/2025    K 4.3 01/31/2025     01/31/2025    CO2 29 01/31/2025    BUN 25 (H) 01/31/2025    CREATININE 1.14 01/31/2025    GLUCOSE 187 (H) 01/31/2025    CALCIUM 8.8 01/31/2025    BILITOT 0.7 01/31/2025    ALKPHOS 74 01/31/2025    AST 16 01/31/2025    ALT 17 01/31/2025    LABGLOM 74 01/31/2025    GFRAA >60 10/03/2022    AGRATIO 1.0 10/03/2022    GLOB 3.3 01/31/2025        Additional Notes:

## 2025-05-19 DIAGNOSIS — I48.91 ATRIAL FIBRILLATION, UNSPECIFIED TYPE (HCC): ICD-10-CM

## 2025-05-19 DIAGNOSIS — I50.21 ACUTE SYSTOLIC (CONGESTIVE) HEART FAILURE (HCC): ICD-10-CM

## 2025-05-19 RX ORDER — CLOPIDOGREL BISULFATE 75 MG/1
75 TABLET ORAL DAILY
Qty: 30 TABLET | Refills: 5 | Status: SHIPPED | OUTPATIENT
Start: 2025-05-19

## 2025-05-19 RX ORDER — SPIRONOLACTONE 25 MG/1
25 TABLET ORAL DAILY
Qty: 30 TABLET | Refills: 5 | Status: SHIPPED | OUTPATIENT
Start: 2025-05-19

## 2025-06-19 ENCOUNTER — OFFICE VISIT (OUTPATIENT)
Dept: CARDIOTHORACIC SURGERY | Age: 60
End: 2025-06-19
Payer: COMMERCIAL

## 2025-06-19 VITALS
WEIGHT: 197.6 LBS | BODY MASS INDEX: 32.92 KG/M2 | DIASTOLIC BLOOD PRESSURE: 80 MMHG | HEIGHT: 65 IN | SYSTOLIC BLOOD PRESSURE: 130 MMHG | OXYGEN SATURATION: 97 % | HEART RATE: 96 BPM

## 2025-06-19 DIAGNOSIS — Z79.4 TYPE 2 DIABETES MELLITUS WITH DIABETIC NEUROPATHY, WITH LONG-TERM CURRENT USE OF INSULIN (HCC): Chronic | ICD-10-CM

## 2025-06-19 DIAGNOSIS — I10 ESSENTIAL (PRIMARY) HYPERTENSION: Chronic | ICD-10-CM

## 2025-06-19 DIAGNOSIS — J96.00 ACUTE RESPIRATORY FAILURE, UNSPECIFIED WHETHER WITH HYPOXIA OR HYPERCAPNIA (HCC): ICD-10-CM

## 2025-06-19 DIAGNOSIS — I48.19 PERSISTENT ATRIAL FIBRILLATION (HCC): ICD-10-CM

## 2025-06-19 DIAGNOSIS — I20.89 STABLE ANGINA PECTORIS: ICD-10-CM

## 2025-06-19 DIAGNOSIS — Z86.73 HISTORY OF CVA (CEREBROVASCULAR ACCIDENT): ICD-10-CM

## 2025-06-19 DIAGNOSIS — Z79.01 CHRONIC ANTICOAGULATION: ICD-10-CM

## 2025-06-19 DIAGNOSIS — I48.92 ATRIAL FLUTTER, UNSPECIFIED TYPE (HCC): ICD-10-CM

## 2025-06-19 DIAGNOSIS — I25.118 CORONARY ARTERY DISEASE OF NATIVE ARTERY OF NATIVE HEART WITH STABLE ANGINA PECTORIS: Primary | ICD-10-CM

## 2025-06-19 DIAGNOSIS — E11.40 TYPE 2 DIABETES MELLITUS WITH DIABETIC NEUROPATHY, WITH LONG-TERM CURRENT USE OF INSULIN (HCC): Chronic | ICD-10-CM

## 2025-06-19 PROBLEM — I50.32 HEART FAILURE WITH RECOVERED EJECTION FRACTION (HFRECEF) (HCC): Status: ACTIVE | Noted: 2019-06-18

## 2025-06-19 PROBLEM — I42.9 CARDIOMYOPATHY (HCC): Status: RESOLVED | Noted: 2023-11-16 | Resolved: 2025-06-19

## 2025-06-19 PROBLEM — I21.4 NSTEMI (NON-ST ELEVATED MYOCARDIAL INFARCTION) (HCC): Status: RESOLVED | Noted: 2023-11-16 | Resolved: 2025-06-19

## 2025-06-19 PROCEDURE — 3075F SYST BP GE 130 - 139MM HG: CPT

## 2025-06-19 PROCEDURE — 99215 OFFICE O/P EST HI 40 MIN: CPT

## 2025-06-19 PROCEDURE — 3051F HG A1C>EQUAL 7.0%<8.0%: CPT

## 2025-06-19 PROCEDURE — 3079F DIAST BP 80-89 MM HG: CPT

## 2025-06-19 RX ORDER — DIGOXIN 125 MCG
125 TABLET ORAL DAILY
Qty: 30 TABLET | Refills: 3 | Status: CANCELLED | OUTPATIENT
Start: 2025-06-19

## 2025-06-19 RX ORDER — ISOSORBIDE MONONITRATE 30 MG/1
30 TABLET, EXTENDED RELEASE ORAL DAILY
Qty: 30 TABLET | Refills: 3 | Status: SHIPPED | OUTPATIENT
Start: 2025-06-19

## 2025-06-19 RX ORDER — METOPROLOL SUCCINATE 100 MG/1
100 TABLET, EXTENDED RELEASE ORAL DAILY
Qty: 30 TABLET | Refills: 2 | Status: SHIPPED | OUTPATIENT
Start: 2025-06-19

## 2025-06-19 NOTE — PATIENT INSTRUCTIONS
- Start metoprolol 100 mg tablet daily   - Start imdur 30 mg tablet daily  - Complete stress test in 2 weeks   - Complete heart monitor    - Follow up with me in 1 month or sooner if needed.

## 2025-06-19 NOTE — PROGRESS NOTES
Rose Pulido Sr. presents today for   Chief Complaint   Patient presents with    Follow-up     Sooner h/f       Rose Pulido Sr. preferred language for health care discussion is english/other.    Is someone accompanying this pt? Yes    Is the patient using any DME equipment during OV? Yes    Pt currently taking Anticoagulant therapy? No    Coordination of Care:  1. Have you been to the ER, urgent care clinic since your last visit? Hospitalized since your last visit? Yes    2. Have you seen or consulted any other health care providers outside of the [facility] since your last visit? Include any pap smears or colon screening. No  
stated above include:  Constitutional: Negative for fever, chills and malaise/fatigue.   HEENT: No congestion or recent URI.  Gastrointestinal: No nausea, vomiting, abdominal pain, bloody stools.  Pulmonary:  Negative except as stated above.  Cardiac:  Negative except as stated above.  Musculoskeletal: Negative except as stated above.  Neurological:  No localized symptoms.  Skin:  Negative except as stated above.  Psych:  Negative except as stated above.  Endocrine:  Negative except as stated above.     Physical Exam:     Wt Readings from Last 3 Encounters:   06/19/25 89.6 kg (197 lb 9.6 oz)   02/26/25 84.8 kg (187 lb)   01/29/25 79.8 kg (176 lb)     BP Readings from Last 3 Encounters:   06/19/25 130/80   02/26/25 136/84   01/29/25 (!) 145/83     Pulse Readings from Last 3 Encounters:   06/19/25 96   02/26/25 (!) 113   01/29/25 77     BMI Readings from Last 1 Encounters:   06/19/25 32.88 kg/m²     General:   Well developed, well groomed, appears stated age.     Head/Neck:   No obvious jugular venous distention.     No obvious carotid pulsations or bruit.      No evidence of xanthelasma.  Lungs:   No respiratory distress.      Clear bilaterally.  Heart:  Irregular rate and rhythm. Normal S1/S2.      No significant murmurs, rubs or gallops.  Abdomen:   Non-acute abdomen.      No obvious pulsations.  Extremities:   Intact peripheral pulses.      No significant edema.    Neurological:   Alert and oriented to person, place, time.      No focal neurological deficit visually.  Skin:   No obvious rash     Cardiographics/Data Review:     Echo on 6/15/25    CONCLUSIONS    * Mild concentric left ventricular hypertrophy with low normal/borderline reduced systolic function with an ejection fraction of 52 % by 3D analysis.     * Mildly hypokinetic appearing basal to mid inferolateral &  anterolateral walls.     * Left ventricular diastolic function: indeterminate.     * Normal right ventricular size with mildly reduced function.

## 2025-07-01 ENCOUNTER — OFFICE VISIT (OUTPATIENT)
Facility: CLINIC | Age: 60
End: 2025-07-01
Payer: MEDICAID

## 2025-07-01 VITALS
RESPIRATION RATE: 17 BRPM | HEIGHT: 65 IN | BODY MASS INDEX: 32.32 KG/M2 | DIASTOLIC BLOOD PRESSURE: 69 MMHG | HEART RATE: 144 BPM | WEIGHT: 194 LBS | SYSTOLIC BLOOD PRESSURE: 114 MMHG | TEMPERATURE: 97.9 F | OXYGEN SATURATION: 95 %

## 2025-07-01 DIAGNOSIS — Z09 HOSPITAL DISCHARGE FOLLOW-UP: Primary | ICD-10-CM

## 2025-07-01 DIAGNOSIS — I48.19 PERSISTENT ATRIAL FIBRILLATION (HCC): ICD-10-CM

## 2025-07-01 PROCEDURE — 1111F DSCHRG MED/CURRENT MED MERGE: CPT | Performed by: FAMILY MEDICINE

## 2025-07-01 PROCEDURE — 99214 OFFICE O/P EST MOD 30 MIN: CPT | Performed by: FAMILY MEDICINE

## 2025-07-01 SDOH — ECONOMIC STABILITY: FOOD INSECURITY: WITHIN THE PAST 12 MONTHS, YOU WORRIED THAT YOUR FOOD WOULD RUN OUT BEFORE YOU GOT MONEY TO BUY MORE.: NEVER TRUE

## 2025-07-01 SDOH — ECONOMIC STABILITY: FOOD INSECURITY: WITHIN THE PAST 12 MONTHS, THE FOOD YOU BOUGHT JUST DIDN'T LAST AND YOU DIDN'T HAVE MONEY TO GET MORE.: NEVER TRUE

## 2025-07-01 ASSESSMENT — PATIENT HEALTH QUESTIONNAIRE - PHQ9
1. LITTLE INTEREST OR PLEASURE IN DOING THINGS: NOT AT ALL
SUM OF ALL RESPONSES TO PHQ QUESTIONS 1-9: 0
7. TROUBLE CONCENTRATING ON THINGS, SUCH AS READING THE NEWSPAPER OR WATCHING TELEVISION: NOT AT ALL
6. FEELING BAD ABOUT YOURSELF - OR THAT YOU ARE A FAILURE OR HAVE LET YOURSELF OR YOUR FAMILY DOWN: NOT AT ALL
10. IF YOU CHECKED OFF ANY PROBLEMS, HOW DIFFICULT HAVE THESE PROBLEMS MADE IT FOR YOU TO DO YOUR WORK, TAKE CARE OF THINGS AT HOME, OR GET ALONG WITH OTHER PEOPLE: NOT DIFFICULT AT ALL
SUM OF ALL RESPONSES TO PHQ QUESTIONS 1-9: 0
5. POOR APPETITE OR OVEREATING: NOT AT ALL
SUM OF ALL RESPONSES TO PHQ QUESTIONS 1-9: 0
4. FEELING TIRED OR HAVING LITTLE ENERGY: NOT AT ALL
9. THOUGHTS THAT YOU WOULD BE BETTER OFF DEAD, OR OF HURTING YOURSELF: NOT AT ALL
3. TROUBLE FALLING OR STAYING ASLEEP: NOT AT ALL
SUM OF ALL RESPONSES TO PHQ QUESTIONS 1-9: 0
2. FEELING DOWN, DEPRESSED OR HOPELESS: NOT AT ALL
8. MOVING OR SPEAKING SO SLOWLY THAT OTHER PEOPLE COULD HAVE NOTICED. OR THE OPPOSITE, BEING SO FIGETY OR RESTLESS THAT YOU HAVE BEEN MOVING AROUND A LOT MORE THAN USUAL: NOT AT ALL

## 2025-07-01 NOTE — PROGRESS NOTES
Rose Pulido Sr. presents today for   Chief Complaint   Patient presents with    Follow-Up from Hospital     Patient heart rate elevated        Is someone accompanying this pt? Yes wife     Is the patient using any DME equipment during OV? No     Depression Screenin/1/2025    10:22 AM 2025     8:35 AM 2024    10:44 AM 2023    11:06 AM 2022    11:10 AM   PHQ-9 Questionaire   Little interest or pleasure in doing things 0 0 0 0 0   Feeling down, depressed, or hopeless 0 0 0 2 0   Trouble falling or staying asleep, or sleeping too much 0 0 0 0    Feeling tired or having little energy 0 0 0 2    Poor appetite or overeating 0 0 0 0    Feeling bad about yourself - or that you are a failure or have let yourself or your family down 0 0 0 0    Trouble concentrating on things, such as reading the newspaper or watching television 0 0 0 2    Moving or speaking so slowly that other people could have noticed. Or the opposite - being so fidgety or restless that you have been moving around a lot more than usual 0 0 0 0    Thoughts that you would be better off dead, or of hurting yourself in some way 0 0 0 0    PHQ-9 Total Score 0 0 0 6 0   If you checked off any problems, how difficult have these problems made it for you to do your work, take care of things at home, or get along with other people? 0 0 0 1         PAUL 7-Anxiety        No data to display                   Learning Assessment:  No question data found.     Fall Risk      2023     2:05 AM   FALL RISK   Difficulty walking/impaired gait Yes   Outpatient fall risk intervention strategies Fall risk education provided          Travel Screening:    Travel Screening       Question Response    Have you been in contact with someone who was sick? No / Unsure    Do you have any of the following new or worsening symptoms? None of these    Have you traveled internationally or domestically in the last month? No          Travel History   Travel

## 2025-07-01 NOTE — PROGRESS NOTES
Post-Discharge Transitional Care Follow Up    Rose Pulido Sr.   YOB: 1965    Date of Office Visit:  2025  Date of Hospital Admission: 25  Date of Hospital Discharge: 6/15/25    Care management risk score Rising risk (score 2-5) and Complex Care (Scores >=6): No Risk Score On File     Non face to face  following discharge, date last encounter closed (first attempt may have been earlier): *No documented post hospital discharge outreach found in the last 14 days     Call initiated 2 business days of discharge: *No response recorded in the last 14 days    ASSESSMENT/PLAN:   Hospital discharge follow-up  -     PA DISCHARGE MEDS RECONCILED W/ CURRENT OUTPATIENT MED LIST  Persistent atrial fibrillation (HCC)  Assessment & Plan:   Care as per cards.   No chest pain or palpitations at this time.       Medical Decision Making: low complexity  No follow-ups on file.           Subjective:   HPI    Inpatient course: Discharge summary reviewed- see chart.  \"Discharge Summary - [facility]     Rose Pulido Sr. is a 59 y.o. male. : 1965   MRN: [id number]    Attending Physician: Amol Quiroga MD  PCP: Natasha Denise MD    Transition of Care     Admit Date: 2025 D/C Date: 6/15/2025 Patient Class: Inpatient     .       Discharge Medication List       START taking these medications     apixaban 5 mg Tabs  Take 1 Tab by Mouth Twice Daily.  Dispense: 60 Tab  Refills: 0  Commonly known as: Eliquis    metoprolol 100 mg Tabs  Take 1 Tab by Mouth Twice Daily.  Dispense: 60 Tab  Refills: 0  Commonly known as: Lopressor          CONTINUE taking these medications     albuterol 90 mcg/actuation Hfaa inhaler  Take 2 Puffs inhaled by mouth Every 6 Hours As Needed for Wheezing or Shortness of Breath.  Refills: 0  Commonly known as: ProventiL, Ventolin, ProAir    aspirin EC 81 mg Tbec  Take 1 Tab by Mouth Once a Day.  Refills: 0    atorvastatin 80 mg Tabs  Take 1 Tab by Mouth Every Night at

## 2025-07-08 ENCOUNTER — HOSPITAL ENCOUNTER (OUTPATIENT)
Facility: HOSPITAL | Age: 60
Discharge: HOME | End: 2025-07-10
Payer: MEDICAID

## 2025-07-08 ENCOUNTER — HOSPITAL ENCOUNTER (OUTPATIENT)
Facility: HOSPITAL | Age: 60
Discharge: HOME | End: 2025-07-11
Payer: MEDICAID

## 2025-07-08 VITALS
DIASTOLIC BLOOD PRESSURE: 92 MMHG | WEIGHT: 195 LBS | HEART RATE: 155 BPM | HEIGHT: 65 IN | BODY MASS INDEX: 32.49 KG/M2 | SYSTOLIC BLOOD PRESSURE: 144 MMHG

## 2025-07-08 DIAGNOSIS — I25.118 CORONARY ARTERY DISEASE OF NATIVE ARTERY OF NATIVE HEART WITH STABLE ANGINA PECTORIS: ICD-10-CM

## 2025-07-08 LAB
ECHO BSA: 2.01 M2
NUC STRESS EJECTION FRACTION: 22 %
STRESS BASELINE DIAS BP: 92 MMHG
STRESS BASELINE HR: 155 BPM
STRESS BASELINE SYS BP: 144 MMHG
STRESS ESTIMATED WORKLOAD: 1 METS
STRESS PEAK DIAS BP: 92 MMHG
STRESS PEAK SYS BP: 144 MMHG
STRESS PERCENT HR ACHIEVED: 98 %
STRESS POST PEAK HR: 157 BPM
STRESS RATE PRESSURE PRODUCT: NORMAL BPM*MMHG
STRESS TARGET HR: 161 BPM
TID: 1.08

## 2025-07-08 PROCEDURE — A9502 TC99M TETROFOSMIN: HCPCS

## 2025-07-08 PROCEDURE — 6360000002 HC RX W HCPCS

## 2025-07-08 PROCEDURE — 78452 HT MUSCLE IMAGE SPECT MULT: CPT

## 2025-07-08 PROCEDURE — 3430000000 HC RX DIAGNOSTIC RADIOPHARMACEUTICAL

## 2025-07-08 PROCEDURE — 93017 CV STRESS TEST TRACING ONLY: CPT

## 2025-07-08 RX ORDER — REGADENOSON 0.08 MG/ML
0.4 INJECTION, SOLUTION INTRAVENOUS
Status: COMPLETED | OUTPATIENT
Start: 2025-07-08 | End: 2025-07-08

## 2025-07-08 RX ADMIN — REGADENOSON 0.4 MG: 0.08 INJECTION, SOLUTION INTRAVENOUS at 09:11

## 2025-07-08 RX ADMIN — TETROFOSMIN 11 MILLICURIE: 1.38 INJECTION, POWDER, LYOPHILIZED, FOR SOLUTION INTRAVENOUS at 07:25

## 2025-07-08 RX ADMIN — TETROFOSMIN 33 MILLICURIE: 1.38 INJECTION, POWDER, LYOPHILIZED, FOR SOLUTION INTRAVENOUS at 09:12

## 2025-07-14 RX ORDER — DIGOXIN 125 MCG
125 TABLET ORAL DAILY
Qty: 30 TABLET | Refills: 3 | Status: CANCELLED | OUTPATIENT
Start: 2025-07-14

## 2025-07-14 RX ORDER — AMIODARONE HYDROCHLORIDE 100 MG/1
100 TABLET ORAL DAILY
Status: CANCELLED | OUTPATIENT
Start: 2025-07-14

## 2025-07-14 NOTE — PROGRESS NOTES
144     BMI Readings from Last 1 Encounters:   07/15/25 33.25 kg/m²     General:   Well developed, well groomed, appears stated age.     Head/Neck:   No obvious jugular venous distention.     No obvious carotid pulsations or bruit.      No evidence of xanthelasma.  Lungs:   No respiratory distress.      Clear bilaterally.  Heart:  Irregular rate and rhythm. Normal S1/S2.      No significant murmurs, rubs or gallops.  Abdomen:   Non-acute abdomen.      No obvious pulsations.  Extremities:   Intact peripheral pulses.      No significant edema.    Neurological:   Alert and oriented to person, place, time.      No focal neurological deficit visually.  Skin:   No obvious rash     Cardiographics/Data Review:     07/08/25    NM STRESS TEST WITH MYOCARDIAL PERFUSION 07/08/2025  3:08 PM (Final)    Interpretation Summary    Stress Combined Conclusion: Findings suggest a high risk of cardiac events.    Stress Function: Left ventricular function post-stress is abnormal. Global function is severely reduced. Post-stress ejection fraction is 22%.    Perfusion Defect: There is a moderate severity left ventricular stress perfusion defect that is medium to large in size present in the mid to distal inferolateral and anterolateral segment(s) that is partially reversible. The defect appears to be mara-infarct ischemia. There is a mild severity left ventricular stress perfusion defect that is small in size present in the apex segment(s) that is predominantly fixed. The defect appears to be probable infarction.    Perfusion Conclusion: TID ratio is 1.08.    ECG: Resting ECG demonstrates sinus tachycardia.    Stress Test: A pharmacological stress test was performed using regadenoson (Lexiscan). Low level exercise was used during the pharmacological stress test. The patient reported no symptoms during the stress test. Hemodynamics are suboptimal due to medication. Blood pressure demonstrated a normal response and heart rate demonstrated a

## 2025-07-14 NOTE — PATIENT INSTRUCTIONS
- Complete fasting labs in 2 weeks   - Complete pulmonary function test  - Complete echocardiogram  - Increase entresto   - Start amiodarone, 200 mg in the morning and 200 mg in the afternoon for 1 week, after 1 week switch to 200 mg daily   - Cardiac catheterization with Dr. Brown  - Ablation with Dr. Michael   - Make eye appointment

## 2025-07-15 ENCOUNTER — OFFICE VISIT (OUTPATIENT)
Dept: CARDIOTHORACIC SURGERY | Age: 60
End: 2025-07-15
Payer: MEDICAID

## 2025-07-15 ENCOUNTER — TELEPHONE (OUTPATIENT)
Age: 60
End: 2025-07-15

## 2025-07-15 VITALS
SYSTOLIC BLOOD PRESSURE: 157 MMHG | HEART RATE: 72 BPM | BODY MASS INDEX: 33.29 KG/M2 | WEIGHT: 199.8 LBS | HEIGHT: 65 IN | OXYGEN SATURATION: 96 % | DIASTOLIC BLOOD PRESSURE: 110 MMHG

## 2025-07-15 DIAGNOSIS — I48.92 ATRIAL FLUTTER, UNSPECIFIED TYPE (HCC): ICD-10-CM

## 2025-07-15 DIAGNOSIS — I48.19 PERSISTENT ATRIAL FIBRILLATION (HCC): ICD-10-CM

## 2025-07-15 DIAGNOSIS — Z79.01 CHRONIC ANTICOAGULATION: ICD-10-CM

## 2025-07-15 DIAGNOSIS — I50.21 ACUTE SYSTOLIC (CONGESTIVE) HEART FAILURE (HCC): Primary | ICD-10-CM

## 2025-07-15 DIAGNOSIS — E11.69 HYPERLIPIDEMIA ASSOCIATED WITH TYPE 2 DIABETES MELLITUS (HCC): Chronic | ICD-10-CM

## 2025-07-15 DIAGNOSIS — I25.118 CORONARY ARTERY DISEASE OF NATIVE HEART WITH STABLE ANGINA PECTORIS, UNSPECIFIED VESSEL OR LESION TYPE: Chronic | ICD-10-CM

## 2025-07-15 DIAGNOSIS — I71.21 ANEURYSM OF ASCENDING AORTA WITHOUT RUPTURE: ICD-10-CM

## 2025-07-15 DIAGNOSIS — Z79.899 ON AMIODARONE THERAPY: ICD-10-CM

## 2025-07-15 DIAGNOSIS — E78.5 HYPERLIPIDEMIA ASSOCIATED WITH TYPE 2 DIABETES MELLITUS (HCC): Chronic | ICD-10-CM

## 2025-07-15 DIAGNOSIS — I50.32 HEART FAILURE WITH RECOVERED EJECTION FRACTION (HFRECEF) (HCC): ICD-10-CM

## 2025-07-15 PROCEDURE — 3051F HG A1C>EQUAL 7.0%<8.0%: CPT

## 2025-07-15 PROCEDURE — 3077F SYST BP >= 140 MM HG: CPT

## 2025-07-15 PROCEDURE — 99215 OFFICE O/P EST HI 40 MIN: CPT

## 2025-07-15 PROCEDURE — 3080F DIAST BP >= 90 MM HG: CPT

## 2025-07-15 RX ORDER — AMIODARONE HYDROCHLORIDE 200 MG/1
200 TABLET ORAL DAILY
Qty: 30 TABLET | Refills: 2 | Status: SHIPPED | OUTPATIENT
Start: 2025-07-23 | End: 2025-07-15

## 2025-07-15 RX ORDER — SACUBITRIL AND VALSARTAN 49; 51 MG/1; MG/1
1 TABLET, FILM COATED ORAL 2 TIMES DAILY
Qty: 60 TABLET | Refills: 3 | Status: SHIPPED | OUTPATIENT
Start: 2025-07-15 | End: 2025-07-15

## 2025-07-15 RX ORDER — AMIODARONE HYDROCHLORIDE 100 MG/1
200 TABLET ORAL 2 TIMES DAILY
Qty: 28 TABLET | Refills: 0 | Status: SHIPPED | OUTPATIENT
Start: 2025-07-15 | End: 2025-07-22

## 2025-07-15 RX ORDER — AMIODARONE HYDROCHLORIDE 200 MG/1
200 TABLET ORAL DAILY
Qty: 30 TABLET | Refills: 2 | Status: SHIPPED | OUTPATIENT
Start: 2025-07-23

## 2025-07-15 RX ORDER — AMIODARONE HYDROCHLORIDE 100 MG/1
200 TABLET ORAL 2 TIMES DAILY
Qty: 28 TABLET | Refills: 0 | Status: SHIPPED | OUTPATIENT
Start: 2025-07-15 | End: 2025-07-15

## 2025-07-15 RX ORDER — SACUBITRIL AND VALSARTAN 49; 51 MG/1; MG/1
1 TABLET, FILM COATED ORAL 2 TIMES DAILY
Qty: 60 TABLET | Refills: 3 | Status: SHIPPED | OUTPATIENT
Start: 2025-07-15

## 2025-07-15 ASSESSMENT — PATIENT HEALTH QUESTIONNAIRE - PHQ9
SUM OF ALL RESPONSES TO PHQ QUESTIONS 1-9: 0
2. FEELING DOWN, DEPRESSED OR HOPELESS: NOT AT ALL
1. LITTLE INTEREST OR PLEASURE IN DOING THINGS: NOT AT ALL
SUM OF ALL RESPONSES TO PHQ QUESTIONS 1-9: 0

## 2025-07-16 ENCOUNTER — TELEPHONE (OUTPATIENT)
Age: 60
End: 2025-07-16

## 2025-07-16 NOTE — TELEPHONE ENCOUNTER
Per message from Roro to schedule patient for A-Fib&Aflutter Ablation with Dr Michael. I have called and left message to call surgery scheduling @ [phone] and speak with Cori to schedule procedure.   Per Roro request to be done After heart catheterization with Dr Brown.

## 2025-07-17 ENCOUNTER — TRANSCRIBE ORDERS (OUTPATIENT)
Facility: HOSPITAL | Age: 60
End: 2025-07-17

## 2025-07-17 DIAGNOSIS — I48.91 ATRIAL FIBRILLATION, UNSPECIFIED TYPE (HCC): Primary | ICD-10-CM

## 2025-07-17 DIAGNOSIS — I48.92 ATRIAL FLUTTER, UNSPECIFIED TYPE (HCC): ICD-10-CM

## 2025-07-24 ENCOUNTER — HOSPITAL ENCOUNTER (OUTPATIENT)
Facility: HOSPITAL | Age: 60
Discharge: HOME | End: 2025-07-27
Payer: MEDICAID

## 2025-07-24 DIAGNOSIS — I48.92 ATRIAL FLUTTER, UNSPECIFIED TYPE (HCC): ICD-10-CM

## 2025-07-24 DIAGNOSIS — Z79.899 ON AMIODARONE THERAPY: ICD-10-CM

## 2025-07-24 PROCEDURE — 94726 PLETHYSMOGRAPHY LUNG VOLUMES: CPT

## 2025-07-24 PROCEDURE — 94010 BREATHING CAPACITY TEST: CPT

## 2025-07-24 PROCEDURE — 94729 DIFFUSING CAPACITY: CPT

## 2025-07-24 PROCEDURE — 94060 EVALUATION OF WHEEZING: CPT

## 2025-07-28 LAB
DLCO %PRED: NORMAL
DLCO PRED: NORMAL
DLCO/VA %PRED: NORMAL
DLCO/VA PRED: NORMAL
DLCO/VA: NORMAL
DLCO: NORMAL
EXPIRATORY TIME-POST: NORMAL
EXPIRATORY TIME: NORMAL
FEF 25-75 %CHNG: NORMAL
FEF 25-75 POST %PRED: NORMAL
FEF 25-75% %PRED-PRE: NORMAL
FEF 25-75% PRED: NORMAL
FEF 25-75-POST: NORMAL
FEF 25-75-PRE: NORMAL
FEV1 %PRED-POST: NORMAL
FEV1 %PRED-PRE: NORMAL
FEV1 PRED: NORMAL
FEV1-POST: NORMAL
FEV1-PRE: NORMAL
FEV1/FVC %PRED-POST: NORMAL
FEV1/FVC %PRED-PRE: NORMAL
FEV1/FVC PRED: NORMAL
FEV1/FVC-POST: NORMAL
FEV1/FVC-PRE: NORMAL
FVC %PRED-POST: NORMAL
FVC %PRED-PRE: NORMAL
FVC PRED: NORMAL
FVC-POST: NORMAL
FVC-PRE: NORMAL
GAW %PRED: NORMAL
GAW PRED: NORMAL
GAW: NORMAL
IC PRE %PRED: NORMAL
IC PRED: NORMAL
IC: NORMAL
MEP: NORMAL
MIP: NORMAL
MVV %PRED-PRE: NORMAL
MVV PRED: NORMAL
MVV-PRE: NORMAL
PEF %PRED-POST: NORMAL
PEF %PRED-PRE: NORMAL
PEF PRED: NORMAL
PEF%CHNG: NORMAL
PEF-POST: NORMAL
PEF-PRE: NORMAL
RAW %PRED: NORMAL
RAW PRED: NORMAL
RAW: NORMAL
RV PRE %PRED: NORMAL
RV PRED: NORMAL
RV: NORMAL
SVC %PRED: NORMAL
SVC PRED: NORMAL
SVC: NORMAL
TLC PRE %PRED: NORMAL
TLC PRED: NORMAL
TLC: NORMAL
VA %PRED: NORMAL
VA PRED: NORMAL
VA: NORMAL
VTG %PRED: NORMAL
VTG PRED: NORMAL
VTG: NORMAL

## 2025-07-31 NOTE — PROGRESS NOTES
ASSESSMENT/PLAN:  1. Persistent atrial fibrillation (HCC)  Assessment & Plan:   Monitored by specialist- no acute findings meriting change in the plan  2. Coronary artery disease involving native coronary artery of native heart, unspecified whether angina present  Assessment & Plan:   Monitored by specialist- no acute findings meriting change in the plan  3. Hyperlipidemia associated with type 2 diabetes mellitus (HCC)  4. Essential (primary) hypertension  Assessment & Plan:   Chronic, not at goal (unstable), meds recently adjusted by cards.  Pt/partner advised to report elevated readings to cards clinic.   5. Type 2 diabetes mellitus with diabetic neuropathy, with long-term current use of insulin (HCC)  Assessment & Plan:   Monitored by specialist- no acute findings meriting change in the plan        Return in about 3 months (around 11/1/2025) for DM, HTN, HLD, CAD, afib, cath, ablation.      SUBJECTIVE/OBJECTIVE:    Chief Complaint   Patient presents with    Depression    Cholesterol Problem    Hypertension    Coronary Artery Disease         HPI    Rose Pulido . is a 60 y.o. male presenting today for    1 month follow up.    Pt had recent f/u with cardiology.  He was noted to have had a stress test on 7/8/2025.  Patient has been scheduled for cardiac catheterization.  Patient was also having symptomatic persistent atrial fibrillation and flutter.  Ablation has been scheduled for a few months now.  Blood pressure was elevated at the time of his appointment.  Entresto was increased.  Sodium restriction was discussed with plans to recheck the CMP in 2 weeks.    With regards to his diabetes, pt will see endo in Sept.  Patient's most recent A1c was 8.4 at his admission in June 2025.    Patient does not need medication refills today.      New concerns today: none        Review of Systems   Constitutional: Negative.    HENT: Negative.     Respiratory: Negative.     Cardiovascular: Negative.    All other systems

## 2025-08-01 ENCOUNTER — OFFICE VISIT (OUTPATIENT)
Facility: CLINIC | Age: 60
End: 2025-08-01
Payer: MEDICAID

## 2025-08-01 VITALS
TEMPERATURE: 98 F | HEIGHT: 65 IN | BODY MASS INDEX: 32.96 KG/M2 | WEIGHT: 197.8 LBS | DIASTOLIC BLOOD PRESSURE: 108 MMHG | RESPIRATION RATE: 16 BRPM | SYSTOLIC BLOOD PRESSURE: 139 MMHG | HEART RATE: 148 BPM | OXYGEN SATURATION: 97 %

## 2025-08-01 DIAGNOSIS — Z79.4 TYPE 2 DIABETES MELLITUS WITH DIABETIC NEUROPATHY, WITH LONG-TERM CURRENT USE OF INSULIN (HCC): ICD-10-CM

## 2025-08-01 DIAGNOSIS — I25.10 CORONARY ARTERY DISEASE INVOLVING NATIVE CORONARY ARTERY OF NATIVE HEART, UNSPECIFIED WHETHER ANGINA PRESENT: ICD-10-CM

## 2025-08-01 DIAGNOSIS — E11.69 HYPERLIPIDEMIA ASSOCIATED WITH TYPE 2 DIABETES MELLITUS (HCC): ICD-10-CM

## 2025-08-01 DIAGNOSIS — E78.5 HYPERLIPIDEMIA ASSOCIATED WITH TYPE 2 DIABETES MELLITUS (HCC): ICD-10-CM

## 2025-08-01 DIAGNOSIS — E11.40 TYPE 2 DIABETES MELLITUS WITH DIABETIC NEUROPATHY, WITH LONG-TERM CURRENT USE OF INSULIN (HCC): ICD-10-CM

## 2025-08-01 DIAGNOSIS — I48.19 PERSISTENT ATRIAL FIBRILLATION (HCC): Primary | ICD-10-CM

## 2025-08-01 DIAGNOSIS — I10 ESSENTIAL (PRIMARY) HYPERTENSION: ICD-10-CM

## 2025-08-01 PROCEDURE — 99214 OFFICE O/P EST MOD 30 MIN: CPT | Performed by: FAMILY MEDICINE

## 2025-08-01 PROCEDURE — 3080F DIAST BP >= 90 MM HG: CPT | Performed by: FAMILY MEDICINE

## 2025-08-01 PROCEDURE — 3075F SYST BP GE 130 - 139MM HG: CPT | Performed by: FAMILY MEDICINE

## 2025-08-01 PROCEDURE — 3051F HG A1C>EQUAL 7.0%<8.0%: CPT | Performed by: FAMILY MEDICINE

## 2025-08-01 RX ORDER — CLOPIDOGREL BISULFATE 75 MG/1
75 TABLET ORAL DAILY
COMMUNITY
Start: 2025-06-20

## 2025-08-01 RX ORDER — ALBUTEROL SULFATE 90 UG/1
2 INHALANT RESPIRATORY (INHALATION) EVERY 6 HOURS PRN
COMMUNITY

## 2025-08-01 RX ORDER — EMPAGLIFLOZIN AND METFORMIN HYDROCHLORIDE 12.5; 1 MG/1; MG/1
1 TABLET ORAL 2 TIMES DAILY
COMMUNITY
Start: 2025-06-20

## 2025-08-01 SDOH — ECONOMIC STABILITY: FOOD INSECURITY: WITHIN THE PAST 12 MONTHS, THE FOOD YOU BOUGHT JUST DIDN'T LAST AND YOU DIDN'T HAVE MONEY TO GET MORE.: NEVER TRUE

## 2025-08-01 SDOH — ECONOMIC STABILITY: FOOD INSECURITY: WITHIN THE PAST 12 MONTHS, YOU WORRIED THAT YOUR FOOD WOULD RUN OUT BEFORE YOU GOT MONEY TO BUY MORE.: NEVER TRUE

## 2025-08-01 ASSESSMENT — PATIENT HEALTH QUESTIONNAIRE - PHQ9
10. IF YOU CHECKED OFF ANY PROBLEMS, HOW DIFFICULT HAVE THESE PROBLEMS MADE IT FOR YOU TO DO YOUR WORK, TAKE CARE OF THINGS AT HOME, OR GET ALONG WITH OTHER PEOPLE: NOT DIFFICULT AT ALL
4. FEELING TIRED OR HAVING LITTLE ENERGY: NOT AT ALL
5. POOR APPETITE OR OVEREATING: NOT AT ALL
7. TROUBLE CONCENTRATING ON THINGS, SUCH AS READING THE NEWSPAPER OR WATCHING TELEVISION: NOT AT ALL
SUM OF ALL RESPONSES TO PHQ QUESTIONS 1-9: 0
2. FEELING DOWN, DEPRESSED OR HOPELESS: NOT AT ALL
SUM OF ALL RESPONSES TO PHQ QUESTIONS 1-9: 0
8. MOVING OR SPEAKING SO SLOWLY THAT OTHER PEOPLE COULD HAVE NOTICED. OR THE OPPOSITE, BEING SO FIGETY OR RESTLESS THAT YOU HAVE BEEN MOVING AROUND A LOT MORE THAN USUAL: NOT AT ALL
1. LITTLE INTEREST OR PLEASURE IN DOING THINGS: NOT AT ALL
3. TROUBLE FALLING OR STAYING ASLEEP: NOT AT ALL
6. FEELING BAD ABOUT YOURSELF - OR THAT YOU ARE A FAILURE OR HAVE LET YOURSELF OR YOUR FAMILY DOWN: NOT AT ALL
SUM OF ALL RESPONSES TO PHQ QUESTIONS 1-9: 0
SUM OF ALL RESPONSES TO PHQ QUESTIONS 1-9: 0
9. THOUGHTS THAT YOU WOULD BE BETTER OFF DEAD, OR OF HURTING YOURSELF: NOT AT ALL

## 2025-08-01 ASSESSMENT — ENCOUNTER SYMPTOMS: RESPIRATORY NEGATIVE: 1

## 2025-08-01 NOTE — PROGRESS NOTES
Rose Pulido Sr. presents today for   Chief Complaint   Patient presents with    Depression    Cholesterol Problem    Hypertension    Coronary Artery Disease       HPI     Is someone accompanying this pt? no    Is the patient using any DME equipment during OV? no    Depression Screenin/1/2025    10:12 AM 7/15/2025     2:11 PM 2025    10:22 AM 2025     8:35 AM 2024    10:44 AM 2023    11:06 AM 2022    11:10 AM   PHQ-9 Questionaire   Little interest or pleasure in doing things 0 0 0 0 0 0 0   Feeling down, depressed, or hopeless 0 0 0 0 0 2 0   Trouble falling or staying asleep, or sleeping too much 0  0 0 0 0    Feeling tired or having little energy 0  0 0 0 2    Poor appetite or overeating 0  0 0 0 0    Feeling bad about yourself - or that you are a failure or have let yourself or your family down 0  0 0 0 0    Trouble concentrating on things, such as reading the newspaper or watching television 0  0 0 0 2    Moving or speaking so slowly that other people could have noticed. Or the opposite - being so fidgety or restless that you have been moving around a lot more than usual 0  0 0 0 0    Thoughts that you would be better off dead, or of hurting yourself in some way 0  0 0 0 0    PHQ-9 Total Score 0 0 0 0 0 6 0   If you checked off any problems, how difficult have these problems made it for you to do your work, take care of things at home, or get along with other people? 0  0 0 0 1         PAUL 7-Anxiety        No data to display                   Learning Assessment:  No question data found.     Fall Risk      2023     2:05 AM   FALL RISK   Difficulty walking/impaired gait Yes   Outpatient fall risk intervention strategies Fall risk education provided          Travel Screening:    Travel Screening       Question Response    Have you been in contact with someone who was sick? No / Unsure    Do you have any of the following new or worsening symptoms? None of these    Have you

## 2025-08-03 PROBLEM — I48.19 PERSISTENT ATRIAL FIBRILLATION (HCC): Chronic | Status: ACTIVE | Noted: 2023-11-14

## 2025-08-06 ENCOUNTER — RESULTS FOLLOW-UP (OUTPATIENT)
Dept: CARDIOTHORACIC SURGERY | Age: 60
End: 2025-08-06

## 2025-08-06 DIAGNOSIS — E78.2 MIXED HYPERLIPIDEMIA: Primary | ICD-10-CM

## 2025-08-20 ENCOUNTER — HOSPITAL ENCOUNTER (OUTPATIENT)
Age: 60
Discharge: HOME | End: 2025-08-20
Payer: MEDICAID

## 2025-08-20 ENCOUNTER — TELEPHONE (OUTPATIENT)
Age: 60
End: 2025-08-20

## 2025-08-20 DIAGNOSIS — I48.19 PERSISTENT ATRIAL FIBRILLATION (HCC): ICD-10-CM

## 2025-08-20 DIAGNOSIS — I48.92 ATRIAL FLUTTER, UNSPECIFIED TYPE (HCC): ICD-10-CM

## 2025-08-20 DIAGNOSIS — Z79.899 ON AMIODARONE THERAPY: ICD-10-CM

## 2025-08-20 DIAGNOSIS — I71.21 ANEURYSM OF ASCENDING AORTA WITHOUT RUPTURE: ICD-10-CM

## 2025-08-20 LAB
ALBUMIN SERPL-MCNC: 3.8 G/DL (ref 3.4–5)
ALBUMIN/GLOB SERPL: 1.2 (ref 0.8–1.7)
ALP SERPL-CCNC: 71 U/L (ref 45–117)
ALT SERPL-CCNC: 15 U/L (ref 10–50)
ANION GAP SERPL CALC-SCNC: 12 MMOL/L (ref 3–18)
AST SERPL-CCNC: 20 U/L (ref 10–38)
BILIRUB SERPL-MCNC: 0.6 MG/DL (ref 0.2–1)
BUN SERPL-MCNC: 26 MG/DL (ref 6–23)
BUN/CREAT SERPL: 17 (ref 12–20)
CALCIUM SERPL-MCNC: 9.7 MG/DL (ref 8.5–10.1)
CHLORIDE SERPL-SCNC: 101 MMOL/L (ref 98–107)
CHOLEST SERPL-MCNC: 155 MG/DL
CO2 SERPL-SCNC: 28 MMOL/L (ref 21–32)
CREAT SERPL-MCNC: 1.56 MG/DL (ref 0.6–1.3)
ERYTHROCYTE [DISTWIDTH] IN BLOOD BY AUTOMATED COUNT: 12.4 % (ref 11.6–14.5)
GLOBULIN SER CALC-MCNC: 3.3 G/DL (ref 2–4)
GLUCOSE SERPL-MCNC: 217 MG/DL (ref 74–108)
HCT VFR BLD AUTO: 47.7 % (ref 36–48)
HDLC SERPL-MCNC: 37 MG/DL (ref 40–60)
HDLC SERPL: 4.2 (ref 0–5)
HGB BLD-MCNC: 15.7 G/DL (ref 13–16)
INR PPP: 0.9 (ref 0.9–1.2)
LDLC SERPL CALC-MCNC: 96 MG/DL (ref 0–100)
MCH RBC QN AUTO: 31.2 PG (ref 24–34)
MCHC RBC AUTO-ENTMCNC: 32.9 G/DL (ref 31–37)
MCV RBC AUTO: 94.8 FL (ref 78–100)
NRBC # BLD: 0 K/UL (ref 0–0.01)
NRBC BLD-RTO: 0 PER 100 WBC
PLATELET # BLD AUTO: 232 K/UL (ref 135–420)
PMV BLD AUTO: 11.9 FL (ref 9.2–11.8)
POTASSIUM SERPL-SCNC: 4.2 MMOL/L (ref 3.5–5.5)
PROT SERPL-MCNC: 7.1 G/DL (ref 6.4–8.2)
PROTHROMBIN TIME: 12.9 SEC (ref 12–15.1)
RBC # BLD AUTO: 5.03 M/UL (ref 4.35–5.65)
SODIUM SERPL-SCNC: 140 MMOL/L (ref 136–145)
T4 FREE SERPL-MCNC: 1.3 NG/DL (ref 0.9–1.7)
TRIGL SERPL-MCNC: 111 MG/DL (ref 0–150)
TSH, 3RD GENERATION: 1.13 UIU/ML (ref 0.27–4.2)
VLDLC SERPL CALC-MCNC: 22 MG/DL
WBC # BLD AUTO: 6.6 K/UL (ref 4.6–13.2)

## 2025-08-20 PROCEDURE — 84439 ASSAY OF FREE THYROXINE: CPT

## 2025-08-20 PROCEDURE — 80053 COMPREHEN METABOLIC PANEL: CPT

## 2025-08-20 PROCEDURE — 80061 LIPID PANEL: CPT

## 2025-08-20 PROCEDURE — 85610 PROTHROMBIN TIME: CPT

## 2025-08-20 PROCEDURE — 36415 COLL VENOUS BLD VENIPUNCTURE: CPT

## 2025-08-20 PROCEDURE — 85027 COMPLETE CBC AUTOMATED: CPT

## 2025-08-20 PROCEDURE — 84443 ASSAY THYROID STIM HORMONE: CPT

## 2025-08-21 RX ORDER — EZETIMIBE 10 MG/1
10 TABLET ORAL DAILY
Qty: 30 TABLET | Refills: 3 | Status: SHIPPED | OUTPATIENT
Start: 2025-08-21

## (undated) DEVICE — GUIDEWIRE VASC L260CM DIA0035IN TIP L3MM PTFE J STD TAPR FIX

## (undated) DEVICE — CATH GUID COR EB40 6FR 100CM -- LAUNCHER

## (undated) DEVICE — CATHETER DIAG 5FR L100CM LUMN ID0.047IN JL3.5 CRV 0 SIDE H

## (undated) DEVICE — STENT RONYX20012UX RESOLUTE ONYX 2.00X12
Type: IMPLANTABLE DEVICE | Status: NON-FUNCTIONAL
Brand: RESOLUTE ONYX™

## (undated) DEVICE — CATHETER DIAG AD 5FR L100CM COR GRY HYDRPHLC NYL MPA 2 W/ 2

## (undated) DEVICE — Device: Brand: OMNIWIRE PRESSURE GUIDE WIRE

## (undated) DEVICE — MINI TREK CORONARY DILATATION CATHETER 2.0 MM X 15 MM / RAPID-EXCHANGE: Brand: MINI TREK

## (undated) DEVICE — CATHETER ANGIO PIG 145 0.045 INX5 FRX110 CM THRULUMEN EXPO

## (undated) DEVICE — PINNACLE INTRODUCER SHEATH: Brand: PINNACLE

## (undated) DEVICE — COVER US PRB W15XL120CM W/ GEL RUBBERBAND TAPE STRP FLD GEN

## (undated) DEVICE — RUNTHROUGH NS EXTRA FLOPPY PTCA GUIDEWIRE: Brand: RUNTHROUGH

## (undated) DEVICE — CATHETER GUID 6FR L100CM DIA0.071IN NYL SHFT EBU3.0 W/O

## (undated) DEVICE — CATHETER DIAG 5FR L100CM LUMN ID0.047IN JR4 CRV 0 SIDE H

## (undated) DEVICE — PROCEDURE KIT FLUID MGMT 10 FR CUST MAINFOLD

## (undated) DEVICE — Device: Brand: EAGLE EYE PLATINUM RX DIGITAL IVUS CATHETER

## (undated) DEVICE — PRESSURE MONITORING SET: Brand: TRUWAVE

## (undated) DEVICE — INTRODUCER SHTH 6FR CANN L11CM DIL TIP 35MM GRN TUNGSTEN

## (undated) DEVICE — CATHETER ANGIO NTR 0.045 INX5 FRX100 CM THRULUMEN EXPO

## (undated) DEVICE — CATH BLLN ANGIO 3X15MM NC EUPHORIA RX

## (undated) DEVICE — DEVICE INFL W ACCS + HEMSTAS VLV INSRT TOOL AND TORQ BASIX

## (undated) DEVICE — RADIFOCUS GLIDEWIRE: Brand: GLIDEWIRE

## (undated) DEVICE — PACK PROCEDURE SURG VASC CATH 161 MMC LF

## (undated) DEVICE — ANGIO-SEAL VIP VASCULAR CLOSURE DEVICE: Brand: ANGIO-SEAL

## (undated) DEVICE — DRAPE,ANGIO,BRACH,STERILE,38X44: Brand: MEDLINE

## (undated) DEVICE — MINI TREK CORONARY DILATATION CATHETER 1.50 MM X 12 MM / RAPID-EXCHANGE: Brand: MINI TREK

## (undated) DEVICE — SET FLD ADMIN 3 W STPCOCK FIX FEM L BOR 1IN

## (undated) DEVICE — GLIDESHEATH SLENDER STAINLESS STEEL KIT: Brand: GLIDESHEATH SLENDER

## (undated) DEVICE — CATHETER GUID ADRT 6FR 072IN 5 ECPC GRN

## (undated) DEVICE — CATH DIAG F5 JR 3.5 100CM -- INFINITI - ORDER AS EA

## (undated) DEVICE — CATHETER ANGIO JL4 0.045 INX5 FRX100 CM THRULUMEN EXPO

## (undated) DEVICE — Device

## (undated) DEVICE — CATHETER GUID 5FR GWIRE 0.058IN COR EXTRA BKUP SUPP 3.5 ACT

## (undated) DEVICE — RADIFOCUS OPTITORQUE ANGIOGRAPHIC CATHETER: Brand: OPTITORQUE

## (undated) DEVICE — CATH BLLN ANGIO 3.25X20MM NC EUPHORIA RX

## (undated) DEVICE — CATH BLLN ANGIO 3.50X8MM NC EUPHORIA RX

## (undated) DEVICE — COPILOT KIT INCLUDES BLEEDBACK CONTROL VALVE 20/30 INDEFLATOR INFLATION DEVICE 30 ATM 20 CC / GUIDE WIRE INTRODUCER / TORQUE DEVICE: Brand: INDEFLATOR

## (undated) DEVICE — BAND COMPR L24CM REG CLR PLAS HEMSTAT EXT HK AND LOOP RETEN

## (undated) DEVICE — NC TREK™ CORONARY DILATATION CATHETER 2.25 MM X 12 MM / RAPID-EXCHANGE: Brand: NC TREK™

## (undated) DEVICE — BAND RADIAL COMPR ARTERY 24CM -- REG BX/10